# Patient Record
Sex: FEMALE | Race: WHITE | Employment: OTHER | URBAN - METROPOLITAN AREA
[De-identification: names, ages, dates, MRNs, and addresses within clinical notes are randomized per-mention and may not be internally consistent; named-entity substitution may affect disease eponyms.]

---

## 2014-10-29 LAB — COLONOSCOPY, EXTERNAL: NORMAL

## 2017-01-24 LAB
HBA1C MFR BLD HPLC: 8.1 %
LDL-C, EXTERNAL: 76
MICROALBUMIN UR TEST STR-MCNC: 26 MG/DL

## 2017-03-31 ENCOUNTER — TELEPHONE (OUTPATIENT)
Dept: INTERNAL MEDICINE CLINIC | Age: 67
End: 2017-03-31

## 2017-04-03 ENCOUNTER — HOSPITAL ENCOUNTER (OUTPATIENT)
Dept: VASCULAR SURGERY | Age: 67
Discharge: HOME | End: 2017-04-03
Attending: PODIATRIST
Payer: MEDICARE

## 2017-04-03 DIAGNOSIS — R09.89 PULSE PRESSURE DECREASE: ICD-10-CM

## 2017-04-03 PROCEDURE — 93923 UPR/LXTR ART STDY 3+ LVLS: CPT

## 2017-04-03 NOTE — PROCEDURES
[facility]  *** FINAL REPORT ***    Name: Zoltan Osorio  MRN: [id number]    Outpatient  : 15 May 1950  HIS Order #: [id number]  [address] [facility] Visit #: [id number]  Date: 2017    TYPE OF TEST: Peripheral Arterial Testing    REASON FOR TEST  Extremity ulceration, Pulse pressure decrease    Right Leg  Segmentals: Abnormal                     mmHg  Brachial         122  High thigh       192  Low thigh        154  Calf             112  Posterior tibial 110  Dorsalis pedis   101  Peroneal  Metatarsal  Toe pressure  Doppler:    Normal  Ankle/Brachial: 0.90    Left Leg  Segmentals: Normal                     mmHg  Brachial         122  High thigh  Low thigh        163  Calf             135  Posterior tibial 123  Dorsalis pedis   122  Peroneal  Metatarsal  Toe pressure  Doppler:    Normal  Ankle/Brachial: 1.01    INTERPRETATION/FINDINGS  PROCEDURE:  Multi-level lower extremity arterial segmental pressures,  CW Doppler waveforms and digital PPG waveforms were performed. 1. Mild peripheral arterial disease indicated at rest in the right  leg. 2. No evidence of significant peripheral arterial disease at rest in  the left leg. 3. The right ankle/brachial index is 0.90 and the left ankle/brachial  index is 1.01.    ADDITIONAL COMMENTS    I have personally reviewed the data relevant to the interpretation of  this  study.     TECHNOLOGIST: Bowen Wick RVT  Signed: 2017 02:53 PM    PHYSICIAN: Rosy Feliz MD  Signed: 2017 03:51 PM

## 2017-04-06 ENCOUNTER — HOSPITAL ENCOUNTER (OUTPATIENT)
Dept: LAB | Age: 67
Discharge: HOME | End: 2017-04-06
Payer: MEDICARE

## 2017-04-06 ENCOUNTER — OFFICE VISIT (OUTPATIENT)
Dept: INTERNAL MEDICINE CLINIC | Age: 67
End: 2017-04-06

## 2017-04-06 ENCOUNTER — HOSPITAL ENCOUNTER (OUTPATIENT)
Dept: GENERAL RADIOLOGY | Age: 67
Discharge: HOME | End: 2017-04-06
Attending: INTERNAL MEDICINE
Payer: MEDICARE

## 2017-04-06 VITALS
BODY MASS INDEX: 38.18 KG/M2 | DIASTOLIC BLOOD PRESSURE: 60 MMHG | TEMPERATURE: 98.1 F | WEIGHT: 237.6 LBS | HEIGHT: 66 IN | SYSTOLIC BLOOD PRESSURE: 110 MMHG | HEART RATE: 62 BPM | RESPIRATION RATE: 18 BRPM | OXYGEN SATURATION: 95 %

## 2017-04-06 DIAGNOSIS — E06.3 HYPOTHYROIDISM, ACQUIRED, AUTOIMMUNE: ICD-10-CM

## 2017-04-06 DIAGNOSIS — E78.2 MIXED HYPERLIPIDEMIA: ICD-10-CM

## 2017-04-06 DIAGNOSIS — I10 ESSENTIAL HYPERTENSION, BENIGN: ICD-10-CM

## 2017-04-06 DIAGNOSIS — E55.9 VITAMIN D DEFICIENCY: ICD-10-CM

## 2017-04-06 DIAGNOSIS — Z01.818 PREOP EXAMINATION: ICD-10-CM

## 2017-04-06 DIAGNOSIS — I25.10 ATHEROSCLEROSIS OF NATIVE CORONARY ARTERY OF NATIVE HEART WITHOUT ANGINA PECTORIS: ICD-10-CM

## 2017-04-06 DIAGNOSIS — I25.10 ATHEROSCLEROSIS OF NATIVE CORONARY ARTERY OF NATIVE HEART WITHOUT ANGINA PECTORIS: Primary | ICD-10-CM

## 2017-04-06 PROCEDURE — 71020 XR CHEST PA LAT: CPT

## 2017-04-06 PROCEDURE — 87081 CULTURE SCREEN ONLY: CPT

## 2017-04-06 NOTE — MR AVS SNAPSHOT
Visit Information Date & Time Provider Department Dept. Phone Encounter #  
 4/6/2017  1:20 PM Florence Mcnulty, [address] [facility] [phone] [id number] Follow-up Instructions Return if symptoms worsen or fail to improve. Your Appointments 11/2/2017  2:00 PM  
Medicare Physical with Florence Mcnulty MD  
[facility] Appt Note: cpe 1 yr  
 [address] [facility] [address]  
[address] [facility] [place] [address] Upcoming Health Maintenance Date Due DTaP/Tdap/Td series (1 - Tdap) 9/13/2005 EYE EXAM RETINAL OR DILATED Q1 5/14/2015 GLAUCOMA SCREENING Q2Y 5/14/2016 MICROALBUMIN Q1 1/18/2017 HEMOGLOBIN A1C Q6M 7/24/2017 BREAST CANCER SCRN MAMMOGRAM 8/24/2017 FOOT EXAM Q1 10/24/2017 MEDICARE YEARLY EXAM 11/1/2017 Pneumococcal 65+ Low/Medium Risk (2 of 2 - PPSV23) 1/4/2018 LIPID PANEL Q1 1/24/2018 COLONOSCOPY 10/31/2024 Allergies as of 4/6/2017  Review Complete On: 4/6/2017 By: Florence Mcnulty MD  
 No Known Allergies Current Immunizations  Reviewed on 12/12/2016 Name Date Influenza Vaccine Split 11/1/2010 Pneumococcal Vaccine (Unspecified Type) 1/4/2013, 1/1/2009 TD Vaccine 9/12/2005 Zoster Vaccine, Live 1/1/2016 Not reviewed this visit You Were Diagnosed With   
  
 Codes Comments Atherosclerosis of native coronary artery of native heart without angina pectoris    -  Primary ICD-10-CM: I25.10 ICD-9-CM: 414.01 Preop examination     ICD-10-CM: S40.134 ICD-9-CM: V72.84 Type 2 diabetes, uncontrolled, with neuropathy (HCC)     ICD-10-CM: E11.40, E11.65 ICD-9-CM: 250.62, 357.2 Essential hypertension, benign     ICD-10-CM: I10 
ICD-9-CM: 401.1 Mixed hyperlipidemia     ICD-10-CM: E78.2 ICD-9-CM: 272.2 Hypothyroidism, acquired, autoimmune     ICD-10-CM: E03.8 ICD-9-CM: 244.8 Vitamin D deficiency     ICD-10-CM: E55.9 ICD-9-CM: 268.9 Vitals BP Pulse Temp Resp Height(growth percentile) Weight(growth percentile) 110/60 (BP 1 Location: Right arm, BP Patient Position: Sitting) 62 98.1 °F (36.7 °C) (Oral) 18 5' 6\" (1.676 m) 237 lb 9.6 oz (107.8 kg) SpO2 BMI OB Status Smoking Status 95% 38.35 kg/m2 Postmenopausal Former Smoker BMI and BSA Data Body Mass Index Body Surface Area  
 38.35 kg/m 2 2.24 m 2 Preferred Pharmacy Pharmacy Name Phone [address] [place] [address] [facility], [address] [place] [address] [phone] Your Updated Medication List  
  
   
This list is accurate as of: 4/6/17  1:30 PM.  Always use your most recent med list.  
  
  
  
  
 aspirin 81 mg chewable tablet Take 81 mg by mouth daily. atorvastatin 40 mg tablet Commonly known as:  LIPITOR Take 1 Tab by mouth daily. b complex vitamins tablet Take 1 Tab by mouth daily. Bifidobacterium Infantis 4 mg Cap Commonly known as:  ALIGN As directed  
  
 clopidogrel 75 mg Tab Commonly known as:  PLAVIX Take 1 Tab by mouth daily. fenofibrate micronized 134 mg capsule Commonly known as:  LOFIBRA Take 134 mg by mouth daily. FISH OIL 1,000 mg Cap Generic drug:  omega-3 fatty acids-vitamin e Take 1 Cap by mouth daily. insulin CONCENTRATED regular 500 unit/mL Soln Commonly known as:  U-500 CONCENTRATED  
24-34 Units by SubCUTAneous route See Admin Instructions. EVERYDAY BEFORE MEALS PATIENT ADHERES TO FOLLOWING SLIDING SCALE INSULIN REGIMEN: FOR BLOOD GLUCOSE  MG/DL: 24 UNITS ON U-100 SYRINGE = 120 UNITS OF U-500 INSULIN, 151-250 MG/DL: 28 UNITS ON U-100 SYRINGE = 140 UNITS OF U-500 INSULIN, 251-350 MG/DL: 30 UNITS ON U-100 SYRINGE = 150 UNITS OF U-500 INSULIN, >351 MG/DL: 34 UNITS ON U-100 SYRINGE = 170 UNITS OF U-500 INSULIN  
  
 levothyroxine 125 mcg tablet Commonly known as:  SYNTHROID  
 Take 125 mcg by mouth every Monday. Take 2 tablets by mouth Tuesday-Sunday once daily before breakfast, and 1 tablet by mouth on Mondays once daily before breakfast  
  
 metoprolol succinate 50 mg XL tablet Commonly known as:  TOPROL XL Take 1 Tab by mouth daily. MOTRIN  mg tablet Generic drug:  ibuprofen Take 200 mg by mouth daily as needed for Pain. Indications: OSTEOARTHRITIS  
  
 nitroglycerin 0.4 mg SL tablet Commonly known as:  NITROSTAT  
1 Tab by SubLINGual route as needed for Chest Pain. PriLOSEC OTC 20 mg tablet Generic drug:  omeprazole Take 20 mg by mouth daily. Indications: GASTROESOPHAGEAL REFLUX PROzac 40 mg capsule Generic drug:  FLUoxetine Take 40 mg by mouth daily. THERAPEUTIC MULTIVIT/MINERAL 27-0.4 mg Tab Generic drug:  multivitamin,tx-iron-ca-min Take 1 Tab by mouth every evening. TRULICITY SC  
by SubCUTAneous route. We Performed the Following AMB POC EKG ROUTINE W/ 12 LEADS, INTER & REP [20644 CPT(R)] CBC WITH AUTOMATED DIFF [21095 CPT(R)] METABOLIC PANEL, COMPREHENSIVE [37968 CPT(R)] MRSA SCREENING CULTURE [65049 CPT(R)] PTH INTACT [96100 CPT(R)] VITAMIN D, 25 HYDROXY [id number] CPT(R)] Follow-up Instructions Return if symptoms worsen or fail to improve. To-Do List   
 04/06/2017 Imaging:  XR CHEST PA LAT Introducing [facility]! [employer] introduces [employer] patient portal. Now you can access parts of your medical record, email your doctor's office, and request medication refills online. 1. In your internet browser, go to [email or web address] 2. Click on the First Time User? Click Here link in the Sign In box. You will see the New Member Sign Up page. 3. Enter your [employer] Access Code exactly as it appears below. You will not need to use this code after youve completed the sign-up process.  If you do not sign up before the expiration date, you must request a new code. · [employer] Access Code: [email or web address] Expires: 6/25/2017  2:22 PM 
 
4. Enter the last four digits of your Social Security Number (xxxx) and Date of Birth (mm/dd/yyyy) as indicated and click Submit. You will be taken to the next sign-up page. 5. Create a [employer] ID. This will be your [employer] login ID and cannot be changed, so think of one that is secure and easy to remember. 6. Create a [employer] password. You can change your password at any time. 7. Enter your Password Reset Question and Answer. This can be used at a later time if you forget your password. 8. Enter your e-mail address. You will receive e-mail notification when new information is available in [address] 9. Click Sign Up. You can now view and download portions of your medical record. 10. Click the Download Summary menu link to download a portable copy of your medical information. If you have questions, please visit the Frequently Asked Questions section of the [employer] website. Remember, [employer] is NOT to be used for urgent needs. For medical emergencies, dial 911. Now available from your iPhone and Android! Please provide this summary of care documentation to your next provider. Your primary care clinician is listed as ZELDA MUÑOZ. If you have any questions after today's visit, please call [phone].

## 2017-04-06 NOTE — PROGRESS NOTES
HISTORY OF PRESENT ILLNESS  Lexi Holliday is a 78 y.o. female. GRACE Nath is seen today for follow up of diabetes and coronary disease as well as for cardiovascular preop evaluation for upcoming foot surgery. 1. CAD native artery native heart without angina. She has seen Dr. Bryanna Jimenez. She has been given instructions regarding her blood thinners. See below. She has had no active symptoms. 2. Diabetes type 2 uncontrolled with neuropathy with long term insulin use. She is up to date with follow up with her endocrinologist Dr. Shiraz Rudd. She has an occasional low reaction. She has chronic neuropathy as well as nonhealing foot ulcers. 3. Nonhealing foot ulcer. She has surgery scheduled with Dr. Jenna Rodríguez on 4/11/17 for a procedure to assist the healing of the wound. Apparently she requires some repositioning of her metatarsal bones. This will be outpatient surgery at [facility].  I have reviewed the preop requirements and these are ordered. Assessment: She is at moderate risk for medical complications based on history of coronary disease and significant diabetes. She is clinically stable. Based on recommendations of her specialists as well as the below recommendations, I feel the risks are acceptable. Recommendations. 1. She is to call Dr. Shiraz Rudd for directions on her insulin prior to surgery. 2. Jet Nath tells me she has seen Dr. Bryanna Jimenez and was given the OK to hold Plavix for 5 days but continue aspirin. I have asked her to make sure she discusses this with Dr. Jenna Rodríguez at her visit with him tomorrow. 3. Continue all other routine medications. 4. If hospitalization is required, consult the hospitalist service for assistance with her diabetic management. 5. Check fingerstick sugar on a prn basis prior to and post op. 6. We will obtain preop studies. Final approval would require review of these studies. She knows to get her labs and x-ray today. Her EKG is unremarkable.    7. Call if I may help with any post operative medical problems or questions. 8. She will call prn otherwise follow up as previously directed. MedDATA/gwo       Past Medical History:   Diagnosis Date    CAD (coronary artery disease) 2008    angio/mild    Diabetes [facility] [address]     Hypercholesterolemia     Hypothyroid     Ischemic colitis ([facility] [address] 11/1/2014    Menopause 2000    Neuropathy     Obesity     Thyroid disease     Vertigo      Past Surgical History:   Procedure Laterality Date    BREAST SURGERY PROCEDURE UNLISTED      benign breast cyst    HX AMPUTATION      right 2nd , 3rd  toes    HX MOHS PROCEDURES  2005    faith    HX ORTHOPAEDIC      right foot abscess     HX ORTHOPAEDIC  2007    rigtht foot surgery    HX ORTHOPAEDIC  2007    R [place] amputate    HX ORTHOPAEDIC  2009    left 2nd toe surgery    HX TONSILLECTOMY       Current Outpatient Prescriptions   Medication Sig    DULAGLUTIDE (TRULICITY SC) by SubCUTAneous route.  atorvastatin (LIPITOR) 40 mg tablet Take 1 Tab by mouth daily.  clopidogrel (PLAVIX) 75 mg tab Take 1 Tab by mouth daily.  nitroglycerin (NITROSTAT) 0.4 mg SL tablet 1 Tab by SubLINGual route as needed for Chest Pain.  metoprolol succinate (TOPROL XL) 50 mg XL tablet Take 1 Tab by mouth daily.  FLUoxetine (PROZAC) 40 mg capsule Take 40 mg by mouth daily.  omeprazole (PRILOSEC OTC) 20 mg tablet Take 20 mg by mouth daily. Indications: GASTROESOPHAGEAL REFLUX    b complex vitamins tablet Take 1 Tab by mouth daily.  ibuprofen (MOTRIN IB) 200 mg tablet Take 200 mg by mouth daily as needed for Pain. Indications: OSTEOARTHRITIS    levothyroxine (SYNTHROID) 125 mcg tablet Take 125 mcg by mouth every Monday. Take 2 tablets by mouth Tuesday-Sunday once daily before breakfast, and 1 tablet by mouth on Mondays once daily before breakfast    Bifidobacterium Infantis (ALIGN) 4 mg cap As directed (Patient taking differently: Take 1 Cap by mouth daily.  As directed)    fenofibrate micronized (LOFIBRA) 134 mg capsule Take 134 mg by mouth daily.  aspirin 81 mg chewable tablet Take 81 mg by mouth daily.  omega-3 fatty acids-vitamin e (FISH OIL) 1,000 mg cap Take 1 Cap by mouth daily.  insulin CONCENTRATED regular (U-500 CONCENTRATED) 500 unit/mL soln 24-34 Units by SubCUTAneous route See Admin Instructions. EVERYDAY BEFORE MEALS PATIENT ADHERES TO FOLLOWING SLIDING SCALE INSULIN REGIMEN:  FOR BLOOD GLUCOSE  MG/DL: 24 UNITS ON U-100 SYRINGE = 120 UNITS OF U-500 INSULIN, 151-250 MG/DL: 28 UNITS ON U-100 SYRINGE = 140 UNITS OF U-500 INSULIN, 251-350 MG/DL: 30 UNITS ON U-100 SYRINGE = 150 UNITS OF U-500 INSULIN, >351 MG/DL: 34 UNITS ON U-100 SYRINGE = 170 UNITS OF U-500 INSULIN      multivitamin,tx-iron-ca-min (THERAPEUTIC MULTIVIT/MINERAL) 27-0.4 mg Tab Take 1 Tab by mouth every evening. No current facility-administered medications for this visit. No Known Allergies  Family History   Problem Relation Age of Onset    Diabetes Father     Heart Attack Father      congestive heart faliure    Heart Disease Father      CHF    Diabetes Maternal Aunt     Diabetes Maternal Uncle      Social History     Social History    Marital status:      Spouse name: N/A    Number of children: N/A    Years of education: N/A     Occupational History    Not on file. Social History Main Topics    Smoking status: Former Smoker     Years: 5.00     Quit date: 2/7/1985    Smokeless tobacco: Never Used    Alcohol use No    Drug use: No    Sexual activity: Not on file     Other Topics Concern    Not on file     Social History Narrative         Review of Systems   Constitutional: Positive for diaphoresis. Negative for chills, fever and weight loss. Eyes: Positive for blurred vision. Respiratory: Negative. Cardiovascular: Negative for chest pain, palpitations, leg swelling and PND. Musculoskeletal: Negative for myalgias.    Neurological: Positive for tingling and sensory change. Negative for focal weakness. All other systems reviewed and are negative. Physical Exam   Constitutional: She appears well-nourished. HENT:   Mouth/Throat: No oropharyngeal exudate. Eyes: EOM are normal. Right eye exhibits no discharge. Left eye exhibits no discharge. Neck: Neck supple. Carotid bruit is not present. Cardiovascular: Normal rate and regular rhythm. Exam reveals no gallop and no friction rub. No murmur heard. Pulmonary/Chest: Effort normal and breath sounds normal. No respiratory distress. Abdominal: She exhibits no distension. There is no tenderness. Musculoskeletal: She exhibits edema. Mild bilateral lower extremity edema     Full foot exam per Dr Romi Joshi   Lymphadenopathy:     She has no cervical adenopathy. Neurological: She is alert. She exhibits normal muscle tone. Coordination normal.   Skin: Skin is warm and dry. Psychiatric: She has a normal mood and affect. Her behavior is normal.   Nursing note and vitals reviewed. ASSESSMENT and PLAN  Jana Cordero was seen today for pre-op exam.    Diagnoses and all orders for this visit:    Atherosclerosis of native coronary artery of native heart without angina pectoris  -     AMB POC EKG ROUTINE W/ 12 LEADS, INTER & REP  -     XR CHEST PA LAT; Future        - See cardiologist as directed. Preop examination  -     MRSA SCREENING CULTURE    Type 2 diabetes, uncontrolled, with neuropathy [facility])- See endocrinologist as directed. -     CBC WITH AUTOMATED DIFF  -     METABOLIC PANEL, COMPREHENSIVE    Essential hypertension, benign- Continue current regimen of prescription and / or OTC medications     Mixed hyperlipidemia- See endocrinologist as directed. Hypothyroidism, acquired, autoimmune- See endocrinologist as directed. Vitamin D deficiency  -     VITAMIN D, 25 HYDROXY  -     PTH INTACT    This has been fully explained to the patient, who indicates understanding.

## 2017-04-07 LAB
25(OH)D3+25(OH)D2 SERPL-MCNC: 39 NG/ML (ref 30–100)
ALBUMIN SERPL-MCNC: 4.1 G/DL (ref 3.6–4.8)
ALBUMIN/GLOB SERPL: 1.2 {RATIO} (ref 1.2–2.2)
ALP SERPL-CCNC: 173 IU/L (ref 39–117)
ALT SERPL-CCNC: 21 IU/L (ref 0–32)
AST SERPL-CCNC: 24 IU/L (ref 0–40)
BASOPHILS # BLD AUTO: 0 X10E3/UL (ref 0–0.2)
BASOPHILS NFR BLD AUTO: 0 %
BILIRUB SERPL-MCNC: 0.3 MG/DL (ref 0–1.2)
BUN SERPL-MCNC: 34 MG/DL (ref 8–27)
BUN/CREAT SERPL: 33 (ref 12–28)
CALCIUM SERPL-MCNC: 9.6 MG/DL (ref 8.7–10.3)
CHLORIDE SERPL-SCNC: 99 MMOL/L (ref 96–106)
CO2 SERPL-SCNC: 21 MMOL/L (ref 18–29)
CREAT SERPL-MCNC: 1.03 MG/DL (ref 0.57–1)
EOSINOPHIL # BLD AUTO: 0.2 X10E3/UL (ref 0–0.4)
EOSINOPHIL NFR BLD AUTO: 2 %
ERYTHROCYTE [DISTWIDTH] IN BLOOD BY AUTOMATED COUNT: 15.2 % (ref 12.3–15.4)
GLOBULIN SER CALC-MCNC: 3.3 G/DL (ref 1.5–4.5)
GLUCOSE SERPL-MCNC: 66 MG/DL (ref 65–99)
HCT VFR BLD AUTO: 38.2 % (ref 34–46.6)
HGB BLD-MCNC: 12.5 G/DL (ref 11.1–15.9)
IMM GRANULOCYTES # BLD: 0.1 X10E3/UL (ref 0–0.1)
IMM GRANULOCYTES NFR BLD: 1 %
LYMPHOCYTES # BLD AUTO: 2.3 X10E3/UL (ref 0.7–3.1)
LYMPHOCYTES NFR BLD AUTO: 24 %
MCH RBC QN AUTO: 27.5 PG (ref 26.6–33)
MCHC RBC AUTO-ENTMCNC: 32.7 G/DL (ref 31.5–35.7)
MCV RBC AUTO: 84 FL (ref 79–97)
MONOCYTES # BLD AUTO: 1.1 X10E3/UL (ref 0.1–0.9)
MONOCYTES NFR BLD AUTO: 11 %
NEUTROPHILS # BLD AUTO: 6.1 X10E3/UL (ref 1.4–7)
NEUTROPHILS NFR BLD AUTO: 62 %
PLATELET # BLD AUTO: 309 X10E3/UL (ref 150–379)
POTASSIUM SERPL-SCNC: 4.5 MMOL/L (ref 3.5–5.2)
PROT SERPL-MCNC: 7.4 G/DL (ref 6–8.5)
PTH-INTACT SERPL-MCNC: 18 PG/ML (ref 15–65)
RBC # BLD AUTO: 4.54 X10E6/UL (ref 3.77–5.28)
SODIUM SERPL-SCNC: 140 MMOL/L (ref 134–144)
WBC # BLD AUTO: 9.8 X10E3/UL (ref 3.4–10.8)

## 2017-04-07 NOTE — PROGRESS NOTES
Call- cxr is fine and Labs look great overall .  The kidney function shows she may be slightly dehydrated so make sure she drinks adequate fluids

## 2017-04-07 NOTE — PROGRESS NOTES
Advised pt hercxr is fine and labs look great overall. The kidney function shows she may be slightly dehydrated so make sure she drinks adequate fluids.

## 2017-04-08 LAB — MRSA SPEC QL CULT: NEGATIVE

## 2017-04-11 NOTE — PERIOP NOTES
[address]  Preoperative Instructions        Surgery Date 4/18/17          Time of Arrival 10:00am    1. On the day of your surgery, please report to the Surgical Services Registration Desk and sign in at your designated time. The Surgery Center is located to the right of the Emergency Room. 2. You must have someone with you to drive you home. You should not drive a car for 24 hours following surgery. Please make arrangements for a friend or family member to stay with you for the first 24 hours after your surgery. 3. Do not have anything to eat or drink (including water, gum, mints, coffee, juice) after midnight 4/17/17             . This may not apply to medications prescribed by your physician. Please note special instructions, if applicable. If you are currently taking Plavix, Coumadin, or other blood-thinning agents, contact your surgeon for instructions. 4. We recommend you do not drink any alcoholic beverages for 24 hours before and after your surgery. 5. Have a list of all current medications, including vitamins, herbal supplements and any other over the counter medications. Stop all Aspirin and non-steroidal anti-inflammatory drugs (I.e. Advil, Aleve), as directed by your surgeon's office. Stop all vitamins and herbal supplements seven days prior to your surgery. 6. Wear comfortable clothes. Wear glasses instead of contacts. Do not bring any money or jewelry. Please bring picture ID, insurance card, and any prearranged co-payment or hospital payment. Do not wear make-up, particularly mascara the morning of your surgery. Do not wear nail polish, particularly if you are having foot /hand surgery. Wear your hair loose or down, no ponytails, buns, sruthi pins or clips. All body piercings must be removed.   Please shower with antibacterial soap for three consecutive days before and on the morning of surgery, but do not apply any lotions, powders or deodorants after the shower on the day of surgery. Please use a fresh towels after each shower. Please sleep in clean clothes and change bed linens the night before surgery. Please do not shave for 48 hours prior to surgery. Shaving of the face is acceptable. 7. You should understand that if you do not follow these instructions your surgery may be cancelled. If your physical condition changes (I.e. fever, cold or flu) please contact your surgeon as soon as possible. 8. It is important that you be on time. If a situation occurs where you may be late, please call [phone] (OR Holding Area). 9. If you have any questions and or problems, please call [phone] (Pre-admission Testing). 10. Your surgery time may be subject to change. You will receive a phone call the evening prior if your time changes. 11.  If having outpatient surgery, you must have someone to drive you here, stay with you during the duration of your stay, and to drive you home at time of discharge. Special Instructions:Pt stated she was advised by Dr Bridger Torres to cont. Aspirin up to day of surgery and to hold Plavix starting 4/14/17 for surgery    MEDICATIONS TO TAKE THE MORNING OF SURGERY WITH A SIP OF WATER:Metoprolol, Prilosec. I understand a pre-operative phone call will be made to verify my surgery time. In the event that I am not available, I give permission for a message to be left on my answering service and/or with another person?   yes         ___________________      __________   _________    (Signature of Patient)             (Witness)                (Date and Time)

## 2017-04-17 ENCOUNTER — TELEPHONE (OUTPATIENT)
Dept: INTERNAL MEDICINE CLINIC | Age: 67
End: 2017-04-17

## 2017-04-18 ENCOUNTER — HOSPITAL ENCOUNTER (OUTPATIENT)
Age: 67
Setting detail: OUTPATIENT SURGERY
Discharge: HOME | End: 2017-04-18
Attending: PODIATRIST | Admitting: PODIATRIST
Payer: MEDICARE

## 2017-04-18 ENCOUNTER — ANESTHESIA (OUTPATIENT)
Dept: SURGERY | Age: 67
End: 2017-04-18
Payer: MEDICARE

## 2017-04-18 ENCOUNTER — ANESTHESIA EVENT (OUTPATIENT)
Dept: SURGERY | Age: 67
End: 2017-04-18
Payer: MEDICARE

## 2017-04-18 VITALS
DIASTOLIC BLOOD PRESSURE: 57 MMHG | RESPIRATION RATE: 18 BRPM | SYSTOLIC BLOOD PRESSURE: 140 MMHG | TEMPERATURE: 98.1 F | HEART RATE: 69 BPM | WEIGHT: 238.54 LBS | BODY MASS INDEX: 38.34 KG/M2 | HEIGHT: 66 IN | OXYGEN SATURATION: 95 %

## 2017-04-18 LAB
GLUCOSE BLD STRIP.AUTO-MCNC: 147 MG/DL (ref 65–100)
GLUCOSE BLD STRIP.AUTO-MCNC: 179 MG/DL (ref 65–100)
SERVICE CMNT-IMP: ABNORMAL
SERVICE CMNT-IMP: ABNORMAL

## 2017-04-18 PROCEDURE — 77030011640 HC PAD GRND REM COVD -A: Performed by: PODIATRIST

## 2017-04-18 PROCEDURE — 74011000250 HC RX REV CODE- 250

## 2017-04-18 PROCEDURE — 77030006788 HC BLD SAW OSC STRY -B: Performed by: PODIATRIST

## 2017-04-18 PROCEDURE — 74011250636 HC RX REV CODE- 250/636: Performed by: PODIATRIST

## 2017-04-18 PROCEDURE — 77030018836 HC SOL IRR NACL ICUM -A: Performed by: PODIATRIST

## 2017-04-18 PROCEDURE — 77030036687 HC SHOE PSTOP S2SG -A

## 2017-04-18 PROCEDURE — 77030008841 HC WRE K MCRA -A: Performed by: PODIATRIST

## 2017-04-18 PROCEDURE — 77030002916 HC SUT ETHLN J&J -A: Performed by: PODIATRIST

## 2017-04-18 PROCEDURE — C1713 ANCHOR/SCREW BN/BN,TIS/BN: HCPCS | Performed by: PODIATRIST

## 2017-04-18 PROCEDURE — 74011250636 HC RX REV CODE- 250/636: Performed by: ANESTHESIOLOGY

## 2017-04-18 PROCEDURE — 76210000006 HC OR PH I REC 0.5 TO 1 HR: Performed by: PODIATRIST

## 2017-04-18 PROCEDURE — 77030020274 HC MISC IMPL ORTHOPEDIC: Performed by: PODIATRIST

## 2017-04-18 PROCEDURE — 82962 GLUCOSE BLOOD TEST: CPT

## 2017-04-18 PROCEDURE — 74011250636 HC RX REV CODE- 250/636

## 2017-04-18 PROCEDURE — 77030020782 HC GWN BAIR PAWS FLX 3M -B

## 2017-04-18 PROCEDURE — 74011000272 HC RX REV CODE- 272: Performed by: PODIATRIST

## 2017-04-18 PROCEDURE — 76060000034 HC ANESTHESIA 1.5 TO 2 HR: Performed by: PODIATRIST

## 2017-04-18 PROCEDURE — 77030031139 HC SUT VCRL2 J&J -A: Performed by: PODIATRIST

## 2017-04-18 PROCEDURE — 74011000250 HC RX REV CODE- 250: Performed by: PODIATRIST

## 2017-04-18 PROCEDURE — 76010000153 HC OR TIME 1.5 TO 2 HR: Performed by: PODIATRIST

## 2017-04-18 PROCEDURE — 77030004414 HC BUR PEAR STRY -B: Performed by: PODIATRIST

## 2017-04-18 PROCEDURE — 76210000020 HC REC RM PH II FIRST 0.5 HR: Performed by: PODIATRIST

## 2017-04-18 RX ORDER — DIPHENHYDRAMINE HYDROCHLORIDE 50 MG/ML
12.5 INJECTION, SOLUTION INTRAMUSCULAR; INTRAVENOUS AS NEEDED
Status: CANCELLED | OUTPATIENT
Start: 2017-04-18 | End: 2017-04-18

## 2017-04-18 RX ORDER — HYDROMORPHONE HYDROCHLORIDE 1 MG/ML
0.2 INJECTION, SOLUTION INTRAMUSCULAR; INTRAVENOUS; SUBCUTANEOUS
Status: CANCELLED | OUTPATIENT
Start: 2017-04-18

## 2017-04-18 RX ORDER — SODIUM CHLORIDE, SODIUM LACTATE, POTASSIUM CHLORIDE, CALCIUM CHLORIDE 600; 310; 30; 20 MG/100ML; MG/100ML; MG/100ML; MG/100ML
25 INJECTION, SOLUTION INTRAVENOUS CONTINUOUS
Status: DISCONTINUED | OUTPATIENT
Start: 2017-04-18 | End: 2017-04-18 | Stop reason: HOSPADM

## 2017-04-18 RX ORDER — SODIUM CHLORIDE, SODIUM LACTATE, POTASSIUM CHLORIDE, CALCIUM CHLORIDE 600; 310; 30; 20 MG/100ML; MG/100ML; MG/100ML; MG/100ML
25 INJECTION, SOLUTION INTRAVENOUS CONTINUOUS
Status: CANCELLED | OUTPATIENT
Start: 2017-04-18

## 2017-04-18 RX ORDER — PROPOFOL 10 MG/ML
INJECTION, EMULSION INTRAVENOUS AS NEEDED
Status: DISCONTINUED | OUTPATIENT
Start: 2017-04-18 | End: 2017-04-18 | Stop reason: HOSPADM

## 2017-04-18 RX ORDER — LIDOCAINE HYDROCHLORIDE 10 MG/ML
0.1 INJECTION, SOLUTION EPIDURAL; INFILTRATION; INTRACAUDAL; PERINEURAL AS NEEDED
Status: DISCONTINUED | OUTPATIENT
Start: 2017-04-18 | End: 2017-04-18 | Stop reason: HOSPADM

## 2017-04-18 RX ORDER — FENTANYL CITRATE 50 UG/ML
INJECTION, SOLUTION INTRAMUSCULAR; INTRAVENOUS AS NEEDED
Status: DISCONTINUED | OUTPATIENT
Start: 2017-04-18 | End: 2017-04-18 | Stop reason: HOSPADM

## 2017-04-18 RX ORDER — MIDAZOLAM HYDROCHLORIDE 1 MG/ML
INJECTION, SOLUTION INTRAMUSCULAR; INTRAVENOUS AS NEEDED
Status: DISCONTINUED | OUTPATIENT
Start: 2017-04-18 | End: 2017-04-18 | Stop reason: HOSPADM

## 2017-04-18 RX ORDER — SODIUM CHLORIDE 0.9 % (FLUSH) 0.9 %
5-10 SYRINGE (ML) INJECTION AS NEEDED
Status: CANCELLED | OUTPATIENT
Start: 2017-04-18

## 2017-04-18 RX ORDER — BUPIVACAINE HYDROCHLORIDE AND EPINEPHRINE 5; 5 MG/ML; UG/ML
INJECTION, SOLUTION EPIDURAL; INTRACAUDAL; PERINEURAL AS NEEDED
Status: DISCONTINUED | OUTPATIENT
Start: 2017-04-18 | End: 2017-04-18 | Stop reason: HOSPADM

## 2017-04-18 RX ORDER — OXYCODONE AND ACETAMINOPHEN 5; 325 MG/1; MG/1
1 TABLET ORAL
Qty: 25 TAB | Refills: 0 | Status: SHIPPED | OUTPATIENT
Start: 2017-04-18 | End: 2017-11-02 | Stop reason: ALTCHOICE

## 2017-04-18 RX ORDER — FENTANYL CITRATE 50 UG/ML
25 INJECTION, SOLUTION INTRAMUSCULAR; INTRAVENOUS
Status: CANCELLED | OUTPATIENT
Start: 2017-04-18

## 2017-04-18 RX ORDER — PROPOFOL 10 MG/ML
INJECTION, EMULSION INTRAVENOUS
Status: DISCONTINUED | OUTPATIENT
Start: 2017-04-18 | End: 2017-04-18 | Stop reason: HOSPADM

## 2017-04-18 RX ORDER — PROMETHAZINE HYDROCHLORIDE 25 MG/1
25 TABLET ORAL
Qty: 20 TAB | Refills: 0 | Status: SHIPPED | OUTPATIENT
Start: 2017-04-18 | End: 2017-11-02 | Stop reason: ALTCHOICE

## 2017-04-18 RX ORDER — SODIUM CHLORIDE 0.9 % (FLUSH) 0.9 %
5-10 SYRINGE (ML) INJECTION EVERY 8 HOURS
Status: DISCONTINUED | OUTPATIENT
Start: 2017-04-18 | End: 2017-04-18 | Stop reason: HOSPADM

## 2017-04-18 RX ORDER — SODIUM CHLORIDE 0.9 % (FLUSH) 0.9 %
5-10 SYRINGE (ML) INJECTION AS NEEDED
Status: DISCONTINUED | OUTPATIENT
Start: 2017-04-18 | End: 2017-04-18 | Stop reason: HOSPADM

## 2017-04-18 RX ADMIN — VANCOMYCIN HYDROCHLORIDE 1000 MG: 1 INJECTION, POWDER, LYOPHILIZED, FOR SOLUTION INTRAVENOUS at 11:09

## 2017-04-18 RX ADMIN — FENTANYL CITRATE 25 MCG: 50 INJECTION, SOLUTION INTRAMUSCULAR; INTRAVENOUS at 12:11

## 2017-04-18 RX ADMIN — MIDAZOLAM HYDROCHLORIDE 2 MG: 1 INJECTION, SOLUTION INTRAMUSCULAR; INTRAVENOUS at 12:08

## 2017-04-18 RX ADMIN — PROPOFOL 100 MCG/KG/MIN: 10 INJECTION, EMULSION INTRAVENOUS at 12:11

## 2017-04-18 RX ADMIN — SODIUM CHLORIDE, SODIUM LACTATE, POTASSIUM CHLORIDE, AND CALCIUM CHLORIDE: 600; 310; 30; 20 INJECTION, SOLUTION INTRAVENOUS at 13:34

## 2017-04-18 RX ADMIN — PROPOFOL 50 MG: 10 INJECTION, EMULSION INTRAVENOUS at 12:35

## 2017-04-18 RX ADMIN — SODIUM CHLORIDE, SODIUM LACTATE, POTASSIUM CHLORIDE, AND CALCIUM CHLORIDE 25 ML/HR: 600; 310; 30; 20 INJECTION, SOLUTION INTRAVENOUS at 11:09

## 2017-04-18 NOTE — ANESTHESIA PREPROCEDURE EVALUATION
Anesthetic History   No history of anesthetic complications            Review of Systems / Medical History  Patient summary reviewed, nursing notes reviewed and pertinent labs reviewed    Pulmonary        Sleep apnea           Neuro/Psych         Psychiatric history     Cardiovascular    Hypertension          CAD    Exercise tolerance: >4 METS     GI/Hepatic/Renal     GERD           Endo/Other    Diabetes  Hypothyroidism  Obesity and arthritis     Other Findings            Physical Exam    Airway  Mallampati: III  TM Distance: 4 - 6 cm  Neck ROM: normal range of motion   Mouth opening: Normal     Cardiovascular  Regular rate and rhythm,  S1 and S2 normal,  no murmur, click, rub, or gallop             Dental  No notable dental hx       Pulmonary  Breath sounds clear to auscultation               Abdominal  GI exam deferred       Other Findings            Anesthetic Plan    ASA: 3  Anesthesia type: general and total IV anesthesia    Monitoring Plan: BIS      Induction: Intravenous  Anesthetic plan and risks discussed with: Patient

## 2017-04-18 NOTE — OP NOTES
[address], [address]   OP NOTE       Name:  Ambrosio Hoffman   MR#:  [id number]   :  1950   Account #:  [de-identified]    Surgery Date:  2017   Date of Adm:  2017       PREOPERATIVE DIAGNOSIS: Plantar-flexed 3rd metatarsal, right   foot, with chronic ulceration. POSTOPERATIVE DIAGNOSIS: Plantar-flexed 3rd metatarsal, right   foot, with chronic ulceration. PROCEDURES PERFORMED   1. Elevating osteotomy, 3rd metatarsal, right foot. 2. Excisional debridement of ulcer, and application of Clarix amniotic   graft. SURGEON: Zenon Avery DPM    ANESTHESIA: Monitored anesthesia care. ESTIMATED BLOOD LOSS: 10 mL. COMPLICATIONS: None. SPECIMENS REMOVED: None. INDICATIONS: The patient has a chronic ulceration on the plantar   aspect of her right foot. We have tried multiple forms of conservative   therapy, and not able to get rid of the ulceration. I have discussed the   surgery, the risks and the complications with her, she is agreeable. DESCRIPTION OF PROCEDURE: She was taken to the operating   room, placed on the operating table in supine position. After adequate   induction of anesthesia, the patient was prepped and draped in the   usual sterile manner. Attention was directed towards the right foot,   where an incision was made over the dorsal aspect of the right foot,   after the ulcer was covered with an impervious, sticky dressing of   DuoDerm. The incision was carried down to the level of bone. An   McGlamry elevator was placed into the metatarsophalangeal joint, and   freed up the metatarsophalangeal joint. A Weil osteotomy was   performed in the 3rd metatarsal. The osteotomy was slid back. Under   C-arm, we could see a significant change in position. We stabilized   with two 0.045 K-wires. Each 0.045 K-wire was replaced with a 2.0   twist-off screw, a #11 and a #12.  Once both twist-off screws were in place, the overhanging shaft on the 3rd metatarsal was resected with a   rongeur. The area was flushed. The deep structures reapproximated   with 4-0 Vicryl, and the skin was reapproximated with a 4-0 nylon. At   this point, I covered the dorsal aspect of the wound. The skin ulcer was   excisionally debrided with a 10 blade down to healthy bleeding tissue. Clarix graft was placed into the site, and sutured in place with a 4-0   nylon. I left the ends of the 1 strand long to create a bolster dressing   on the plantar aspect of the foot. Once we had done this, the patient   was placed in a dry sterile dressing, and taken from the operating room   to the recovery room in stable condition.         [facility]   D:  04/18/2017   14:09   T:  04/18/2017   14:30   Job #:  [id number]

## 2017-04-18 NOTE — IP AVS SNAPSHOT
[address] [facility] 
[phone] Patient: Francisco Vasquez MRN: [id number] WUT:[id number] You are allergic to the following No active allergies Recent Documentation Height Weight BMI OB Status Smoking Status 1.676 m 108.2 kg 38.5 kg/m2 Postmenopausal Former Smoker Emergency Contacts Name Discharge Info Relation Home Work Mobile Loco Hernandez 11  Other Relative [6] [phone] About your hospitalization You were admitted on:  April 18, 2017 You last received care in the:  [facility] You were discharged on:  April 18, 2017 Unit phone number:  [phone] Why you were hospitalized Your primary diagnosis was:  Not on File Providers Seen During Your Hospitalizations Provider Role Specialty Primary office phone Debora Aguero DPM Attending Provider Podiatry [phone] Your Primary Care Physician (PCP) Primary Care Physician Office Phone Office Fax Kristine Houston [phone] Follow-up Information Follow up With Details Comments Contact Info Gilbert Potter MD   [address] [facility] [address] [place] [address] 
[phone] Current Discharge Medication List  
  
START taking these medications Dose & Instructions Dispensing Information Comments Morning Noon Evening Bedtime  
 oxyCODONE-acetaminophen 5-325 mg per tablet Commonly known as:  PERCOCET Your last dose was: Your next dose is:    
   
   
 Dose:  1 Tab Take 1 Tab by mouth every four (4) hours as needed for Pain. Max Daily Amount: 6 Tabs. Indications: Pain Quantity:  25 Tab Refills:  0  
     
   
   
   
  
 promethazine 25 mg tablet Commonly known as:  PHENERGAN Your last dose was:     
   
Your next dose is:    
   
   
 Dose:  25 mg  
 Take 1 Tab by mouth every six (6) hours as needed for Nausea. Indications: Pain Treatment Adjunct, POST-OPERATIVE NAUSEA AND VOMITING Quantity:  20 Tab Refills:  0 CONTINUE these medications which have CHANGED Dose & Instructions Dispensing Information Comments Morning Noon Evening Bedtime Bifidobacterium Infantis 4 mg Cap Commonly known as:  ALIGN What changed:   
- how much to take 
- how to take this - when to take this 
- additional instructions Your last dose was: Your next dose is: As directed Quantity:  30 capsule Refills:  11 CONTINUE these medications which have NOT CHANGED Dose & Instructions Dispensing Information Comments Morning Noon Evening Bedtime  
 aspirin 81 mg chewable tablet Your last dose was: Your next dose is:    
   
   
 Dose:  81 mg Take 81 mg by mouth daily. Refills:  0  
     
   
   
   
  
 atorvastatin 40 mg tablet Commonly known as:  LIPITOR Your last dose was: Your next dose is:    
   
   
 Dose:  40 mg Take 1 Tab by mouth daily. Quantity:  30 Tab Refills:  6  
     
   
   
   
  
 b complex vitamins tablet Your last dose was: Your next dose is:    
   
   
 Dose:  1 Tab Take 1 Tab by mouth daily. Refills:  0  
     
   
   
   
  
 clopidogrel 75 mg Tab Commonly known as:  PLAVIX Your last dose was: Your next dose is:    
   
   
 Dose:  75 mg Take 1 Tab by mouth daily. Quantity:  30 Tab Refills:  6  
     
   
   
   
  
 fenofibrate micronized 134 mg capsule Commonly known as:  LOFIBRA Your last dose was: Your next dose is:    
   
   
 Dose:  134 mg Take 134 mg by mouth daily. Refills:  0  
     
   
   
   
  
 FISH OIL 1,000 mg Cap Generic drug:  omega-3 fatty acids-vitamin e Your last dose was: Your next dose is:    
   
   
 Dose:  1 Cap Take 1 Cap by mouth daily. Refills:  0  
     
   
   
   
  
 insulin CONCENTRATED regular 500 unit/mL Soln Commonly known as:  U-500 CONCENTRATED Your last dose was: Your next dose is:    
   
   
 Dose:  24-34 Units 24-34 Units by SubCUTAneous route See Admin Instructions. EVERYDAY BEFORE MEALS PATIENT ADHERES TO FOLLOWING SLIDING SCALE INSULIN REGIMEN: FOR BLOOD GLUCOSE  MG/DL: 24 UNITS ON U-100 SYRINGE = 120 UNITS OF U-500 INSULIN, 151-250 MG/DL: 28 UNITS ON U-100 SYRINGE = 140 UNITS OF U-500 INSULIN, 251-350 MG/DL: 30 UNITS ON U-100 SYRINGE = 150 UNITS OF U-500 INSULIN, >351 MG/DL: 34 UNITS ON U-100 SYRINGE = 170 UNITS OF U-500 INSULIN Refills:  0  
     
   
   
   
  
 levothyroxine 125 mcg tablet Commonly known as:  SYNTHROID Your last dose was: Your next dose is:    
   
   
 Dose:  125 mcg Take 125 mcg by mouth every Monday. Take 2 tablets by mouth Tuesday-Sunday once daily before breakfast, and 1 tablet by mouth on Mondays once daily before breakfast  
 Refills:  0  
     
   
   
   
  
 metoprolol succinate 50 mg XL tablet Commonly known as:  TOPROL XL Your last dose was: Your next dose is:    
   
   
 Dose:  50 mg Take 1 Tab by mouth daily. Quantity:  30 Tab Refills:  6 MOTRIN  mg tablet Generic drug:  ibuprofen Your last dose was: Your next dose is:    
   
   
 Dose:  200 mg Take 200 mg by mouth daily as needed for Pain. Indications: OSTEOARTHRITIS Refills:  0  
     
   
   
   
  
 nitroglycerin 0.4 mg SL tablet Commonly known as:  NITROSTAT Your last dose was: Your next dose is:    
   
   
 Dose:  0.4 mg  
1 Tab by SubLINGual route as needed for Chest Pain. Quantity:  1 Bottle Refills:  6 PriLOSEC OTC 20 mg tablet Generic drug:  omeprazole Your last dose was:     
   
Your next dose is:    
   
   
 Dose:  20 mg  
 Take 20 mg by mouth daily. Indications: GASTROESOPHAGEAL REFLUX Refills:  0 PROzac 40 mg capsule Generic drug:  FLUoxetine Your last dose was: Your next dose is:    
   
   
 Dose:  40 mg Take 40 mg by mouth nightly. Refills:  0  
     
   
   
   
  
 THERAPEUTIC MULTIVIT/MINERAL 27-0.4 mg Tab Generic drug:  multivitamin,tx-iron-ca-min Your last dose was: Your next dose is:    
   
   
 Dose:  1 Tab Take 1 Tab by mouth every evening. Refills:  0  
     
   
   
   
  
 TRULICITY SC Your last dose was: Your next dose is:    
   
   
 by SubCUTAneous route. 1.5 units every saturday Refills:  0 Where to Get Your Medications Information on where to get these meds will be given to you by the nurse or doctor. ! Ask your nurse or doctor about these medications  
  oxyCODONE-acetaminophen 5-325 mg per tablet  
 promethazine 25 mg tablet Discharge Instructions INSTRUCTIONS FOLLOWING FOOT SURGERY 
 
ACTIVITY: 
· Elevate feet for 48 hours · Use ice as directed by your doctor · Use crutches / walker as directed by your doctor, and follow your doctors instructions as to your weight bearing status - as little weight as possible on the right foot DIET: 
· Clear liquids until no nausea or vomiting; then light diet for the first day · An advance to regular diet on second day, unless your doctor orders otherwise. PAIN: 
· Take pain medication as directed by your doctor. · Call your doctor if pain is not relieved by medication. · Do not take aspirin or blood thinners until directed by your doctor. DRESSING CARE: keep dressing clean and dry, do not remove FOLLOW-UP PHONE CALLS: 
· Calls will be made by nursing staff. · If you had any problems, call your doctor as needed.  
 
CALL YOUR DOCTOR IF: 
· Excessive bleeding that does not stop after holding mild pressure over the area · Temperature of 101° F or above · Redness, excessive swelling or bruising, and/or green or yellow, smelly discharge from incision · Loss of sensation cold, white, or blue toes AFTER ANESTHESIA :  
· For the first 24 hours: do not drive, drink alcoholic beverages, or make important decisions. · Be aware of dizziness following anesthesia and while taking pain medication. DISCHARGE MEDICATIONS:  
   
 
APPOINTMENT DATE/TIME: please call for an appointment YOUR DOCTORS PHONE NUMBER: [phone] Patients signature: 
Date: 4/18/2017 Discharging Nurse:  
 
 
DISCHARGE SUMMARY from Nurse The following personal items are in your possession at time of discharge: 
 
Dental Appliances: None Visual Aid: Glasses, With patient Hearing Aids/Status: Does not own Home Medications: None Jewelry: None Clothing: Undergarments, With patient (home clothing) Other Valuables: Eyeglasses, Other (comment) (sun glasses) Personal Items Sent to Safe: Denies need to send valuables to security PATIENT INSTRUCTIONS: 
 
 
F-face looks uneven A-arms unable to move or move unevenly S-speech slurred or non-existent T-time-call 911 as soon as signs and symptoms begin-DO NOT go Back to bed or wait to see if you get better-TIME IS BRAIN. Warning Signs of HEART ATTACK Call 911 if you have these symptoms: 
? Chest discomfort. Most heart attacks involve discomfort in the center of the chest that lasts more than a few minutes, or that goes away and comes back. It can feel like uncomfortable pressure, squeezing, fullness, or pain. ? Discomfort in other areas of the upper body. Symptoms can include pain or discomfort in one or both arms, the back, neck, jaw, or stomach. ? Shortness of breath with or without chest discomfort. ? Other signs may include breaking out in a cold sweat, nausea, or lightheadedness. Don't wait more than five minutes to call [address]! Fast action can save your life. Calling 911 is almost always the fastest way to get lifesaving treatment. Emergency Medical Services staff can begin treatment when they arrive  up to an hour sooner than if someone gets to the hospital by car. The discharge information has been reviewed with the patient and caregiver. The patient and caregiver verbalized understanding. Discharge medications reviewed with the patient and caregiver and appropriate educational materials and side effects teaching were provided. Narcotic-Analgesic/Acetaminophen (By mouth) Relieves pain. Brand Name(s):Capital w/Codeine, Endocet, Hycet, Lorcet, Lorcet HD, Lorcet Plus, Lortab 10/325, Lortab 5/325, Lortab 7.5/325, Lortab Elixir, Norco, Percocet, [place], [place], Trezix There may be other brand names for this medicine. When This Medicine Should Not Be Used: You should not use this medicine if you have had an allergic reaction to acetaminophen, codeine, hydrocodone, propoxyphene, or sulfites. You should not use this medicine if you have had an allergic reaction to any other opioid pain medicine. How to Use This Medicine:  
Liquid, Tablet, Capsule · Your doctor will tell you how much medicine to use. Do not use more than directed. · This medicine contains acetaminophen. Read the labels of all other medicines you are using to see if they also contain acetaminophen, or ask your doctor or pharmacist. [facility] not use more than 4 grams (4,000 milligrams) total of acetaminophen in one day. · Drink plenty of liquids to help avoid constipation. If a dose is missed: · Some of these medicines need to be used on a fixed schedule. If you miss a dose or forget to use your medicine, call your doctor pharmacist for instructions. Do not use extra medicine to make up for a missed dose. How to Store and Dispose of This Medicine: · Store the medicine in a closed container at room temperature, away from heat, moisture, and direct light. Do not refrigerate or freeze the medicine. · Ask your pharmacist, doctor, or health caregiver about the best way to dispose of any outdated medicine or medicine no longer needed. · Keep all medicine out of the reach of children. Never share your medicine with anyone. Drugs and Foods to Avoid: Ask your doctor or pharmacist before using any other medicine, including over-the-counter medicines, vitamins, and herbal products. · Make sure your doctor knows if you are using a monoamine oxidase inhibitor (MAOI) medicine, such as Eldepryl®, Marplan®, [place], or Parnate®. Make sure your doctor knows if you are also using a medicine to treat depression, such as amitriptyline, doxepin, nortriptyline, Elavil®, Pamelor®, or Sinequan®. Make sure your doctor knows if you are taking an anticholinergic medicine, such as atropine, methscopolamine, or scopolamine. · Tell your doctor if you use anything else that makes you sleepy. Some examples are allergy medicine, narcotic pain medicine, and alcohol. · Do not drink alcohol while you are using this medicine. Warnings While Using This Medicine: · Make sure your doctor knows if you are pregnant or breast feeding, or if you have a head injury, or other conditions that may cause an increase in intercranial pressure (pressure inside your head). Make sure your doctor knows if you have severe kidney problems or severe liver problems, or if you have hypothyroidism (lack of thyroid function). Make sure your doctor knows if you have Sarath's disease (adrenal gland disease), or if you have enlarged prostate or urethral stricture. Make sure your doctor knows if you have any abdominal problems, or if you have lung disease or asthma. · This medicine may make you dizzy or drowsy. Avoid driving, using machines, or anything else that could be dangerous if you are not alert. · This medicine can be habit-forming. Do not use more than your prescribed dose. Call your doctor if you think your medicine is not working. · Tell any doctor or dentist who treats you that you are using this medicine. This medicine may affect certain medical test results. · This medicine may cause constipation, especially with long-term use. Ask your doctor if you should use a laxative to prevent and treat constipation. · When a mother is breastfeeding and takes codeine, there is a very small chance that this medicine could cause serious side effects in the baby. This is because codeine works differently in a few women, so their breast milk contains too much medicine. If you take codeine, be alert for these signs of overdose in your nursing baby: sleeping more than usual, trouble breastfeeding, trouble breathing, or being limp and weak. Call the baby's doctor right away if you think there is a problem. If you cannot talk to the doctor, take the baby to the emergency room or call 911. Possible Side Effects While Using This Medicine:  
Call your doctor right away if you notice any of these side effects: · Allergic reaction: Itching or hives, swelling in your face or hands, swelling or tingling in your mouth or throat, chest tightness, trouble breathing · Dizziness. · Seeing or hearing things that are not there. · Very slow heartbeat. If you notice these less serious side effects, talk with your doctor: · Change in how much or how often you urinate. · Cold, clammy skin. · Feeling unusually anxious, excited, fearful, or tired. · Nausea, vomiting, constipation, stomach pain or upset, or heartburn. · Skin rash. · Vision changes. If you notice other side effects that you think are caused by this medicine, tell your doctor. Call your doctor for medical advice about side effects. You may report side effects to FDA at 4-779-XCK-7724 © 2016 [address] is for End User's use only and may not be sold, redistributed or otherwise used for commercial purposes. The above information is an  only. It is not intended as medical advice for individual conditions or treatments. Talk to your doctor, nurse or pharmacist before following any medical regimen to see if it is safe and effective for you. Promethazine (By mouth) Promethazine (proe-METH-a-zeen) Treats allergies and motion sickness. Also used before and after surgery and other procedures as a sedative and to control pain or nausea and vomiting. This medicine is a phenothiazine. Brand Name(s):Phenergan, Promacot There may be other brand names for this medicine. When This Medicine Should Not Be Used: This medicine is not right for everyone. Do not use it if you had an allergic reaction to promethazine or another phenothiazine medicine, or while you are having asthma symptoms or similar breathing problems. How to Use This Medicine:  
Tablet, Liquid · Your doctor will tell you how much medicine to use. Do not use more than directed. · Measure the oral liquid medicine with a marked measuring spoon, oral syringe, or medicine cup. · Missed dose: Take a dose as soon as you remember. If it is almost time for your next dose, wait until then and take a regular dose. Do not take extra medicine to make up for a missed dose. · Store the medicine in a closed container at room temperature, away from heat, moisture, and direct light. Do not freeze the oral liquid. Drugs and Foods to Avoid: Ask your doctor or pharmacist before using any other medicine, including over-the-counter medicines, vitamins, and herbal products. · Some medicines can affect how promethazine works. Tell your doctor if you are also using an MAO inhibitor (MAOI). · Tell your doctor if you use anything else that makes you sleepy.  Some examples are allergy medicine, narcotic pain medicine, and alcohol. Warnings While Using This Medicine: · Tell your doctor if you are pregnant or breastfeeding, or if you have liver disease, heart or blood vessel disease, glaucoma, a stomach ulcer, bowel problems, an enlarged prostate, bone marrow problems, trouble urinating, or seizures. Also tell your doctor if you have breathing problems, such as COPD, asthma, or sleep apnea. · This medicine may cause the following problems: ¨ Breathing problems, which could be life-threatening ¨ Neuroleptic malignant syndrome (a nerve disorder that can be life-threatening) ¨ Liver problems · Use in children: Give the medicine exactly as directed by the child's doctor. Too much of this medicine can cause death in a young child. Do not give this medicine to a child younger than 3years old, unless your doctor tells you to. · This medicine may make you dizzy or drowsy. Do not drive or do anything that could be dangerous until you know how this medicine affects you. · Tell any doctor or dentist who treats you that you are using this medicine. This medicine may affect certain medical test results. · This medicine may make your skin more sensitive to sunlight. Wear sunscreen. Do not use sunlamps or tanning beds. · Keep all medicine out of the reach of children. Never share your medicine with anyone. Possible Side Effects While Using This Medicine:  
Call your doctor right away if you notice any of these side effects: · Allergic reaction: Itching or hives, swelling in your face or hands, swelling or tingling in your mouth or throat, chest tightness, trouble breathing · Fever, sweating, confusion, uneven heartbeat, muscle stiffness · Lightheadedness or fainting · Seeing or hearing things that are not there (especially in children) · Seizures · Trouble breathing, slow breathing · Twitching or muscle movements you cannot control · Yellow skin or eyes If you notice these less serious side effects, talk with your doctor: · Blurred vision · Nausea, vomiting, constipation If you notice other side effects that you think are caused by this medicine, tell your doctor. Call your doctor for medical advice about side effects. You may report side effects to FDA at 7-224-JAM-8702 © 2016 [address] is for End User's use only and may not be sold, redistributed or otherwise used for commercial purposes. The above information is an  only. It is not intended as medical advice for individual conditions or treatments. Talk to your doctor, nurse or pharmacist before following any medical regimen to see if it is safe and effective for you. Discharge Orders None Introducing [facility]! Toribio Nyhan introduces [employer] patient portal. Now you can access parts of your medical record, email your doctor's office, and request medication refills online. 1. In your internet browser, go to [email or web address] 2. Click on the First Time User? Click Here link in the Sign In box. You will see the New Member Sign Up page. 3. Enter your Intio Access Code exactly as it appears below. You will not need to use this code after youve completed the sign-up process. If you do not sign up before the expiration date, you must request a new code. · Intio Access Code: [email or web address] Expires: 6/25/2017  2:22 PM 
 
4. Enter the last four digits of your Social Security Number (xxxx) and Date of Birth (mm/dd/yyyy) as indicated and click Submit. You will be taken to the next sign-up page. 5. Create a Intio ID. This will be your Intio login ID and cannot be changed, so think of one that is secure and easy to remember. 6. Create a Intio password. You can change your password at any time. 7. Enter your Password Reset Question and Answer.  This can be used at a later time if you forget your password. 8. Enter your e-mail address. You will receive e-mail notification when new information is available in [address] 9. Click Sign Up. You can now view and download portions of your medical record. 10. Click the Download Summary menu link to download a portable copy of your medical information. If you have questions, please visit the Frequently Asked Questions section of the [employer] website. Remember, [employer] is NOT to be used for urgent needs. For medical emergencies, dial 911. Now available from your iPhone and Android! General Information Please provide this summary of care documentation to your next provider. Patient Signature:  ____________________________________________________________ Date:  ____________________________________________________________  
  
[place] Provider Signature:  ____________________________________________________________ Date:  ____________________________________________________________

## 2017-04-18 NOTE — PERIOP NOTES
Handoff Report from Operating Room to PACU    Report received from CHI Voss CRNA and APURVA Polk RN regarding Gorge Couch. Surgeon(s):  Ruthell Sandhoff, DPM  And Procedure(s) (LRB):  ELEVATING OSTECTOMY METATARSAL 3 RIGHT FOOT DEBRIDE ULCER AND APPLICATION OF GRAFT (Right)  confirmed   with allergies and dressings discussed. Anesthesia type, drugs, patient history, complications, estimated blood loss, vital signs, intake and output, and last pain medication, lines and temperature were reviewed.

## 2017-04-18 NOTE — PERIOP NOTES
1435 TRANSFER - OUT REPORT:    Verbal report given to [facility] RN(name) on Luci Loco  being transferred to Phase II (unit) for routine post - op       Report consisted of patients Situation, Background, Assessment and   Recommendations(SBAR). Information from the following report(s) SBAR, Kardex, OR Summary, Procedure Summary, Intake/Output, MAR, Accordion, Recent Results, Med Rec Status, Cardiac Rhythm NSR and Alarm Parameters  was reviewed with the receiving nurse. Opportunity for questions and clarification was provided.       Patient transported with:   Registered Nurse

## 2017-04-18 NOTE — PERIOP NOTES
TRANSFER - IN REPORT:    Verbal report received from NARCISO Lora RN on Carmelo Mueller  being received from PACU for routine post - op      Report consisted of patients Situation, Background, Assessment and   Recommendations(SBAR). Information from the following report(s) SBAR, Kardex, OR Summary, Intake/Output and MAR was reviewed with the receiving nurse. Opportunity for questions and clarification was provided. Assessment completed upon patients arrival to unit and care assumed.

## 2017-04-18 NOTE — BRIEF OP NOTE
BRIEF OPERATIVE NOTE    Date of Procedure: 4/18/2017   Preoperative Diagnosis: PLANTAR FLEXED METATARSAL 3 RIGHT FOOT, chronic ulceration  Postoperative Diagnosis: PLANTAR FLEXED METATARSAL 3 RIGHT FOOT  Chronic ulcerationProcedure(s):  ELEVATING OSTECTOMY METATARSAL 3 RIGHT FOOT DEBRIDE ULCER AND APPLICATION OF AMNIOX GRAFT  Surgeon(s) and Role:     * Nancy Hook DPM - Primary            Surgical Staff:  Circ-1: Betsey Paez RN  Circ-Relief: Vikram Mercer RN  Scrub Tech-1: Jassi Carvalho  Scrub Tech-2: Kanu Rueda  Scrub RN-Relief: Hal Mays RN  Event Time In   Incision Start 1230   Incision Close 1331     Anesthesia: MAC   Estimated Blood Loss: 10cc  Specimens: * No specimens in log *   Findings: chronic ulcer   Complications: none  Implants:   Implant Name Type Inv.  Item Serial No.  Lot No. LRB No. Used Action   11MM SNAP OFF SCREW    AQ2011 [employer] [id number] Right 1 Implanted   12MM SNAP OFF SCREW   AQ2012 [employer] [id number] Right 1 Implanted   CLARIX CORD 1K 6.0X3.0CM     [id number] [employer] [id number] Implanted

## 2017-04-18 NOTE — ANESTHESIA POSTPROCEDURE EVALUATION
Post-Anesthesia Evaluation and Assessment    Patient: Roderick Caruso MRN: [id number]  SSN: [id number]    YOB: 1950  Age: 78 y.o. Sex: female       Cardiovascular Function/Vital Signs  Visit Vitals    /67    Pulse 78    Temp 36.9 °C (98.5 °F)    Resp 13    Ht 5' 6\" (1.676 m)    Wt 108.2 kg (238 lb 8.6 oz)    SpO2 96%    BMI 38.5 kg/m2       Patient is status post general, total IV anesthesia anesthesia for Procedure(s):  ELEVATING OSTECTOMY METATARSAL 3 RIGHT FOOT DEBRIDE ULCER AND APPLICATION OF GRAFT. Nausea/Vomiting: None    Postoperative hydration reviewed and adequate. Pain:  Pain Scale 1: Numeric (0 - 10) (04/18/17 1355)  Pain Intensity 1: 0 (04/18/17 1355)   Managed    Neurological Status:   Neuro (WDL): Exceptions to WDL (04/18/17 1342)  Neuro  Neurologic State: Drowsy; Eyes open spontaneously (04/18/17 1342)  Orientation Level: Oriented to person (04/18/17 1342)  Cognition: Follows commands (04/18/17 1342)  Speech: Delayed responses;Clear (04/18/17 1342)  LUE Motor Response: Purposeful;Weak;Spontaneous  (04/18/17 1342)  LLE Motor Response: Purposeful;Weak;Spontaneous  (04/18/17 1342)  RUE Motor Response: Purposeful;Weak;Spontaneous  (04/18/17 1342)  RLE Motor Response: Purposeful;Weak;Spontaneous  (04/18/17 1342)   At baseline    Mental Status and Level of Consciousness: Arousable    Pulmonary Status:   O2 Device: Nasal cannula (04/18/17 1355)   Adequate oxygenation and airway patent    Complications related to anesthesia: None    Post-anesthesia assessment completed.  No concerns    Signed By: Boone Calles MD     April 18, 2017

## 2017-04-18 NOTE — ROUTINE PROCESS
1328: Patient: Nate Rabago MRN: [id number]  SSN: [id number]   YOB: 1950  Age: 78 y.o. Sex: female     Patient is status post Procedure(s):  ELEVATING OSTECTOMY METATARSAL 3 RIGHT FOOT DEBRIDE ULCER AND APPLICATION OF GRAFT. Surgeon(s) and Role:     * Hu Chin DPM - Primary    Local/Dose/Irrigation:  MARCAINE 0.5% WITH EPI                  Peripheral IV 04/18/17 Left Antecubital (Active)   Site Assessment Clean, dry, & intact 4/18/2017 11:08 AM   Phlebitis Assessment 0 4/18/2017 11:08 AM   Infiltration Assessment 0 4/18/2017 11:08 AM   Dressing Status New;Occlusive 4/18/2017 11:08 AM   Dressing Type Tape;Transparent 4/18/2017 11:08 AM   Hub Color/Line Status Pink; Infusing 4/18/2017 11:08 AM                           Dressing/Packing:  Wound Foot Right-DRESSING TYPE: 4 x 4;Gauze wrap (lilia); Other (Comment) (Marge Panedy) (04/18/17 1311)  Splint/Cast:  ]    Other:

## 2017-04-18 NOTE — IP AVS SNAPSHOT
Current Discharge Medication List  
  
START taking these medications Dose & Instructions Dispensing Information Comments Morning Noon Evening Bedtime  
 oxyCODONE-acetaminophen 5-325 mg per tablet Commonly known as:  PERCOCET Your last dose was: Your next dose is:    
   
   
 Dose:  1 Tab Take 1 Tab by mouth every four (4) hours as needed for Pain. Max Daily Amount: 6 Tabs. Indications: Pain Quantity:  25 Tab Refills:  0  
     
   
   
   
  
 promethazine 25 mg tablet Commonly known as:  PHENERGAN Your last dose was: Your next dose is:    
   
   
 Dose:  25 mg Take 1 Tab by mouth every six (6) hours as needed for Nausea. Indications: Pain Treatment Adjunct, POST-OPERATIVE NAUSEA AND VOMITING Quantity:  20 Tab Refills:  0 CONTINUE these medications which have CHANGED Dose & Instructions Dispensing Information Comments Morning Noon Evening Bedtime Bifidobacterium Infantis 4 mg Cap Commonly known as:  ALIGN What changed:   
- how much to take 
- how to take this - when to take this 
- additional instructions Your last dose was: Your next dose is: As directed Quantity:  30 capsule Refills:  11 CONTINUE these medications which have NOT CHANGED Dose & Instructions Dispensing Information Comments Morning Noon Evening Bedtime  
 aspirin 81 mg chewable tablet Your last dose was: Your next dose is:    
   
   
 Dose:  81 mg Take 81 mg by mouth daily. Refills:  0  
     
   
   
   
  
 atorvastatin 40 mg tablet Commonly known as:  LIPITOR Your last dose was: Your next dose is:    
   
   
 Dose:  40 mg Take 1 Tab by mouth daily. Quantity:  30 Tab Refills:  6  
     
   
   
   
  
 b complex vitamins tablet Your last dose was: Your next dose is:    
   
   
 Dose:  1 Tab Take 1 Tab by mouth daily. Refills:  0  
     
   
   
   
  
 clopidogrel 75 mg Tab Commonly known as:  PLAVIX Your last dose was: Your next dose is:    
   
   
 Dose:  75 mg Take 1 Tab by mouth daily. Quantity:  30 Tab Refills:  6  
     
   
   
   
  
 fenofibrate micronized 134 mg capsule Commonly known as:  LOFIBRA Your last dose was: Your next dose is:    
   
   
 Dose:  134 mg Take 134 mg by mouth daily. Refills:  0  
     
   
   
   
  
 FISH OIL 1,000 mg Cap Generic drug:  omega-3 fatty acids-vitamin e Your last dose was: Your next dose is:    
   
   
 Dose:  1 Cap Take 1 Cap by mouth daily. Refills:  0  
     
   
   
   
  
 insulin CONCENTRATED regular 500 unit/mL Soln Commonly known as:  U-500 CONCENTRATED Your last dose was: Your next dose is:    
   
   
 Dose:  24-34 Units 24-34 Units by SubCUTAneous route See Admin Instructions. EVERYDAY BEFORE MEALS PATIENT ADHERES TO FOLLOWING SLIDING SCALE INSULIN REGIMEN: FOR BLOOD GLUCOSE  MG/DL: 24 UNITS ON U-100 SYRINGE = 120 UNITS OF U-500 INSULIN, 151-250 MG/DL: 28 UNITS ON U-100 SYRINGE = 140 UNITS OF U-500 INSULIN, 251-350 MG/DL: 30 UNITS ON U-100 SYRINGE = 150 UNITS OF U-500 INSULIN, >351 MG/DL: 34 UNITS ON U-100 SYRINGE = 170 UNITS OF U-500 INSULIN Refills:  0  
     
   
   
   
  
 levothyroxine 125 mcg tablet Commonly known as:  SYNTHROID Your last dose was: Your next dose is:    
   
   
 Dose:  125 mcg Take 125 mcg by mouth every Monday. Take 2 tablets by mouth Tuesday-Sunday once daily before breakfast, and 1 tablet by mouth on Mondays once daily before breakfast  
 Refills:  0  
     
   
   
   
  
 metoprolol succinate 50 mg XL tablet Commonly known as:  TOPROL XL Your last dose was: Your next dose is:    
   
   
 Dose:  50 mg Take 1 Tab by mouth daily. Quantity:  30 Tab Refills:  6 MOTRIN  mg tablet Generic drug:  ibuprofen Your last dose was: Your next dose is:    
   
   
 Dose:  200 mg Take 200 mg by mouth daily as needed for Pain. Indications: OSTEOARTHRITIS Refills:  0  
     
   
   
   
  
 nitroglycerin 0.4 mg SL tablet Commonly known as:  NITROSTAT Your last dose was: Your next dose is:    
   
   
 Dose:  0.4 mg  
1 Tab by SubLINGual route as needed for Chest Pain. Quantity:  1 Bottle Refills:  6 PriLOSEC OTC 20 mg tablet Generic drug:  omeprazole Your last dose was: Your next dose is:    
   
   
 Dose:  20 mg Take 20 mg by mouth daily. Indications: GASTROESOPHAGEAL REFLUX Refills:  0 PROzac 40 mg capsule Generic drug:  FLUoxetine Your last dose was: Your next dose is:    
   
   
 Dose:  40 mg Take 40 mg by mouth nightly. Refills:  0  
     
   
   
   
  
 THERAPEUTIC MULTIVIT/MINERAL 27-0.4 mg Tab Generic drug:  multivitamin,tx-iron-ca-min Your last dose was: Your next dose is:    
   
   
 Dose:  1 Tab Take 1 Tab by mouth every evening. Refills:  0  
     
   
   
   
  
 TRULICITY SC Your last dose was: Your next dose is:    
   
   
 by SubCUTAneous route. 1.5 units every saturday Refills:  0 Where to Get Your Medications Information on where to get these meds will be given to you by the nurse or doctor. ! Ask your nurse or doctor about these medications  
  oxyCODONE-acetaminophen 5-325 mg per tablet  
 promethazine 25 mg tablet

## 2017-04-18 NOTE — DISCHARGE INSTRUCTIONS
INSTRUCTIONS FOLLOWING FOOT SURGERY    ACTIVITY:  · Elevate feet for 48 hours  · Use ice as directed by your doctor  · Use crutches / walker as directed by your doctor, and follow your doctors instructions as to your weight bearing status - as little weight as possible on the right foot    DIET:  · Clear liquids until no nausea or vomiting; then light diet for the first day  · An advance to regular diet on second day, unless your doctor orders otherwise. PAIN:  · Take pain medication as directed by your doctor. · Call your doctor if pain is not relieved by medication. · Do not take aspirin or blood thinners until directed by your doctor. DRESSING CARE: keep dressing clean and dry, do not remove       FOLLOW-UP PHONE CALLS:  · Calls will be made by nursing staff. · If you had any problems, call your doctor as needed. CALL YOUR DOCTOR IF:  · Excessive bleeding that does not stop after holding mild pressure over the area  · Temperature of 101° F or above  · Redness, excessive swelling or bruising, and/or green or yellow, smelly discharge from incision  · Loss of sensation -cold, white, or blue toes    AFTER ANESTHESIA :   · For the first 24 hours: do not drive, drink alcoholic beverages, or make important decisions. · Be aware of dizziness following anesthesia and while taking pain medication.       DISCHARGE MEDICATIONS:         APPOINTMENT DATE/TIME: please call for an appointment    YOUR DOCTORS PHONE NUMBER: [phone]    Patients signature:  Date: 4/18/2017  Discharging Nurse:       Lisseth Waller from Nurse    The following personal items are in your possession at time of discharge:    Dental Appliances: None  Visual Aid: Glasses, With patient  Hearing Aids/Status: Does not own  Home Medications: None  Jewelry: None  Clothing: Undergarments, With patient (home clothing)  Other Valuables: Eyeglasses, Other (comment) (sun glasses)  Personal Items Sent to Safe: Denies need to send valuables to security          PATIENT INSTRUCTIONS:    After general anesthesia or intravenous sedation, for 24 hours or while taking prescription Narcotics:  · Limit your activities  · Do not drive and operate hazardous machinery  · Do not make important personal or business decisions  · Do  not drink alcoholic beverages  · If you have not urinated within 8 hours after discharge, please contact your surgeon on call. Report the following to your surgeon:  · Excessive pain, swelling, redness or odor of or around the surgical area  · Temperature over 100.5  · Nausea and vomiting lasting longer than 4 hours or if unable to take medications  · Any signs of decreased circulation or nerve impairment to extremity: change in color, persistent  numbness, tingling, coldness or increase pain  · Any questions              *  Please give a list of your current medications to your Primary Care Provider. *  Please update this list whenever your medications are discontinued, doses are      changed, or new medications (including over-the-counter products) are added. *  Please carry medication information at all times in case of emergency situations. These are general instructions for a healthy lifestyle:    No smoking/ No tobacco products/ Avoid exposure to second hand smoke    Surgeon General's Warning:  Quitting smoking now greatly reduces serious risk to your health. Obesity, smoking, and sedentary lifestyle greatly increases your risk for illness    A healthy diet, regular physical exercise & weight monitoring are important for maintaining a healthy lifestyle    You may be retaining fluid if you have a history of heart failure or if you experience any of the following symptoms:  Weight gain of 3 pounds or more overnight or 5 pounds in a week, increased swelling in our hands or feet or shortness of breath while lying flat in bed.   Please call your doctor as soon as you notice any of these symptoms; do not wait until your next office visit. Recognize signs and symptoms of STROKE:    F-face looks uneven    A-arms unable to move or move unevenly    S-speech slurred or non-existent    T-time-call 911 as soon as signs and symptoms begin-DO NOT go       Back to bed or wait to see if you get better-TIME IS BRAIN. Warning Signs of HEART ATTACK     Call 911 if you have these symptoms:   Chest discomfort. Most heart attacks involve discomfort in the center of the chest that lasts more than a few minutes, or that goes away and comes back. It can feel like uncomfortable pressure, squeezing, fullness, or pain.  Discomfort in other areas of the upper body. Symptoms can include pain or discomfort in one or both arms, the back, neck, jaw, or stomach.  Shortness of breath with or without chest discomfort.  Other signs may include breaking out in a cold sweat, nausea, or lightheadedness. Don't wait more than five minutes to call 911 - MINUTES MATTER! Fast action can save your life. Calling 911 is almost always the fastest way to get lifesaving treatment. Emergency Medical Services staff can begin treatment when they arrive -- up to an hour sooner than if someone gets to the hospital by car. The discharge information has been reviewed with the patient and caregiver. The patient and caregiver verbalized understanding. Discharge medications reviewed with the patient and caregiver and appropriate educational materials and side effects teaching were provided. Narcotic-Analgesic/Acetaminophen (By mouth)   Relieves pain. Brand Name(s):Capital w/Codeine, Endocet, Hycet, Lorcet, Lorcet HD, Lorcet Plus, Lortab 10/325, Lortab 5/325, Lortab 7.5/325, Lortab Elixir, Norco, Percocet, Moore, Roxicet, Trezix   There may be other brand names for this medicine. When This Medicine Should Not Be Used: You should not use this medicine if you have had an allergic reaction to acetaminophen, codeine, hydrocodone, propoxyphene, or sulfites. You should not use this medicine if you have had an allergic reaction to any other opioid pain medicine. How to Use This Medicine:   Liquid, Tablet, Capsule  · Your doctor will tell you how much medicine to use. Do not use more than directed. · This medicine contains acetaminophen. Read the labels of all other medicines you are using to see if they also contain acetaminophen, or ask your doctor or pharmacist. Niko Atwood not use more than 4 grams (4,000 milligrams) total of acetaminophen in one day. · Drink plenty of liquids to help avoid constipation. If a dose is missed:   · Some of these medicines need to be used on a fixed schedule. If you miss a dose or forget to use your medicine, call your doctor pharmacist for instructions. Do not use extra medicine to make up for a missed dose. How to Store and Dispose of This Medicine:   · Store the medicine in a closed container at room temperature, away from heat, moisture, and direct light. Do not refrigerate or freeze the medicine. · Ask your pharmacist, doctor, or health caregiver about the best way to dispose of any outdated medicine or medicine no longer needed. · Keep all medicine out of the reach of children. Never share your medicine with anyone. Drugs and Foods to Avoid:   Ask your doctor or pharmacist before using any other medicine, including over-the-counter medicines, vitamins, and herbal products. · Make sure your doctor knows if you are using a monoamine oxidase inhibitor (MAOI) medicine, such as Eldepryl®, Marplan®, [place], or Parnate®. Make sure your doctor knows if you are also using a medicine to treat depression, such as amitriptyline, doxepin, nortriptyline, Elavil®, Pamelor®, or Sinequan®. Make sure your doctor knows if you are taking an anticholinergic medicine, such as atropine, methscopolamine, or scopolamine. · Tell your doctor if you use anything else that makes you sleepy.  Some examples are allergy medicine, narcotic pain medicine, and alcohol. · Do not drink alcohol while you are using this medicine. Warnings While Using This Medicine:   · Make sure your doctor knows if you are pregnant or breast feeding, or if you have a head injury, or other conditions that may cause an increase in intercranial pressure (pressure inside your head). Make sure your doctor knows if you have severe kidney problems or severe liver problems, or if you have hypothyroidism (lack of thyroid function). Make sure your doctor knows if you have Clallam's disease (adrenal gland disease), or if you have enlarged prostate or urethral stricture. Make sure your doctor knows if you have any abdominal problems, or if you have lung disease or asthma. · This medicine may make you dizzy or drowsy. Avoid driving, using machines, or anything else that could be dangerous if you are not alert. · This medicine can be habit-forming. Do not use more than your prescribed dose. Call your doctor if you think your medicine is not working. · Tell any doctor or dentist who treats you that you are using this medicine. This medicine may affect certain medical test results. · This medicine may cause constipation, especially with long-term use. Ask your doctor if you should use a laxative to prevent and treat constipation. · When a mother is breastfeeding and takes codeine, there is a very small chance that this medicine could cause serious side effects in the baby. This is because codeine works differently in a few women, so their breast milk contains too much medicine. If you take codeine, be alert for these signs of overdose in your nursing baby: sleeping more than usual, trouble breastfeeding, trouble breathing, or being limp and weak. Call the baby's doctor right away if you think there is a problem. If you cannot talk to the doctor, take the baby to the emergency room or call 911.   Possible Side Effects While Using This Medicine:   Call your doctor right away if you notice any of these side effects:  · Allergic reaction: Itching or hives, swelling in your face or hands, swelling or tingling in your mouth or throat, chest tightness, trouble breathing  · Dizziness. · Seeing or hearing things that are not there. · Very slow heartbeat. If you notice these less serious side effects, talk with your doctor:   · Change in how much or how often you urinate. · Cold, clammy skin. · Feeling unusually anxious, excited, fearful, or tired. · Nausea, vomiting, constipation, stomach pain or upset, or heartburn. · Skin rash. · Vision changes. If you notice other side effects that you think are caused by this medicine, tell your doctor. Call your doctor for medical advice about side effects. You may report side effects to FDA at 6-774-OBC-1576  © 2016 [address] is for End User's use only and may not be sold, redistributed or otherwise used for commercial purposes. The above information is an  only. It is not intended as medical advice for individual conditions or treatments. Talk to your doctor, nurse or pharmacist before following any medical regimen to see if it is safe and effective for you. Promethazine (By mouth)   Promethazine (proe-METH-a-zeen)  Treats allergies and motion sickness. Also used before and after surgery and other procedures as a sedative and to control pain or nausea and vomiting. This medicine is a phenothiazine. Brand Name(s):Phenergan, Promacot   There may be other brand names for this medicine. When This Medicine Should Not Be Used: This medicine is not right for everyone. Do not use it if you had an allergic reaction to promethazine or another phenothiazine medicine, or while you are having asthma symptoms or similar breathing problems. How to Use This Medicine:   Tablet, Liquid  · Your doctor will tell you how much medicine to use. Do not use more than directed.   · Measure the oral liquid medicine with a marked measuring spoon, oral syringe, or medicine cup. · Missed dose: Take a dose as soon as you remember. If it is almost time for your next dose, wait until then and take a regular dose. Do not take extra medicine to make up for a missed dose. · Store the medicine in a closed container at room temperature, away from heat, moisture, and direct light. Do not freeze the oral liquid. Drugs and Foods to Avoid:   Ask your doctor or pharmacist before using any other medicine, including over-the-counter medicines, vitamins, and herbal products. · Some medicines can affect how promethazine works. Tell your doctor if you are also using an MAO inhibitor (MAOI). · Tell your doctor if you use anything else that makes you sleepy. Some examples are allergy medicine, narcotic pain medicine, and alcohol. Warnings While Using This Medicine:   · Tell your doctor if you are pregnant or breastfeeding, or if you have liver disease, heart or blood vessel disease, glaucoma, a stomach ulcer, bowel problems, an enlarged prostate, bone marrow problems, trouble urinating, or seizures. Also tell your doctor if you have breathing problems, such as COPD, asthma, or sleep apnea. · This medicine may cause the following problems:  ¨ Breathing problems, which could be life-threatening  ¨ Neuroleptic malignant syndrome (a nerve disorder that can be life-threatening)  ¨ Liver problems  · Use in children: Give the medicine exactly as directed by the child's doctor. Too much of this medicine can cause death in a young child. Do not give this medicine to a child younger than 3years old, unless your doctor tells you to. · This medicine may make you dizzy or drowsy. Do not drive or do anything that could be dangerous until you know how this medicine affects you. · Tell any doctor or dentist who treats you that you are using this medicine. This medicine may affect certain medical test results. · This medicine may make your skin more sensitive to sunlight.  Wear sunscreen. Do not use sunlamps or tanning beds. · Keep all medicine out of the reach of children. Never share your medicine with anyone. Possible Side Effects While Using This Medicine:   Call your doctor right away if you notice any of these side effects:  · Allergic reaction: Itching or hives, swelling in your face or hands, swelling or tingling in your mouth or throat, chest tightness, trouble breathing  · Fever, sweating, confusion, uneven heartbeat, muscle stiffness  · Lightheadedness or fainting  · Seeing or hearing things that are not there (especially in children)  · Seizures  · Trouble breathing, slow breathing  · Twitching or muscle movements you cannot control  · Yellow skin or eyes  If you notice these less serious side effects, talk with your doctor:   · Blurred vision  · Nausea, vomiting, constipation  If you notice other side effects that you think are caused by this medicine, tell your doctor. Call your doctor for medical advice about side effects. You may report side effects to FDA at 6-560-FDA-4881  © 2016 [address] is for End User's use only and may not be sold, redistributed or otherwise used for commercial purposes. The above information is an  only. It is not intended as medical advice for individual conditions or treatments. Talk to your doctor, nurse or pharmacist before following any medical regimen to see if it is safe and effective for you.

## 2017-05-27 LAB
CREATININE, EXTERNAL: 1.08
HBA1C MFR BLD HPLC: 8.3 %
LDL-C, EXTERNAL: 84

## 2017-08-24 LAB
CREATININE, EXTERNAL: 1.06
HBA1C MFR BLD HPLC: 8.9 %
LDL-C, EXTERNAL: 60

## 2017-09-29 LAB — EF %, EXTERNAL: NORMAL

## 2017-11-02 ENCOUNTER — HOSPITAL ENCOUNTER (OUTPATIENT)
Dept: LAB | Age: 67
Discharge: HOME | End: 2017-11-02
Payer: MEDICARE

## 2017-11-02 ENCOUNTER — OFFICE VISIT (OUTPATIENT)
Dept: INTERNAL MEDICINE CLINIC | Age: 67
End: 2017-11-02

## 2017-11-02 VITALS
RESPIRATION RATE: 20 BRPM | HEART RATE: 72 BPM | BODY MASS INDEX: 38.92 KG/M2 | SYSTOLIC BLOOD PRESSURE: 134 MMHG | OXYGEN SATURATION: 98 % | HEIGHT: 66 IN | TEMPERATURE: 98.2 F | WEIGHT: 242.2 LBS | DIASTOLIC BLOOD PRESSURE: 55 MMHG

## 2017-11-02 DIAGNOSIS — Z71.89 ADVANCED CARE PLANNING/COUNSELING DISCUSSION: ICD-10-CM

## 2017-11-02 DIAGNOSIS — L97.511 DIABETIC ULCER OF TOE OF RIGHT FOOT ASSOCIATED WITH TYPE 2 DIABETES MELLITUS, LIMITED TO BREAKDOWN OF SKIN (HCC): ICD-10-CM

## 2017-11-02 DIAGNOSIS — L29.8 NASAL ITCHING: ICD-10-CM

## 2017-11-02 DIAGNOSIS — Z23 NEED FOR DIPHTHERIA-TETANUS-PERTUSSIS (TDAP) VACCINE, ADULT/ADOLESCENT: ICD-10-CM

## 2017-11-02 DIAGNOSIS — Z23 NEED FOR VACCINATION WITH 13-POLYVALENT PNEUMOCOCCAL CONJUGATE VACCINE: ICD-10-CM

## 2017-11-02 DIAGNOSIS — I10 ESSENTIAL HYPERTENSION, BENIGN: ICD-10-CM

## 2017-11-02 DIAGNOSIS — E78.2 MIXED HYPERLIPIDEMIA: ICD-10-CM

## 2017-11-02 DIAGNOSIS — E11.621 DIABETIC ULCER OF TOE OF RIGHT FOOT ASSOCIATED WITH TYPE 2 DIABETES MELLITUS, LIMITED TO BREAKDOWN OF SKIN (HCC): ICD-10-CM

## 2017-11-02 DIAGNOSIS — Z00.00 MEDICARE ANNUAL WELLNESS VISIT, SUBSEQUENT: Primary | ICD-10-CM

## 2017-11-02 DIAGNOSIS — Z12.39 BREAST CANCER SCREENING: ICD-10-CM

## 2017-11-02 DIAGNOSIS — F33.41 MAJOR DEPRESSIVE DISORDER, RECURRENT EPISODE, IN PARTIAL REMISSION (HCC): ICD-10-CM

## 2017-11-02 DIAGNOSIS — Z13.39 SCREENING FOR ALCOHOLISM: ICD-10-CM

## 2017-11-02 DIAGNOSIS — Z13.31 SCREENING FOR DEPRESSION: ICD-10-CM

## 2017-11-02 PROCEDURE — 85025 COMPLETE CBC W/AUTO DIFF WBC: CPT

## 2017-11-02 PROCEDURE — 36415 COLL VENOUS BLD VENIPUNCTURE: CPT

## 2017-11-02 RX ORDER — FLUOXETINE HYDROCHLORIDE 20 MG/1
20 CAPSULE ORAL DAILY
Qty: 30 CAP | Refills: 11 | Status: SHIPPED | OUTPATIENT
Start: 2017-11-02 | End: 2018-12-13 | Stop reason: ALTCHOICE

## 2017-11-02 RX ORDER — INSULIN LISPRO 100 [IU]/ML
INJECTION, SOLUTION INTRAVENOUS; SUBCUTANEOUS
COMMUNITY
End: 2019-03-12

## 2017-11-02 RX ORDER — FLUTICASONE PROPIONATE 50 MCG
2 SPRAY, SUSPENSION (ML) NASAL DAILY
Qty: 1 BOTTLE | Refills: 11
Start: 2017-11-02 | End: 2018-05-30 | Stop reason: ALTCHOICE

## 2017-11-02 NOTE — PROGRESS NOTES
Lizbeth Wyatt is a 75 y.o. female and presents for Annual Medicare Wellness Visit, she is accompanied with her  today. Assessment of cognitive impairment: Alert and oriented x 3. Abuse Screen:    Abuse Screening Questionnaire 10/28/2015   Do you ever feel afraid of your partner? N   Are you in a relationship with someone who physically or mentally threatens you? N   Is it safe for you to go home? Y       Depression Screen:   PHQ over the last two weeks 11/2/2017   Little interest or pleasure in doing things More than half the days   Feeling down, depressed or hopeless Nearly every day   Total Score PHQ 2 5   Trouble falling or staying asleep, or sleeping too much More than half the days   Feeling tired or having little energy Nearly every day   Poor appetite or overeating More than half the days   Feeling bad about yourself - or that you are a failure or have let yourself or your family down Several days   Trouble concentrating on things such as school, work, reading or watching TV Not at all   Moving or speaking so slowly that other people could have noticed; or the opposite being so fidgety that others notice Nearly every day   Thoughts of being better off dead, or hurting yourself in some way Not at all   PHQ 9 Score 16   Discussed with Dr. Elvie Taylor:    Fall Risk Assessment, last 12 mths 11/2/2017   Able to walk? Yes   Fall in past 12 months? Yes   Fall with injury?  No   Number of falls in past 12 months 1   Fall Risk Score 1       Activities of Daily Living:    ADL Assessment 11/2/2017   Feeding yourself No Help Needed   Getting from bed to chair No Help Needed   Getting dressed No Help Needed   Bathing or showering No Help Needed   Walk across the room (includes cane/walker) No Help Needed   Using the telphone No Help Needed   Taking your medications No Help Needed   Preparing meals No Help Needed   Managing money (expenses/bills) No Help Needed   Moderately strenuous housework (laundry) No Help Needed   Shopping for personal items (toiletries/medicines) No Help Needed   Shopping for groceries No Help Needed   Driving No Help Needed   Climbing a flight of stairs No Help Needed   Getting to places beyond walking distances No Help Needed       Health Maintenance:  Daily Low Dose Aspirin: yes  Bone Density: up to date 11/18/15  Glaucoma Screening & DM eye exam: up to date with Dr. Shira Portillo, records requested  Immunizations:    Tetanus: order placed. Influenza: will obtain at appointment with endocrinologist 11/15/17. Shingles:  up to date 1/1/16. Pneumovax:  up to date 1/14/13. Prevnar: order placed. Cancer screening:    Cervical: NA.  Breast: not up to date - order placed. Colon: up to date 10/31/14 q10 years. Prostate:  NA    Advance Care Planning:   End of Life Planning: has NO advanced directive - not interested in additional information. Provided pt with \"Respecting Choices packet of Information\" no  Offered facilitator session with NN no     Medications/Allergies: Reviewed with patient  Prior to Admission medications    Medication Sig Start Date End Date Taking? Authorizing Provider   insulin lispro (HUMALOG) 100 unit/mL injection by SubCUTAneous route Before breakfast, lunch, and dinner. With sliding scale- per endocrinology   Yes Historical Provider   insulin glargine (TOUJEO SOLOSTAR) 300 unit/mL (1.5 mL) inpn 60 Units by SubCUTAneous route nightly. Yes Historical Provider   DULAGLUTIDE (TRULICITY SC) by SubCUTAneous route. 1.5 units every saturday   Yes Historical Provider   atorvastatin (LIPITOR) 40 mg tablet Take 1 Tab by mouth daily. 11/22/16  Yes Rosaline Drummond MD   clopidogrel (PLAVIX) 75 mg tab Take 1 Tab by mouth daily. 11/22/16  Yes Rosaline Drummond MD   metoprolol succinate (TOPROL XL) 50 mg XL tablet Take 1 Tab by mouth daily. 11/22/16  Yes Rosaline Drummond MD   FLUoxetine (PROZAC) 40 mg capsule Take 40 mg by mouth nightly.    Yes Historical Provider omeprazole (PRILOSEC OTC) 20 mg tablet Take 20 mg by mouth daily. Indications: GASTROESOPHAGEAL REFLUX   Yes Historical Provider   b complex vitamins tablet Take 1 Tab by mouth daily. Yes Historical Provider   levothyroxine (SYNTHROID) 125 mcg tablet Take 125 mcg by mouth every Monday. Take 2 tablets by mouth Tuesday-Sunday once daily before breakfast, and 1 tablet by mouth on Mondays once daily before breakfast   Yes Historical Provider   Bifidobacterium Infantis (ALIGN) 4 mg cap As directed  Patient taking differently: Take 1 Cap by mouth daily. As directed 10/23/14  Yes Judie Martinez MD   fenofibrate micronized (LOFIBRA) 134 mg capsule Take 134 mg by mouth daily. Yes Historical Provider   aspirin 81 mg chewable tablet Take 81 mg by mouth daily. Yes Historical Provider   omega-3 fatty acids-vitamin e (FISH OIL) 1,000 mg cap Take 1 Cap by mouth daily. Yes Historical Provider   multivitamin,tx-iron-ca-min (THERAPEUTIC MULTIVIT/MINERAL) 27-0.4 mg Tab Take 1 Tab by mouth every evening. Yes Historical Provider   nitroglycerin (NITROSTAT) 0.4 mg SL tablet 1 Tab by SubLINGual route as needed for Chest Pain. 11/22/16   Pawan Leon MD   ibuprofen (MOTRIN IB) 200 mg tablet Take 200 mg by mouth daily as needed for Pain. Indications: OSTEOARTHRITIS    Historical Provider     No Known Allergies    PSH: Reviewed with patient  Past Surgical History:   Procedure Laterality Date    BREAST SURGERY PROCEDURE UNLISTED  2000's    benign breast cyst    CARDIAC SURG PROCEDURE UNLIST  11/2016    heart attack    HX AMPUTATION      right 2nd , 3rd  toes    HX GYN      vaginal delivery x1    HX MOHS PROCEDURES  2005    faith    HX ORTHOPAEDIC  2015    right foot abscess     [facility]    HX ORTHOPAEDIC  2007    R MT amputate    HX ORTHOPAEDIC  2009    left 2nd toe surgery    HX TONSILLECTOMY  6 yrs.  old        SH: Reviewed with patient  Social History   Substance Use Topics    Smoking status: Former Smoker     Years: 5.00     Quit date: 2/7/1985    Smokeless tobacco: Never Used    Alcohol use No       FH: Reviewed with patient  Family History   Problem Relation Age of Onset    Diabetes Father     Heart Attack Father      congestive heart faliure    Heart Disease Father      CHF    Diabetes Maternal Aunt     Diabetes Maternal Uncle          Objective:  Visit Vitals    /55 (BP 1 Location: Left arm, BP Patient Position: Sitting)    Pulse 72    Temp 98.2 °F (36.8 °C) (Oral)    Resp 20    Ht 5' 6\" (1.676 m)    Wt 242 lb 3.2 oz (109.9 kg)    SpO2 98%    BMI 39.09 kg/m2    Body mass index is 39.09 kg/(m^2). Alcohol Risk Screen:   On any occasion during past 3 months, have you had more than 3 drinks (female) or 4 drinks (male) containing alcohol? No  Do you average more than 7 drinks (female) or 14 drinks (male) per week? No  Type and Amount: NA    Tobacco Abuse:  No    Nutrition Screen:  Patient does not eat a balanced diabetic diet every day, discussed ways to incorporate small changes in her diet    Hearing Loss:  mild    Vision Loss:   Wears glasses, contact lenses, or have any other visual impairment  Wears glasses    Activities of Daily Living:  Self-care.    Requires assistance with: no ADLs  Patient handle his/her own medications  yes    Current medical providers:    Patient Care Team:  Marcos Benitez MD as PCP - Darien De Leon RN as Ambulatory Care Navigator (Internal Medicine)  Mick Chacko MD as Physician (Endocrinology)  Madi Malin DPM as Physician (Podiatry)  Ofe Ann MD as Physician (Gastroenterology)  Edy Marino MD as Physician (Dermatology)  Dr. Sarah Walker as Physician (Optometry)  Eric Gaines MD as Physician (Cardiology)      Plan:      Orders Placed This Encounter    insulin lispro (HUMALOG) 100 unit/mL injection    insulin glargine (TOUJEO SOLOSTAR) 300 unit/mL (1.5 mL) inCritical access hospital Maintenance   Topic Date Due    DTaP/Tdap/Td series (1 - Tdap) 09/13/2005    EYE EXAM RETINAL OR DILATED Q1  05/14/2015    GLAUCOMA SCREENING Q2Y  05/14/2016    INFLUENZA AGE 9 TO ADULT  08/01/2017    BREAST CANCER SCRN MAMMOGRAM  08/24/2017    FOOT EXAM Q1  10/24/2017    HEMOGLOBIN A1C Q6M  11/27/2017    Pneumococcal 65+ Low/Medium Risk (2 of 2 - PPSV23) 01/04/2018    MICROALBUMIN Q1  01/24/2018    LIPID PANEL Q1  05/27/2018    MEDICARE YEARLY EXAM  11/03/2018    COLONOSCOPY  10/31/2024    Hepatitis C Screening  Completed    OSTEOPOROSIS SCREENING (DEXA)  Completed    ZOSTER VACCINE AGE 60>  Addressed       *Patient verbalized understanding and agreement with the plan. A copy of the After Visit Summary with personalized health plan was given to the patient today. Physical Exam will be performed by PCP and documented under a separate Progress Note.

## 2017-11-02 NOTE — PROGRESS NOTES
HISTORY OF PRESENT ILLNESS  Giovana Proctor is a 75 y.o. female. GRACE Dahl is seen today accompanied by her  Nany Medina for a [employer] Visit and follow up of chronic problems. Preventive medicine. 1. Please see attached RN note. This was reviewed and fully discussed with Meche. 2. She is due for a complete physical examination, select labs. 3. We will have to check on her vaccines. Chronic medical problems are reviewed. 1. Diabetes, cholesterol, thyroid disease. All followed by her endocrinologist.  2. CAD. Clinically stable and up to date with follow up. New problems reviewed. 1. Depression. This has worsened. She is discouraged as she had to drop out of the [employer] [facility] at [address] [place] [address]. We talked about medication adjustments. We will increase Prozac to 60 mg daily and check her in one month. 2. She has a recurrent toe ulcer. She has had these in the past, complicated by osteomyelitis with subsequent amputations. The ulcer has been present for about three weeks. She has not yet called her podiatrist. She did not think it was too bad. We are trying to arrange for ASAP consultation to make sure she was able to get in with the podiatrist. We initially left a message. Review of systems notable for some back pain and sinus itching.     MedDATA/gwo     Past Medical History:   Diagnosis Date    Adverse effect of anesthesia     slow to wake up    Arthritis     hands    CAD (coronary artery disease) 2008, 2016    angio/mild:MI, heart cath    Diabetes ([address]     Type II: insulin    GERD (gastroesophageal reflux disease)     Hypercholesterolemia     Hypothyroid     Ischemic colitis ([address] 11/01/2014    Menopause 2000    Neuropathy     feet/legs: ulcer right foot    Obesity     Psychiatric disorder     Depression    Sleep apnea     no use CPAP: unable to use, ruel me    Thyroid disease     Ulcer ([address] 11/2016    Callus that turned into open wound bottom right foot(ball area), not draining    Vertigo          Review of Systems   Constitutional: Negative for weight loss. Respiratory: Negative. Cardiovascular: Negative for chest pain, palpitations, leg swelling and PND. Musculoskeletal: Negative for myalgias. Skin: Positive for rash. Neurological: Negative for focal weakness. Psychiatric/Behavioral: Positive for depression. Physical Exam   Constitutional: She is oriented to person, place, and time. She appears well-developed and well-nourished. No distress. HENT:   Head: Normocephalic and atraumatic. Right Ear: Tympanic membrane, external ear and ear canal normal.   Left Ear: Tympanic membrane, external ear and ear canal normal.   Eyes: EOM are normal. Pupils are equal, round, and reactive to light. Right eye exhibits no discharge. Left eye exhibits no discharge. Neck: Normal range of motion. Neck supple. Carotid bruit is not present. No thyromegaly present. Cardiovascular: Normal rate, regular rhythm, normal heart sounds and intact distal pulses. Exam reveals no gallop and no friction rub. No murmur heard. Pulmonary/Chest: Effort normal and breath sounds normal. No respiratory distress. She has no wheezes. She has no rales. Abdominal: Soft. She exhibits no distension. Musculoskeletal: Normal range of motion. She exhibits no edema or tenderness. Feet:    Lymphadenopathy:     She has no cervical adenopathy. Neurological: She is alert and oriented to person, place, and time. Skin: Skin is warm and dry. No rash noted. Psychiatric: She has a normal mood and affect. Her behavior is normal.   Nursing note and vitals reviewed. ASSESSMENT and PLAN  Diagnoses and all orders for this visit:    1. Medicare annual wellness visit, subsequent    2. Advanced care planning/counseling discussion    3. Screening for depression    4. Screening for alcoholism    5.  Breast cancer screening  -     [facility] MAMMO BI SCREENING INCL CAD; Future    6. Need for diphtheria-tetanus-pertussis (Tdap) vaccine, adult/adolescent  -     Diphth, Pertus,Acell,, Tetanus (BOOSTRIX TDAP) 2.5-8-5 Lf-mcg-Lf/0.5mL susp susp; 0.5 mL by IntraMUSCular route once for 1 dose. 7. Need for vaccination with 13-polyvalent pneumococcal conjugate vaccine  -     pneumococcal 13 jose roberto conj dip (PREVNAR-13) 0.5 mL syrg injection; 0.5 mL by IntraMUSCular route once for 1 dose. 8. Diabetic ulcer of toe of right foot associated with type 2 diabetes mellitus, limited to breakdown of skin (HCC)  -     REFERRAL TO PODIATRY    9. Mixed hyperlipidemia  -     CBC WITH AUTOMATED DIFF    10. Essential hypertension, benign- Continue current regimen of prescription and / or OTC medications     11. Nasal itching  -     fluticasone (FLONASE) 50 mcg/actuation nasal spray; 2 Sprays by Both Nostrils route daily. 12. Major depressive disorder, recurrent episode, in partial remission (HCC)  -     FLUoxetine (PROZAC) 20 mg capsule; Take 1 Cap by mouth daily.  Take in addition to 40 mg dose    Plan was reviewed with patient and family, understanding expressed

## 2017-11-02 NOTE — MR AVS SNAPSHOT
Visit Information Date & Time Provider Department Dept. Phone Encounter #  
 11/2/2017  2:00 PM Shmuel Casey, [facility] Internal Medicine [phone] [id number] Follow-up Instructions Return in about 1 month (around 12/2/2017). Upcoming Health Maintenance Date Due DTaP/Tdap/Td series (1 - Tdap) 9/13/2005 EYE EXAM RETINAL OR DILATED Q1 5/14/2015 GLAUCOMA SCREENING Q2Y 5/14/2016 BREAST CANCER SCRN MAMMOGRAM 8/24/2017 HEMOGLOBIN A1C Q6M 11/27/2017 Pneumococcal 65+ Low/Medium Risk (2 of 2 - PPSV23) 1/4/2018 MICROALBUMIN Q1 1/24/2018 LIPID PANEL Q1 5/27/2018 FOOT EXAM Q1 11/2/2018 MEDICARE YEARLY EXAM 11/3/2018 COLONOSCOPY 10/31/2024 Allergies as of 11/2/2017  Review Complete On: 11/2/2017 By: Fernando Ryan RN No Known Allergies Current Immunizations  Reviewed on 11/2/2017 Name Date Influenza Vaccine Split 11/1/2010 Pneumococcal Polysaccharide (PPSV-23) 5/15/2017 Pneumococcal Vaccine (Unspecified Type) 1/4/2013 TD Vaccine 9/12/2005 ZZZ-RETIRED (DO NOT USE) Pneumococcal Vaccine (Unspecified Type) 1/1/2009 Zoster Vaccine, Live 1/1/2016 Reviewed by Nicki Bowser on 11/2/2017 at  2:46 PM  
You Were Diagnosed With   
  
 Codes Comments Medicare annual wellness visit, subsequent    -  Primary ICD-10-CM: Z00.00 ICD-9-CM: V70.0 Advanced care planning/counseling discussion     ICD-10-CM: Z71.89 ICD-9-CM: V65.49 Screening for depression     ICD-10-CM: Z13.89 ICD-9-CM: V79.0 Screening for alcoholism     ICD-10-CM: Z13.89 ICD-9-CM: V79.1 Breast cancer screening     ICD-10-CM: Z12.31 
ICD-9-CM: V76.10 Need for diphtheria-tetanus-pertussis (Tdap) vaccine, adult/adolescent     ICD-10-CM: Z23 ICD-9-CM: V06.1 Need for vaccination with 13-polyvalent pneumococcal conjugate vaccine     ICD-10-CM: W95 ICD-9-CM: V03.82  Diabetic ulcer of toe of right foot associated with type 2 diabetes mellitus, limited to breakdown of skin ([facility]     ICD-10-CM: E11.621, F48.083 ICD-9-CM: 250.80, 707.15 Mixed hyperlipidemia     ICD-10-CM: E78.2 ICD-9-CM: 272.2 Essential hypertension, benign     ICD-10-CM: I10 
ICD-9-CM: 401.1 Nasal itching     ICD-10-CM: L29.8 ICD-9-CM: 478.19 Major depressive disorder, recurrent episode, in partial remission ([facility]     ICD-10-CM: F33.41 
ICD-9-CM: 296.35 Vitals BP Pulse Temp Resp Height(growth percentile) Weight(growth percentile) 134/55 (BP 1 Location: Left arm, BP Patient Position: Sitting) 72 98.2 °F (36.8 °C) (Oral) 20 5' 6\" (1.676 m) 242 lb 3.2 oz (109.9 kg) SpO2 BMI OB Status Smoking Status 98% 39.09 kg/m2 Postmenopausal Former Smoker BMI and BSA Data Body Mass Index Body Surface Area 39.09 kg/m 2 2.26 m 2 Preferred Pharmacy Pharmacy Name Phone [facility] [address] [place] [address], [address] [place] [address] [phone] Your Updated Medication List  
  
   
This list is accurate as of: 11/2/17  3:13 PM.  Always use your most recent med list.  
  
  
  
  
 aspirin 81 mg chewable tablet Take 81 mg by mouth daily. atorvastatin 40 mg tablet Commonly known as:  LIPITOR Take 1 Tab by mouth daily. b complex vitamins tablet Take 1 Tab by mouth daily. Bifidobacterium Infantis 4 mg Cap Commonly known as:  ALIGN As directed  
  
 clopidogrel 75 mg Tab Commonly known as:  PLAVIX Take 1 Tab by mouth daily. Diphth, Pertus(Acell), Tetanus 2.5-8-5 Lf-mcg-Lf/0.5mL Susp susp Commonly known as:  BOOSTRIX TDAP  
0.5 mL by IntraMUSCular route once for 1 dose. fenofibrate micronized 134 mg capsule Commonly known as:  LOFIBRA Take 134 mg by mouth daily. FISH OIL 1,000 mg Cap Generic drug:  omega-3 fatty acids-vitamin e Take 1 Cap by mouth daily. fluticasone 50 mcg/actuation nasal spray Commonly known as:  Hardin Cyphers 2 Sprays by Both Nostrils route daily. HumaLOG 100 unit/mL injection Generic drug:  insulin lispro  
by SubCUTAneous route Before breakfast, lunch, and dinner. With sliding scale- per endocrinology  
  
 levothyroxine 125 mcg tablet Commonly known as:  SYNTHROID Take 125 mcg by mouth every Monday. Take 2 tablets by mouth Tuesday-Sunday once daily before breakfast, and 1 tablet by mouth on Mondays once daily before breakfast  
  
 metoprolol succinate 50 mg XL tablet Commonly known as:  TOPROL XL Take 1 Tab by mouth daily. MOTRIN  mg tablet Generic drug:  ibuprofen Take 200 mg by mouth daily as needed for Pain. Indications: OSTEOARTHRITIS  
  
 nitroglycerin 0.4 mg SL tablet Commonly known as:  NITROSTAT  
1 Tab by SubLINGual route as needed for Chest Pain. pneumococcal 13 jose roberto conj dip 0.5 mL Syrg injection Commonly known as:  PREVNAR-13  
0.5 mL by IntraMUSCular route once for 1 dose. PriLOSEC OTC 20 mg tablet Generic drug:  omeprazole Take 20 mg by mouth daily. Indications: GASTROESOPHAGEAL REFLUX  
  
 * PROzac 40 mg capsule Generic drug:  FLUoxetine Take 40 mg by mouth nightly. * FLUoxetine 20 mg capsule Commonly known as:  PROzac Take 1 Cap by mouth daily. Take in addition to 40 mg dose THERAPEUTIC MULTIVIT/MINERAL 27-0.4 mg Tab Generic drug:  multivitamin,tx-iron-ca-min Take 1 Tab by mouth every evening. TOUJEO SOLOSTAR 300 unit/mL (1.5 mL) Inpn Generic drug:  insulin glargine 60 Units by SubCUTAneous route nightly. TRULICITY SC  
by SubCUTAneous route. 1.5 units every saturday * Notice: This list has 2 medication(s) that are the same as other medications prescribed for you. Read the directions carefully, and ask your doctor or other care provider to review them with you. Prescriptions Printed Refills  Mag Gardner,, Tetanus (BOOSTRIX TDAP) 2.5-8-5 Lf-mcg-Lf/0.5mL susp susp 0  
 Si.5 mL by IntraMUSCular route once for 1 dose. Class: Print Route: IntraMUSCular  
 pneumococcal 13 jose roberto conj dip (PREVNAR-13) 0.5 mL syrg injection 0 Si.5 mL by IntraMUSCular route once for 1 dose. Class: Print Route: IntraMUSCular Prescriptions Sent to Pharmacy Refills FLUoxetine (PROZAC) 20 mg capsule 11 Sig: Take 1 Cap by mouth daily. Take in addition to 40 mg dose Class: Normal  
 Pharmacy: [address], [address] [place] [address] #: [phone] Route: Oral  
  
We Performed the Following CBC WITH AUTOMATED DIFF [ CPT(R)] REFERRAL TO PODIATRY [REF90 Custom] Follow-up Instructions Return in about 1 month (around 2017). To-Do List   
 2017 Imaging:  [facility] MAMMO BI SCREENING INCL CAD Referral Information Referral ID Referred By Referred To  
  
 [id number] [facility], [address] [place] [address], [address] [place] [address] Phone: [phone] Fax: [phone] Visits Status Start Date End Date 1 New Request 17 If your referral has a status of pending review or denied, additional information will be sent to support the outcome of this decision. Introducing [facility]! [facility] introduces [facility] patient portal. Now you can access parts of your medical record, email your doctor's office, and request medication refills online. 1. In your internet browser, go to [email or web address] 2. Click on the First Time User? Click Here link in the Sign In box. You will see the New Member Sign Up page. 3. Enter your [facility] Access Code exactly as it appears below. You will not need to use this code after youve completed the sign-up process. If you do not sign up before the expiration date, you must request a new code. · [facility] Access Code: [facility] Expires: 1/31/2018  8:32 AM 
 
4. Enter the last four digits of your Social Security Number (xxxx) and Date of Birth (mm/dd/yyyy) as indicated and click Submit. You will be taken to the next sign-up page. 5. Create a [employer] ID. This will be your [employer] login ID and cannot be changed, so think of one that is secure and easy to remember. 6. Create a [employer] password. You can change your password at any time. 7. Enter your Password Reset Question and Answer. This can be used at a later time if you forget your password. 8. Enter your e-mail address. You will receive e-mail notification when new information is available in [address] 9. Click Sign Up. You can now view and download portions of your medical record. 10. Click the Download Summary menu link to download a portable copy of your medical information. If you have questions, please visit the Frequently Asked Questions section of the [employer] website. Remember, [employer] is NOT to be used for urgent needs. For medical emergencies, dial 911. Now available from your iPhone and Android! Please provide this summary of care documentation to your next provider. Your primary care clinician is listed as ZELDA MUÑOZ. If you have any questions after today's visit, please call [phone].

## 2017-11-03 ENCOUNTER — TELEPHONE (OUTPATIENT)
Dept: INTERNAL MEDICINE CLINIC | Age: 67
End: 2017-11-03

## 2017-11-03 LAB
BASOPHILS # BLD AUTO: 0 X10E3/UL (ref 0–0.2)
BASOPHILS NFR BLD AUTO: 0 %
EOSINOPHIL # BLD AUTO: 0.3 X10E3/UL (ref 0–0.4)
EOSINOPHIL NFR BLD AUTO: 3 %
ERYTHROCYTE [DISTWIDTH] IN BLOOD BY AUTOMATED COUNT: 14.9 % (ref 12.3–15.4)
HCT VFR BLD AUTO: 38.7 % (ref 34–46.6)
HGB BLD-MCNC: 12.9 G/DL (ref 11.1–15.9)
IMM GRANULOCYTES # BLD: 0.1 X10E3/UL (ref 0–0.1)
IMM GRANULOCYTES NFR BLD: 1 %
LYMPHOCYTES # BLD AUTO: 2.4 X10E3/UL (ref 0.7–3.1)
LYMPHOCYTES NFR BLD AUTO: 25 %
MCH RBC QN AUTO: 28.4 PG (ref 26.6–33)
MCHC RBC AUTO-ENTMCNC: 33.3 G/DL (ref 31.5–35.7)
MCV RBC AUTO: 85 FL (ref 79–97)
MONOCYTES # BLD AUTO: 0.7 X10E3/UL (ref 0.1–0.9)
MONOCYTES NFR BLD AUTO: 8 %
NEUTROPHILS # BLD AUTO: 6.1 X10E3/UL (ref 1.4–7)
NEUTROPHILS NFR BLD AUTO: 63 %
PLATELET # BLD AUTO: 349 X10E3/UL (ref 150–379)
RBC # BLD AUTO: 4.55 X10E6/UL (ref 3.77–5.28)
WBC # BLD AUTO: 9.6 X10E3/UL (ref 3.4–10.8)

## 2017-11-03 NOTE — TELEPHONE ENCOUNTER
Called patient's podiatry office, Dr. Torin Allen, to secure appt regarding ulcer on L toe. Patient has appt 11/6/17 at 11:00am with Dr. Torin Allen. LM asking patient to return my call.

## 2017-11-06 ENCOUNTER — TELEPHONE (OUTPATIENT)
Dept: INTERNAL MEDICINE CLINIC | Age: 67
End: 2017-11-06

## 2017-11-06 NOTE — TELEPHONE ENCOUNTER
----- Message from Luis Mena MD sent at 11/4/2017  2:30 PM EDT -----  Regarding: acs  Make sure she got into her , thx      Spoke with pt - MD wanted to make sure she has appt with podiatrist. She states she is there now at Dr Avery Lopez office.  Will forward to MD.

## 2017-11-06 NOTE — PERIOP NOTES
[facility]  Ambulatory Surgery Unit  Pre-operative Instructions    Procedure Date  11/07/2017            Tentative Arrival Time 1030      1. On the day of your procedure, please report to the Ambulatory Surgery Unit Registration Desk and sign in at your designated time. The Ambulatory Surgery Unit is located in [facility] on the [facility] side of the [facility] [place] across from the [facility]. Please have all of your health insurance cards and a photo ID. 2. You must have someone with you to drive you home as directed by your surgeon. 3. You may have a light breakfast and take normal morning medications. 4. We recommend you do not drink any alcoholic beverages for 24 hours before and after your procedure. 5. Contact your surgeons office for instructions on the following medications: non-steroidal anti-inflammatory drugs (i.e. Advil, Aleve), vitamins, and supplements. (Some surgeons will want you to stop these medications prior to surgery and others may allow you to take them)   **If you are currently taking Plavix, Coumadin, Aspirin and/or other blood-thinning agents, contact your surgeon for instructions. ** Your surgeon will partner with the physician prescribing these medications to determine if it is safe to stop or if you need to continue taking. Please do not stop taking these medications without instructions from your surgeon. 6. In an effort to help prevent surgical site infection, we ask that you shower with an anti-bacterial soap (i.e. Dial or Safeguard) on the morning of your procedure. Do not apply any lotions, powders, or deodorants after showering. 7. Wear comfortable clothes. Wear glasses instead of contacts. Do not bring any jewelry or money (other than copays or fees as instructed). Do not wear make-up, particularly mascara, the morning of your procedure. Wear your hair loose or down, no ponytails, buns, sruthi pins or clips.  All body piercings must be removed. 8. You should understand that if you do not follow these instructions your procedure may be cancelled. If your physical condition changes (i.e. fever, cold or flu) please contact your surgeon as soon as possible. 9. It is important that you be on time. If a situation occurs where you may be late, or if you have any questions or problems, please call [phone].    10. Your procedure time may be subject to change. You will receive a phone call the day prior to confirm your arrival time. I understand a pre-operative phone call will be made to verify my procedure time. In the event that I am not available, I give permission for a message to be left on my answering service and/or with another person? yes         ___________________      ___________________      ___________________  Pt verbalized understanding of preop instructions via telephone.   (Signature of Patient)          (Witness)                   (Date and Time)

## 2017-11-07 ENCOUNTER — HOSPITAL ENCOUNTER (OUTPATIENT)
Age: 67
Setting detail: OUTPATIENT SURGERY
Discharge: HOME | End: 2017-11-07
Attending: PODIATRIST | Admitting: PODIATRIST
Payer: MEDICARE

## 2017-11-07 ENCOUNTER — OFFICE VISIT (OUTPATIENT)
Dept: INTERNAL MEDICINE CLINIC | Age: 67
End: 2017-11-07

## 2017-11-07 ENCOUNTER — DOCUMENTATION ONLY (OUTPATIENT)
Dept: INTERNAL MEDICINE CLINIC | Age: 67
End: 2017-11-07

## 2017-11-07 VITALS
SYSTOLIC BLOOD PRESSURE: 129 MMHG | HEART RATE: 60 BPM | HEIGHT: 66 IN | WEIGHT: 242.13 LBS | BODY MASS INDEX: 38.91 KG/M2 | TEMPERATURE: 98.1 F | OXYGEN SATURATION: 96 % | DIASTOLIC BLOOD PRESSURE: 61 MMHG | RESPIRATION RATE: 16 BRPM

## 2017-11-07 VITALS
RESPIRATION RATE: 19 BRPM | HEIGHT: 66 IN | OXYGEN SATURATION: 96 % | DIASTOLIC BLOOD PRESSURE: 60 MMHG | SYSTOLIC BLOOD PRESSURE: 90 MMHG | HEART RATE: 59 BPM | TEMPERATURE: 97.6 F | WEIGHT: 242.2 LBS | BODY MASS INDEX: 38.92 KG/M2

## 2017-11-07 DIAGNOSIS — Z01.818 PRE-OP EXAMINATION: Primary | ICD-10-CM

## 2017-11-07 LAB
GLUCOSE BLD STRIP.AUTO-MCNC: 269 MG/DL (ref 65–100)
GLUCOSE BLD STRIP.AUTO-MCNC: 290 MG/DL (ref 65–100)
GLUCOSE BLD STRIP.AUTO-MCNC: 293 MG/DL (ref 65–100)
GLUCOSE BLD STRIP.AUTO-MCNC: 329 MG/DL (ref 65–100)
SERVICE CMNT-IMP: ABNORMAL

## 2017-11-07 PROCEDURE — 77030010785: Performed by: PODIATRIST

## 2017-11-07 PROCEDURE — 74011250636 HC RX REV CODE- 250/636: Performed by: PODIATRIST

## 2017-11-07 PROCEDURE — 77030018836 HC SOL IRR NACL ICUM -A: Performed by: PODIATRIST

## 2017-11-07 PROCEDURE — 82962 GLUCOSE BLOOD TEST: CPT

## 2017-11-07 PROCEDURE — 87075 CULTR BACTERIA EXCEPT BLOOD: CPT | Performed by: PODIATRIST

## 2017-11-07 PROCEDURE — 74011000272 HC RX REV CODE- 272: Performed by: PODIATRIST

## 2017-11-07 PROCEDURE — 87077 CULTURE AEROBIC IDENTIFY: CPT | Performed by: PODIATRIST

## 2017-11-07 PROCEDURE — 74011636637 HC RX REV CODE- 636/637: Performed by: PODIATRIST

## 2017-11-07 PROCEDURE — C1713 ANCHOR/SCREW BN/BN,TIS/BN: HCPCS | Performed by: PODIATRIST

## 2017-11-07 PROCEDURE — 64450 NJX AA&/STRD OTHER PN/BRANCH: CPT

## 2017-11-07 PROCEDURE — 76030000000 HC AMB SURG OR TIME 0.5 TO 1: Performed by: PODIATRIST

## 2017-11-07 PROCEDURE — 77030011640 HC PAD GRND REM COVD -A: Performed by: PODIATRIST

## 2017-11-07 PROCEDURE — 88311 DECALCIFY TISSUE: CPT | Performed by: PODIATRIST

## 2017-11-07 PROCEDURE — 88307 TISSUE EXAM BY PATHOLOGIST: CPT | Performed by: PODIATRIST

## 2017-11-07 PROCEDURE — 87186 SC STD MICRODIL/AGAR DIL: CPT | Performed by: PODIATRIST

## 2017-11-07 PROCEDURE — 74011000250 HC RX REV CODE- 250: Performed by: PODIATRIST

## 2017-11-07 PROCEDURE — 77030031139 HC SUT VCRL2 J&J -A: Performed by: PODIATRIST

## 2017-11-07 PROCEDURE — 87205 SMEAR GRAM STAIN: CPT | Performed by: PODIATRIST

## 2017-11-07 PROCEDURE — 76210000057 HC AMBSU PH II REC 1 TO 1.5 HR: Performed by: PODIATRIST

## 2017-11-07 PROCEDURE — 74011636637 HC RX REV CODE- 636/637

## 2017-11-07 DEVICE — CEMENT BNE 20ML 41GM FULL DOSE PMMA W/ TOBRA M VISC RADPQ: Type: IMPLANTABLE DEVICE | Site: TOE | Status: FUNCTIONAL

## 2017-11-07 RX ORDER — CEFAZOLIN SODIUM IN 0.9 % NACL 2 G/100 ML
2 PLASTIC BAG, INJECTION (ML) INTRAVENOUS ONCE
Status: COMPLETED | OUTPATIENT
Start: 2017-11-07 | End: 2017-11-07

## 2017-11-07 RX ORDER — BUPIVACAINE HYDROCHLORIDE AND EPINEPHRINE 5; 5 MG/ML; UG/ML
INJECTION, SOLUTION EPIDURAL; INTRACAUDAL; PERINEURAL AS NEEDED
Status: DISCONTINUED | OUTPATIENT
Start: 2017-11-07 | End: 2017-11-07 | Stop reason: HOSPADM

## 2017-11-07 RX ORDER — LIDOCAINE HYDROCHLORIDE 10 MG/ML
0.1 INJECTION, SOLUTION EPIDURAL; INFILTRATION; INTRACAUDAL; PERINEURAL ONCE
Status: COMPLETED | OUTPATIENT
Start: 2017-11-07 | End: 2017-11-07

## 2017-11-07 RX ORDER — AMOXICILLIN AND CLAVULANATE POTASSIUM 875; 125 MG/1; MG/1
TABLET, FILM COATED ORAL EVERY 12 HOURS
COMMUNITY
End: 2018-05-30 | Stop reason: ALTCHOICE

## 2017-11-07 RX ORDER — HYDROCODONE BITARTRATE AND ACETAMINOPHEN 5; 325 MG/1; MG/1
1 TABLET ORAL
Qty: 20 TAB | Refills: 0 | Status: SHIPPED | OUTPATIENT
Start: 2017-11-07 | End: 2018-05-30 | Stop reason: ALTCHOICE

## 2017-11-07 RX ORDER — SODIUM CHLORIDE 9 MG/ML
25 INJECTION, SOLUTION INTRAVENOUS CONTINUOUS
Status: DISCONTINUED | OUTPATIENT
Start: 2017-11-07 | End: 2017-11-07 | Stop reason: HOSPADM

## 2017-11-07 RX ADMIN — LIDOCAINE HYDROCHLORIDE 0.1 ML: 10 INJECTION, SOLUTION EPIDURAL; INFILTRATION; INTRACAUDAL; PERINEURAL at 11:56

## 2017-11-07 RX ADMIN — SODIUM CHLORIDE 25 ML/HR: 900 INJECTION, SOLUTION INTRAVENOUS at 12:02

## 2017-11-07 RX ADMIN — CEFAZOLIN 2 G: 10 INJECTION, POWDER, FOR SOLUTION INTRAVENOUS; PARENTERAL at 14:34

## 2017-11-07 RX ADMIN — INSULIN HUMAN 3 UNITS: 100 INJECTION, SOLUTION PARENTERAL at 13:19

## 2017-11-07 RX ADMIN — INSULIN HUMAN 4 UNITS: 100 INJECTION, SOLUTION PARENTERAL at 12:44

## 2017-11-07 RX ADMIN — INSULIN HUMAN 4 UNITS: 100 INJECTION, SOLUTION PARENTERAL at 13:52

## 2017-11-07 NOTE — DISCHARGE INSTRUCTIONS
INSTRUCTIONS FOLLOWING FOOT SURGERY    ACTIVITY:  · Elevate feet for 48 hours  · Use crutches / walker as directed by your doctor, and follow your doctors instructions as to your weight bearing status -you can bear weight on the left foot    DIET: Please eat  low fat, low sugar foods-high protein. Blood Sugar was 269 at 4:00pm Eat light dinner and take insulin as directed. PAIN:  · Take pain medication as directed by your doctor. · Call your doctor if pain is not relieved by medication. · Take aspirin, Plavix and fish oil in am    DRESSING CARE: keep dressing clean and dry, do not remove       FOLLOW-UP PHONE CALLS:  · Calls will be made by nursing staff. · If you had any problems, call your doctor as needed. CALL YOUR DOCTOR IF:  · Excessive bleeding that does not stop after holding mild pressure over the area  · Temperature of 101° F or above  · Redness, excessive swelling or bruising, and/or green or yellow, smelly discharge from incision              cold, white, or blue toes        [place] THROMBOSIS AND PULMONARY EMBOLUS    SURGICAL PATIENTS  Surgical patients are the #1 risk for DVT and PE. WHAT IS DVT? WHAT IS PE?  DVT is a serious condition where blood clots develop deep in the veins of the legs. PE occurs when a blood clot breaks loose from the wall of a vein and travels to the lungs blocking the pulmonary artery or one of its branches impairing blood flow from the heart, which could result in death.   RISK FACTORS   Surgery lasting longer than 45 minutes   History of inflammatory bowel disease   Oral contraceptive or hormone replacement therapy   Immobilization   Varicose veins / swollen legs   Smoking    CHF / Acute MI / Irregular heart beat   Family history of thrombosis   General anesthesia greater than 2 hours   Obesity   Infection of less than one month   Less than 1 month postpartum   COPD / Pneumonia   Arthroscopic surgery   Malignancy / cancer   Spine surgery   Blood abnormalities   Stroke / Paralysis / Coma    SIGNS AND SYMPTOMS OF DEEP VEIN TROMBOSIS  Usually occurs in one leg, above or below the knee   Swelling - one calf or thigh may be larger than the other   Feeling increased warmth in the area of the leg that is swollen or painful   Leg pain, which may increase when standing or walking   Swelling along the vein of the leg   When swollen areas is pressed with a finger, a depression may remain   Tenderness of the leg that may be confined to one area   Change in leg color (bluish or red)    SIGNS AND SYMPTOMS OF PULMONARY EMBOLUS   Chest pain that gets worse with deep breath, coughing or chest movement   Coughing up blood   Sweating   Shortness of breath or difficulty breathing   Rapid heart beat   Lightheadedness    HOW TO REDUCE THE POSSIBLE RISK OF DVT   Exercise - simple activities as rotating ankles and wrists, wiggling toes and fingers, tightening and relaxing muscles in calves and thighs promotes circulation while recovering from surgery. Please do these exercises every hour during waking hours      Take mediation as prescribed by your physician (Lovenox, Coumadin, Aspirin)   Resume your normal activities as soon as your doctor advises you to do so.  Remember, when traveling, to [place] your legs frequently. PATIENTS WHO BELIEVE THEY MAY BE EXPERIENCING SIGNS AND SYMPTOMS OF DVT OR PE SHOULD SEEK MEDICAL HELP IMMEDIATELY                 DISCHARGE SUMMARY from Nurse    The following personal items collected during your admission are returned to you:   Dental Appliance: Dental Appliances: None  Vision: Visual Aid: Glasses  Hearing Aid:    Jewelry: Jewelry: None  Clothing: Clothing: Shirt, Pants, Undergarments, Socks, Footwear  Other Valuables:  Other Valuables:  (glasses to sister aura)  Valuables sent to safe: Personal Items Sent to Safe: none      If you were given prescriptions, please review the written information on prescribed medications. · You will receive a Post Operative Call from one of the Recovery Room Nurses on the day after your surgery to check on you. It is very important for us to know how you are recovering after your surgery. · You may receive an e-mail or letter in the mail from Mary regarding your experience with us in the Ambulatory Surgery Unit. Your feedback is valuable to us and we appreciate your participation in the survey. If you have not had your influenza or pneumococcal vaccines, please follow up with your primary care physician. The discharge information has been reviewed with the patient. The patient verbalized understanding.

## 2017-11-07 NOTE — PERIOP NOTES
[facility]  1950  [id number]    Situation:  Verbal report given from: RAINA Uriostegui RN  Procedure: Procedure(s):  4TH LEFT TOE RESECT THE PHALLANX  AND INSERT ANTIBIOTIC BEADS LEFT FOOT    Background:    Preoperative diagnosis: OSTEOMYLITIS TOE 4 LEFT FOOT    Postoperative diagnosis: OSTEOMYLITIS TOE 4 LEFT FOOT    :  Dr. Marj Lang    Assistant(s): Alexsander Bates: Nelsy Royal  Local Nurse Monitor: Jacobo Ospina RN; Bunny Zuniga RN  Scrub Tech-1: Feliz Hartman  Scrub Tech-2: Raoul Kendrick    Specimens:   ID Type Source Tests Collected by Time Destination   1 : Middle phalanx 4th toe left foot Preservative Toe  Felecia Angel, [facility] 11/7/2017 1523 Pathology   1 : Middle phalanx 4th toe left foot Wound Toe AEROBIC/ANAEROBIC CULTURE [facility], [facility] 11/7/2017 1514 Microbiology       Assessment:  Intra-procedure medications         Anesthesia gave intra-procedure sedation and medications, see anesthesia flow sheet     Intravenous fluids: LR@ KVO     Vital signs stable. Pt denies pain and warm blanket for shoulders. Left foot elevated and 1st three toes pink with good blood return. 4th toe purple but blanches and refils quick. 5th toe pale and cold-pt. Received local with epi and very normal-will watch and assure pink before discharge.        Recommendation:    Permission to share finding with family or friend yes

## 2017-11-07 NOTE — PERIOP NOTES
Consent clarified to 4th left toe. Glucose 329 treated with 4u regular insulin per Dr. Addis Krause. Will recheck glucose at 1315. Recheck Glucose 290. Per Dr. Addis Krause give 3u Regular Insulin and re-check glucose in 30 min.

## 2017-11-07 NOTE — IP AVS SNAPSHOT
[address] [facility] 
[phone] Patient: Elton Gross MRN: [id number] About your hospitalization You were admitted on:  November 7, 2017 You last received care in the:  [facility] PACU You were discharged on:  November 7, 2017 Why you were hospitalized Your primary diagnosis was:  Not on File Things You Need To Do (next 8 weeks) Follow up with Prem Garza MD  
  
Phone:  [phone] Where:  [address], [address] [place] [phone] Discharge Orders None A check luc indicates which time of day the medication should be taken. My Medications TAKE these medications as instructed Instructions Each Dose to Equal  
 Morning Noon Evening Bedtime  
 amoxicillin-clavulanate 875-125 mg per tablet Commonly known as:  AUGMENTIN Your last dose was: Your next dose is: Take  by mouth every twelve (12) hours. aspirin 81 mg chewable tablet Your last dose was: Your next dose is: Take 81 mg by mouth daily. 81 mg  
    
   
   
   
  
 atorvastatin 40 mg tablet Commonly known as:  LIPITOR Your last dose was: Your next dose is: Take 1 Tab by mouth daily. 40 mg  
    
   
   
   
  
 b complex vitamins tablet Your last dose was: Your next dose is: Take 1 Tab by mouth daily. 1 Tab Bifidobacterium Infantis 4 mg Cap Commonly known as:  ALIGN Your last dose was: Your next dose is: As directed  
     
   
   
   
  
 clopidogrel 75 mg Tab Commonly known as:  PLAVIX Your last dose was: Your next dose is: Take 1 Tab by mouth daily. 75 mg  
    
   
   
   
  
 fenofibrate micronized 134 mg capsule Commonly known as:  LOFIBRA Your last dose was: Your next dose is: Take 134 mg by mouth daily. 134 mg FISH OIL 1,000 mg Cap Generic drug:  omega-3 fatty acids-vitamin e Your last dose was: Your next dose is: Take 1 Cap by mouth daily. 1 Cap  
    
   
   
   
  
 fluticasone 50 mcg/actuation nasal spray Commonly known as:  Wilber Keys Your last dose was: Your next dose is: 2 Sprays by Both Nostrils route daily. 2 Spray HumaLOG 100 unit/mL injection Generic drug:  insulin lispro Your last dose was: Your next dose is:    
   
   
 by SubCUTAneous route Before breakfast, lunch, and dinner. With sliding scale- per endocrinology HYDROcodone-acetaminophen 5-325 mg per tablet Commonly known as:  Fitz Snyder Your last dose was: Your next dose is: Take 1 Tab by mouth every four (4) hours as needed for Pain. Max Daily Amount: 6 Tabs. Indications: Pain 1 Tab  
    
   
   
   
  
 levothyroxine 125 mcg tablet Commonly known as:  SYNTHROID Your last dose was: Your next dose is: Take 125 mcg by mouth every Monday. Take 2 tablets by mouth Tuesday-Sunday once daily before breakfast, and 1 tablet by mouth on Mondays and Wednesday once daily before breakfast  
 125 mcg  
    
   
   
   
  
 metoprolol succinate 50 mg XL tablet Commonly known as:  TOPROL XL Your last dose was: Your next dose is: Take 1 Tab by mouth daily. 50 mg MOTRIN  mg tablet Generic drug:  ibuprofen Your last dose was: Your next dose is: Take 200 mg by mouth daily as needed for Pain. Indications: OSTEOARTHRITIS  
 200 mg  
    
   
   
   
  
 nitroglycerin 0.4 mg SL tablet Commonly known as:  NITROSTAT Your last dose was: Your next dose is: 1 Tab by SubLINGual route as needed for Chest Pain. 0.4 mg  
    
   
   
   
  
 PriLOSEC OTC 20 mg tablet Generic drug:  omeprazole Your last dose was: Your next dose is: Take 20 mg by mouth daily. Indications: GASTROESOPHAGEAL REFLUX  
 20 mg  
    
   
   
   
  
 * PROzac 40 mg capsule Generic drug:  FLUoxetine Your last dose was: Your next dose is: Take 40 mg by mouth nightly. 40 mg  
    
   
   
   
  
 * FLUoxetine 20 mg capsule Commonly known as:  PROzac Your last dose was: Your next dose is: Take 1 Cap by mouth daily. Take in addition to 40 mg dose  
 20 mg  
    
   
   
   
  
 THERAPEUTIC MULTIVIT/MINERAL 27-0.4 mg Tab Generic drug:  multivitamin,tx-iron-ca-min Your last dose was: Your next dose is: Take 1 Tab by mouth every evening. 1 Tab TOUJEO SOLOSTAR 300 unit/mL (1.5 mL) Inpn Generic drug:  insulin glargine Your last dose was: Your next dose is:    
   
   
 60 Units by SubCUTAneous route nightly. 60 Units TRULICITY SC Your last dose was: Your next dose is:    
   
   
 by SubCUTAneous route. 1.5 units every saturday * Notice: This list has 2 medication(s) that are the same as other medications prescribed for you. Read the directions carefully, and ask your doctor or other care provider to review them with you. Where to Get Your Medications Information on where to get these meds will be given to you by the nurse or doctor. ! Ask your nurse or doctor about these medications HYDROcodone-acetaminophen 5-325 mg per tablet Discharge Instructions INSTRUCTIONS FOLLOWING FOOT SURGERY 
 
ACTIVITY: 
· Elevate feet for 48 hours · Use crutches / walker as directed by your doctor, and follow your doctors instructions as to your weight bearing status -you can bear weight on the left foot DIET: Please eat  low fat, low sugar foods-high protein. Blood Sugar was 269 at 4:00pm Eat light dinner and take insulin as directed. PAIN: 
· Take pain medication as directed by your doctor. · Call your doctor if pain is not relieved by medication. · Take aspirin, Plavix and fish oil in am 
 
DRESSING CARE: keep dressing clean and dry, do not remove FOLLOW-UP PHONE CALLS: 
· Calls will be made by nursing staff. · If you had any problems, call your doctor as needed. CALL YOUR DOCTOR IF: 
· Excessive bleeding that does not stop after holding mild pressure over the area · Temperature of 101° F or above · Redness, excessive swelling or bruising, and/or green or yellow, smelly discharge from incision 
            cold, white, or blue toes [address] THROMBOSIS AND PULMONARY EMBOLUS 
 
SURGICAL PATIENTS Surgical patients are the #1 risk for DVT and PE. WHAT IS DVT? WHAT IS PE? 
DVT is a serious condition where blood clots develop deep in the veins of the legs. PE occurs when a blood clot breaks loose from the wall of a vein and travels to the lungs blocking the pulmonary artery or one of its branches impairing blood flow from the heart, which could result in death. RISK FACTORS 
? Surgery lasting longer than 45 minutes ? History of inflammatory bowel disease 
? Oral contraceptive or hormone replacement therapy ? Immobilization ? Varicose veins / swollen legs ? Smoking  
? CHF / Acute MI / Irregular heart beat ? Family history of thrombosis ? General anesthesia greater than 2 hours ? Obesity ? Infection of less than one month ? Less than 1 month postpartum 
? COPD / Pneumonia ? Arthroscopic surgery ? Malignancy / cancer ? Spine surgery ? Blood abnormalities ? Stroke / Paralysis / Coma SIGNS AND SYMPTOMS OF DEEP VEIN TROMBOSIS 
 Usually occurs in one leg, above or below the knee ? Swelling  one calf or thigh may be larger than the other ? Feeling increased warmth in the area of the leg that is swollen or painful ? Leg pain, which may increase when standing or walking ? Swelling along the vein of the leg ? When swollen areas is pressed with a finger, a depression may remain ? Tenderness of the leg that may be confined to one area ? Change in leg color (bluish or red) SIGNS AND SYMPTOMS OF PULMONARY EMBOLUS 
? Chest pain that gets worse with deep breath, coughing or chest movement ? Coughing up blood ? Sweating ? Shortness of breath or difficulty breathing ? Rapid heart beat ? Lightheadedness HOW TO REDUCE THE POSSIBLE RISK OF DVT ? Exercise  simple activities as rotating ankles and wrists, wiggling toes and fingers, tightening and relaxing muscles in calves and thighs promotes circulation while recovering from surgery. Please do these exercises every hour during waking hours ? ? Take mediation as prescribed by your physician (Lovenox, Coumadin, Aspirin) ? Resume your normal activities as soon as your doctor advises you to do so. 
? Remember, when traveling, to [place] your legs frequently. PATIENTS WHO BELIEVE THEY MAY BE EXPERIENCING SIGNS AND SYMPTOMS OF DVT OR PE SHOULD SEEK MEDICAL HELP IMMEDIATELY DISCHARGE SUMMARY from Nurse The following personal items collected during your admission are returned to you:  
Dental Appliance: Dental Appliances: None Vision: Visual Aid: Glasses Hearing Aid:   
Jewelry: Jewelry: None Clothing: Clothing: Shirt, Pants, Undergarments, Socks, Footwear Other Valuables: Other Valuables:  (glasses to sister arua) Valuables sent to safe: Personal Items Sent to Safe: none If you were given prescriptions, please review the written information on prescribed medications.   
 
· You will receive a Post Operative Call from one of the Recovery Room Nurses on the day after your surgery to check on you. It is very important for us to know how you are recovering after your surgery. · You may receive an e-mail or letter in the mail from Mary regarding your experience with us in the Ambulatory Surgery Unit. Your feedback is valuable to us and we appreciate your participation in the survey. If you have not had your influenza or pneumococcal vaccines, please follow up with your primary care physician. The discharge information has been reviewed with the patient. The patient verbalized understanding. Introducing [facility]! [employer] introduces Approva patient portal. Now you can access parts of your medical record, email your doctor's office, and request medication refills online. 1. In your internet browser, go to [email or web address] 2. Click on the First Time User? Click Here link in the Sign In box. You will see the New Member Sign Up page. 3. Enter your Approva Access Code exactly as it appears below. You will not need to use this code after youve completed the sign-up process. If you do not sign up before the expiration date, you must request a new code. · Approva Access Code: [facility] Expires: 1/31/2018  7:32 AM 
 
4. Enter the last four digits of your Social Security Number (xxxx) and Date of Birth (mm/dd/yyyy) as indicated and click Submit. You will be taken to the next sign-up page. 5. Create a Approva ID. This will be your Approva login ID and cannot be changed, so think of one that is secure and easy to remember. 6. Create a Approva password. You can change your password at any time. 7. Enter your Password Reset Question and Answer. This can be used at a later time if you forget your password. 8. Enter your e-mail address. You will receive e-mail notification when new information is available in [address] 9. Click Sign Up. You can now view and download portions of your medical record. 10. Click the Download Summary menu link to download a portable copy of your medical information. If you have questions, please visit the Frequently Asked Questions section of the MyChart website. Remember, MyChart is NOT to be used for urgent needs. For medical emergencies, dial 911. Now available from your iPhone and Android! Unresulted Labs-Please follow up with your PCP about these lab tests Order Current Status CULTURE, ANAEROBIC In process CULTURE, WOUND W GRAM STAIN In process Providers Seen During Your Hospitalization Provider Specialty Primary office phone [facility] [phone] Your Primary Care Physician (PCP) Primary Care Physician Office Phone Office Fax Sonia Nails [phone] You are allergic to the following No active allergies Recent Documentation Height Weight BMI OB Status Smoking Status 1.676 m 109.8 kg 39.08 kg/m2 Postmenopausal Former Smoker Emergency Contacts Name Discharge Info Relation Home Work Mobile Dana Benitez DISCHARGE CAREGIVER [3] Other Relative [6] [phone] Patient Belongings The following personal items are in your possession at time of discharge: 
  Dental Appliances: None  Visual Aid: Glasses   Hearing Aids/Status: Does not own  Home Medications: None   Jewelry: None  Clothing: Shirt, Pants, Undergarments, Socks, Footwear    Other Valuables:  (glasses to sister dana)  Personal Items Sent to Safe: none Discharge Instructions Attachments/References MEFS - HYDROCODONE/ACETAMINOPHEN (VICODIN, Benetta Pock, LORTAB) - (BY MOUTH) (ENGLISH) Patient Handouts Hydrocodone/Acetaminophen (Vicodin, Norco, Lortab) - (By mouth) Why this medicine is used:  
Treats pain. Contact a nurse or doctor right away if you have: · Blistering, peeling, red skin rash · Fast or slow heartbeat, shallow breathing, blue lips, fingernails, or skin · Anxiety, restlessness, muscle spasms, twitching, seeing or hearing things that are not there · Dark urine or pale stools, yellow skin or eyes · Extreme weakness, sweating, seizures, cold or clammy skin · Lightheadedness, dizziness, fainting, fever, sweating Common side effects: 
· Constipation, nausea, vomiting, loss of appetite, stomach pain · Tiredness or sleepiness © 2017 [facility] Information is for End User's use only and may not be sold, redistributed or otherwise used for commercial purposes. Please provide this summary of care documentation to your next provider. Signatures-by signing, you are acknowledging that this After Visit Summary has been reviewed with you and you have received a copy. Patient Signature:  ____________________________________________________________ Date:  ____________________________________________________________  
  
Namita Charter Provider Signature:  ____________________________________________________________ Date:  ____________________________________________________________

## 2017-11-07 NOTE — OP NOTES
[address]   OP NOTE       Name:  Luci Gardiner   MR#:  [id number]   :  1950   Account #:  [de-identified]    Surgery Date:  2017   Date of Adm:  2017       PREOPERATIVE DIAGNOSIS: Osteomyelitis, 4th toe, left foot. POSTOPERATIVE DIAGNOSIS: Osteomyelitis, 4th toe, left foot. PROCEDURES PERFORMED: Resection of phalanx with insertion of   antibiotic spacer. SURGEON: Theresa Blackwood DPM    ASSISTANT: Dr. Misa Arias DPM, Resident    ANESTHESIA: Local.    SPECIMENS REMOVED: Aerobic and anaerobic cultures, and bone   sent for pathology. ESTIMATED BLOOD LOSS: 3cc    INDICATIONS: The patient presented with an open ulceration, draining   ulcer on the dorsal aspect of her left 4th toe yesterday. We discussed   the surgery, risks and complications, and she is aware and agreeable. Rather than amputate the toe, I thought I would try save it with a   spacer and resection. DESCRIPTION OF PROCEDURE: She was taken to the operating   room, placed on the operating table in supine position. After a block of   0.5% Marcaine, she was prepped and draped. A double lenticular   incision was used to resect out the ulceration, and then went down to   bone. We disarticulated the middle phalanx at the proximal and distal   aspects, and debrided of soft tissue sent to Pathology. The head of the   proximal phalanx was soft, and this was resected back to healthy   bone, based on texture and site. At this point, we flushed out the area   after cultures were taken, and then an antibiotic spacer with   gentamicin, vancomycin and bone cement was placed in the wound. The wound was loosely approximated with a 4-0 Prolene. She left the   operating room to the recovery room in stable condition.         Nay Naik DPM      [facility]   D:  2017   15:22   T:  2017   16:46   Job #:  [id number]

## 2017-11-07 NOTE — PROGRESS NOTES
Preoperative Asessment    George Lund is 75 y.o. female (1950) who presents for preoperative evaluation. Procedure/Surgery: Resection of 4th phalanx of left foot and insertion of antibiotic beads. Date of Procedure/Surgery: 11/7/17   Surgeon: Dr. Boni Blanton: [facility]  Primary Physician: Dr. Dayanna Whitt    The patient reports feeling well generally. She has no chest pain. She has had a recent diagnosis of osteomyelitis. No fevers. She has a history of CAD in the past which has been treated medically. She has recently seen cardiology and had an echo with a noted EF of 60%. No complaints. Patient Active Problem List   Diagnosis Code    Mixed hyperlipidemia E78.2    Diabetic foot ulcer ([facility] [address] E11.621, L97.509    Hypothyroidism, acquired, autoimmune E03.8    Mononeuritis of unspecified site G58.9    Obesity, unspecified E66.9    Coronary atherosclerosis of native coronary artery I25.10    Symptomatic menopausal or female climacteric states N95.1    Unspecified sleep apnea G47.30    Essential hypertension, benign I10    Iron deficiency anemia D50.9    Renal insufficiency N28.9    Acute colitis K52.9    Ischemic colitis (HCC) K55.9    Advanced care planning/counseling discussion Z71.89    Type 2 diabetes, uncontrolled, with neuropathy (HCC) E11.40, E11.65       Prior to Admission medications    Medication Sig Start Date End Date Taking? Authorizing Provider   amoxicillin-clavulanate (AUGMENTIN) 875-125 mg per tablet Take  by mouth every twelve (12) hours. Yes Historical Provider   insulin lispro (HUMALOG) 100 unit/mL injection by SubCUTAneous route Before breakfast, lunch, and dinner. With sliding scale- per endocrinology   Yes Historical Provider   insulin glargine (TOUJEO SOLOSTAR) 300 unit/mL (1.5 mL) inpn 60 Units by SubCUTAneous route nightly.    Yes Historical Provider   FLUoxetine (PROZAC) 20 mg capsule Take 1 Cap by mouth daily. Take in addition to 40 mg dose 11/2/17  Yes Khadra Boland MD   fluticasone [facility]) 50 mcg/actuation nasal spray 2 Sprays by Both Nostrils route daily. 11/2/17  Yes Khadra Boland MD   DULAGLUTIDE (TRULICITY SC) by SubCUTAneous route. 1.5 units every saturday   Yes Historical Provider   atorvastatin (LIPITOR) 40 mg tablet Take 1 Tab by mouth daily. 11/22/16  Yes Romana Cosier, MD   clopidogrel (PLAVIX) 75 mg tab Take 1 Tab by mouth daily. 11/22/16  Yes Romana Cosier, MD   nitroglycerin (NITROSTAT) 0.4 mg SL tablet 1 Tab by SubLINGual route as needed for Chest Pain. 11/22/16  Yes Romana Cosier, MD   metoprolol succinate (TOPROL XL) 50 mg XL tablet Take 1 Tab by mouth daily. 11/22/16  Yes Romana Cosier, MD   FLUoxetine (PROZAC) 40 mg capsule Take 40 mg by mouth nightly. Yes Historical Provider   omeprazole (PRILOSEC OTC) 20 mg tablet Take 20 mg by mouth daily. Indications: GASTROESOPHAGEAL REFLUX   Yes Historical Provider   b complex vitamins tablet Take 1 Tab by mouth daily. Yes Historical Provider   ibuprofen (MOTRIN IB) 200 mg tablet Take 200 mg by mouth daily as needed for Pain. Indications: OSTEOARTHRITIS   Yes Historical Provider   levothyroxine (SYNTHROID) 125 mcg tablet Take 125 mcg by mouth every Monday. Take 2 tablets by mouth Tuesday-Sunday once daily before breakfast, and 1 tablet by mouth on Mondays and Wednesday once daily before breakfast   Yes Historical Provider   Bifidobacterium Infantis (ALIGN) 4 mg cap As directed  Patient taking differently: Take 1 Cap by mouth daily. As directed 10/23/14  Yes Khadra Boland MD   fenofibrate micronized (LOFIBRA) 134 mg capsule Take 134 mg by mouth daily. Yes Historical Provider   aspirin 81 mg chewable tablet Take 81 mg by mouth daily. Yes Historical Provider   omega-3 fatty acids-vitamin e (FISH OIL) 1,000 mg cap Take 1 Cap by mouth daily.    Yes Historical Provider   multivitamin,tx-iron-ca-min (THERAPEUTIC MULTIVIT/MINERAL) 27-0.4 mg Tab Take 1 Tab by mouth every evening. Yes Historical Provider       Review of Systems   Constitutional: Negative. Respiratory: Negative. Cardiovascular: Negative. Gastrointestinal: Negative. Latex Allergy: None  Recent use of: ASA and Plavix. Stopped on 11/6/17  Tetanus up to date: last tetanus booster greater than years ago--she has been ordered but not able to get yet. Anesthesia Complications: Yes: Personal Hx (slow to wake up from general anesthesia in the past)  History of abnormal bleeding : None  History of Blood Transfusions: no  Health Care Directive or Living Will: no    Visit Vitals    BP 90/60 (BP 1 Location: Right arm, BP Patient Position: Sitting)    Pulse (!) 59    Temp 97.6 °F (36.4 °C) (Oral)    Resp 19    Ht 5' 6\" (1.676 m)    Wt 242 lb 3.2 oz (109.9 kg)    SpO2 96%    BMI 39.09 kg/m2         Physical Exam   Constitutional: No distress. HENT:   Mouth/Throat: Oropharynx is clear and moist. No oropharyngeal exudate. Cardiovascular: Normal rate and regular rhythm. No murmur heard. Pulmonary/Chest: Effort normal and breath sounds normal.           EKG: EKG FINDINGS - normal EKG, normal sinus rhythm, unchanged from previous tracings    Labs:   Lab Results  Component Value Date/Time   WBC 9.6 11/02/2017 12:00 AM   HGB 12.9 11/02/2017 12:00 AM   Hemoglobin (POC) 11.6 10/29/2014 08:41 PM   HCT 38.7 11/02/2017 12:00 AM   Hematocrit (POC) 34 10/29/2014 08:41 PM   PLATELET 485 16/08/4373 12:00 AM   MCV 85 11/02/2017 12:00 AM           Diagnoses and all orders for this visit:    1. Pre-op examination  -     AMB POC EKG ROUTINE W/ 12 LEADS, INTER & REP        After reviewing the patient's history & exam she is low risk for cardiac complications for a low risk procedure.   No contraindications to planned surgery

## 2017-11-07 NOTE — PERIOP NOTES
Stressed to pt and sister-high risk for blood clots-must move feet and legs and all of body AMAP. Showed sister toes and if 5th toe not pink by discharge then will call Dr. Thu Salvador. Encouraged sister to drive pt home so foot can be elevated and get pt feed and take her evening insulins. Pt and sister state understanding. 65 Dressed pt and escorted to BR via WC. Pt voided. Checked toes. 4th still purple but warm and blanches with good refil. 5th toe pink with good refil. Verified by Javan Walker RN and instructed sister to look when gets home for pt to see in the mirror. Both state understanding and discharged to car without incident.

## 2017-11-07 NOTE — BRIEF OP NOTE
BRIEF OPERATIVE NOTE    Date of Procedure: 11/7/2017   Preoperative Diagnosis: OSTEOMYLITIS TOE 4 LEFT FOOT  Postoperative Diagnosis: OSTEOMYLITIS TOE 4 LEFT FOOT    Procedure(s):  4TH LEFT TOE RESECT THE PHALLANX  AND INSERT ANTIBIOTIC BEADS LEFT FOOT  Surgeon(s) and Role:     * Kj Johnson DPM - Primary     * Marcelo Anne DPM - Resident - Assisting         Assistant Staff:       Surgical Staff:  Circ-1: Bladimir Neff  Local Nurse Monitor: Radha Melendez RN; Edvin Powell RN  Scrub Tech-1: Vlad Diehl  Scrub Tech-2: Jem Jim  Event Time In   Incision Start 1500   Incision Close      Anesthesia: Local   Estimated Blood Loss: 5cc  Specimens:   ID Type Source Tests Collected by Time Destination   1 : Middle phalanx 4th toe left foot Wound Toe AEROBIC/ANAEROBIC CULTURE Kj Johnson DPM 11/7/2017 1514 Microbiology      Findings: soft bone   Complications: none  Implants:   Implant Name Type Inv.  Item Serial No.  Lot No. LRB No. Used Action   CEMENT BNE SIMPLEX TOBRA 4 --  - SNA   CEMENT BNE SIMPLEX TOBRA 4 --  NA [employer] [facility] [id number] Left 1 Implanted

## 2017-11-10 LAB
BACTERIA SPEC CULT: ABNORMAL
SERVICE CMNT-IMP: ABNORMAL

## 2017-11-11 LAB
BACTERIA SPEC CULT: ABNORMAL
GRAM STN SPEC: ABNORMAL
GRAM STN SPEC: ABNORMAL
SERVICE CMNT-IMP: ABNORMAL

## 2017-11-14 LAB
CREATININE, EXTERNAL: 1.01
HBA1C MFR BLD HPLC: 10.4 %
LDL-C, EXTERNAL: 60

## 2018-02-23 LAB
HBA1C MFR BLD HPLC: 12.2 %
MICROALBUMIN UR TEST STR-MCNC: 28.9 MG/DL

## 2018-05-03 LAB
CREATININE, EXTERNAL: 0.96
LDL-C, EXTERNAL: 75

## 2018-05-14 ENCOUNTER — HOSPITAL ENCOUNTER (OUTPATIENT)
Dept: ULTRASOUND IMAGING | Age: 68
Discharge: HOME | End: 2018-05-14
Attending: INTERNAL MEDICINE
Payer: MEDICARE

## 2018-05-14 DIAGNOSIS — R94.5 ABNORMAL RESULTS OF LIVER FUNCTION STUDIES: ICD-10-CM

## 2018-05-14 PROCEDURE — 76705 ECHO EXAM OF ABDOMEN: CPT

## 2018-05-30 ENCOUNTER — OFFICE VISIT (OUTPATIENT)
Dept: INTERNAL MEDICINE CLINIC | Age: 68
End: 2018-05-30

## 2018-05-30 ENCOUNTER — TELEPHONE (OUTPATIENT)
Dept: INTERNAL MEDICINE CLINIC | Age: 68
End: 2018-05-30

## 2018-05-30 VITALS
WEIGHT: 233.6 LBS | RESPIRATION RATE: 18 BRPM | OXYGEN SATURATION: 97 % | DIASTOLIC BLOOD PRESSURE: 60 MMHG | TEMPERATURE: 98.3 F | HEIGHT: 66 IN | BODY MASS INDEX: 37.54 KG/M2 | SYSTOLIC BLOOD PRESSURE: 116 MMHG | HEART RATE: 95 BPM

## 2018-05-30 DIAGNOSIS — I25.10 ATHEROSCLEROSIS OF NATIVE CORONARY ARTERY OF NATIVE HEART WITHOUT ANGINA PECTORIS: ICD-10-CM

## 2018-05-30 DIAGNOSIS — R07.89 ATYPICAL CHEST PAIN: Primary | ICD-10-CM

## 2018-05-30 DIAGNOSIS — M25.562 ACUTE PAIN OF LEFT KNEE: ICD-10-CM

## 2018-05-30 DIAGNOSIS — E06.3 HYPOTHYROIDISM, ACQUIRED, AUTOIMMUNE: ICD-10-CM

## 2018-05-30 DIAGNOSIS — E78.2 MIXED HYPERLIPIDEMIA: ICD-10-CM

## 2018-05-30 DIAGNOSIS — E66.01 SEVERE OBESITY (BMI 35.0-39.9) WITH COMORBIDITY (HCC): ICD-10-CM

## 2018-05-30 NOTE — TELEPHONE ENCOUNTER
Called [facility] - spoke with Lily Payne - requesting appt with Dr Livier Hidalgo or NP this week - pt having chest pain. Pt has appt 6/29/18 but Dr Rebeca Frederick wants her seen sooner. She stated no appt available and she would have to send message back to his nurse Aivo. She will have [facility] call back with appt. Given our back line number.  Will forward to MD.

## 2018-05-30 NOTE — PROGRESS NOTES
HISTORY OF PRESENT ILLNESS  Genie Leyden is a 70 y.o. female. GRACE Michele is seen today for follow up of diabetes and other medical problems. She has also had recurrent chest pain. 1.  Chest pain. For the last 3 weeks she has had episodic chest pain. It has occurred every few days, not daily. It is random, lasts about an hour, resolves spontaneously. It is a pain in the center of her chest without radiation or other associated worrisome symptoms such as nausea, shortness of breath and diaphoresis. She wonders if it may be GI based. She ran out of Prilosec but is back on it now. We checked an EKG and there were no acute changes, but we will arrange for an ASAP consultation with a cardiologist given known coronary disease. 2.  New problem reviewed, fatty liver disease. GI consultation is pending. 3.  Diabetes, hyperlipidemia, hypothyroidism, CKD 3. She is up to date with endocrinology and nephrology for specialist care. Review of  Systems:   Notable for some left knee pain after a fall yesterday. There appears to be no significant injury on exam.     This was an extended visit of high complexity. Past Medical History:   Diagnosis Date    Adverse effect of anesthesia     slow to wake up    Arthritis     hands    CAD (coronary artery disease) 2008, 2016    angio/mild:MI, heart cath    Diabetes ([facility] [address]     Type II: insulin    GERD (gastroesophageal reflux disease)     Hypercholesterolemia     Hypothyroid     Ischemic colitis ([facility] [address] 11/01/2014    Menopause 2000    Neuropathy     feet/legs: ulcer right foot    Obesity     Psychiatric disorder     Depression    Sleep apnea     no use CPAP: unable to use, ruel no    Ulcer 11/2016    Callus that turned into open wound bottom right foot(ball area), not draining    Vertigo      Current Outpatient Prescriptions   Medication Sig    insulin lispro (HUMALOG) 100 unit/mL injection by SubCUTAneous route Before breakfast, lunch, and dinner.  With sliding scale- per endocrinology    insulin glargine (TOUJEO SOLOSTAR) 300 unit/mL (1.5 mL) inpn 60 Units by SubCUTAneous route nightly.  FLUoxetine (PROZAC) 20 mg capsule Take 1 Cap by mouth daily. Take in addition to 40 mg dose    DULAGLUTIDE (TRULICITY SC) by SubCUTAneous route. 1.5 units every saturday    atorvastatin (LIPITOR) 40 mg tablet Take 1 Tab by mouth daily.  clopidogrel (PLAVIX) 75 mg tab Take 1 Tab by mouth daily.  nitroglycerin (NITROSTAT) 0.4 mg SL tablet 1 Tab by SubLINGual route as needed for Chest Pain.  metoprolol succinate (TOPROL XL) 50 mg XL tablet Take 1 Tab by mouth daily.  FLUoxetine (PROZAC) 40 mg capsule Take 40 mg by mouth nightly.  omeprazole (PRILOSEC OTC) 20 mg tablet Take 20 mg by mouth daily. Indications: GASTROESOPHAGEAL REFLUX    b complex vitamins tablet Take 1 Tab by mouth daily.  ibuprofen (MOTRIN IB) 200 mg tablet Take 200 mg by mouth daily as needed for Pain. Indications: OSTEOARTHRITIS    levothyroxine (SYNTHROID) 125 mcg tablet Take 125 mcg by mouth every Monday. Take 2 tablets by mouth Tuesday-Sunday once daily before breakfast, and 1 tablet by mouth on Mondays and Wednesday once daily before breakfast    Bifidobacterium Infantis (ALIGN) 4 mg cap As directed (Patient taking differently: Take 1 Cap by mouth daily. As directed)    fenofibrate micronized (LOFIBRA) 134 mg capsule Take 134 mg by mouth daily.  aspirin 81 mg chewable tablet Take 81 mg by mouth daily.  omega-3 fatty acids-vitamin e (FISH OIL) 1,000 mg cap Take 1 Cap by mouth daily.  multivitamin,tx-iron-ca-min (THERAPEUTIC MULTIVIT/MINERAL) 27-0.4 mg Tab Take 1 Tab by mouth every evening. No current facility-administered medications for this visit. Review of Systems   Constitutional: Negative for weight loss. Respiratory: Negative. Cardiovascular: Positive for chest pain. Negative for palpitations, leg swelling and PND.    Musculoskeletal: Positive for joint pain. Negative for myalgias. Neurological: Negative for focal weakness. All other systems reviewed and are negative. Physical Exam   Constitutional: She appears well-nourished. Neck: Carotid bruit is not present. Cardiovascular: Normal rate and regular rhythm. Exam reveals no gallop and no friction rub. No murmur heard. Pulmonary/Chest: Effort normal and breath sounds normal. No respiratory distress. Musculoskeletal: She exhibits no edema. Left knee: She exhibits normal range of motion, no swelling, no effusion and no deformity. Nursing note and vitals reviewed. ASSESSMENT and PLAN  Diagnoses and all orders for this visit:    1. Atypical chest pain  -     AMB POC EKG ROUTINE W/ 12 LEADS, INTER & REP    2. Severe obesity (BMI 35.0-39. 9) with comorbidity ([address]- Diet and exercise     3. Type 2 diabetes, uncontrolled, with neuropathy [facility])- See endocrinologist as directed. 4. Acute pain of left knee  -     XR KNEE LT MAX 2 VWS; Future    5. Mixed hyperlipidemia- See endocrinologist as directed. 6. Hypothyroidism, acquired, autoimmune- See endocrinologist as directed.      7. Atherosclerosis of native coronary artery of native heart without angina pectoris  -     REFERRAL TO CARDIOLOGY

## 2018-05-30 NOTE — MR AVS SNAPSHOT
[address] [address] 
[phone] Patient: Nano Weaver [place]  [facility] [place][birth year][facility][birth year] Visit Information Date & Time Provider Department Dept. Phone Encounter #  
 5/30/2018  2:40 PM Angelo Ramachandran, [facility] Internal Medicine [phone] [id number] Follow-up Instructions Return in about 6 months (around 11/30/2018). Upcoming Health Maintenance Date Due DTaP/Tdap/Td series (1 - Tdap) 9/13/2005 BREAST CANCER SCRN MAMMOGRAM 8/24/2017 EYE EXAM RETINAL OR DILATED Q1 6/24/2018 Influenza Age 5 to Adult 8/1/2018 HEMOGLOBIN A1C Q6M 8/23/2018 MEDICARE YEARLY EXAM 11/3/2018 FOOT EXAM Q1 11/6/2018 MICROALBUMIN Q1 2/23/2019 LIPID PANEL Q1 5/3/2019 GLAUCOMA SCREENING Q2Y 6/24/2019 COLONOSCOPY 10/31/2024 Allergies as of 5/30/2018  Review Complete On: 5/30/2018 By: Angelo Ramachandran MD  
 No Known Allergies Current Immunizations  Reviewed on 11/2/2017 Name Date Influenza Vaccine Split 11/1/2010 Pneumococcal Polysaccharide (PPSV-23) 5/15/2017 Pneumococcal Vaccine (Unspecified Type) 1/4/2013 TD Vaccine 9/12/2005 ZZZ-RETIRED (DO NOT USE) Pneumococcal Vaccine (Unspecified Type) 1/1/2009 Zoster Vaccine, Live 1/1/2016 Not reviewed this visit You Were Diagnosed With   
  
 Codes Comments Atypical chest pain    -  Primary ICD-10-CM: R07.89 ICD-9-CM: 786.59 Severe obesity (BMI 35.0-39. 9) with comorbidity ([facility] [address]     ICD-10-CM: E66.01 
ICD-9-CM: 278.01 Type 2 diabetes, uncontrolled, with neuropathy (HCC)     ICD-10-CM: E11.40, E11.65 ICD-9-CM: 250.62, 357.2 Acute pain of left knee     ICD-10-CM: M25.562 ICD-9-CM: 719.46 Mixed hyperlipidemia     ICD-10-CM: E78.2 ICD-9-CM: 272.2 Hypothyroidism, acquired, autoimmune     ICD-10-CM: E06.3 ICD-9-CM: 244.8  Atherosclerosis of native coronary artery of native heart without angina pectoris     ICD-10-CM: I25.10 ICD-9-CM: 414.01 Vitals BP Pulse Temp Resp Height(growth percentile) Weight(growth percentile) 116/60 (BP 1 Location: Right arm, BP Patient Position: Sitting) 95 98.3 °F (36.8 °C) (Oral) 18 5' 6\" (1.676 m) 233 lb 9.6 oz (106 kg) SpO2 BMI OB Status Smoking Status 97% 37.7 kg/m2 Postmenopausal Former Smoker BMI and BSA Data Body Mass Index Body Surface Area 37.7 kg/m 2 2.22 m 2 Preferred Pharmacy Pharmacy Name Phone Norma [facility] [id number] [facility], [address] [place] [address] [phone] Your Updated Medication List  
  
   
This list is accurate as of 5/30/18  4:02 PM.  Always use your most recent med list.  
  
  
  
  
 aspirin 81 mg chewable tablet Take 81 mg by mouth daily. atorvastatin 40 mg tablet Commonly known as:  LIPITOR Take 1 Tab by mouth daily. b complex vitamins tablet Take 1 Tab by mouth daily. Bifidobacterium Infantis 4 mg Cap Commonly known as:  ALIGN As directed  
  
 clopidogrel 75 mg Tab Commonly known as:  PLAVIX Take 1 Tab by mouth daily. fenofibrate micronized 134 mg capsule Commonly known as:  LOFIBRA Take 134 mg by mouth daily. FISH OIL 1,000 mg Cap Generic drug:  omega-3 fatty acids-vitamin e Take 1 Cap by mouth daily. HumaLOG U-100 Insulin 100 unit/mL injection Generic drug:  insulin lispro  
by SubCUTAneous route Before breakfast, lunch, and dinner. With sliding scale- per endocrinology  
  
 levothyroxine 125 mcg tablet Commonly known as:  SYNTHROID Take 125 mcg by mouth every Monday. Take 2 tablets by mouth Tuesday-Sunday once daily before breakfast, and 1 tablet by mouth on Mondays and Wednesday once daily before breakfast  
  
 metoprolol succinate 50 mg XL tablet Commonly known as:  TOPROL XL Take 1 Tab by mouth daily. MOTRIN  mg tablet Generic drug:  ibuprofen Take 200 mg by mouth daily as needed for Pain. Indications: OSTEOARTHRITIS  
  
 nitroglycerin 0.4 mg SL tablet Commonly known as:  NITROSTAT  
1 Tab by SubLINGual route as needed for Chest Pain. PriLOSEC OTC 20 mg tablet Generic drug:  omeprazole Take 20 mg by mouth daily. Indications: GASTROESOPHAGEAL REFLUX  
  
 * PROzac 40 mg capsule Generic drug:  FLUoxetine Take 40 mg by mouth nightly. * FLUoxetine 20 mg capsule Commonly known as:  PROzac Take 1 Cap by mouth daily. Take in addition to 40 mg dose THERAPEUTIC MULTIVIT/MINERAL 27-0.4 mg Tab Generic drug:  multivitamin,tx-iron-ca-min Take 1 Tab by mouth every evening. TOUJEO SOLOSTAR U-300 INSULIN 300 unit/mL (1.5 mL) Inpn Generic drug:  insulin glargine 60 Units by SubCUTAneous route nightly. TRULICITY SC  
by SubCUTAneous route. 1.5 units every saturday * Notice: This list has 2 medication(s) that are the same as other medications prescribed for you. Read the directions carefully, and ask your doctor or other care provider to review them with you. We Performed the Following REFERRAL TO CARDIOLOGY [RZC25 Custom] Follow-up Instructions Return in about 6 months (around 11/30/2018). To-Do List   
 05/30/2018 Imaging:  XR KNEE LT MAX 2 VWS Referral Information Referral ID Referred By Referred To  
  
 [id number] Yasmin MUÑOZ MD   
   [address] , [address] Phone: [phone] Fax: [phone] Visits Status Start Date End Date 1 New Request 5/30/18 5/30/19 If your referral has a status of pending review or denied, additional information will be sent to support the outcome of this decision. Introducing [facility]!    
 [facility] introduces [employer] patient portal. Now you can access parts of your medical record, email your doctor's office, and request medication refills online. 1. In your internet browser, go to [email or web address] 2. Click on the First Time User? Click Here link in the Sign In box. You will see the New Member Sign Up page. 3. Enter your [employer] Access Code exactly as it appears below. You will not need to use this code after youve completed the sign-up process. If you do not sign up before the expiration date, you must request a new code. · [employer] Access Code: [email or web address] Expires: 8/9/2018  3:27 PM 
 
4. Enter the last four digits of your Social Security Number (xxxx) and Date of Birth (mm/dd/yyyy) as indicated and click Submit. You will be taken to the next sign-up page. 5. Create a [employer] ID. This will be your [employer] login ID and cannot be changed, so think of one that is secure and easy to remember. 6. Create a [employer] password. You can change your password at any time. 7. Enter your Password Reset Question and Answer. This can be used at a later time if you forget your password. 8. Enter your e-mail address. You will receive e-mail notification when new information is available in [address] 9. Click Sign Up. You can now view and download portions of your medical record. 10. Click the Download Summary menu link to download a portable copy of your medical information. If you have questions, please visit the Frequently Asked Questions section of the [employer] website. Remember, [employer] is NOT to be used for urgent needs. For medical emergencies, dial 911. Now available from your iPhone and Android! Please provide this summary of care documentation to your next provider. Your primary care clinician is listed as ZELDA MUÑOZ. If you have any questions after today's visit, please call [phone].

## 2018-06-01 ENCOUNTER — TELEPHONE (OUTPATIENT)
Dept: INTERNAL MEDICINE CLINIC | Age: 68
End: 2018-06-01

## 2018-06-01 NOTE — TELEPHONE ENCOUNTER
Rodney Cano ([facility]) [phone] (H)     Pt calling re referral to Dr. Abdulaziz Mckeon. Says hasn't heard anything about scheduling. Please advise.

## 2018-06-06 ENCOUNTER — HOSPITAL ENCOUNTER (OUTPATIENT)
Dept: GENERAL RADIOLOGY | Age: 68
Discharge: HOME | End: 2018-06-06
Attending: INTERNAL MEDICINE
Payer: MEDICARE

## 2018-06-06 DIAGNOSIS — M25.562 ACUTE PAIN OF LEFT KNEE: ICD-10-CM

## 2018-06-06 PROCEDURE — 73562 X-RAY EXAM OF KNEE 3: CPT

## 2018-06-11 ENCOUNTER — OFFICE VISIT (OUTPATIENT)
Dept: INTERNAL MEDICINE CLINIC | Age: 68
End: 2018-06-11

## 2018-06-11 ENCOUNTER — TELEPHONE (OUTPATIENT)
Dept: INTERNAL MEDICINE CLINIC | Age: 68
End: 2018-06-11

## 2018-06-11 ENCOUNTER — HOSPITAL ENCOUNTER (OUTPATIENT)
Dept: LAB | Age: 68
Discharge: HOME | End: 2018-06-11
Payer: MEDICARE

## 2018-06-11 VITALS
OXYGEN SATURATION: 98 % | HEIGHT: 66 IN | HEART RATE: 73 BPM | WEIGHT: 231.2 LBS | SYSTOLIC BLOOD PRESSURE: 120 MMHG | BODY MASS INDEX: 37.16 KG/M2 | DIASTOLIC BLOOD PRESSURE: 60 MMHG | TEMPERATURE: 98.3 F | RESPIRATION RATE: 18 BRPM

## 2018-06-11 DIAGNOSIS — R35.0 URINARY FREQUENCY: ICD-10-CM

## 2018-06-11 DIAGNOSIS — N30.00 ACUTE CYSTITIS WITHOUT HEMATURIA: Primary | ICD-10-CM

## 2018-06-11 LAB
BILIRUB UR QL STRIP: NEGATIVE
GLUCOSE UR-MCNC: NEGATIVE MG/DL
KETONES P FAST UR STRIP-MCNC: NEGATIVE MG/DL
PH UR STRIP: 7 [PH] (ref 4.6–8)
PROT UR QL STRIP: NORMAL
SP GR UR STRIP: 1.02 (ref 1–1.03)
UA UROBILINOGEN AMB POC: NORMAL (ref 0.2–1)
URINALYSIS CLARITY POC: CLEAR
URINALYSIS COLOR POC: YELLOW
URINE BLOOD POC: NORMAL
URINE LEUKOCYTES POC: NORMAL
URINE NITRITES POC: POSITIVE

## 2018-06-11 PROCEDURE — 81001 URINALYSIS AUTO W/SCOPE: CPT

## 2018-06-11 PROCEDURE — 87086 URINE CULTURE/COLONY COUNT: CPT

## 2018-06-11 PROCEDURE — 87088 URINE BACTERIA CULTURE: CPT

## 2018-06-11 PROCEDURE — 87186 SC STD MICRODIL/AGAR DIL: CPT

## 2018-06-11 RX ORDER — SULFAMETHOXAZOLE AND TRIMETHOPRIM 800; 160 MG/1; MG/1
1 TABLET ORAL 2 TIMES DAILY
Qty: 14 TAB | Refills: 0 | Status: SHIPPED | OUTPATIENT
Start: 2018-06-11 | End: 2018-06-18

## 2018-06-11 RX ORDER — MELATONIN
2000 DAILY
COMMUNITY

## 2018-06-11 NOTE — PROGRESS NOTES
HISTORY OF PRESENT ILLNESS  Jane Tucker is a 70 y.o. female. Urinary Frequency    The history is provided by the patient. This is a new problem. Episode onset: 4 d. The problem occurs every urination. The problem has not changed since onset. The quality of the pain is described as burning. The pain is moderate. There has been no fever. Associated symptoms include frequency, urgency and back pain. Pertinent negatives include no chills, no sweats, no nausea, no vomiting, no hematuria, no flank pain and no abdominal pain. She has tried nothing for the symptoms. Her past medical history does not include recurrent UTIs. Past medical history comments: poorly controlled DM. Review of Systems   Constitutional: Negative for chills. Gastrointestinal: Negative for abdominal pain, nausea and vomiting. Genitourinary: Positive for frequency and urgency. Negative for flank pain and hematuria. Musculoskeletal: Positive for back pain. Physical Exam   Constitutional: No distress. Cardiovascular: Normal rate and regular rhythm. Exam reveals no gallop and no friction rub. No murmur heard. Pulmonary/Chest: Effort normal and breath sounds normal.   Abdominal: There is no tenderness. There is no CVA tenderness. Nursing note and vitals reviewed. ASSESSMENT and PLAN  Diagnoses and all orders for this visit:    1. Acute cystitis without hematuria  -     URINALYSIS W/MICROSCOPIC  -     CULTURE, URINE  -     trimethoprim-sulfamethoxazole (BACTRIM DS, SEPTRA DS) 160-800 mg per tablet; Take 1 Tab by mouth two (2) times a day for 7 days. Indications: BACTERIAL URINARY TRACT INFECTION    2.  Urinary frequency  -     AMB POC URINALYSIS DIP STICK AUTO W/ MICRO- abnl

## 2018-06-11 NOTE — TELEPHONE ENCOUNTER
While giving pt her results on knee x-ray pt states she has been experiencing symptoms of UTI - burning, frequency, cloudy and odor. She wanted MD to send in antibiotic. Advised pt MD will want to her to schedule and appt for further evaluation prior to prescribing any meds.  Scheduled today 6/11/18 at 4:50 pm. Will forward to MD.

## 2018-06-11 NOTE — MR AVS SNAPSHOT
[address] [facility] [address] [place] [address] 
[phone] Patient: Tre Jenkins MRN:  [id number] Visit Information Date & Time Provider Department Dept. Phone Encounter #  
 6/11/2018  4:50 PM Muriel Johnston [address] [place] [address] Internal Medicine [phone] [id number] Follow-up Instructions Return if symptoms worsen or fail to improve. Your Appointments 11/5/2018  3:50 PM  
Complete Physical with Muriel Johnston MD  
[facility] Internal Medicine [facility]) Appt Note: CPE (11/2/18) [address] [facility] [address] [facility] [address]  
Phyllis Blas[address]brad [phone] [address] Upcoming Health Maintenance Date Due DTaP/Tdap/Td series (1 - Tdap) 9/13/2005 BREAST CANCER SCRN MAMMOGRAM 8/24/2017 EYE EXAM RETINAL OR DILATED Q1 6/24/2018 Influenza Age 5 to Adult 8/1/2018 HEMOGLOBIN A1C Q6M 8/23/2018 MEDICARE YEARLY EXAM 11/3/2018 FOOT EXAM Q1 11/6/2018 MICROALBUMIN Q1 2/23/2019 LIPID PANEL Q1 5/3/2019 GLAUCOMA SCREENING Q2Y 6/24/2019 COLONOSCOPY 10/31/2024 Allergies as of 6/11/2018  Review Complete On: 6/11/2018 By: Muriel Johnston MD  
 No Known Allergies Current Immunizations  Reviewed on 11/2/2017 Name Date Influenza Vaccine Split 11/1/2010 Pneumococcal Polysaccharide (PPSV-23) 5/15/2017 Pneumococcal Vaccine (Unspecified Type) 1/4/2013 TD Vaccine 9/12/2005 ZZZ-RETIRED (DO NOT USE) Pneumococcal Vaccine (Unspecified Type) 1/1/2009 Zoster Vaccine, Live 1/1/2016 Not reviewed this visit You Were Diagnosed With   
  
 Codes Comments Acute cystitis without hematuria    -  Primary ICD-10-CM: N30.00 ICD-9-CM: 595.0 Urinary frequency     ICD-10-CM: R35.0 ICD-9-CM: 788.41 Vitals BP Pulse Temp Resp Height(growth percentile) Weight(growth percentile) 120/60 (BP 1 Location: Right arm, BP Patient Position: Sitting) 73 98.3 °F (36.8 °C) (Oral) 18 5' 6\" (1.676 m) 231 lb 3.2 oz (104.9 kg) SpO2 BMI OB Status Smoking Status 98% 37.32 kg/m2 Postmenopausal Former Smoker BMI and BSA Data Body Mass Index Body Surface Area  
 37.32 kg/m 2 2.21 m 2 Preferred Pharmacy Pharmacy Name Phone JSOIANE BESS [facility] [address], [address] [facility] [phone] Your Updated Medication List  
  
   
This list is accurate as of 6/11/18  5:30 PM.  Always use your most recent med list.  
  
  
  
  
 aspirin 81 mg chewable tablet Take 81 mg by mouth daily. atorvastatin 40 mg tablet Commonly known as:  LIPITOR Take 1 Tab by mouth daily. b complex vitamins tablet Take 1 Tab by mouth daily. Bifidobacterium Infantis 4 mg Cap Commonly known as:  ALIGN As directed  
  
 clopidogrel 75 mg Tab Commonly known as:  PLAVIX Take 1 Tab by mouth daily. fenofibrate micronized 134 mg capsule Commonly known as:  LOFIBRA Take 134 mg by mouth daily. FISH OIL 1,000 mg Cap Generic drug:  omega-3 fatty acids-vitamin e Take 1 Cap by mouth daily. HumaLOG U-100 Insulin 100 unit/mL injection Generic drug:  insulin lispro  
by SubCUTAneous route Before breakfast, lunch, and dinner. With sliding scale- per endocrinology  
  
 levothyroxine 125 mcg tablet Commonly known as:  SYNTHROID Take 125 mcg by mouth every Monday. Take 2 tablets by mouth Tuesday-Sunday once daily before breakfast, and 1 tablet by mouth on Mondays and Wednesday once daily before breakfast  
  
 metoprolol succinate 50 mg XL tablet Commonly known as:  TOPROL XL Take 1 Tab by mouth daily. MOTRIN  mg tablet Generic drug:  ibuprofen Take 200 mg by mouth daily as needed for Pain. Indications: OSTEOARTHRITIS  
  
 nitroglycerin 0.4 mg SL tablet Commonly known as:  NITROSTAT 1 Tab by SubLINGual route as needed for Chest Pain. PriLOSEC OTC 20 mg tablet Generic drug:  omeprazole Take 20 mg by mouth daily. Indications: GASTROESOPHAGEAL REFLUX  
  
 * PROzac 40 mg capsule Generic drug:  FLUoxetine Take 40 mg by mouth nightly. * FLUoxetine 20 mg capsule Commonly known as:  PROzac Take 1 Cap by mouth daily. Take in addition to 40 mg dose THERAPEUTIC MULTIVIT/MINERAL 27-0.4 mg Tab Generic drug:  multivitamin,tx-iron-ca-min Take 1 Tab by mouth every evening. TOUJEO SOLOSTAR U-300 INSULIN 300 unit/mL (1.5 mL) Inpn Generic drug:  insulin glargine 60 Units by SubCUTAneous route nightly. trimethoprim-sulfamethoxazole 160-800 mg per tablet Commonly known as:  BACTRIM DS, SEPTRA DS Take 1 Tab by mouth two (2) times a day for 7 days. Indications: BACTERIAL URINARY TRACT INFECTION  
  
 TRULICITY SC  
by SubCUTAneous route. 1.5 units every saturday VITAMIN D3 1,000 unit tablet Generic drug:  cholecalciferol Take  by mouth daily. * Notice: This list has 2 medication(s) that are the same as other medications prescribed for you. Read the directions carefully, and ask your doctor or other care provider to review them with you. Prescriptions Sent to Pharmacy Refills  
 trimethoprim-sulfamethoxazole (BACTRIM DS, SEPTRA DS) 160-800 mg per tablet 0 Sig: Take 1 Tab by mouth two (2) times a day for 7 days. Indications: BACTERIAL URINARY TRACT INFECTION Class: Normal  
 Pharmacy: Mir Dhruv [address] [place] [address], [address] [place] [address] #: [phone] Route: Oral  
  
We Performed the Following AMB POC URINALYSIS DIP STICK AUTO W/ MICRO [85892 CPT(R)] CULTURE, URINE [id number] CPT(R)] URINALYSIS W/MICROSCOPIC [53721 CPT(R)] Follow-up Instructions Return if symptoms worsen or fail to improve. Patient Instructions Urinary Tract Infection in Women: Care Instructions Your Care Instructions A urinary tract infection, or UTI, is a general term for an infection anywhere between the kidneys and the urethra (where urine comes out). Most UTIs are bladder infections. They often cause pain or burning when you urinate. UTIs are caused by bacteria and can be cured with antibiotics. Be sure to complete your treatment so that the infection goes away. Follow-up care is a key part of your treatment and safety. Be sure to make and go to all appointments, and call your doctor if you are having problems. It's also a good idea to know your test results and keep a list of the medicines you take. How can you care for yourself at home? · Take your antibiotics as directed. Do not stop taking them just because you feel better. You need to take the full course of antibiotics. · Drink extra water and other fluids for the next day or two. This may help wash out the bacteria that are causing the infection. (If you have kidney, heart, or liver disease and have to limit fluids, talk with your doctor before you increase your fluid intake.) · Avoid drinks that are carbonated or have caffeine. They can irritate the bladder. · Urinate often. Try to empty your bladder each time. · To relieve pain, take a hot bath or lay a heating pad set on low over your lower belly or genital area. Never go to sleep with a heating pad in place. To prevent UTIs · Drink plenty of water each day. This helps you urinate often, which clears bacteria from your system. (If you have kidney, heart, or liver disease and have to limit fluids, talk with your doctor before you increase your fluid intake.) · Urinate when you need to. · Urinate right after you have sex. · Change sanitary pads often. · Avoid douches, bubble baths, feminine hygiene sprays, and other feminine hygiene products that have deodorants. · After going to the bathroom, wipe from front to back. When should you call for help? Call your doctor now or seek immediate medical care if: 
? · Symptoms such as fever, chills, nausea, or vomiting get worse or appear for the first time. ? · You have new pain in your back just below your rib cage. This is called flank pain. ? · There is new blood or pus in your urine. ? · You have any problems with your antibiotic medicine. ? Watch closely for changes in your health, and be sure to contact your doctor if: 
? · You are not getting better after taking an antibiotic for 2 days. ? · Your symptoms go away but then come back. Where can you learn more? Go to http://wanda-shahana.info/. Enter O100 in the search box to learn more about \"Urinary Tract Infection in Women: Care Instructions. \" Current as of: May 12, 2017 Content Version: 11.4 © 4731-5309 [employer]. Care instructions adapted under license by [employer] (which disclaims liability or warranty for this information). If you have questions about a medical condition or this instruction, always ask your healthcare professional. [place] [address] any warranty or liability for your use of this information. Introducing [facility]! [employer] introduces [employer] patient portal. Now you can access parts of your medical record, email your doctor's office, and request medication refills online. 1. In your internet browser, go to [email or web address] 2. Click on the First Time User? Click Here link in the Sign In box. You will see the New Member Sign Up page. 3. Enter your [employer] Access Code exactly as it appears below. You will not need to use this code after youve completed the sign-up process. If you do not sign up before the expiration date, you must request a new code. · [employer] Access Code: [email or web address] Expires: 8/9/2018  3:27 PM 
 
4.  Enter the last four digits of your Social Security Number (xxxx) and Date of Birth (mm/dd/yyyy) as indicated and click Submit. You will be taken to the next sign-up page. 5. Create a Hole 19 ID. This will be your Hole 19 login ID and cannot be changed, so think of one that is secure and easy to remember. 6. Create a Hole 19 password. You can change your password at any time. 7. Enter your Password Reset Question and Answer. This can be used at a later time if you forget your password. 8. Enter your e-mail address. You will receive e-mail notification when new information is available in [address] 9. Click Sign Up. You can now view and download portions of your medical record. 10. Click the Download Summary menu link to download a portable copy of your medical information. If you have questions, please visit the Frequently Asked Questions section of the Hole 19 website. Remember, Hole 19 is NOT to be used for urgent needs. For medical emergencies, dial 911. Now available from your iPhone and Android! Please provide this summary of care documentation to your next provider. Your primary care clinician is listed as ZELDA MUÑOZ. If you have any questions after today's visit, please call [phone].

## 2018-06-11 NOTE — PATIENT INSTRUCTIONS

## 2018-06-12 LAB
APPEARANCE UR: ABNORMAL
BACTERIA #/AREA URNS HPF: ABNORMAL /[HPF]
BILIRUB UR QL STRIP: NEGATIVE
CASTS URNS QL MICRO: ABNORMAL /LPF
COLOR UR: YELLOW
EPI CELLS #/AREA URNS HPF: >10 /HPF
GLUCOSE UR QL: ABNORMAL
HGB UR QL STRIP: NEGATIVE
KETONES UR QL STRIP: NEGATIVE
LEUKOCYTE ESTERASE UR QL STRIP: ABNORMAL
MICRO URNS: ABNORMAL
MUCOUS THREADS URNS QL MICRO: PRESENT
NITRITE UR QL STRIP: POSITIVE
PH UR STRIP: =>9 [PH] (ref 5–7.5)
PROT UR QL STRIP: ABNORMAL
RBC #/AREA URNS HPF: ABNORMAL /HPF
SP GR UR: 1.02 (ref 1–1.03)
UROBILINOGEN UR STRIP-MCNC: 0.2 MG/DL (ref 0.2–1)
WBC #/AREA URNS HPF: >30 /HPF

## 2018-06-13 LAB — BACTERIA UR CULT: ABNORMAL

## 2018-08-16 ENCOUNTER — TELEPHONE (OUTPATIENT)
Dept: INTERNAL MEDICINE CLINIC | Age: 68
End: 2018-08-16

## 2018-08-21 NOTE — TELEPHONE ENCOUNTER
Unclear what message is regarding. Attempted to reach patient but she was not available. Advised  if patient still has questions for the nurse she can call back. He said that he would give ehr the emssage.

## 2018-09-26 LAB
CREATININE, EXTERNAL: 0.97
HBA1C MFR BLD HPLC: 909 %
LDL-C, EXTERNAL: 75
MICROALBUMIN UR TEST STR-MCNC: 12.8 MG/DL

## 2018-11-05 ENCOUNTER — OFFICE VISIT (OUTPATIENT)
Dept: INTERNAL MEDICINE CLINIC | Age: 68
End: 2018-11-05

## 2018-11-05 VITALS
TEMPERATURE: 98.9 F | RESPIRATION RATE: 16 BRPM | OXYGEN SATURATION: 97 % | BODY MASS INDEX: 37.77 KG/M2 | WEIGHT: 235 LBS | DIASTOLIC BLOOD PRESSURE: 70 MMHG | HEART RATE: 70 BPM | HEIGHT: 66 IN | SYSTOLIC BLOOD PRESSURE: 114 MMHG

## 2018-11-05 DIAGNOSIS — I10 ESSENTIAL HYPERTENSION, BENIGN: ICD-10-CM

## 2018-11-05 DIAGNOSIS — E78.2 MIXED HYPERLIPIDEMIA: ICD-10-CM

## 2018-11-05 DIAGNOSIS — Z00.00 MEDICARE ANNUAL WELLNESS VISIT, SUBSEQUENT: Primary | ICD-10-CM

## 2018-11-05 DIAGNOSIS — Z12.31 ENCOUNTER FOR SCREENING MAMMOGRAM FOR BREAST CANCER: ICD-10-CM

## 2018-11-05 DIAGNOSIS — Z23 ENCOUNTER FOR IMMUNIZATION: ICD-10-CM

## 2018-11-05 DIAGNOSIS — I25.10 ATHEROSCLEROSIS OF NATIVE CORONARY ARTERY OF NATIVE HEART WITHOUT ANGINA PECTORIS: ICD-10-CM

## 2018-11-05 DIAGNOSIS — E06.3 HYPOTHYROIDISM, ACQUIRED, AUTOIMMUNE: ICD-10-CM

## 2018-11-05 DIAGNOSIS — Z13.31 SCREENING FOR DEPRESSION: ICD-10-CM

## 2018-11-05 DIAGNOSIS — R09.81 NASAL CONGESTION: ICD-10-CM

## 2018-11-05 RX ORDER — FLUTICASONE PROPIONATE 50 MCG
2 SPRAY, SUSPENSION (ML) NASAL DAILY
Qty: 1 BOTTLE | Refills: 11
Start: 2018-11-05

## 2018-11-05 NOTE — PATIENT INSTRUCTIONS
Medicare Wellness Visit, Female The best way to live healthy is to have a lifestyle where you eat a well-balanced diet, exercise regularly, limit alcohol use, and quit all forms of tobacco/nicotine, if applicable. Regular preventive services are another way to keep healthy. Preventive services (vaccines, screening tests, monitoring & exams) can help personalize your care plan, which helps you manage your own care. Screening tests can find health problems at the earliest stages, when they are easiest to treat. [facility] follows the current, evidence-based guidelines published by the [facility] Darci ([facility]) when recommending preventive services for our patients. Because we follow these guidelines, sometimes recommendations change over time as research supports it. (For example, mammograms used to be recommended annually. Even though Medicare will still pay for an annual mammogram, the newer guidelines recommend a mammogram every two years for women of average risk.) Of course, you and your doctor may decide to screen more often for some diseases, based on your risk and your health status. Preventive services for you include: - Medicare offers their members a free annual wellness visit, which is time for you and your primary care provider to discuss and plan for your preventive service needs. Take advantage of this benefit every year! 
-All adults over the age of 72 should receive the recommended pneumonia vaccines. Current USPSTF guidelines recommend a series of two vaccines for the best pneumonia protection.  
-All adults should have a flu vaccine yearly and a tetanus vaccine every 10 years. All adults age 61 and older should receive a shingles vaccine once in their lifetime.   
-A bone mass density test is recommended when a woman turns 65 to screen for osteoporosis. This test is only recommended one time, as a screening. Some providers will use this same test as a disease monitoring tool if you already have osteoporosis. -All adults age 38-68 who are overweight should have a diabetes screening test once every three years.  
-Other screening tests and preventive services for persons with diabetes include: an eye exam to screen for diabetic retinopathy, a kidney function test, a foot exam, and stricter control over your cholesterol.  
-Cardiovascular screening for adults with routine risk involves an electrocardiogram (ECG) at intervals determined by your doctor.  
-Colorectal cancer screenings should be done for adults age 54-65 with no increased risk factors for colorectal cancer. There are a number of acceptable methods of screening for this type of cancer. Each test has its own benefits and drawbacks. Discuss with your doctor what is most appropriate for you during your annual wellness visit. The different tests include: colonoscopy (considered the best screening method), a fecal occult blood test, a fecal DNA test, and sigmoidoscopy. -Breast cancer screenings are recommended every other year for women of normal risk, age 54-69. 
-Cervical cancer screenings for women over age 72 are only recommended with certain risk factors.  
-All adults born between [facility] should be screened once for Hepatitis C. Here is a list of your current Health Maintenance items (your personalized list of preventive services) with a due date: 
Health Maintenance Due Topic Date Due  Shingles Vaccine (1 of 2) 05/15/1999  
 DTaP/Tdap/Td  (1 - Tdap) 09/13/2005  Flu Vaccine  08/01/2018  Hemoglobin A1C    08/23/2018 [facility]  11/06/2018

## 2018-11-05 NOTE — PROGRESS NOTES
This is the Subsequent Medicare Annual Wellness Exam, performed 12 months or more after the Initial AWV or the last Subsequent AWV I have reviewed the patient's medical history in detail and updated the computerized patient record. History Past Medical History:  
Diagnosis Date  Adverse effect of anesthesia   
 slow to wake up  Arthritis   
 hands  CAD (coronary artery disease) 2008, 2016  
 angio/mild:MI, heart cath  Diabetes ([facility] Type II: insulin  GERD (gastroesophageal reflux disease)  Hypercholesterolemia  Hypothyroid  Ischemic colitis ([facility] 11/01/2014  Menopause 2000  Neuropathy   
 feet/legs: ulcer right foot  Obesity  Psychiatric disorder Depression  Sleep apnea   
 no use CPAP: unable to use, ruel me  Ulcer 11/2016 Callus that turned into open wound bottom right foot(ball area), not draining  Vertigo Past Surgical History:  
Procedure Laterality Date  BREAST SURGERY PROCEDURE UNLISTED  2000's  
 benign breast cyst  
 CARDIAC SURG PROCEDURE UNLIST  11/2016  
 heart attack  HX GYN    
 vaginal delivery x1  
 HX MOHS PROCEDURES  2005  
 faith  HX ORTHOPAEDIC  2015  
 right foot abscess  HX ORTHOPAEDIC  2007  
 rigtht foot surgery  HX ORTHOPAEDIC  2007 R MT amputate  HX ORTHOPAEDIC  2009  
 left 2nd toe surgery  HX TONSILLECTOMY  6 yrs. old Current Outpatient Medications Medication Sig Dispense Refill  pneumococcal 13 jose roberto conj dip (PREVNAR 13, PF,) 0.5 mL syrg injection 0.5 mL by IntraMUSCular route once for 1 dose. 0.5 mL 0  
 varicella-zoster recombinant, PF, (SHINGRIX, PF,) 50 mcg/0.5 mL susr injection 0.5mL by IntraMUSCular route once now and then repeat in 2-6 months 0.5 mL 1  cholecalciferol (VITAMIN D3) 1,000 unit tablet Take  by mouth daily.     
 insulin lispro (HUMALOG) 100 unit/mL injection by SubCUTAneous route Before breakfast, lunch, and dinner. With sliding scale- per endocrinology  insulin glargine (TOUJEO SOLOSTAR) 300 unit/mL (1.5 mL) inpn 60 Units by SubCUTAneous route nightly.  FLUoxetine (PROZAC) 20 mg capsule Take 1 Cap by mouth daily. Take in addition to 40 mg dose 30 Cap 11  
 DULAGLUTIDE (TRULICITY SC) by SubCUTAneous route. 1.5 units every saturday  atorvastatin (LIPITOR) 40 mg tablet Take 1 Tab by mouth daily. 30 Tab 6  clopidogrel (PLAVIX) 75 mg tab Take 1 Tab by mouth daily. 30 Tab 6  
 nitroglycerin (NITROSTAT) 0.4 mg SL tablet 1 Tab by SubLINGual route as needed for Chest Pain. 1 Bottle 6  
 metoprolol succinate (TOPROL XL) 50 mg XL tablet Take 1 Tab by mouth daily. 30 Tab 6  FLUoxetine (PROZAC) 40 mg capsule Take 40 mg by mouth nightly.  omeprazole (PRILOSEC OTC) 20 mg tablet Take 20 mg by mouth daily. Indications: GASTROESOPHAGEAL REFLUX    
 b complex vitamins tablet Take 1 Tab by mouth daily.  ibuprofen (MOTRIN IB) 200 mg tablet Take 200 mg by mouth daily as needed for Pain. Indications: OSTEOARTHRITIS  levothyroxine (SYNTHROID) 125 mcg tablet Take 125 mcg by mouth every Monday. Take 2 tablets by mouth Tuesday-Sunday once daily before breakfast, and 1 tablet by mouth on Mondays and Wednesday once daily before breakfast    
 Bifidobacterium Infantis (ALIGN) 4 mg cap As directed (Patient taking differently: Take 1 Cap by mouth daily. As directed) 30 capsule 11  
 fenofibrate micronized (LOFIBRA) 134 mg capsule Take 134 mg by mouth daily.  aspirin 81 mg chewable tablet Take 81 mg by mouth daily.  omega-3 fatty acids-vitamin e (FISH OIL) 1,000 mg cap Take 1 Cap by mouth daily.  multivitamin,tx-iron-ca-min (THERAPEUTIC MULTIVIT/MINERAL) 27-0.4 mg Tab Take 1 Tab by mouth every evening. No Known Allergies Family History Problem Relation Age of Onset  Diabetes Father  Heart Attack Father   
     congestive heart faliure  Heart Disease Father CHF  Diabetes Maternal Aunt  Diabetes Maternal Uncle Social History Tobacco Use  Smoking status: Former Smoker Years: 5.00 Last attempt to quit: 1985 Years since quittin.7  Smokeless tobacco: Never Used Substance Use Topics  Alcohol use: No  
 
Patient Active Problem List  
Diagnosis Code  Mixed hyperlipidemia E78.2  Diabetic foot ulcer ([facility] E11.621, L97.509  Hypothyroidism, acquired, autoimmune E06.3  Mononeuritis of unspecified site G58.9  Coronary atherosclerosis of native coronary artery I25.10  Symptomatic menopausal or female climacteric states N95.1  Unspecified sleep apnea G47.30  Essential hypertension, benign I10  
 Iron deficiency anemia D50.9  Renal insufficiency N28.9  Acute colitis K52.9  
 Ischemic colitis ([facility] K55.9  Advanced care planning/counseling discussion Z71.89  Type 2 diabetes, uncontrolled, with neuropathy (HCC) E11.40, E11.65  Severe obesity (BMI 35.0-39. 9) with comorbidity ([facility] E66.01 Depression Risk Factor Screening: PHQ over the last two weeks 2018 Little interest or pleasure in doing things Several days Feeling down, depressed, irritable, or hopeless Several days Total Score PHQ 2 2 Trouble falling or staying asleep, or sleeping too much - Feeling tired or having little energy - Poor appetite, weight loss, or overeating - Feeling bad about yourself - or that you are a failure or have let yourself or your family down - Trouble concentrating on things such as school, work, reading, or watching TV - Moving or speaking so slowly that other people could have noticed; or the opposite being so fidgety that others notice - Thoughts of being better off dead, or hurting yourself in some way -  
PHQ 9 Score - Alcohol Risk Factor Screening: You do not drink alcohol or very rarely. Functional Ability and Level of Safety: Hearing Loss Hearing is good. Activities of Daily Living The home contains: walker, cane Patient does total self care Fall Risk Fall Risk Assessment, last 12 mths 5/30/2018 Able to walk? Yes Fall in past 12 months? Yes Fall with injury? -  
Number of falls in past 12 months - Fall Risk Score -  
 
 
Abuse Screen Patient is not abused Cognitive Screening Evaluation of Cognitive Function: 
Has your family/caregiver stated any concerns about your memory: no 
Normal 
 
Patient Care Team  
Patient Care Team: 
Margarita Shaffer MD as PCP - General LeeleyFigueroa MD as Physician (Endocrinology) Rhona Cheung DPM as Physician (Podiatry) Marley Morrison MD as Physician (Gastroenterology) Jaden Farah MD as Physician (Dermatology) Dr. Daysi Heard as Physician (Optometry) Yaya Ponce MD as Physician (Cardiology) Assessment/Plan Education and counseling provided: 
Are appropriate based on today's review and evaluation Diagnoses and all orders for this visit: 
 
1. Medicare annual wellness visit, subsequent 2. Type 2 diabetes, uncontrolled, with neuropathy ([facility] 3. Mixed hyperlipidemia 4. Hypothyroidism, acquired, autoimmune 5. Atherosclerosis of native coronary artery of native heart without angina pectoris 6. Essential hypertension, benign 7. Encounter for screening mammogram for breast cancer -     [facility] MAMMO BI SCREENING INCL CAD; Future 8. Encounter for immunization -     pneumococcal 13 jose roberto conj dip (PREVNAR 13, PF,) 0.5 mL syrg injection; 0.5 mL by IntraMUSCular route once for 1 dose. 
-     varicella-zoster recombinant, PF, (SHINGRIX, PF,) 50 mcg/0.5 mL susr injection; 0.5mL by IntraMUSCular route once now and then repeat in 2-6 months 9. Screening for depression 
-     [place] [id number] Health Maintenance Due Topic Date Due  Shingrix Vaccine Age 50> (1 of 2) 05/15/1999  DTaP/Tdap/Td series (1 - Tdap) 09/13/2005  Influenza Age 5 to Adult  08/01/2018  HEMOGLOBIN A1C Q6M  08/23/2018  
 FOOT EXAM Q1  11/06/2018

## 2018-11-05 NOTE — PROGRESS NOTES
HISTORY OF PRESENT ILLNESS Raffy Zuniga is a 70 y.o. female. HPI Guicho Root is seen today for a Medicare Wellness Visit and follow up of chronic problems. 1. Preventive medicine. Fully reviewed today. She is due for a complete physical examination, mammogram, Prevnar vaccine, shingles vaccine. She is up to date with labs from her endocrinologist which I reviewed with her. She is also up to date with the flu vaccine. 2. Medicare Wellness Visit. See attached note. 3. Chronic medical problems are reviewed. 4. Diabetes, hyperlipidemia, hypothyroidism. Up to date with labs. Review of systems notable for some chronic sinus congestion. She treats this with saline spray. Reviewed use of nasal steroid Flonase. Knee pain is 5/10. She had a fall 2-3 weeks ago. She has poor balance. She does have devices at home which I reviewed with her that she should use on a consistent basis (walker, cane). We counseled for 10 minutes regarding her multiple medical problems as well as new problem of sinus congestion. This component of the visit was 15 minutes, > 50% of this component of the visit was spent in counseling. Review of Systems Constitutional: Negative for weight loss. HENT: Positive for congestion. Respiratory: Negative. Cardiovascular: Negative for chest pain, palpitations, leg swelling and PND. Musculoskeletal: Positive for back pain and joint pain. Negative for myalgias. Neurological: Negative for focal weakness. Physical Exam  
Constitutional: She appears well-nourished. HENT:  
Mouth/Throat: Oropharynx is clear and moist. No oropharyngeal exudate. Neck: Neck supple. Carotid bruit is not present. Cardiovascular: Normal rate and regular rhythm. Exam reveals no gallop and no friction rub. No murmur heard. Pulmonary/Chest: Effort normal and breath sounds normal. No respiratory distress. Musculoskeletal: She exhibits no edema. Lymphadenopathy: She has no cervical adenopathy. Neurological: She is alert. Skin: Skin is warm and dry. Psychiatric: She has a normal mood and affect. Her behavior is normal.  
Nursing note and vitals reviewed. ASSESSMENT and PLAN Diagnoses and all orders for this visit: 
 
1. Medicare annual wellness visit, subsequent 2. Type 2 diabetes, uncontrolled, with neuropathy [facility])- See endocrinologist as directed. 3. Mixed hyperlipidemia - See endocrinologist as directed. 4. Hypothyroidism, acquired, autoimmune- See endocrinologist as directed. 5. Atherosclerosis of native coronary artery of native heart without angina pectoris- Continue current regimen of prescription and / or OTC medications , See cardiologist as directed. 6. Essential hypertension, benign- Continue current regimen of prescription and / or OTC medications 7. Encounter for screening mammogram for breast cancer -     [facility] MAMMO BI SCREENING INCL CAD; Future 8. Encounter for immunization -     pneumococcal 13 jose roberto conj dip (PREVNAR 13, PF,) 0.5 mL syrg injection; 0.5 mL by IntraMUSCular route once for 1 dose. 
-     varicella-zoster recombinant, PF, (SHINGRIX, PF,) 50 mcg/0.5 mL susr injection; 0.5mL by IntraMUSCular route once now and then repeat in 2-6 months 9. Screening for depression 
-     [facility]al congestion 
-     fluticasone (FLONASE) 50 mcg/actuation nasal spray; 2 Sprays by Both Nostrils route daily.

## 2018-11-08 ENCOUNTER — HOSPITAL ENCOUNTER (OUTPATIENT)
Dept: MAMMOGRAPHY | Age: 68
Discharge: HOME | End: 2018-11-08
Attending: INTERNAL MEDICINE
Payer: MEDICARE

## 2018-11-08 DIAGNOSIS — Z12.31 ENCOUNTER FOR SCREENING MAMMOGRAM FOR BREAST CANCER: ICD-10-CM

## 2018-11-08 PROCEDURE — 77067 SCR MAMMO BI INCL CAD: CPT

## 2018-11-15 ENCOUNTER — OFFICE VISIT (OUTPATIENT)
Dept: HEMATOLOGY | Age: 68
End: 2018-11-15

## 2018-11-15 VITALS
DIASTOLIC BLOOD PRESSURE: 57 MMHG | SYSTOLIC BLOOD PRESSURE: 126 MMHG | OXYGEN SATURATION: 95 % | HEART RATE: 68 BPM | TEMPERATURE: 98 F | WEIGHT: 234 LBS | BODY MASS INDEX: 37.77 KG/M2

## 2018-11-15 DIAGNOSIS — R74.8 ELEVATED ALKALINE PHOSPHATASE LEVEL: Primary | ICD-10-CM

## 2018-11-15 NOTE — PROGRESS NOTES
Chief Complaint Patient presents with  Follow-up  
  fibroscan Visit Vitals /57 (BP 1 Location: Right arm, BP Patient Position: Sitting) Pulse 68 Temp 98 °F (36.7 °C) (Tympanic) Wt 234 lb (106.1 kg) SpO2 95% BMI 37.77 kg/m² PHQ over the last two weeks 11/15/2018 PHQ Not Done Active Diagnosis of Depression or Bipolar Disorder Little interest or pleasure in doing things Several days Feeling down, depressed, irritable, or hopeless Several days Total Score PHQ 2 2 Trouble falling or staying asleep, or sleeping too much - Feeling tired or having little energy - Poor appetite, weight loss, or overeating - Feeling bad about yourself - or that you are a failure or have let yourself or your family down - Trouble concentrating on things such as school, work, reading, or watching TV - Moving or speaking so slowly that other people could have noticed; or the opposite being so fidgety that others notice - Thoughts of being better off dead, or hurting yourself in some way -  
PHQ 9 Score - Learning Assessment 5/30/2018 PRIMARY LEARNER Patient BARRIERS PRIMARY LEARNER -  
CO-LEARNER CAREGIVER -  
PRIMARY LANGUAGE ENGLISH  
LEARNER PREFERENCE PRIMARY LISTENING  
ANSWERED BY patient RELATIONSHIP SELF Abuse Screening Questionnaire 11/15/2018 Do you ever feel afraid of your partner? Amee Casas Are you in a relationship with someone who physically or mentally threatens you? Amee Casas Is it safe for you to go home? Geoff Ambriz Fall Risk Assessment, last 12 mths 11/15/2018 Able to walk? Yes Fall in past 12 months? Yes Fall with injury? No  
Number of falls in past 12 months 4 Fall Risk Score 4

## 2018-12-08 DIAGNOSIS — F33.41 MAJOR DEPRESSIVE DISORDER, RECURRENT EPISODE, IN PARTIAL REMISSION (HCC): ICD-10-CM

## 2018-12-10 RX ORDER — FLUOXETINE HYDROCHLORIDE 20 MG/1
CAPSULE ORAL
Qty: 30 CAP | Refills: 10 | OUTPATIENT
Start: 2018-12-10

## 2018-12-10 NOTE — TELEPHONE ENCOUNTER
Call- there is a potential drug interaction with fluoxetine and clopidogrel. We should change fluoxetine to lexapro 10 mg every day .  Call this in and schedule a one month follow up to make sure this is working ok

## 2018-12-12 NOTE — TELEPHONE ENCOUNTER
Spoke to patient who states she is just filled prozac and is taking a total of 60mg daily. She would like to start the Lexapro but cannot get out of her driveway right now because of the snow. She may be able to in the next couple of days. She has a 30 day supply of the prozac. Should she taper or stop and immediately start the lexapro. Please advise. lexapro pended.

## 2018-12-13 RX ORDER — ESCITALOPRAM OXALATE 10 MG/1
10 TABLET ORAL DAILY
Qty: 30 TAB | Refills: 11 | Status: SHIPPED | OUTPATIENT
Start: 2018-12-13 | End: 2019-12-19

## 2018-12-15 LAB
CREATININE, EXTERNAL: 0.9
HBA1C MFR BLD HPLC: 10.1 %

## 2019-01-15 ENCOUNTER — OFFICE VISIT (OUTPATIENT)
Dept: INTERNAL MEDICINE CLINIC | Age: 69
End: 2019-01-15

## 2019-01-15 VITALS
SYSTOLIC BLOOD PRESSURE: 104 MMHG | WEIGHT: 237 LBS | DIASTOLIC BLOOD PRESSURE: 54 MMHG | OXYGEN SATURATION: 93 % | RESPIRATION RATE: 20 BRPM | TEMPERATURE: 98.4 F | HEART RATE: 80 BPM | BODY MASS INDEX: 38.09 KG/M2 | HEIGHT: 66 IN

## 2019-01-15 DIAGNOSIS — J45.901 ASTHMATIC BRONCHITIS WITH ACUTE EXACERBATION, UNSPECIFIED ASTHMA SEVERITY, UNSPECIFIED WHETHER PERSISTENT: Primary | ICD-10-CM

## 2019-01-15 DIAGNOSIS — E11.8 TYPE 2 DIABETES MELLITUS WITH COMPLICATION, WITH LONG-TERM CURRENT USE OF INSULIN (HCC): ICD-10-CM

## 2019-01-15 DIAGNOSIS — Z79.4 TYPE 2 DIABETES MELLITUS WITH COMPLICATION, WITH LONG-TERM CURRENT USE OF INSULIN (HCC): ICD-10-CM

## 2019-01-15 DIAGNOSIS — I10 ESSENTIAL HYPERTENSION: ICD-10-CM

## 2019-01-15 RX ORDER — DOXYCYCLINE 100 MG/1
100 TABLET ORAL 2 TIMES DAILY
Qty: 14 TAB | Refills: 0 | Status: SHIPPED | OUTPATIENT
Start: 2019-01-15 | End: 2019-03-12 | Stop reason: ALTCHOICE

## 2019-01-15 RX ORDER — ALBUTEROL SULFATE 90 UG/1
2 AEROSOL, METERED RESPIRATORY (INHALATION)
Qty: 1 INHALER | Refills: 1 | Status: SHIPPED | OUTPATIENT
Start: 2019-01-15

## 2019-01-15 NOTE — PROGRESS NOTES
HISTORY OF PRESENT ILLNESS Lolis Rowe is a 70 y.o. female. HPI Patient presents with cough, congestion, rhinitis x several weeks. She denies fevers or chills. She has noticed wheezing x 1 week. Denies shortness of breath, and she is a non smoker. Taking Mucinex DM, and Flonase. She also complains of  right lateral neck/shoulder soreness, worse with movement and coughing, without radiation. Denies arm pain, numbness or weakness. Diabetes followed by Dr. Don Ring. Has appointment next month. She reports that her Insulin is being changed due to the cost. BS this morning 227 Past Medical History:  
Diagnosis Date  Adverse effect of anesthesia   
 slow to wake up  Arthritis   
 hands  CAD (coronary artery disease) 2008, 2016  
 angio/mild:MI, heart cath  Diabetes ([facility] [address] Type II: insulin  GERD (gastroesophageal reflux disease)  Hypercholesterolemia  Hypothyroid  Ischemic colitis ([facility] [address] 11/01/2014  Menopause 2000  Neuropathy   
 feet/legs: ulcer right foot  Obesity  Psychiatric disorder Depression  Sleep apnea   
 no use CPAP: unable to use, ruel me  Ulcer 11/2016 Callus that turned into open wound bottom right foot(ball area), not draining  Vertigo Current Outpatient Medications on File Prior to Visit Medication Sig Dispense Refill  escitalopram oxalate (LEXAPRO) 10 mg tablet Take 1 Tab by mouth daily. 30 Tab 11  
 varicella-zoster recombinant, PF, (SHINGRIX, PF,) 50 mcg/0.5 mL susr injection 0.5mL by IntraMUSCular route once now and then repeat in 2-6 months 0.5 mL 1  
 fluticasone (FLONASE) 50 mcg/actuation nasal spray 2 Sprays by Both Nostrils route daily. 1 Bottle 11  
 cholecalciferol (VITAMIN D3) 1,000 unit tablet Take  by mouth daily.  insulin lispro (HUMALOG) 100 unit/mL injection by SubCUTAneous route Before breakfast, lunch, and dinner. With sliding scale- per endocrinology  insulin glargine (TOUJEO SOLOSTAR) 300 unit/mL (1.5 mL) inpn 50 Units by SubCUTAneous route two (2) times a day.  DULAGLUTIDE (TRULICITY SC) by SubCUTAneous route. 1.5 units every saturday  atorvastatin (LIPITOR) 40 mg tablet Take 1 Tab by mouth daily. 30 Tab 6  clopidogrel (PLAVIX) 75 mg tab Take 1 Tab by mouth daily. 30 Tab 6  
 nitroglycerin (NITROSTAT) 0.4 mg SL tablet 1 Tab by SubLINGual route as needed for Chest Pain. 1 Bottle 6  
 metoprolol succinate (TOPROL XL) 50 mg XL tablet Take 1 Tab by mouth daily. 30 Tab 6  
 omeprazole (PRILOSEC OTC) 20 mg tablet Take 20 mg by mouth daily. Indications: GASTROESOPHAGEAL REFLUX    
 b complex vitamins tablet Take 1 Tab by mouth daily.  ibuprofen (MOTRIN IB) 200 mg tablet Take 200 mg by mouth daily as needed for Pain. Indications: OSTEOARTHRITIS  levothyroxine (SYNTHROID) 125 mcg tablet Take 125 mcg by mouth every Wednesday. Take 2 tablets by mouth thurs-tues  Bifidobacterium Infantis (ALIGN) 4 mg cap As directed (Patient taking differently: Take 1 Cap by mouth daily. As directed) 30 capsule 11  
 fenofibrate micronized (LOFIBRA) 134 mg capsule Take 134 mg by mouth daily.  aspirin 81 mg chewable tablet Take 81 mg by mouth daily.  omega-3 fatty acids-vitamin e (FISH OIL) 1,000 mg cap Take 1 Cap by mouth daily.  multivitamin,tx-iron-ca-min (THERAPEUTIC MULTIVIT/MINERAL) 27-0.4 mg Tab Take 1 Tab by mouth every evening. No current facility-administered medications on file prior to visit. ROS Per HPI Physical Exam 
Visit Vitals /54 (BP 1 Location: Right arm, BP Patient Position: Sitting) Pulse 80 Temp 98.4 °F (36.9 °C) (Oral) Resp 20 Ht 5' 6\" (1.676 m) Wt 237 lb (107.5 kg) SpO2 93% BMI 38.25 kg/m² HEENT: normocephalic,  conjunctiva clear Oropharynx: clear. No erythema, exudate, or drainage Nose: rhinitis is present Sinuses: nontender Ears: tympanic membrane's and canals normal.  No erythema, fluid, drainage Neck: no cervical tenderness, no lymphadenopathy Tenderness and muscle spasm right trapezius/rhomboid muscles Heart[de-identified] normal rate, regular rhythm, normal S1, S2, no murmurs, rubs, clicks or gallops. Chest: diffuse end expiratory wheezes, no rales or rhonchi, Abdominal exam: soft, nontender, nondistended, no masses or organomegaly. Extremities: no edema Yuriy Opoka ASSESSMENT and PLAN Asthmatic bronchitis Will try to avoid oral steroids due to poorly controlled diabetes mellitus Dulera 100/5 mcg 2 inhalations twice daily (samples x 2 provided), use reviewed by Martínez Montoya LPN Albuterol 2 inhalations every 4 hours as needed for wheezing Mucinex 1 tablet twice daily Doxycycline twice daily x 7 days with food Delsym as directed for cough Muscle Spasm: Recommended moist heat, stretching reviewed DM: poorly controlled. follow up with Endocrine HTN: stable Hyperlipidemia: on Lipitor. Followed by Endocrine

## 2019-01-15 NOTE — PATIENT INSTRUCTIONS
Dulera 100/5 mcg 2 inhalations twice daily Albuterol 2 inhalations every 4 hours as needed for wheezing Mucinex 1 tablet twice daily Doxycycline twice daily x 7 days with food Delsym as directed for cough Call or return to clinic if these symptoms worsen or fail to improve as anticipated.

## 2019-03-12 ENCOUNTER — APPOINTMENT (OUTPATIENT)
Dept: MRI IMAGING | Age: 69
DRG: 617 | End: 2019-03-12
Attending: FAMILY MEDICINE
Payer: MEDICARE

## 2019-03-12 ENCOUNTER — APPOINTMENT (OUTPATIENT)
Dept: GENERAL RADIOLOGY | Age: 69
DRG: 617 | End: 2019-03-12
Attending: EMERGENCY MEDICINE
Payer: MEDICARE

## 2019-03-12 ENCOUNTER — APPOINTMENT (OUTPATIENT)
Dept: MRI IMAGING | Age: 69
DRG: 617 | End: 2019-03-12
Attending: PODIATRIST
Payer: MEDICARE

## 2019-03-12 ENCOUNTER — APPOINTMENT (OUTPATIENT)
Dept: VASCULAR SURGERY | Age: 69
DRG: 617 | End: 2019-03-12
Attending: FAMILY MEDICINE
Payer: MEDICARE

## 2019-03-12 ENCOUNTER — APPOINTMENT (OUTPATIENT)
Dept: CT IMAGING | Age: 69
DRG: 617 | End: 2019-03-12
Attending: FAMILY MEDICINE
Payer: MEDICARE

## 2019-03-12 ENCOUNTER — HOSPITAL ENCOUNTER (INPATIENT)
Age: 69
LOS: 6 days | Discharge: HOME | DRG: 617 | End: 2019-03-18
Attending: EMERGENCY MEDICINE | Admitting: FAMILY MEDICINE
Payer: MEDICARE

## 2019-03-12 DIAGNOSIS — H81.10 BENIGN PAROXYSMAL POSITIONAL VERTIGO, UNSPECIFIED LATERALITY: ICD-10-CM

## 2019-03-12 DIAGNOSIS — E11.628 DIABETIC FOOT INFECTION (HCC): Primary | ICD-10-CM

## 2019-03-12 DIAGNOSIS — L08.9 DIABETIC FOOT INFECTION (HCC): Primary | ICD-10-CM

## 2019-03-12 DIAGNOSIS — M86.9 OSTEOMYELITIS OF RIGHT FOOT, UNSPECIFIED TYPE (HCC): ICD-10-CM

## 2019-03-12 LAB
ALBUMIN SERPL-MCNC: 3.2 G/DL (ref 3.5–5)
ALBUMIN/GLOB SERPL: 0.6 {RATIO} (ref 1.1–2.2)
ALP SERPL-CCNC: 371 U/L (ref 45–117)
ALT SERPL-CCNC: 47 U/L (ref 12–78)
ANION GAP SERPL CALC-SCNC: 11 MMOL/L (ref 5–15)
AST SERPL-CCNC: 48 U/L (ref 15–37)
BASOPHILS # BLD: 0.1 K/UL (ref 0–0.1)
BASOPHILS NFR BLD: 1 % (ref 0–1)
BILIRUB SERPL-MCNC: 0.4 MG/DL (ref 0.2–1)
BUN SERPL-MCNC: 34 MG/DL (ref 6–20)
BUN/CREAT SERPL: 31 (ref 12–20)
CALCIUM SERPL-MCNC: 10 MG/DL (ref 8.5–10.1)
CHLORIDE SERPL-SCNC: 100 MMOL/L (ref 97–108)
CHOLEST SERPL-MCNC: 155 MG/DL
CO2 SERPL-SCNC: 23 MMOL/L (ref 21–32)
COMMENT, HOLDF: NORMAL
CREAT SERPL-MCNC: 1.1 MG/DL (ref 0.55–1.02)
DIFFERENTIAL METHOD BLD: ABNORMAL
EOSINOPHIL # BLD: 0.2 K/UL (ref 0–0.4)
EOSINOPHIL NFR BLD: 3 % (ref 0–7)
ERYTHROCYTE [DISTWIDTH] IN BLOOD BY AUTOMATED COUNT: 13.9 % (ref 11.5–14.5)
EST. AVERAGE GLUCOSE BLD GHB EST-MCNC: 275 MG/DL
GLOBULIN SER CALC-MCNC: 5.4 G/DL (ref 2–4)
GLUCOSE BLD STRIP.AUTO-MCNC: 107 MG/DL (ref 65–100)
GLUCOSE BLD STRIP.AUTO-MCNC: 194 MG/DL (ref 65–100)
GLUCOSE BLD STRIP.AUTO-MCNC: 199 MG/DL (ref 65–100)
GLUCOSE BLD STRIP.AUTO-MCNC: 200 MG/DL (ref 65–100)
GLUCOSE SERPL-MCNC: 221 MG/DL (ref 65–100)
HBA1C MFR BLD: 11.2 % (ref 4.2–6.3)
HCT VFR BLD AUTO: 39.4 % (ref 35–47)
HDLC SERPL-MCNC: 49 MG/DL
HDLC SERPL: 3.2 {RATIO} (ref 0–5)
HGB BLD-MCNC: 12.8 G/DL (ref 11.5–16)
IMM GRANULOCYTES # BLD AUTO: 0 K/UL (ref 0–0.04)
IMM GRANULOCYTES NFR BLD AUTO: 1 % (ref 0–0.5)
LACTATE SERPL-SCNC: 1.2 MMOL/L (ref 0.4–2)
LDLC SERPL CALC-MCNC: 65.4 MG/DL (ref 0–100)
LIPID PROFILE,FLP: ABNORMAL
LYMPHOCYTES # BLD: 1.8 K/UL (ref 0.8–3.5)
LYMPHOCYTES NFR BLD: 23 % (ref 12–49)
MCH RBC QN AUTO: 27.9 PG (ref 26–34)
MCHC RBC AUTO-ENTMCNC: 32.5 G/DL (ref 30–36.5)
MCV RBC AUTO: 86 FL (ref 80–99)
MONOCYTES # BLD: 0.9 K/UL (ref 0–1)
MONOCYTES NFR BLD: 11 % (ref 5–13)
NEUTS SEG # BLD: 4.8 K/UL (ref 1.8–8)
NEUTS SEG NFR BLD: 61 % (ref 32–75)
NRBC # BLD: 0 K/UL (ref 0–0.01)
NRBC BLD-RTO: 0 PER 100 WBC
PLATELET # BLD AUTO: 314 K/UL (ref 150–400)
PMV BLD AUTO: 10.6 FL (ref 8.9–12.9)
POTASSIUM SERPL-SCNC: 4 MMOL/L (ref 3.5–5.1)
PROT SERPL-MCNC: 8.6 G/DL (ref 6.4–8.2)
RBC # BLD AUTO: 4.58 M/UL (ref 3.8–5.2)
SAMPLES BEING HELD,HOLD: NORMAL
SERVICE CMNT-IMP: ABNORMAL
SODIUM SERPL-SCNC: 134 MMOL/L (ref 136–145)
TRIGL SERPL-MCNC: 203 MG/DL (ref ?–150)
TROPONIN I SERPL-MCNC: <0.05 NG/ML
TROPONIN I SERPL-MCNC: <0.05 NG/ML
VLDLC SERPL CALC-MCNC: 40.6 MG/DL
WBC # BLD AUTO: 7.7 K/UL (ref 3.6–11)

## 2019-03-12 PROCEDURE — 80061 LIPID PANEL: CPT

## 2019-03-12 PROCEDURE — 94761 N-INVAS EAR/PLS OXIMETRY MLT: CPT

## 2019-03-12 PROCEDURE — 74011250637 HC RX REV CODE- 250/637: Performed by: FAMILY MEDICINE

## 2019-03-12 PROCEDURE — 74011636637 HC RX REV CODE- 636/637: Performed by: FAMILY MEDICINE

## 2019-03-12 PROCEDURE — 87040 BLOOD CULTURE FOR BACTERIA: CPT

## 2019-03-12 PROCEDURE — 70551 MRI BRAIN STEM W/O DYE: CPT

## 2019-03-12 PROCEDURE — 74011000258 HC RX REV CODE- 258: Performed by: PODIATRIST

## 2019-03-12 PROCEDURE — 36415 COLL VENOUS BLD VENIPUNCTURE: CPT

## 2019-03-12 PROCEDURE — 73630 X-RAY EXAM OF FOOT: CPT

## 2019-03-12 PROCEDURE — 73718 MRI LOWER EXTREMITY W/O DYE: CPT

## 2019-03-12 PROCEDURE — 74011250636 HC RX REV CODE- 250/636: Performed by: FAMILY MEDICINE

## 2019-03-12 PROCEDURE — 84484 ASSAY OF TROPONIN QUANT: CPT

## 2019-03-12 PROCEDURE — 74011250636 HC RX REV CODE- 250/636: Performed by: EMERGENCY MEDICINE

## 2019-03-12 PROCEDURE — 80053 COMPREHEN METABOLIC PANEL: CPT

## 2019-03-12 PROCEDURE — 74011000258 HC RX REV CODE- 258: Performed by: EMERGENCY MEDICINE

## 2019-03-12 PROCEDURE — 70450 CT HEAD/BRAIN W/O DYE: CPT

## 2019-03-12 PROCEDURE — 74011636320 HC RX REV CODE- 636/320: Performed by: RADIOLOGY

## 2019-03-12 PROCEDURE — 83605 ASSAY OF LACTIC ACID: CPT

## 2019-03-12 PROCEDURE — 74011000258 HC RX REV CODE- 258: Performed by: RADIOLOGY

## 2019-03-12 PROCEDURE — 74011250636 HC RX REV CODE- 250/636: Performed by: PODIATRIST

## 2019-03-12 PROCEDURE — 70498 CT ANGIOGRAPHY NECK: CPT

## 2019-03-12 PROCEDURE — 82962 GLUCOSE BLOOD TEST: CPT

## 2019-03-12 PROCEDURE — 85025 COMPLETE CBC W/AUTO DIFF WBC: CPT

## 2019-03-12 PROCEDURE — 99283 EMERGENCY DEPT VISIT LOW MDM: CPT

## 2019-03-12 PROCEDURE — 83036 HEMOGLOBIN GLYCOSYLATED A1C: CPT

## 2019-03-12 PROCEDURE — 65660000000 HC RM CCU STEPDOWN

## 2019-03-12 PROCEDURE — 93971 EXTREMITY STUDY: CPT

## 2019-03-12 PROCEDURE — 70496 CT ANGIOGRAPHY HEAD: CPT

## 2019-03-12 RX ORDER — MAGNESIUM SULFATE 100 %
4 CRYSTALS MISCELLANEOUS AS NEEDED
Status: DISCONTINUED | OUTPATIENT
Start: 2019-03-12 | End: 2019-03-15 | Stop reason: SDUPTHER

## 2019-03-12 RX ORDER — LEVOTHYROXINE SODIUM 125 UG/1
250 TABLET ORAL
COMMUNITY

## 2019-03-12 RX ORDER — ACETAMINOPHEN 325 MG/1
650 TABLET ORAL
Status: DISCONTINUED | OUTPATIENT
Start: 2019-03-12 | End: 2019-03-18 | Stop reason: HOSPADM

## 2019-03-12 RX ORDER — SODIUM CHLORIDE 9 MG/ML
75 INJECTION, SOLUTION INTRAVENOUS CONTINUOUS
Status: DISCONTINUED | OUTPATIENT
Start: 2019-03-12 | End: 2019-03-15

## 2019-03-12 RX ORDER — DEXTROSE 50 % IN WATER (D50W) INTRAVENOUS SYRINGE
25-50 AS NEEDED
Status: DISCONTINUED | OUTPATIENT
Start: 2019-03-12 | End: 2019-03-15 | Stop reason: SDUPTHER

## 2019-03-12 RX ORDER — METOPROLOL SUCCINATE 50 MG/1
50 TABLET, EXTENDED RELEASE ORAL DAILY
Status: DISCONTINUED | OUTPATIENT
Start: 2019-03-13 | End: 2019-03-18 | Stop reason: HOSPADM

## 2019-03-12 RX ORDER — INSULIN LISPRO 100 [IU]/ML
INJECTION, SOLUTION INTRAVENOUS; SUBCUTANEOUS EVERY 6 HOURS
Status: DISCONTINUED | OUTPATIENT
Start: 2019-03-12 | End: 2019-03-12

## 2019-03-12 RX ORDER — HEPARIN SODIUM 5000 [USP'U]/ML
5000 INJECTION, SOLUTION INTRAVENOUS; SUBCUTANEOUS EVERY 8 HOURS
Status: DISCONTINUED | OUTPATIENT
Start: 2019-03-12 | End: 2019-03-14

## 2019-03-12 RX ORDER — SODIUM CHLORIDE 0.9 % (FLUSH) 0.9 %
5-40 SYRINGE (ML) INJECTION EVERY 8 HOURS
Status: DISCONTINUED | OUTPATIENT
Start: 2019-03-12 | End: 2019-03-18 | Stop reason: HOSPADM

## 2019-03-12 RX ORDER — GUAIFENESIN 100 MG/5ML
81 LIQUID (ML) ORAL DAILY
Status: DISCONTINUED | OUTPATIENT
Start: 2019-03-13 | End: 2019-03-12 | Stop reason: SDUPTHER

## 2019-03-12 RX ORDER — SODIUM CHLORIDE 0.9 % (FLUSH) 0.9 %
10 SYRINGE (ML) INJECTION
Status: COMPLETED | OUTPATIENT
Start: 2019-03-12 | End: 2019-03-12

## 2019-03-12 RX ORDER — FENOFIBRATE 48 MG/1
48 TABLET, COATED ORAL EVERY EVENING
Status: DISCONTINUED | OUTPATIENT
Start: 2019-03-12 | End: 2019-03-18 | Stop reason: HOSPADM

## 2019-03-12 RX ORDER — SODIUM CHLORIDE 0.9 % (FLUSH) 0.9 %
5-40 SYRINGE (ML) INJECTION AS NEEDED
Status: DISCONTINUED | OUTPATIENT
Start: 2019-03-12 | End: 2019-03-18 | Stop reason: HOSPADM

## 2019-03-12 RX ORDER — MECLIZINE HCL 12.5 MG 12.5 MG/1
25 TABLET ORAL
Status: DISCONTINUED | OUTPATIENT
Start: 2019-03-12 | End: 2019-03-18 | Stop reason: HOSPADM

## 2019-03-12 RX ORDER — FENOFIBRATE 67 MG/1
67 CAPSULE ORAL
COMMUNITY

## 2019-03-12 RX ORDER — VANCOMYCIN/0.9 % SOD CHLORIDE 1.5G/250ML
1500 PLASTIC BAG, INJECTION (ML) INTRAVENOUS
Status: DISCONTINUED | OUTPATIENT
Start: 2019-03-13 | End: 2019-03-13

## 2019-03-12 RX ORDER — GUAIFENESIN 100 MG/5ML
162 LIQUID (ML) ORAL
Status: COMPLETED | OUTPATIENT
Start: 2019-03-12 | End: 2019-03-12

## 2019-03-12 RX ORDER — CLOPIDOGREL BISULFATE 75 MG/1
75 TABLET ORAL DAILY
Status: DISCONTINUED | OUTPATIENT
Start: 2019-03-13 | End: 2019-03-18 | Stop reason: HOSPADM

## 2019-03-12 RX ORDER — INSULIN LISPRO 100 [IU]/ML
INJECTION, SOLUTION INTRAVENOUS; SUBCUTANEOUS
Status: DISCONTINUED | OUTPATIENT
Start: 2019-03-12 | End: 2019-03-13

## 2019-03-12 RX ORDER — ESCITALOPRAM OXALATE 10 MG/1
10 TABLET ORAL DAILY
Status: DISCONTINUED | OUTPATIENT
Start: 2019-03-13 | End: 2019-03-18 | Stop reason: HOSPADM

## 2019-03-12 RX ORDER — PANTOPRAZOLE SODIUM 40 MG/1
40 TABLET, DELAYED RELEASE ORAL DAILY
Status: DISCONTINUED | OUTPATIENT
Start: 2019-03-13 | End: 2019-03-18 | Stop reason: HOSPADM

## 2019-03-12 RX ORDER — IPRATROPIUM BROMIDE AND ALBUTEROL SULFATE 2.5; .5 MG/3ML; MG/3ML
3 SOLUTION RESPIRATORY (INHALATION)
Status: DISCONTINUED | OUTPATIENT
Start: 2019-03-12 | End: 2019-03-18 | Stop reason: HOSPADM

## 2019-03-12 RX ORDER — GUAIFENESIN 100 MG/5ML
81 LIQUID (ML) ORAL DAILY
Status: DISCONTINUED | OUTPATIENT
Start: 2019-03-13 | End: 2019-03-18 | Stop reason: HOSPADM

## 2019-03-12 RX ORDER — LEVOTHYROXINE SODIUM 125 UG/1
125 TABLET ORAL
Status: DISCONTINUED | OUTPATIENT
Start: 2019-03-13 | End: 2019-03-18 | Stop reason: HOSPADM

## 2019-03-12 RX ORDER — LEVOTHYROXINE SODIUM 125 UG/1
250 TABLET ORAL
Status: DISCONTINUED | OUTPATIENT
Start: 2019-03-14 | End: 2019-03-18 | Stop reason: HOSPADM

## 2019-03-12 RX ORDER — VANCOMYCIN 2 GRAM/500 ML IN 0.9 % SODIUM CHLORIDE INTRAVENOUS
2000 ONCE
Status: COMPLETED | OUTPATIENT
Start: 2019-03-12 | End: 2019-03-12

## 2019-03-12 RX ORDER — ATORVASTATIN CALCIUM 40 MG/1
40 TABLET, FILM COATED ORAL
Status: DISCONTINUED | OUTPATIENT
Start: 2019-03-12 | End: 2019-03-18 | Stop reason: HOSPADM

## 2019-03-12 RX ADMIN — FENOFIBRATE 48 MG: 48 TABLET, FILM COATED ORAL at 21:40

## 2019-03-12 RX ADMIN — ATORVASTATIN CALCIUM 40 MG: 40 TABLET, FILM COATED ORAL at 21:39

## 2019-03-12 RX ADMIN — SODIUM CHLORIDE 75 ML/HR: 900 INJECTION, SOLUTION INTRAVENOUS at 21:50

## 2019-03-12 RX ADMIN — HEPARIN SODIUM 5000 UNITS: 5000 INJECTION INTRAVENOUS; SUBCUTANEOUS at 21:41

## 2019-03-12 RX ADMIN — INSULIN LISPRO 3 UNITS: 100 INJECTION, SOLUTION INTRAVENOUS; SUBCUTANEOUS at 14:09

## 2019-03-12 RX ADMIN — IOPAMIDOL 100 ML: 755 INJECTION, SOLUTION INTRAVENOUS at 17:46

## 2019-03-12 RX ADMIN — SODIUM CHLORIDE 100 ML: 900 INJECTION, SOLUTION INTRAVENOUS at 17:47

## 2019-03-12 RX ADMIN — ASPIRIN 81 MG CHEWABLE TABLET 162 MG: 81 TABLET CHEWABLE at 14:09

## 2019-03-12 RX ADMIN — PIPERACILLIN SODIUM AND TAZOBACTAM SODIUM 3.38 G: 3; .375 INJECTION, POWDER, LYOPHILIZED, FOR SOLUTION INTRAVENOUS at 13:27

## 2019-03-12 RX ADMIN — Medication 10 ML: at 17:47

## 2019-03-12 RX ADMIN — VANCOMYCIN HYDROCHLORIDE 2000 MG: 10 INJECTION, POWDER, LYOPHILIZED, FOR SOLUTION INTRAVENOUS at 15:40

## 2019-03-12 RX ADMIN — SODIUM CHLORIDE 1000 ML: 900 INJECTION, SOLUTION INTRAVENOUS at 15:40

## 2019-03-12 RX ADMIN — PIPERACILLIN AND TAZOBACTAM 3.38 G: 3; .375 INJECTION, POWDER, FOR SOLUTION INTRAVENOUS at 20:10

## 2019-03-12 NOTE — PROGRESS NOTES
Pharmacist Note - Vancomycin Dosing    Consult provided for this 70 y.o. female for indication of cellulitis of the right foot     Antibiotic regimen(s):Vancomycin+Zosyn    Recent Labs     19  1317   WBC 7.7   CREA 1.10*   BUN 34*     Frequency of BMP: daily  Height: 167 cm  Weight: 104 kg  Est CrCl: ~ 60 ml/min; Temp (24hrs), Av.2 °F (36.8 °C), Min:98.2 °F (36.8 °C), Max:98.2 °F (36.8 °C)    Cultures:  None    Goal trough = 10 - 15 mcg/mL    Therapy will be initiated with a loading dose of 2000 mg IV x 1 to be followed by a maintenance dose of 1500 mg IV every 18 hours. Pharmacy to follow patient daily and order levels / make dose adjustments as appropriate.

## 2019-03-12 NOTE — H&P
[address]  HISTORY AND PHYSICAL    Name:  Tim Samano  MR#:  [id number]  :  1950  ACCOUNT #:  [de-identified]  ADMIT DATE:  2019    CHIEF COMPLAINT:  Right foot pain. HISTORY OF PRESENT ILLNESS:  The patient is a 69-year-old female with past medical history of hypertension, diabetes, history of coronary artery disease, bronchitis, GERD, hypercholesterolemia, hypothyroidism, ischemic colitis, menopause, neuropathy, obesity, psychiatric disorder, depression, ulcer at the bottom of the right foot, and vertigo who presents to the hospital with the above-mentioned symptoms. The patient reports that she has two complaints. She reports that for the past 3 days she has been having extensive vertigo. The patient reports that she is not able to get out of her bed. She reports that she has been sleeping a lot, felt weak all over, and quite dizzy. The patient reports that today she woke up and tried to walk but noticed that there was wound on the left lower extremity, got concerned, and went to Dr. Kimmy Walker. Dr. Kimmy Walker saw the patient and told her to come to the hospital for a fourth toe amputation as the toe looked infected. The patient reports that she had not noticed this wound on her foot, fourth toe previously. The patient reports that she has been quite vertiginous. Denies any headache with that but does report mild blurry vision. Denies any trouble swallowing, trouble with speech, any chest pain, shortness of breath, cough, fever, chills, abdominal pain, constipation, diarrhea, urinary symptoms, focal or generalized neurological weakness, recent travels, sick contacts, falls, injuries, hematemesis, melena hemoptysis, hematuria, or any other concerns or problems. PAST MEDICAL HISTORY:  See above. HOME MEDICATIONS:  Currently the patient is on,  1. Levothyroxine 250 mcg for 6 days a week except for Wednesday. 2.  Novolin sliding scale.   3.   units b.i.d.  4. Lofibra 67 mg daily. 5.  Albuterol. 6.  Lexapro 10 mg daily. 7.  Fluticasone. 8.  Cholecalciferol. 9.  Trulicity. 10.  Lipitor 40 mg daily. 11.  Plavix 75 mg daily. 12.  Nitroglycerin. 13.  Metoprolol 50 mg daily. 14.  Omeprazole 20 mg daily. 15.  Levothyroxine 125 mcg on Wednesday. 16.  Aspirin 81 mg daily. SOCIAL HISTORY:  Denies tobacco abuse. Occasional alcohol use. Denies IV drug abuse. Lives at home. ALLERGIES:  NO KNOWN DRUG ALLERGIES. FAMILY HISTORY:  Discussed. History of coronary artery disease in both parents. REVIEW OF SYSTEMS:  All systems were reviewed and found to be essentially negative except for the symptoms mentioned above. PHYSICAL EXAMINATION:  VITAL SIGNS:  Temperature 98.2, pulse 82, respiratory rate 16, blood pressure 126/69, pulse oximetry 97% on room air. GENERAL:  Alert x3, awake, mildly distressed, pleasant female, appears to be stated age. HEENT:  Pupils equal and reactive to light. Dry mucous membranes. Tympanic membranes clear. NECK:  Supple. CHEST:  Clear to auscultation bilaterally. CORONARY:  S1, S2 are heard. ABDOMEN:  Soft, nontender, nondistended. Bowel sounds are physiological.  EXTREMITIES:  No clubbing, no cyanosis, no edema. The right forefoot is under dressing. There is some streaking redness underlying on the distal part of the foot going up to the ankle from the forefoot. The patient has the wound dressed and refuses for me to take off the dressing that Dr. Nano Wilder has put in for her, so the wound could not be checked. NEURO/PSYCH:  Pleasant mood and affect. Cranial nerves II through XII grossly intact. Sensory grossly within normal limits. DTRs 2+/4. Strength 5/5. Romberg could not be tested. SKIN:  Warm. LABORATORY DATA:  White count 7.7, hemoglobin 12.8, hematocrit 39.4, platelets 380. Sodium 134, potassium 4, chloride 100, bicarbonate 23, anion gap 11, glucose 221, BUN 34, creatinine 1.10, calcium 10.0.   Bilirubin total 0.4, ALT 47, AST 48, alkaline phosphatase 371. Glucose 221. X-ray of the foot shows no acute bony abnormality of the fourth toe on examination of right foot; although, the fourth toe is not entirely included; however, further evaluation with an MRI is suggested. ASSESSMENT AND PLAN:  1. Left fourth toe infection with cellulitis of the foot. The patient will be admitted on a medical bed. We will start the patient on broad-spectrum IV antibiotics, gentle IV hydration, n.p.o. for now. We will talk to Dr. Juan Navas for further plan of intervention. We will provide pain control with a venous Duplex, wound care, and further intervention per hospital course. Continue to closely monitor and reassess as needed. 2.  Dizziness, vertigo versus central.  We will get CT of the head and neck to rule out peripheral stroke. Continue aspirin. Neurovascular checks. IV fluids. Fall precautions. May consider getting PT/OT consult once the patient's foot has been improved. Further intervention per hospital course. We will reassess as needed. Continue to monitor. 3.  Hypertension. Continue home medications and continue to monitor. 4.  Diabetes, poorly controlled. The patient will be on sliding scale Novolog insulin, Accu-Cheks, diet control. Further intervention per hospital course. 5.  Gastrointestinal and deep venous thrombosis prophylaxis. The patient will be on heparin.           Karen Jaramillo MD      [id number]  D:  03/12/2019 14:35  T:  03/12/2019 18:09  JOB #:  [id number]

## 2019-03-12 NOTE — ED NOTES
SBAR report received from Raul Valladares. Assumed care of patient at this time. Patient resting in stretcher, talking on cellphone. No sign of distress noted. Family remains at bedside.  RLE dressed appropriately by podiatrist MD.

## 2019-03-12 NOTE — ED NOTES
Bedside shift change report given to ROBERTA grace (oncoming nurse) by Eugenio Sidhu RN (offgoing nurse). Report included the following information SBAR, ED Summary and Recent Results.

## 2019-03-12 NOTE — PROGRESS NOTES
Admission Medication Reconciliation:    Information obtained from: Sherryll Boast query    Significant PMH/Disease States:   Past Medical History:   Diagnosis Date    Adverse effect of anesthesia     slow to wake up    Arthritis     hands    Bronchitis     CAD (coronary artery disease) ,     angio/mild:MI, heart cath    Diabetes ([facility] [address]     Type II: insulin    GERD (gastroesophageal reflux disease)     Hypercholesterolemia     Hypothyroid     Ischemic colitis ([facility] [address] 2014    Menopause 2000    Neuropathy     feet/legs: ulcer right foot    Obesity     Psychiatric disorder     Depression    Sleep apnea     no use CPAP: unable to use, ruel me    Ulcer 2016    Callus that turned into open wound bottom right foot(ball area), not draining    Vertigo        Chief Complaint for this Admission:  Referral    Allergies:  Patient has no known allergies. Prior to Admission Medications:   Prior to Admission Medications   Prescriptions Last Dose Informant Patient Reported? Taking? Bifidobacterium Infantis (ALIGN) 4 mg cap 3/11/2019 Self No Yes   Sig: As directed   Patient taking differently: Take 1 Cap by mouth daily. As directed   DULAGLUTIDE (TRULICITY SC) 8223  Yes Yes   Si.5 Units by SubCUTAneous route Every Saturday. 1.5 units every saturday   albuterol (PROVENTIL HFA, VENTOLIN HFA, PROAIR HFA) 90 mcg/actuation inhaler   No Yes   Sig: Take 2 Puffs by inhalation every four (4) hours as needed for Wheezing. aspirin 81 mg chewable tablet 3/11/2019 Self Yes Yes   Sig: Take 81 mg by mouth daily. atorvastatin (LIPITOR) 40 mg tablet 3/11/2019  No Yes   Sig: Take 1 Tab by mouth daily. b complex vitamins tablet 3/11/2019 Self Yes Yes   Sig: Take 1 Tab by mouth daily. cholecalciferol (VITAMIN D3) 1,000 unit tablet 3/11/2019  Yes Yes   Sig: Take 2,000 Units by mouth daily. clopidogrel (PLAVIX) 75 mg tab 3/11/2019  No Yes   Sig: Take 1 Tab by mouth daily.    escitalopram oxalate (LEXAPRO) 10 mg tablet 3/11/2019  No Yes   Sig: Take 1 Tab by mouth daily. fenofibrate micronized (LOFIBRA) 67 mg capsule 3/11/2019 at Unknown time  Yes Yes   Sig: Take 67 mg by mouth every morning. fluticasone (FLONASE) 50 mcg/actuation nasal spray 3/11/2019  No Yes   Si Sprays by Both Nostrils route daily. ibuprofen (MOTRIN IB) 200 mg tablet  Self Yes Yes   Sig: Take 200 mg by mouth daily as needed for Pain. Indications: OSTEOARTHRITIS   insulin NPH (NOVOLIN N NPH U-100 INSULIN) 100 unit/mL injection 3/11/2019  Yes Yes   Si Units by SubCUTAneous route two (2) times a day. insulin regular (NOVOLIN R REGULAR U-100 INSULN) 100 unit/mL injection 3/11/2019  Yes Yes   Sig: by SubCUTAneous route Before breakfast, lunch, and dinner. Sliding scale   levothyroxine (SYNTHROID) 125 mcg tablet 3/5/2019 at Unknown time  Yes Yes   Sig: Take 125 mcg by mouth every Wednesday. Take 2 tablets by mouth thurs-tues   levothyroxine (SYNTHROID) 125 mcg tablet 3/11/2019  Yes Yes   Sig: Take 250 mcg by mouth six (6) days a week. Every day except Wednesday   metoprolol succinate (TOPROL XL) 50 mg XL tablet 3/11/2019  No Yes   Sig: Take 1 Tab by mouth daily. multivitamin,tx-iron-ca-min (THERAPEUTIC MULTIVIT/MINERAL) 27-0.4 mg Tab 3/11/2019 Self Yes Yes   Sig: Take 1 Tab by mouth every evening. nitroglycerin (NITROSTAT) 0.4 mg SL tablet   No Yes   Si Tab by SubLINGual route as needed for Chest Pain. omega-3 fatty acids-vitamin e (FISH OIL) 1,000 mg cap 3/11/2019 Self Yes Yes   Sig: Take 1 Cap by mouth daily. omeprazole (PRILOSEC OTC) 20 mg tablet 3/11/2019 Self Yes Yes   Sig: Take 20 mg by mouth daily. Indications: GASTROESOPHAGEAL REFLUX      Facility-Administered Medications: None         Comments/Recommendations:   1. Removed doxycycline, Toujeo, Humalog, Dulera  2. Added Novolin R and Novolin N  3. Changed Fenofibrate 134 to 67mg  4.  Changed Vitamin D to 2 tabs daily

## 2019-03-12 NOTE — ED NOTES
Per Leni Kuhn MD, patient cleared to eat and not NPO at this time. Patient to go to MRI around 2000. Podiatry MD paged in regard to NPO status after midnight. Per Podiatry MD, patient not to eat at this time. Clarifying NPO status.    5:21 PM  Per Podiatry, patient able to eat now. Patient NPO at midnight. 5:45 PM  Pt in CT at this time. 6:58 PM  Patient remains off the floor, in MRI at this time. 7:10 PM  Patient returned from MRI via wheelchair by rad tech. Melisa Loving MD at bedside updating patient on plan of care. 7:25 PM  Patient assisted to and from restroom, up with this RN at side. 1 urine occurrence, able to perform incontinence by self.

## 2019-03-12 NOTE — ED PROVIDER NOTES
Foot and Ankle Surgery Progress Note    Patient: Noa Conde MRN: [id number]  SSN: [id number]    YOB: 1950  Age: 70 y.o. Sex: female      Admit Date: 3/12/2019    * No surgery found *    Procedure:      Subjective:     Patient seen in ED     Objective:     Visit Vitals  /60   Pulse 82   Temp 98.2 °F (36.8 °C)   Resp 16   Ht 5' 6\" (1.676 m)   SpO2 95%   BMI 38.25 kg/m²       Temp (24hrs), Av.2 °F (36.8 °C), Min:98.2 °F (36.8 °C), Max:98.2 °F (36.8 °C)      Physical Exam:    There is cellulitis of the right foot  There is an ulcer toe 4 probes deep    Data Review: images and reports reviewed    Lab Review: All lab results for the last 24 hours reviewed.     Assessment:     Hospital Problems  Date Reviewed: 3/12/2019          Codes Class Noted POA    * (Principal) Foot infection ICD-10-CM: L08.9  ICD-9-CM: 686.9  3/12/2019 Unknown              Plan/Recommendations/Medical Decision Making:     MRI of the right foot   Will schedule sx after MRI  I spoke to the Hospitalist and he wants me to hold until the Neuro w/u    Signed By: Abbey Hong DPM     2019

## 2019-03-12 NOTE — ED PROVIDER NOTES
68yo F sent by podiatry for amputation of toe. Pt being treated by Dr. Tim Smith. Has had previous amputations. Now with worsening swelling and drainage fr0m 4th toe on R. No pain or fever. No recent injury or trauma. Pt seen today by Dr. Erin Jacobo associate and sent in for surgery. Podiatry - Dr. Tim Smith. Referral / Consult   Pertinent negatives include no chest pain and no abdominal pain. Past Medical History:   Diagnosis Date    Adverse effect of anesthesia     slow to wake up    Arthritis     hands    Bronchitis     CAD (coronary artery disease) 2008, 2016    angio/mild:MI, heart cath    Diabetes ([facility] [address]     Type II: insulin    GERD (gastroesophageal reflux disease)     Hypercholesterolemia     Hypothyroid     Ischemic colitis ([facility] [address] 11/01/2014    Menopause 2000    Neuropathy     feet/legs: ulcer right foot    Obesity     Psychiatric disorder     Depression    Sleep apnea     no use CPAP: unable to use, ruel me    Ulcer 11/2016    Callus that turned into open wound bottom right foot(ball area), not draining    Vertigo        Past Surgical History:   Procedure Laterality Date    BREAST SURGERY PROCEDURE UNLISTED  2000's    benign breast cyst    CARDIAC SURG PROCEDURE UNLIST  11/2016    heart attack    HX BREAST BIOPSY      many years ago; laterality unknown no scar    HX GYN      vaginal delivery x1    HX MOHS PROCEDURES  2005    faith    HX ORTHOPAEDIC  2015    right foot abscess     HX ORTHOPAEDIC  2007    rigtht foot surgery    HX ORTHOPAEDIC  2007    R MT amputate    HX ORTHOPAEDIC  2009    left 2nd toe surgery    HX TONSILLECTOMY  6 yrs.  old         Family History:   Problem Relation Age of Onset    Diabetes Father     Heart Attack Father         congestive heart faliure    Heart Disease Father         CHF    Diabetes Maternal Aunt     Diabetes Maternal Uncle        Social History     Socioeconomic History    Marital status:      Spouse name: Not on file    Number of children: Not on file    Years of education: Not on file    Highest education level: Not on file   Social Needs    Financial resource strain: Not on file    Food insecurity - worry: Not on file    Food insecurity - inability: Not on file    Transportation needs - medical: Not on file   [employer] needs - non-medical: Not on file   Occupational History    Not on file   Tobacco Use    Smoking status: Former Smoker     Years: 5.00     Last attempt to quit: 1985     Years since quittin.1    Smokeless tobacco: Never Used   Substance and Sexual Activity    Alcohol use: No    Drug use: No    Sexual activity: Not on file   Other Topics Concern    Not on file   Social History Narrative    Not on file         ALLERGIES: Patient has no known allergies. Review of Systems   Constitutional: Negative for fever. HENT: Negative for facial swelling. Eyes: Negative for visual disturbance. Respiratory: Negative for chest tightness. Cardiovascular: Negative for chest pain. Gastrointestinal: Negative for abdominal pain. Genitourinary: Negative for difficulty urinating and dysuria. Musculoskeletal: Negative for arthralgias. Skin: Negative for rash. Neurological: Negative for dizziness. Hematological: Negative for adenopathy. Psychiatric/Behavioral: Negative for suicidal ideas. Vitals:    19 1115   BP: 126/69   Pulse: 82   Resp: 16   Temp: 98.2 °F (36.8 °C)   SpO2: 97%   Height: 5' 6\" (1.676 m)            Physical Exam   Constitutional: She is oriented to person, place, and time. She appears well-developed and well-nourished. No distress. HENT:   Head: Normocephalic and atraumatic. Eyes: Conjunctivae are normal. Pupils are equal, round, and reactive to light. No scleral icterus. Neck: Normal range of motion. Neck supple. Cardiovascular: Normal rate. No murmur heard. Pulmonary/Chest: Effort normal. No respiratory distress.    Abdominal: She exhibits no distension. Musculoskeletal: Normal range of motion. R foot with partial amputation. R 4th toe with increased redness, swelling, and drainage. Neurological: She is alert and oriented to person, place, and time. Skin: Skin is warm and dry. No rash noted. Nursing note and vitals reviewed. MDM  Number of Diagnoses or Management Options  Diabetic foot infection [facility]):   Osteomyelitis of right foot, unspecified type [facility]):   Diagnosis management comments: Diabetic foot wound. Concerning for osteo. IV abx ordered. Admit to hospitalist.  Plan to go to OR later today per notes with family. Procedures    Hospitalist Hardik for Admission  1:05 PM    ED Room Number: L/HL  Patient Name and age:  Ayleen Fitting 70 y.o.  female  Working Diagnosis:   1. Diabetic foot infection ([facility] [address]    2. Osteomyelitis of right foot, unspecified type ([facility] [address]      Readmission: no  Isolation Requirements:  no  Recommended Level of Care:  med/surg  Code Status:  Full  Other:  Sent by Dr. Juan Navas for admission and amputation of R 4th toe. VS stable. ABX ordered for diabetic foot infection. This note will not be viewable in [address]

## 2019-03-13 ENCOUNTER — ANESTHESIA (OUTPATIENT)
Dept: SURGERY | Age: 69
DRG: 617 | End: 2019-03-13
Payer: MEDICARE

## 2019-03-13 ENCOUNTER — ANESTHESIA EVENT (OUTPATIENT)
Dept: SURGERY | Age: 69
DRG: 617 | End: 2019-03-13
Payer: MEDICARE

## 2019-03-13 LAB
ANION GAP SERPL CALC-SCNC: 10 MMOL/L (ref 5–15)
BASOPHILS # BLD: 0 K/UL (ref 0–0.1)
BASOPHILS NFR BLD: 1 % (ref 0–1)
BUN SERPL-MCNC: 26 MG/DL (ref 6–20)
BUN/CREAT SERPL: 22 (ref 12–20)
CALCIUM SERPL-MCNC: 8.4 MG/DL (ref 8.5–10.1)
CHLORIDE SERPL-SCNC: 107 MMOL/L (ref 97–108)
CO2 SERPL-SCNC: 21 MMOL/L (ref 21–32)
CREAT SERPL-MCNC: 1.18 MG/DL (ref 0.55–1.02)
DIFFERENTIAL METHOD BLD: ABNORMAL
EOSINOPHIL # BLD: 0.2 K/UL (ref 0–0.4)
EOSINOPHIL NFR BLD: 3 % (ref 0–7)
ERYTHROCYTE [DISTWIDTH] IN BLOOD BY AUTOMATED COUNT: 13.9 % (ref 11.5–14.5)
GLUCOSE BLD STRIP.AUTO-MCNC: 207 MG/DL (ref 65–100)
GLUCOSE BLD STRIP.AUTO-MCNC: 220 MG/DL (ref 65–100)
GLUCOSE BLD STRIP.AUTO-MCNC: 228 MG/DL (ref 65–100)
GLUCOSE BLD STRIP.AUTO-MCNC: 232 MG/DL (ref 65–100)
GLUCOSE BLD STRIP.AUTO-MCNC: 311 MG/DL (ref 65–100)
GLUCOSE SERPL-MCNC: 277 MG/DL (ref 65–100)
HCT VFR BLD AUTO: 36 % (ref 35–47)
HGB BLD-MCNC: 11.2 G/DL (ref 11.5–16)
IMM GRANULOCYTES # BLD AUTO: 0.1 K/UL (ref 0–0.04)
IMM GRANULOCYTES NFR BLD AUTO: 1 % (ref 0–0.5)
LYMPHOCYTES # BLD: 1.1 K/UL (ref 0.8–3.5)
LYMPHOCYTES NFR BLD: 20 % (ref 12–49)
MCH RBC QN AUTO: 26.9 PG (ref 26–34)
MCHC RBC AUTO-ENTMCNC: 31.1 G/DL (ref 30–36.5)
MCV RBC AUTO: 86.5 FL (ref 80–99)
MONOCYTES # BLD: 0.5 K/UL (ref 0–1)
MONOCYTES NFR BLD: 10 % (ref 5–13)
NEUTS SEG # BLD: 3.6 K/UL (ref 1.8–8)
NEUTS SEG NFR BLD: 65 % (ref 32–75)
NRBC # BLD: 0 K/UL (ref 0–0.01)
NRBC BLD-RTO: 0 PER 100 WBC
PLATELET # BLD AUTO: 280 K/UL (ref 150–400)
PMV BLD AUTO: 10.5 FL (ref 8.9–12.9)
POTASSIUM SERPL-SCNC: 4 MMOL/L (ref 3.5–5.1)
RBC # BLD AUTO: 4.16 M/UL (ref 3.8–5.2)
SERVICE CMNT-IMP: ABNORMAL
SODIUM SERPL-SCNC: 138 MMOL/L (ref 136–145)
WBC # BLD AUTO: 5.5 K/UL (ref 3.6–11)

## 2019-03-13 PROCEDURE — 87077 CULTURE AEROBIC IDENTIFY: CPT

## 2019-03-13 PROCEDURE — 74011250636 HC RX REV CODE- 250/636: Performed by: PODIATRIST

## 2019-03-13 PROCEDURE — 74011636637 HC RX REV CODE- 636/637: Performed by: HOSPITALIST

## 2019-03-13 PROCEDURE — 76010000138 HC OR TIME 0.5 TO 1 HR: Performed by: PODIATRIST

## 2019-03-13 PROCEDURE — 80048 BASIC METABOLIC PNL TOTAL CA: CPT

## 2019-03-13 PROCEDURE — 87075 CULTR BACTERIA EXCEPT BLOOD: CPT

## 2019-03-13 PROCEDURE — 65270000029 HC RM PRIVATE

## 2019-03-13 PROCEDURE — 74011250636 HC RX REV CODE- 250/636: Performed by: NURSE PRACTITIONER

## 2019-03-13 PROCEDURE — 74011250637 HC RX REV CODE- 250/637: Performed by: FAMILY MEDICINE

## 2019-03-13 PROCEDURE — 87186 SC STD MICRODIL/AGAR DIL: CPT

## 2019-03-13 PROCEDURE — 74011636637 HC RX REV CODE- 636/637: Performed by: FAMILY MEDICINE

## 2019-03-13 PROCEDURE — 82962 GLUCOSE BLOOD TEST: CPT

## 2019-03-13 PROCEDURE — 74011250636 HC RX REV CODE- 250/636

## 2019-03-13 PROCEDURE — 87205 SMEAR GRAM STAIN: CPT

## 2019-03-13 PROCEDURE — 76210000016 HC OR PH I REC 1 TO 1.5 HR: Performed by: PODIATRIST

## 2019-03-13 PROCEDURE — 74011250636 HC RX REV CODE- 250/636: Performed by: FAMILY MEDICINE

## 2019-03-13 PROCEDURE — 74011000258 HC RX REV CODE- 258: Performed by: PODIATRIST

## 2019-03-13 PROCEDURE — 76060000033 HC ANESTHESIA 1 TO 1.5 HR: Performed by: PODIATRIST

## 2019-03-13 PROCEDURE — 77030002916 HC SUT ETHLN J&J -A: Performed by: PODIATRIST

## 2019-03-13 PROCEDURE — 77030018836 HC SOL IRR NACL ICUM -A: Performed by: PODIATRIST

## 2019-03-13 PROCEDURE — 0Y6V0Z3 DETACHMENT AT RIGHT 4TH TOE, LOW, OPEN APPROACH: ICD-10-PCS | Performed by: PODIATRIST

## 2019-03-13 PROCEDURE — 77030011640 HC PAD GRND REM COVD -A: Performed by: PODIATRIST

## 2019-03-13 PROCEDURE — 74011000250 HC RX REV CODE- 250

## 2019-03-13 PROCEDURE — 74011000250 HC RX REV CODE- 250: Performed by: PODIATRIST

## 2019-03-13 PROCEDURE — 85025 COMPLETE CBC W/AUTO DIFF WBC: CPT

## 2019-03-13 PROCEDURE — 88305 TISSUE EXAM BY PATHOLOGIST: CPT

## 2019-03-13 PROCEDURE — 74011000272 HC RX REV CODE- 272: Performed by: PODIATRIST

## 2019-03-13 PROCEDURE — 36415 COLL VENOUS BLD VENIPUNCTURE: CPT

## 2019-03-13 PROCEDURE — 77030031139 HC SUT VCRL2 J&J -A: Performed by: PODIATRIST

## 2019-03-13 PROCEDURE — 88311 DECALCIFY TISSUE: CPT

## 2019-03-13 RX ORDER — SODIUM CHLORIDE 0.9 % (FLUSH) 0.9 %
5-40 SYRINGE (ML) INJECTION AS NEEDED
Status: DISCONTINUED | OUTPATIENT
Start: 2019-03-13 | End: 2019-03-13 | Stop reason: HOSPADM

## 2019-03-13 RX ORDER — DEXMEDETOMIDINE HYDROCHLORIDE 4 UG/ML
INJECTION, SOLUTION INTRAVENOUS AS NEEDED
Status: DISCONTINUED | OUTPATIENT
Start: 2019-03-13 | End: 2019-03-13 | Stop reason: HOSPADM

## 2019-03-13 RX ORDER — BUPIVACAINE HYDROCHLORIDE AND EPINEPHRINE 5; 5 MG/ML; UG/ML
INJECTION, SOLUTION EPIDURAL; INTRACAUDAL; PERINEURAL AS NEEDED
Status: DISCONTINUED | OUTPATIENT
Start: 2019-03-13 | End: 2019-03-13 | Stop reason: HOSPADM

## 2019-03-13 RX ORDER — OXYCODONE HYDROCHLORIDE 5 MG/1
5 TABLET ORAL AS NEEDED
Status: DISCONTINUED | OUTPATIENT
Start: 2019-03-13 | End: 2019-03-13 | Stop reason: HOSPADM

## 2019-03-13 RX ORDER — SODIUM CHLORIDE 0.9 % (FLUSH) 0.9 %
5-40 SYRINGE (ML) INJECTION EVERY 8 HOURS
Status: DISCONTINUED | OUTPATIENT
Start: 2019-03-13 | End: 2019-03-13 | Stop reason: HOSPADM

## 2019-03-13 RX ORDER — LIDOCAINE HYDROCHLORIDE 10 MG/ML
0.5 INJECTION, SOLUTION EPIDURAL; INFILTRATION; INTRACAUDAL; PERINEURAL AS NEEDED
Status: DISCONTINUED | OUTPATIENT
Start: 2019-03-13 | End: 2019-03-13 | Stop reason: HOSPADM

## 2019-03-13 RX ORDER — PHENYLEPHRINE HCL IN 0.9% NACL 0.4MG/10ML
SYRINGE (ML) INTRAVENOUS AS NEEDED
Status: DISCONTINUED | OUTPATIENT
Start: 2019-03-13 | End: 2019-03-13 | Stop reason: HOSPADM

## 2019-03-13 RX ORDER — PROPOFOL 10 MG/ML
INJECTION, EMULSION INTRAVENOUS AS NEEDED
Status: DISCONTINUED | OUTPATIENT
Start: 2019-03-13 | End: 2019-03-13 | Stop reason: HOSPADM

## 2019-03-13 RX ORDER — DIPHENHYDRAMINE HYDROCHLORIDE 50 MG/ML
12.5 INJECTION, SOLUTION INTRAMUSCULAR; INTRAVENOUS AS NEEDED
Status: DISCONTINUED | OUTPATIENT
Start: 2019-03-13 | End: 2019-03-13 | Stop reason: HOSPADM

## 2019-03-13 RX ORDER — MECLIZINE HCL 12.5 MG 12.5 MG/1
25 TABLET ORAL 3 TIMES DAILY
Status: DISCONTINUED | OUTPATIENT
Start: 2019-03-13 | End: 2019-03-18 | Stop reason: HOSPADM

## 2019-03-13 RX ORDER — VANCOMYCIN/0.9 % SOD CHLORIDE 1.5G/250ML
1500 PLASTIC BAG, INJECTION (ML) INTRAVENOUS EVERY 24 HOURS
Status: DISCONTINUED | OUTPATIENT
Start: 2019-03-13 | End: 2019-03-14

## 2019-03-13 RX ORDER — SODIUM CHLORIDE, SODIUM LACTATE, POTASSIUM CHLORIDE, CALCIUM CHLORIDE 600; 310; 30; 20 MG/100ML; MG/100ML; MG/100ML; MG/100ML
125 INJECTION, SOLUTION INTRAVENOUS CONTINUOUS
Status: DISCONTINUED | OUTPATIENT
Start: 2019-03-13 | End: 2019-03-13 | Stop reason: HOSPADM

## 2019-03-13 RX ORDER — MIDAZOLAM HYDROCHLORIDE 1 MG/ML
INJECTION, SOLUTION INTRAMUSCULAR; INTRAVENOUS AS NEEDED
Status: DISCONTINUED | OUTPATIENT
Start: 2019-03-13 | End: 2019-03-13 | Stop reason: HOSPADM

## 2019-03-13 RX ORDER — FENTANYL CITRATE 50 UG/ML
50 INJECTION, SOLUTION INTRAMUSCULAR; INTRAVENOUS AS NEEDED
Status: DISCONTINUED | OUTPATIENT
Start: 2019-03-13 | End: 2019-03-13 | Stop reason: HOSPADM

## 2019-03-13 RX ORDER — MORPHINE SULFATE 10 MG/ML
2 INJECTION, SOLUTION INTRAMUSCULAR; INTRAVENOUS
Status: DISCONTINUED | OUTPATIENT
Start: 2019-03-13 | End: 2019-03-13 | Stop reason: HOSPADM

## 2019-03-13 RX ORDER — MIDAZOLAM HYDROCHLORIDE 1 MG/ML
1 INJECTION, SOLUTION INTRAMUSCULAR; INTRAVENOUS
Status: DISCONTINUED | OUTPATIENT
Start: 2019-03-13 | End: 2019-03-13 | Stop reason: HOSPADM

## 2019-03-13 RX ORDER — ONDANSETRON 2 MG/ML
4 INJECTION INTRAMUSCULAR; INTRAVENOUS AS NEEDED
Status: DISCONTINUED | OUTPATIENT
Start: 2019-03-13 | End: 2019-03-13 | Stop reason: HOSPADM

## 2019-03-13 RX ORDER — MIDAZOLAM HYDROCHLORIDE 1 MG/ML
1 INJECTION, SOLUTION INTRAMUSCULAR; INTRAVENOUS AS NEEDED
Status: DISCONTINUED | OUTPATIENT
Start: 2019-03-13 | End: 2019-03-13 | Stop reason: HOSPADM

## 2019-03-13 RX ORDER — INSULIN LISPRO 100 [IU]/ML
INJECTION, SOLUTION INTRAVENOUS; SUBCUTANEOUS
Status: DISCONTINUED | OUTPATIENT
Start: 2019-03-13 | End: 2019-03-14

## 2019-03-13 RX ORDER — DEXTROSE, SODIUM CHLORIDE, SODIUM LACTATE, POTASSIUM CHLORIDE, AND CALCIUM CHLORIDE 5; .6; .31; .03; .02 G/100ML; G/100ML; G/100ML; G/100ML; G/100ML
125 INJECTION, SOLUTION INTRAVENOUS CONTINUOUS
Status: DISCONTINUED | OUTPATIENT
Start: 2019-03-13 | End: 2019-03-13 | Stop reason: HOSPADM

## 2019-03-13 RX ORDER — FENTANYL CITRATE 50 UG/ML
25 INJECTION, SOLUTION INTRAMUSCULAR; INTRAVENOUS
Status: DISCONTINUED | OUTPATIENT
Start: 2019-03-13 | End: 2019-03-13 | Stop reason: HOSPADM

## 2019-03-13 RX ADMIN — MECLIZINE 25 MG: 12.5 TABLET ORAL at 23:33

## 2019-03-13 RX ADMIN — PIPERACILLIN AND TAZOBACTAM 3.38 G: 3; .375 INJECTION, POWDER, FOR SOLUTION INTRAVENOUS at 11:31

## 2019-03-13 RX ADMIN — PROPOFOL 25 MG: 10 INJECTION, EMULSION INTRAVENOUS at 20:21

## 2019-03-13 RX ADMIN — PROPOFOL 25 MG: 10 INJECTION, EMULSION INTRAVENOUS at 20:00

## 2019-03-13 RX ADMIN — INSULIN LISPRO 3 UNITS: 100 INJECTION, SOLUTION INTRAVENOUS; SUBCUTANEOUS at 11:42

## 2019-03-13 RX ADMIN — Medication 120 MCG: at 20:20

## 2019-03-13 RX ADMIN — Medication 10 ML: at 06:00

## 2019-03-13 RX ADMIN — DEXMEDETOMIDINE HYDROCHLORIDE 4 MCG: 4 INJECTION, SOLUTION INTRAVENOUS at 19:52

## 2019-03-13 RX ADMIN — ATORVASTATIN CALCIUM 40 MG: 40 TABLET, FILM COATED ORAL at 23:33

## 2019-03-13 RX ADMIN — DEXMEDETOMIDINE HYDROCHLORIDE 4 MCG: 4 INJECTION, SOLUTION INTRAVENOUS at 19:56

## 2019-03-13 RX ADMIN — Medication 10 ML: at 02:56

## 2019-03-13 RX ADMIN — PROPOFOL 25 MG: 10 INJECTION, EMULSION INTRAVENOUS at 20:03

## 2019-03-13 RX ADMIN — PROPOFOL 25 MG: 10 INJECTION, EMULSION INTRAVENOUS at 20:06

## 2019-03-13 RX ADMIN — DEXMEDETOMIDINE HYDROCHLORIDE 4 MCG: 4 INJECTION, SOLUTION INTRAVENOUS at 19:50

## 2019-03-13 RX ADMIN — INSULIN LISPRO 7 UNITS: 100 INJECTION, SOLUTION INTRAVENOUS; SUBCUTANEOUS at 08:46

## 2019-03-13 RX ADMIN — LEVOTHYROXINE SODIUM 125 MCG: 125 TABLET ORAL at 06:00

## 2019-03-13 RX ADMIN — DEXMEDETOMIDINE HYDROCHLORIDE 4 MCG: 4 INJECTION, SOLUTION INTRAVENOUS at 19:58

## 2019-03-13 RX ADMIN — DEXMEDETOMIDINE HYDROCHLORIDE 4 MCG: 4 INJECTION, SOLUTION INTRAVENOUS at 19:54

## 2019-03-13 RX ADMIN — Medication 120 MCG: at 20:10

## 2019-03-13 RX ADMIN — PROPOFOL 25 MG: 10 INJECTION, EMULSION INTRAVENOUS at 20:09

## 2019-03-13 RX ADMIN — PROPOFOL 25 MG: 10 INJECTION, EMULSION INTRAVENOUS at 20:07

## 2019-03-13 RX ADMIN — PROPOFOL 25 MG: 10 INJECTION, EMULSION INTRAVENOUS at 20:02

## 2019-03-13 RX ADMIN — INSULIN LISPRO 2 UNITS: 100 INJECTION, SOLUTION INTRAVENOUS; SUBCUTANEOUS at 21:42

## 2019-03-13 RX ADMIN — PROPOFOL 50 MG: 10 INJECTION, EMULSION INTRAVENOUS at 19:53

## 2019-03-13 RX ADMIN — PIPERACILLIN AND TAZOBACTAM 3.38 G: 3; .375 INJECTION, POWDER, FOR SOLUTION INTRAVENOUS at 02:56

## 2019-03-13 RX ADMIN — DEXMEDETOMIDINE HYDROCHLORIDE 4 MCG: 4 INJECTION, SOLUTION INTRAVENOUS at 20:02

## 2019-03-13 RX ADMIN — Medication 120 MCG: at 20:49

## 2019-03-13 RX ADMIN — PROPOFOL 25 MG: 10 INJECTION, EMULSION INTRAVENOUS at 20:24

## 2019-03-13 RX ADMIN — DEXMEDETOMIDINE HYDROCHLORIDE 4 MCG: 4 INJECTION, SOLUTION INTRAVENOUS at 20:00

## 2019-03-13 RX ADMIN — VANCOMYCIN HYDROCHLORIDE 1500 MG: 10 INJECTION, POWDER, LYOPHILIZED, FOR SOLUTION INTRAVENOUS at 15:38

## 2019-03-13 RX ADMIN — MIDAZOLAM HYDROCHLORIDE 3 MG: 1 INJECTION, SOLUTION INTRAMUSCULAR; INTRAVENOUS at 19:47

## 2019-03-13 RX ADMIN — PROPOFOL 25 MG: 10 INJECTION, EMULSION INTRAVENOUS at 20:13

## 2019-03-13 RX ADMIN — PANTOPRAZOLE SODIUM 40 MG: 40 TABLET, DELAYED RELEASE ORAL at 08:46

## 2019-03-13 RX ADMIN — PROPOFOL 25 MG: 10 INJECTION, EMULSION INTRAVENOUS at 20:11

## 2019-03-13 RX ADMIN — DEXMEDETOMIDINE HYDROCHLORIDE 4 MCG: 4 INJECTION, SOLUTION INTRAVENOUS at 20:04

## 2019-03-13 RX ADMIN — PROPOFOL 25 MG: 10 INJECTION, EMULSION INTRAVENOUS at 20:05

## 2019-03-13 RX ADMIN — PROPOFOL 25 MG: 10 INJECTION, EMULSION INTRAVENOUS at 20:19

## 2019-03-13 RX ADMIN — PROPOFOL 50 MG: 10 INJECTION, EMULSION INTRAVENOUS at 19:50

## 2019-03-13 RX ADMIN — Medication 10 ML: at 11:43

## 2019-03-13 RX ADMIN — ESCITALOPRAM OXALATE 10 MG: 10 TABLET ORAL at 08:46

## 2019-03-13 RX ADMIN — MIDAZOLAM HYDROCHLORIDE 2 MG: 1 INJECTION, SOLUTION INTRAMUSCULAR; INTRAVENOUS at 19:43

## 2019-03-13 RX ADMIN — PROPOFOL 25 MG: 10 INJECTION, EMULSION INTRAVENOUS at 20:26

## 2019-03-13 RX ADMIN — DEXMEDETOMIDINE HYDROCHLORIDE 4 MCG: 4 INJECTION, SOLUTION INTRAVENOUS at 19:48

## 2019-03-13 RX ADMIN — MECLIZINE 25 MG: 12.5 TABLET ORAL at 15:38

## 2019-03-13 RX ADMIN — DEXMEDETOMIDINE HYDROCHLORIDE 4 MCG: 4 INJECTION, SOLUTION INTRAVENOUS at 19:46

## 2019-03-13 RX ADMIN — PIPERACILLIN AND TAZOBACTAM 3.38 G: 3; .375 INJECTION, POWDER, FOR SOLUTION INTRAVENOUS at 19:09

## 2019-03-13 RX ADMIN — Medication 80 MCG: at 20:39

## 2019-03-13 RX ADMIN — METOPROLOL SUCCINATE 50 MG: 50 TABLET, EXTENDED RELEASE ORAL at 08:46

## 2019-03-13 RX ADMIN — PROPOFOL 25 MG: 10 INJECTION, EMULSION INTRAVENOUS at 20:16

## 2019-03-13 RX ADMIN — PROPOFOL 50 MG: 10 INJECTION, EMULSION INTRAVENOUS at 19:56

## 2019-03-13 NOTE — PROGRESS NOTES
Day #2 of Vancomycin  Indication:  cellulitis of R foot  Current regimen:  1500 mg IV q18h  Abx regimen:  vanc + zosyn  ID Following ?: NO  Concomitant nephrotoxic drugs (requires more frequent monitoring): None  Frequency of BMP?: daily through 3/15    Recent Labs     19  0606 19  0308 19  1317   WBC 5.5  --  7.7   CREA  --  1.18* 1.10*   BUN  --  26* 34*     Est CrCl: 55 ml/min; UO: unmeasured ml/kg/hr  Temp (24hrs), Av.9 °F (36.6 °C), Min:97.6 °F (36.4 °C), Max:98.2 °F (36.8 °C)    Cultures:   3/12 blood, paired - pending  3/13 MRSA - pending      Goal trough = 10 - 15 mcg/mL    Recent trough history (date/time/level/dose/action taken):  none    Plan: Change from 1500 mg IV q18h to 1500 mg IV q24h for an anticipated trough level of ~13.5 mcg/ml.

## 2019-03-13 NOTE — PROGRESS NOTES
Problem: Falls - Risk of  Goal: *Absence of Falls  Document Saige Fall Risk and appropriate interventions in the flowsheet.   Outcome: Progressing Towards Goal  Fall Risk Interventions:            Medication Interventions: Patient to call before getting OOB    Elimination Interventions: Call light in reach, Patient to call for help with toileting needs

## 2019-03-13 NOTE — ANESTHESIA PREPROCEDURE EVALUATION
Anesthetic History   No history of anesthetic complications            Review of Systems / Medical History  Patient summary reviewed, nursing notes reviewed and pertinent labs reviewed    Pulmonary        Sleep apnea           Neuro/Psych         Psychiatric history     Cardiovascular    Hypertension          CAD    Exercise tolerance: >4 METS     GI/Hepatic/Renal     GERD           Endo/Other    Diabetes  Hypothyroidism  Obesity and arthritis     Other Findings              Physical Exam    Airway  Mallampati: III  TM Distance: 4 - 6 cm  Neck ROM: normal range of motion   Mouth opening: Normal     Cardiovascular  Regular rate and rhythm,  S1 and S2 normal,  no murmur, click, rub, or gallop             Dental  No notable dental hx       Pulmonary  Breath sounds clear to auscultation               Abdominal  GI exam deferred       Other Findings            Anesthetic Plan    ASA: 3  Anesthesia type: MAC          Induction: Intravenous  Anesthetic plan and risks discussed with: Patient

## 2019-03-13 NOTE — ROUTINE PROCESS
Bedside shift change report given to [facility] (oncoming nurse) by Angel Damon (offgoing nurse). Report included the following information SBAR, ED Summary, Med Rec Status and Cardiac Rhythm NSR.

## 2019-03-13 NOTE — PROGRESS NOTES
TRANSFER - OUT REPORT:    Verbal report given to [facility] RN(name) on Kapil Nassar  being transferred to (unit) for routine post - op       Report consisted of patients Situation, Background, Assessment and   Recommendations(SBAR). Information from the following report(s) SBAR, Kardex and Recent Results was reviewed with the receiving nurse. Lines:   Peripheral IV 03/12/19 Left Antecubital (Active)   Site Assessment Clean, dry, & intact 3/13/2019  4:00 PM   Phlebitis Assessment 0 3/13/2019  4:00 PM   Infiltration Assessment 0 3/13/2019  4:00 PM   Dressing Status Clean, dry, & intact 3/13/2019  4:00 PM   Dressing Type Transparent 3/13/2019  4:00 PM   Hub Color/Line Status Pink 3/13/2019  4:00 PM   Action Taken Open ports on tubing capped 3/13/2019  4:00 PM   Alcohol Cap Used Yes 3/13/2019  4:00 PM       Peripheral IV 03/12/19 Right;Posterior Forearm (Active)   Site Assessment Clean, dry, & intact 3/13/2019  4:00 PM   Phlebitis Assessment 0 3/13/2019  4:00 PM   Infiltration Assessment 0 3/13/2019  4:00 PM   Dressing Status Clean, dry, & intact 3/13/2019  4:00 PM   Dressing Type Transparent 3/13/2019  4:00 PM   Hub Color/Line Status Pink 3/13/2019  4:00 PM   Action Taken Open ports on tubing capped 3/13/2019  4:00 PM   Alcohol Cap Used Yes 3/13/2019  4:00 PM        Opportunity for questions and clarification was provided.       Patient transported with:   [employer]

## 2019-03-13 NOTE — PROGRESS NOTES
Reason for Admission:   Diabetic foot infection. RRAT Score: 19                 Do you (patient/family) have any concerns for transition/discharge? No               Plan for utilizing home health:    TBD     Likelihood of readmission? medium            Transition of Care Plan:   CM met with pt to introduce her to the role of CM and transition of care. She verbalized understanding. This pt lives at home with her . Per pt, her  has dementia, but she has to supervise him. Her daughter is currently with her . Per pt, she uses a cane for ambulation. She stated that she is fairly independent with ADL's and IADL's, still active and driving. She has had home IV antibiotics in the past but cannot remember the name of the agency. CM will follow. [address] Management Interventions  PCP Verified by CM:  Yes  Palliative Care Criteria Met (RRAT>21 & CHF Dx)?: No  Transition of Care Consult (CM Consult): Discharge Planning  MyChart Signup: No  Discharge Durable Medical Equipment: No  Physical Therapy Consult: No  Occupational Therapy Consult: No  Speech Therapy Consult: No  Current Support Network: Lives with Spouse  Confirm Follow Up Transport: Family  Plan discussed with Pt/Family/Caregiver: Yes  Freedom of Choice Offered: Yes   Resource Information Provided?: No

## 2019-03-13 NOTE — PROGRESS NOTES
Hospitalist Progress Note  Glenn Medina MD  Answering service: [phone] OR [phone] from in house phone      Date of Service:  3/13/2019  NAME:  Clement Rosario  :    MRN:  [id number]      Admission Summary:    70-year-old female with past medical history of hypertension, diabetes, history of coronary artery disease, bronchitis, GERD, hypercholesterolemia, hypothyroidism, ischemic colitis, menopause, neuropathy, obesity, psychiatric disorder, depression, ulcer at the bottom of the right foot, and vertigo who presents to the hospital with right foot pain         Interval history / Subjective:     Pt seen in follow up of right foot pain    Pt seen in room  She does report having  A fall and having a head injury quite some time back, no new injuries. Reports she is ere for getting surgery on her right foot. Denies Fevers or chills. Assessment & Plan:     1. Left fourth Toe OM - Confirmed by MRI- Seen by podiatry for Debridement later today, Currently on broad spectrum abx    2. 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 Dm2- Uncontrolled , Change Scale to resistant, Diabetic Diet    4. HTN - C/W toprol, Controlled        Code status: Full  DVT prophylaxis: Heparin      Risk of deterioration: medium      Total time spent with patient: [address] discussed with: Patient/Family and Nurse  Disposition: TBD     Hospital Problems  Date Reviewed: 3/12/2019          Codes Class Noted POA    * (Principal) Foot infection ICD-10-CM: L08.9  ICD-9-CM: 686.9  3/12/2019 Unknown                Review of Systems:   Pertinent items are noted in HPI. Vital Signs:    Last 24hrs VS reviewed since prior progress note.  Most recent are:  Visit Vitals  /56 (BP 1 Location: Right arm, BP Patient Position: At rest)   Pulse 69   Temp 98.2 °F (36.8 °C)   Resp 16   Ht 5' 6\" (1.676 m)   SpO2 95%   Breastfeeding? No   BMI 38.25 kg/m²       No intake or output data in the 24 hours ending 03/13/19 1607     Physical Examination:             Constitutional:  No acute distress, cooperative, pleasant    ENT:  Oral mucous moist, oropharynx benign. Neck supple,    Resp:  CTA bilaterally. No wheezing/rhonchi/rales. No accessory muscle use   CV:  Regular rhythm, normal rate, no murmurs, gallops, rubs    GI:  Soft, non distended, non tender. normoactive bowel sounds, no hepatosplenomegaly     Musculoskeletal:  No edema, warm, 2+ pulses throughout    Neurologic:  Moves all extremities. AAOx3, CN II-XII reviewed     Hematologic/Lymphatic/Immunlogic:  No jaundice nor lymph node swelling       Data Review:    Review and/or order of clinical lab test  Review and/or order of tests in the radiology section of [facility]      Labs:     Recent Labs     03/13/19  0606 03/12/19  1317   WBC 5.5 7.7   HGB 11.2* 12.8   HCT 36.0 39.4    314     Recent Labs     03/13/19  0308 03/12/19  1317    134*   K 4.0 4.0    100   CO2 21 23   BUN 26* 34*   CREA 1.18* 1.10*   * 221*   CA 8.4* 10.0     Recent Labs     03/12/19  1317   SGOT 48*   ALT 47   *   TBILI 0.4   TP 8.6*   ALB 3.2*   GLOB 5.4*     No results for input(s): INR, PTP, APTT in the last 72 hours. No lab exists for component: INREXT   No results for input(s): FE, TIBC, PSAT, FERR in the last 72 hours. Lab Results   Component Value Date/Time    Folate 19.5 11/01/2014 08:00 AM      No results for input(s): PH, PCO2, PO2 in the last 72 hours.   Recent Labs     03/12/19  1928 03/12/19  1317   TROIQ <0.05 <0.05     Lab Results   Component Value Date/Time    Cholesterol, total 155 03/12/2019 01:17 PM    HDL Cholesterol 49 03/12/2019 01:17 PM    LDL, calculated 65.4 03/12/2019 01:17 PM    Triglyceride 203 (H) 03/12/2019 01:17 PM    CHOL/HDL Ratio 3.2 03/12/2019 01:17 PM     Lab Results   Component Value Date/Time    Glucose (POC) 228 (H) 03/13/2019 11:36 AM    Glucose (POC) 311 (H) 03/13/2019 07:25 AM    Glucose (POC) 199 (H) 03/12/2019 09:42 PM    Glucose (POC) 107 (H) 03/12/2019 07:13 PM    Glucose (POC) 194 (H) 03/12/2019 04:28 PM     Lab Results   Component Value Date/Time    Color Yellow 06/11/2018 05:39 PM    Appearance Cloudy (A) 06/11/2018 05:39 PM    Specific gravity 1.021 10/29/2014 10:02 PM    pH (UA) =>9.0 (A) 06/11/2018 05:39 PM    Protein NEGATIVE  10/29/2014 10:02 PM    Glucose NEGATIVE  10/29/2014 10:02 PM    Ketone Negative 06/11/2018 05:39 PM    Bilirubin Negative 06/11/2018 05:39 PM    Urobilinogen 0.2 10/29/2014 10:02 PM    Nitrites Positive (A) 06/11/2018 05:39 PM    Leukocyte Esterase 3+ (A) 06/11/2018 05:39 PM    Epithelial cells MODERATE (A) 10/29/2014 10:02 PM    Bacteria Few 06/11/2018 05:39 PM    WBC >30 (A) 06/11/2018 05:39 PM    RBC 0-2 06/11/2018 05:39 PM         Medications Reviewed:     Current Facility-Administered Medications   Medication Dose Route Frequency    vancomycin (VANCOCIN) 1500 mg in  ml infusion  1,500 mg IntraVENous Q24H    meclizine (ANTIVERT) tablet 25 mg  25 mg Oral TID    sodium chloride (NS) flush 5-40 mL  5-40 mL IntraVENous Q8H    sodium chloride (NS) flush 5-40 mL  5-40 mL IntraVENous PRN    0.9% sodium chloride infusion  75 mL/hr IntraVENous CONTINUOUS    acetaminophen (TYLENOL) tablet 650 mg  650 mg Oral Q4H PRN    heparin (porcine) injection 5,000 Units  5,000 Units SubCUTAneous Q8H    aspirin chewable tablet 81 mg  81 mg Oral DAILY    glucose chewable tablet 16 g  4 Tab Oral PRN    dextrose (D50W) injection syrg 12.5-25 g  25-50 mL IntraVENous PRN    glucagon (GLUCAGEN) injection 1 mg  1 mg IntraMUSCular PRN    albuterol-ipratropium (DUO-NEB) 2.5 MG-0.5 MG/3 ML  3 mL Nebulization Q4H PRN    atorvastatin (LIPITOR) tablet 40 mg  40 mg Oral QHS    clopidogrel (PLAVIX) tablet 75 mg  75 mg Oral DAILY    escitalopram oxalate (LEXAPRO) tablet 10 mg  10 mg Oral DAILY    fenofibrate nanocrystallized (TRICOR) tablet 48 mg  48 mg Oral QPM    levothyroxine (SYNTHROID) tablet 125 mcg  125 mcg Oral every Wednesday    [START ON 3/14/2019] levothyroxine (SYNTHROID) tablet 250 mcg  250 mcg Oral Once per day on Sun Mon Tue Thu Fri Sat    metoprolol succinate (TOPROL-XL) XL tablet 50 mg  50 mg Oral DAILY    pantoprazole (PROTONIX) tablet 40 mg  40 mg Oral DAILY    meclizine (ANTIVERT) tablet 25 mg  25 mg Oral Q6H PRN    piperacillin-tazobactam (ZOSYN) 3.375 g in 0.9% sodium chloride (MBP/ADV) 100 mL  3.375 g IntraVENous Q8H    Vancomycin Pharmacy Dosing   Other PRN    insulin lispro (HUMALOG) injection   SubCUTAneous AC&HS     ______________________________________________________________________  EXPECTED LENGTH OF STAY: 2d 21h  ACTUAL LENGTH OF STAY:          1                 Luli Meier MD   Patient has given Verbal permission to discuss medical care with   persons present in the room and and also with contact as listed on face sheet.

## 2019-03-13 NOTE — PROGRESS NOTES
1020 left voice message with ROBERTA day for Dr Tirso Vallejo reg.  Sx schedule and meds to hold    1050 per sushila from dr Carol Ann Mcdermott office, sx scheduled around 2000, and hold aspirin and plavix today

## 2019-03-13 NOTE — CONSULTS
[facility]: [id number] [address] Neurology  [place] [address]  [phone]        Name:   Alex Hernandez record #: [id number]  Admission Date: 3/12/2019   Who Consulted: Dr. Cristal Lynn  Reason for Consult:  Vertigo     HISTORY OF PRESENT ILLNESS:   This is a 70 y.o. female with  has a past medical history of Adverse effect of anesthesia, Arthritis, Bronchitis, CAD (coronary artery disease) (2008, 2016), Diabetes ([facility] [address] GERD (gastroesophageal reflux disease), Hypercholesterolemia, Hypothyroid, Ischemic colitis ([facility] [address] (11/01/2014), Menopause (2000), Neuropathy, Obesity, Psychiatric disorder, Sleep apnea, Ulcer (11/2016), and Vertigo. who is admitted for foot infection. The Neurology Service is asked to evaluate for Dizziness/Vertigo. The patient describes a feeling ofspinning sensation. The symptoms lasted approximately couple seconds to mins. Dizziness occured with positional changes. She report this has happen before just not as severe. She denies:  Hearing loss, headache, visual disturbances, decreased sensation in lower extremities, gait disturbances, slurred speech, facial droop. Clinical Data:  Imaging review: CTA/CT- unremarkable   MRI : NO acute findings. 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 The largest foci may be  related to a cavernoma. Current rhythm: unknown    Assessment/Plan:   1. Vertigo:  BPPV   · Heather-Hallpike maneuver- positive for nystagmus  ·  Meclizine 25 mg three times daily, please document in chart if it helps patient  · Follow up with ENT after Discharge if continues   ·  Fall precautions  · Vestibular therapy     2. Mobility:   · Has beenOOB. · PT- Vestibular therapy     5. VTE Prophylaxes:   · Per the primary team    Thank you for allowing the Neurology Service the pleasure of participating in the care of your patient.   This patient will be discussed with my collaborating care team physician Dr. Humera Mcclelland  and he may have further recommendations regarding this patient's care. Attending Attestation:   Patient seen and examined. Chart reviewed. Agree with the assessment and plan as mentioned in the note. Admitted for symptoms of dizziness/vertigo which is mainly positional  She has positive Heather-Hallpike testing on exam  Suspect that this is likely benign paroxysmal positional vertigo  Performed modified Epley exercises at the bedside. Patient may continue to do this on her own. MRI images reviewed and negative for any acute findings  Meclizine 25 mg 3 times daily for 1-2 days  May need vestibular PT  Vicki Leger MD     Review of Systems: 10 point ROS was performed. Pertinent positives listed in HPI. Negative ROS is as follows. Pt denies: angina, palpitations, paresthesias, weakness, vision loss, slurred speech, aphasia, confusion, fever, chills, falls, headache, diplopia, back pain, neck pain,  hallucinations, new medications or dosage changes. PHYSICAL EXAM:     Visit Vitals  /56 (BP 1 Location: Right arm, BP Patient Position: At rest)   Pulse 69   Temp 98.2 °F (36.8 °C)   Resp 16   Ht 5' 6\" (1.676 m)   SpO2 95%   Breastfeeding? No   BMI 38.25 kg/m²      O2 Device: Room air    Temp (24hrs), Av °F (36.7 °C), Min:97.6 °F (36.4 °C), Max:98.5 °F (36.9 °C)    No intake/output data recorded. No intake/output data recorded. General:  Alert, cooperative, no acute distress. Lungs:   Clear to auscultation bilaterally. No crackles/wheezes. Heart:  Abdominal:  Regular rate and rhythm, No murmur, click, rub or gallop. Soft and nondistended   Skin: Skin color, texture, turgor normal.      NEUROLOGICAL EXAM:    Appearance:  Well developed, well nourished,  and is in no acute distress. Mental Status: Oriented to time, place and person. Fully attentive. No aphasia. Full fund of knowledge.   Normal recent and remote memory. Cranial Nerves:   Intact visual fields. Fundi are benign. PERRL, EOM's full, no nystagmus, no ptosis. Facial sensation is normal. Facial movement is symmetric. Palate is midline. Normal sternocleidomastoid strength. Tongue is midline. [place]-Hallpike maneuver- positive for nystagmus   Reflexes:   Deep tendon reflexes 2+/4 and symmetrical.   Sensory:   Normal to temperature and vibration. Gait:  deferred   Tremor:   No tremor noted. Cerebellar:  No cerebellar signs present. Neurovascular:  Normal heart sounds and regular rhythm. No carotid bruits. Motor: No pronator drift of either outstretched arm.          Deltoid Biceps Triceps Wrist Extension Finger Abduction   L 5 5 5 5 5   R 5 5 5 5 5      Hip Flexion Hip Extension Knee Flexion Knee Extension Ankle Dorsiflexion Ankle Plantarflexion   L 5 5 5 5 5 5   R 5 5 5 5 5 5        Reflexes:     Biceps Triceps Plantar Patellar Achilles   L 2 2 2 2 2   R 2 2 2 2 2           Past Medical History:   Diagnosis Date    Adverse effect of anesthesia     slow to wake up    Arthritis     hands    Bronchitis     CAD (coronary artery disease) 2008, 2016    angio/mild:MI, heart cath    Diabetes ([facility] [address]     Type II: insulin    GERD (gastroesophageal reflux disease)     Hypercholesterolemia     Hypothyroid     Ischemic colitis ([facility] [address] 11/01/2014    Menopause 2000    Neuropathy     feet/legs: ulcer right foot    Obesity     Psychiatric disorder     Depression    Sleep apnea     no use CPAP: unable to use, ruel me    Ulcer 11/2016    Callus that turned into open wound bottom right foot(ball area), not draining    Vertigo      Past Surgical History:   Procedure Laterality Date    BREAST SURGERY PROCEDURE UNLISTED  2000's    benign breast cyst    CARDIAC SURG PROCEDURE UNLIST  11/2016    heart attack    HX BREAST BIOPSY      many years ago; laterality unknown no scar    HX GYN      vaginal delivery x1    HX MOHS PROCEDURES  2005    faith    HX ORTHOPAEDIC  2015    right foot abscess     HX ORTHOPAEDIC  2007    rigt foot surgery    HX ORTHOPAEDIC  2007    R MT amputate    HX ORTHOPAEDIC  2009    left 2nd toe surgery    HX TONSILLECTOMY  6 yrs. old     Family History   Problem Relation Age of Onset    Diabetes Father     Heart Attack Father         congestive heart faliure    Heart Disease Father         CHF    Diabetes Maternal Aunt     Diabetes Maternal Uncle      Social History     Socioeconomic History    Marital status:      Spouse name: Not on file    Number of children: Not on file    Years of education: Not on file    Highest education level: Not on file   Social Needs    Financial resource strain: Not on file    Food insecurity - worry: Not on file    Food insecurity - inability: Not on file   [employer] needs - medical: Not on file   [employer] needs - non-medical: Not on file   Occupational History    Not on file   Tobacco Use    Smoking status: Former Smoker     Years: 5.00     Last attempt to quit: 1985     Years since quittin.1    Smokeless tobacco: Never Used   Substance and Sexual Activity    Alcohol use: No    Drug use: No    Sexual activity: Not on file   Other Topics Concern    Not on file   Social History Narrative    Not on file       Allergies:   No Known Allergies    Outpatient Meds  No current facility-administered medications on file prior to encounter. Current Outpatient Medications on File Prior to Encounter   Medication Sig Dispense Refill    levothyroxine (SYNTHROID) 125 mcg tablet Take 250 mcg by mouth six (6) days a week. Every day except Wednesday      insulin regular (NOVOLIN R REGULAR U-100 INSULN) 100 unit/mL injection by SubCUTAneous route Before breakfast, lunch, and dinner. Sliding scale      insulin NPH (NOVOLIN N NPH U-100 INSULIN) 100 unit/mL injection 100 Units by SubCUTAneous route two (2) times a day.       fenofibrate micronized (LOFIBRA) 67 mg capsule Take 67 mg by mouth every morning.  albuterol (PROVENTIL HFA, VENTOLIN HFA, PROAIR HFA) 90 mcg/actuation inhaler Take 2 Puffs by inhalation every four (4) hours as needed for Wheezing. 1 Inhaler 1    escitalopram oxalate (LEXAPRO) 10 mg tablet Take 1 Tab by mouth daily. 30 Tab 11    fluticasone (FLONASE) 50 mcg/actuation nasal spray 2 Sprays by Both Nostrils route daily. 1 Bottle 11    cholecalciferol (VITAMIN D3) 1,000 unit tablet Take 2,000 Units by mouth daily.  DULAGLUTIDE (TRULICITY SC) 1.5 Units by SubCUTAneous route Every Saturday. 1.5 units every saturday      atorvastatin (LIPITOR) 40 mg tablet Take 1 Tab by mouth daily. 30 Tab 6    clopidogrel (PLAVIX) 75 mg tab Take 1 Tab by mouth daily. 30 Tab 6    nitroglycerin (NITROSTAT) 0.4 mg SL tablet 1 Tab by SubLINGual route as needed for Chest Pain. 1 Bottle 6    metoprolol succinate (TOPROL XL) 50 mg XL tablet Take 1 Tab by mouth daily. 30 Tab 6    omeprazole (PRILOSEC OTC) 20 mg tablet Take 20 mg by mouth daily. Indications: GASTROESOPHAGEAL REFLUX      b complex vitamins tablet Take 1 Tab by mouth daily.  ibuprofen (MOTRIN IB) 200 mg tablet Take 200 mg by mouth daily as needed for Pain. Indications: OSTEOARTHRITIS      levothyroxine (SYNTHROID) 125 mcg tablet Take 125 mcg by mouth every Wednesday. Take 2 tablets by mouth thurs-tues      Bifidobacterium Infantis (ALIGN) 4 mg cap As directed (Patient taking differently: Take 1 Cap by mouth daily. As directed) 30 capsule 11    aspirin 81 mg chewable tablet Take 81 mg by mouth daily.  omega-3 fatty acids-vitamin e (FISH OIL) 1,000 mg cap Take 1 Cap by mouth daily.  multivitamin,tx-iron-ca-min (THERAPEUTIC MULTIVIT/MINERAL) 27-0.4 mg Tab Take 1 Tab by mouth every evening.          Inpatient Meds    Current Facility-Administered Medications:     vancomycin (VANCOCIN) 1500 mg in  ml infusion, 1,500 mg, IntraVENous, Q24H, Dasia Reagan MD    meclizine (ANTIVERT) tablet 25 mg, 25 mg, Oral, TID, Tasneem Foreman, NP    sodium chloride (NS) flush 5-40 mL, 5-40 mL, IntraVENous, Q8H, Dasia Reagan MD, 10 mL at 03/13/19 1143    sodium chloride (NS) flush 5-40 mL, 5-40 mL, IntraVENous, PRN, Cesar Barajas MD    0.9% sodium chloride infusion, 75 mL/hr, IntraVENous, CONTINUOUS, Cesar Barajas MD, Last Rate: 75 mL/hr at 03/12/19 2150, 75 mL/hr at 03/12/19 2150    acetaminophen (TYLENOL) tablet 650 mg, 650 mg, Oral, Q4H PRN, Cesar Barajas MD    heparin (porcine) injection 5,000 Units, 5,000 Units, SubCUTAneous, Q8H, Cesar Barajas MD, Stopped at 03/13/19 0406    aspirin chewable tablet 81 mg, 81 mg, Oral, DAILY, Romaine Reagan MD, Stopped at 03/13/19 0900    glucose chewable tablet 16 g, 4 Tab, Oral, PRN, Cesar Barajas MD    dextrose (D50W) injection syrg 12.5-25 g, 25-50 mL, IntraVENous, PRN, Cesar Barajas MD    glucagon (GLUCAGEN) injection 1 mg, 1 mg, IntraMUSCular, PRN, Cesar Barajas MD    albuterol-ipratropium (DUO-NEB) 2.5 MG-0.5 MG/3 ML, 3 mL, Nebulization, Q4H PRN, Cesar Barajas MD    atorvastatin (LIPITOR) tablet 40 mg, 40 mg, Oral, QHS, Dasia Reagan MD, 40 mg at 03/12/19 2139    clopidogrel (PLAVIX) tablet 75 mg, 75 mg, Oral, DAILY, Cesar Barajas MD, Stopped at 03/13/19 0900    escitalopram oxalate (LEXAPRO) tablet 10 mg, 10 mg, Oral, DAILY, Dasia Reagan MD, 10 mg at 03/13/19 0846    fenofibrate nanocrystallized (TRICOR) tablet 48 mg, 48 mg, Oral, QPM, Dasia Reagan MD, 48 mg at 03/12/19 2140    levothyroxine (SYNTHROID) tablet 125 mcg, 125 mcg, Oral, every Wednesday, Cesar Barajas MD, 125 mcg at 03/13/19 0600    [START ON 3/14/2019] levothyroxine (SYNTHROID) tablet 250 mcg, 250 mcg, Oral, Once per day on Sun Mon Tue Thu Fri Sat, Cesar Barajas MD    metoprolol succinate (TOPROL-XL) XL tablet 50 mg, 50 mg, Oral, DAILY, Cesar Barajas MD, 50 mg at 03/13/19 0846    pantoprazole (PROTONIX) tablet 40 mg, 40 mg, Oral, DAILY, Cesar Barajas MD, 40 mg at 03/13/19 0846    meclizine (ANTIVERT) tablet 25 mg, 25 mg, Oral, Q6H PRN, Ivory Wayne MD    piperacillin-tazobactam (ZOSYN) 3.375 g in 0.9% sodium chloride (MBP/ADV) 100 mL, 3.375 g, IntraVENous, Q8H, Dipesh Mann, DPM, Last Rate: 25 mL/hr at 03/13/19 1131, 3.375 g at 03/13/19 1131    Vancomycin Pharmacy Dosing, , Other, PRN, Vedia Johnathan, DPM    insulin lispro (HUMALOG) injection, , SubCUTAneous, AC&HS, Ivory Wayne MD, 3 Units at 03/13/19 1142    Recent Results (from the past 24 hour(s))   GLUCOSE, POC    Collection Time: 03/12/19  4:28 PM   Result Value Ref Range    Glucose (POC) 194 (H) 65 - 100 mg/dL    Performed by [address], POC    Collection Time: 03/12/19  7:13 PM   Result Value Ref Range    Glucose (POC) 107 (H) 65 - 100 mg/dL    Performed by Jules Ayers    TROPONIN I    Collection Time: 03/12/19  7:28 PM   Result Value Ref Range    Troponin-I, Qt. <0.05 <0.05 ng/mL   GLUCOSE, POC    Collection Time: 03/12/19  9:42 PM   Result Value Ref Range    Glucose (POC) 199 (H) 65 - 100 mg/dL    Performed by Chioma Mcgee    CULTURE, BLOOD, PAIRED    Collection Time: 03/12/19 11:00 PM   Result Value Ref Range    Special Requests: NO SPECIAL REQUESTS      Culture result: NO GROWTH AFTER 11 HOURS     LACTIC ACID    Collection Time: 03/12/19 11:09 PM   Result Value Ref Range    Lactic acid 1.2 0.4 - 2.0 MMOL/L   METABOLIC PANEL, BASIC    Collection Time: 03/13/19  3:08 AM   Result Value Ref Range    Sodium 138 136 - 145 mmol/L    Potassium 4.0 3.5 - 5.1 mmol/L    Chloride 107 97 - 108 mmol/L    CO2 21 21 - 32 mmol/L    Anion gap 10 5 - 15 mmol/L    Glucose 277 (H) 65 - 100 mg/dL    BUN 26 (H) 6 - 20 MG/DL    Creatinine 1.18 (H) 0.55 - 1.02 MG/DL    BUN/Creatinine ratio 22 (H) 12 - 20      GFR est AA 55 (L) >60 ml/min/1.73m2    GFR est non-AA 45 (L) >60 ml/min/1.73m2    Calcium 8.4 (L) 8.5 - 10.1 MG/DL   CBC WITH AUTOMATED DIFF    Collection Time: 03/13/19  6:06 AM   Result Value Ref Range    WBC 5.5 3.6 - 11.0 K/uL    RBC 4.16 3.80 - 5.20 M/uL    HGB 11.2 (L) 11.5 - 16.0 g/dL    HCT 36.0 35.0 - 47.0 %    MCV 86.5 80.0 - 99.0 FL    MCH 26.9 26.0 - 34.0 PG    MCHC 31.1 30.0 - 36.5 g/dL    RDW 13.9 11.5 - 14.5 %    PLATELET 932 970 - 583 K/uL    MPV 10.5 8.9 - 12.9 FL    NRBC 0.0 0  WBC    ABSOLUTE NRBC 0.00 0.00 - 0.01 K/uL    NEUTROPHILS 65 32 - 75 %    LYMPHOCYTES 20 12 - 49 %    MONOCYTES 10 5 - 13 %    EOSINOPHILS 3 0 - 7 %    BASOPHILS 1 0 - 1 %    IMMATURE GRANULOCYTES 1 (H) 0.0 - 0.5 %    ABS. NEUTROPHILS 3.6 1.8 - 8.0 K/UL    ABS. LYMPHOCYTES 1.1 0.8 - 3.5 K/UL    ABS. MONOCYTES 0.5 0.0 - 1.0 K/UL    ABS. EOSINOPHILS 0.2 0.0 - 0.4 K/UL    ABS. BASOPHILS 0.0 0.0 - 0.1 K/UL    ABS. IMM.  GRANS. 0.1 (H) 0.00 - 0.04 K/UL    DF AUTOMATED     GLUCOSE, POC    Collection Time: 03/13/19  7:25 AM   Result Value Ref Range    Glucose (POC) 311 (H) 65 - 100 mg/dL    Performed by Efrain Lamb, POC    Collection Time: 03/13/19 11:36 AM   Result Value Ref Range    Glucose (POC) 228 (H) 65 - 100 mg/dL    Performed by Josiah 1822 discussed with:  Patient x   Family X   RN    Care Manager    Consultant/Specialist:         Choco Flowers NP

## 2019-03-13 NOTE — ED NOTES
TRANSFER - OUT REPORT:    Verbal report given to dionisio olivarez (name) on Hardik Sue  being transferred to [facility] (unit) for routine progression of care       Report consisted of patients Situation, Background, Assessment and   Recommendations(SBAR). Information from the following report(s) SBAR, ED Summary and Recent Results was reviewed with the receiving nurse. Lines:   Peripheral IV 03/12/19 Left Antecubital (Active)   Site Assessment Clean, dry, & intact 3/12/2019  1:20 PM   Phlebitis Assessment 0 3/12/2019  1:20 PM   Infiltration Assessment 0 3/12/2019  1:20 PM   Dressing Status Clean, dry, & intact 3/12/2019  1:20 PM   Dressing Type Transparent 3/12/2019  1:20 PM   Hub Color/Line Status Pink;Capped;Flushed;Patent 3/12/2019  1:20 PM   Action Taken Blood drawn 3/12/2019  1:20 PM       Peripheral IV 03/12/19 Right;Posterior Forearm (Active)   Site Assessment Clean, dry, & intact 3/12/2019  4:04 PM   Phlebitis Assessment 0 3/12/2019  4:04 PM   Infiltration Assessment 0 3/12/2019  4:04 PM   Dressing Status Clean, dry, & intact 3/12/2019  4:04 PM   Dressing Type Transparent 3/12/2019  4:04 PM   Hub Color/Line Status Pink 3/12/2019  4:04 PM        Opportunity for questions and clarification was provided.       Patient transported with:   Monitor  Registered Nurse

## 2019-03-14 LAB
ANION GAP SERPL CALC-SCNC: 8 MMOL/L (ref 5–15)
BACTERIA SPEC CULT: NORMAL
BACTERIA SPEC CULT: NORMAL
BASOPHILS # BLD: 0 K/UL (ref 0–0.1)
BASOPHILS NFR BLD: 1 % (ref 0–1)
BUN SERPL-MCNC: 13 MG/DL (ref 6–20)
BUN/CREAT SERPL: 18 (ref 12–20)
CALCIUM SERPL-MCNC: 9.3 MG/DL (ref 8.5–10.1)
CHLORIDE SERPL-SCNC: 107 MMOL/L (ref 97–108)
CO2 SERPL-SCNC: 26 MMOL/L (ref 21–32)
CREAT SERPL-MCNC: 0.74 MG/DL (ref 0.55–1.02)
DIFFERENTIAL METHOD BLD: ABNORMAL
EOSINOPHIL # BLD: 0.2 K/UL (ref 0–0.4)
EOSINOPHIL NFR BLD: 4 % (ref 0–7)
ERYTHROCYTE [DISTWIDTH] IN BLOOD BY AUTOMATED COUNT: 13.8 % (ref 11.5–14.5)
GLUCOSE BLD STRIP.AUTO-MCNC: 182 MG/DL (ref 65–100)
GLUCOSE BLD STRIP.AUTO-MCNC: 282 MG/DL (ref 65–100)
GLUCOSE BLD STRIP.AUTO-MCNC: 353 MG/DL (ref 65–100)
GLUCOSE BLD STRIP.AUTO-MCNC: 354 MG/DL (ref 65–100)
GLUCOSE BLD STRIP.AUTO-MCNC: 370 MG/DL (ref 65–100)
GLUCOSE SERPL-MCNC: 179 MG/DL (ref 65–100)
HCT VFR BLD AUTO: 35.6 % (ref 35–47)
HGB BLD-MCNC: 11 G/DL (ref 11.5–16)
IMM GRANULOCYTES # BLD AUTO: 0.1 K/UL (ref 0–0.04)
IMM GRANULOCYTES NFR BLD AUTO: 1 % (ref 0–0.5)
LYMPHOCYTES # BLD: 1.6 K/UL (ref 0.8–3.5)
LYMPHOCYTES NFR BLD: 27 % (ref 12–49)
MCH RBC QN AUTO: 27.3 PG (ref 26–34)
MCHC RBC AUTO-ENTMCNC: 30.9 G/DL (ref 30–36.5)
MCV RBC AUTO: 88.3 FL (ref 80–99)
MONOCYTES # BLD: 0.6 K/UL (ref 0–1)
MONOCYTES NFR BLD: 10 % (ref 5–13)
NEUTS SEG # BLD: 3.5 K/UL (ref 1.8–8)
NEUTS SEG NFR BLD: 57 % (ref 32–75)
NRBC # BLD: 0 K/UL (ref 0–0.01)
NRBC BLD-RTO: 0 PER 100 WBC
PLATELET # BLD AUTO: 299 K/UL (ref 150–400)
PMV BLD AUTO: 10.5 FL (ref 8.9–12.9)
POTASSIUM SERPL-SCNC: 3.9 MMOL/L (ref 3.5–5.1)
RBC # BLD AUTO: 4.03 M/UL (ref 3.8–5.2)
SERVICE CMNT-IMP: ABNORMAL
SERVICE CMNT-IMP: NORMAL
SODIUM SERPL-SCNC: 141 MMOL/L (ref 136–145)
WBC # BLD AUTO: 6.1 K/UL (ref 3.6–11)

## 2019-03-14 PROCEDURE — 74011250637 HC RX REV CODE- 250/637: Performed by: FAMILY MEDICINE

## 2019-03-14 PROCEDURE — 74011000258 HC RX REV CODE- 258: Performed by: PODIATRIST

## 2019-03-14 PROCEDURE — 65270000029 HC RM PRIVATE

## 2019-03-14 PROCEDURE — 85025 COMPLETE CBC W/AUTO DIFF WBC: CPT

## 2019-03-14 PROCEDURE — 80048 BASIC METABOLIC PNL TOTAL CA: CPT

## 2019-03-14 PROCEDURE — 82962 GLUCOSE BLOOD TEST: CPT

## 2019-03-14 PROCEDURE — 74011250636 HC RX REV CODE- 250/636: Performed by: PODIATRIST

## 2019-03-14 PROCEDURE — 74011250636 HC RX REV CODE- 250/636: Performed by: FAMILY MEDICINE

## 2019-03-14 PROCEDURE — 74011636637 HC RX REV CODE- 636/637: Performed by: HOSPITALIST

## 2019-03-14 PROCEDURE — 36415 COLL VENOUS BLD VENIPUNCTURE: CPT

## 2019-03-14 PROCEDURE — 74011636637 HC RX REV CODE- 636/637: Performed by: FAMILY MEDICINE

## 2019-03-14 PROCEDURE — 77030018836 HC SOL IRR NACL ICUM -A

## 2019-03-14 PROCEDURE — 74011250636 HC RX REV CODE- 250/636: Performed by: NURSE PRACTITIONER

## 2019-03-14 PROCEDURE — 97530 THERAPEUTIC ACTIVITIES: CPT

## 2019-03-14 PROCEDURE — 97161 PT EVAL LOW COMPLEX 20 MIN: CPT

## 2019-03-14 PROCEDURE — 94760 N-INVAS EAR/PLS OXIMETRY 1: CPT

## 2019-03-14 RX ORDER — VANCOMYCIN/0.9 % SOD CHLORIDE 1.5G/250ML
1500 PLASTIC BAG, INJECTION (ML) INTRAVENOUS
Status: DISCONTINUED | OUTPATIENT
Start: 2019-03-14 | End: 2019-03-15

## 2019-03-14 RX ORDER — INSULIN LISPRO 100 [IU]/ML
4 INJECTION, SOLUTION INTRAVENOUS; SUBCUTANEOUS ONCE
Status: COMPLETED | OUTPATIENT
Start: 2019-03-15 | End: 2019-03-14

## 2019-03-14 RX ORDER — DEXTROSE 50 % IN WATER (D50W) INTRAVENOUS SYRINGE
12.5-25 AS NEEDED
Status: DISCONTINUED | OUTPATIENT
Start: 2019-03-14 | End: 2019-03-15 | Stop reason: SDUPTHER

## 2019-03-14 RX ORDER — INSULIN LISPRO 100 [IU]/ML
INJECTION, SOLUTION INTRAVENOUS; SUBCUTANEOUS
Status: DISCONTINUED | OUTPATIENT
Start: 2019-03-14 | End: 2019-03-15

## 2019-03-14 RX ORDER — MAGNESIUM SULFATE 100 %
4 CRYSTALS MISCELLANEOUS AS NEEDED
Status: DISCONTINUED | OUTPATIENT
Start: 2019-03-14 | End: 2019-03-18 | Stop reason: HOSPADM

## 2019-03-14 RX ADMIN — PANTOPRAZOLE SODIUM 40 MG: 40 TABLET, DELAYED RELEASE ORAL at 09:56

## 2019-03-14 RX ADMIN — MECLIZINE 25 MG: 12.5 TABLET ORAL at 16:17

## 2019-03-14 RX ADMIN — INSULIN LISPRO 5 UNITS: 100 INJECTION, SOLUTION INTRAVENOUS; SUBCUTANEOUS at 16:30

## 2019-03-14 RX ADMIN — INSULIN LISPRO 10 UNITS: 100 INJECTION, SOLUTION INTRAVENOUS; SUBCUTANEOUS at 13:18

## 2019-03-14 RX ADMIN — HUMAN INSULIN 25 UNITS: 100 INJECTION, SUSPENSION SUBCUTANEOUS at 11:00

## 2019-03-14 RX ADMIN — ATORVASTATIN CALCIUM 40 MG: 40 TABLET, FILM COATED ORAL at 21:55

## 2019-03-14 RX ADMIN — METOPROLOL SUCCINATE 50 MG: 50 TABLET, EXTENDED RELEASE ORAL at 09:56

## 2019-03-14 RX ADMIN — MECLIZINE 25 MG: 12.5 TABLET ORAL at 09:56

## 2019-03-14 RX ADMIN — INSULIN LISPRO 3 UNITS: 100 INJECTION, SOLUTION INTRAVENOUS; SUBCUTANEOUS at 08:15

## 2019-03-14 RX ADMIN — PIPERACILLIN AND TAZOBACTAM 3.38 G: 3; .375 INJECTION, POWDER, FOR SOLUTION INTRAVENOUS at 04:04

## 2019-03-14 RX ADMIN — ESCITALOPRAM OXALATE 10 MG: 10 TABLET ORAL at 09:56

## 2019-03-14 RX ADMIN — MECLIZINE 25 MG: 12.5 TABLET ORAL at 21:55

## 2019-03-14 RX ADMIN — PIPERACILLIN AND TAZOBACTAM 3.38 G: 3; .375 INJECTION, POWDER, FOR SOLUTION INTRAVENOUS at 19:27

## 2019-03-14 RX ADMIN — INSULIN LISPRO 4 UNITS: 100 INJECTION, SOLUTION INTRAVENOUS; SUBCUTANEOUS at 23:33

## 2019-03-14 RX ADMIN — VANCOMYCIN HYDROCHLORIDE 1500 MG: 10 INJECTION, POWDER, LYOPHILIZED, FOR SOLUTION INTRAVENOUS at 12:03

## 2019-03-14 RX ADMIN — Medication 10 ML: at 21:56

## 2019-03-14 RX ADMIN — ASPIRIN 81 MG CHEWABLE TABLET 81 MG: 81 TABLET CHEWABLE at 09:56

## 2019-03-14 RX ADMIN — HUMAN INSULIN 25 UNITS: 100 INJECTION, SUSPENSION SUBCUTANEOUS at 18:11

## 2019-03-14 RX ADMIN — HEPARIN SODIUM 5000 UNITS: 5000 INJECTION INTRAVENOUS; SUBCUTANEOUS at 04:05

## 2019-03-14 RX ADMIN — LEVOTHYROXINE SODIUM 250 MCG: 125 TABLET ORAL at 08:15

## 2019-03-14 RX ADMIN — INSULIN LISPRO 10 UNITS: 100 INJECTION, SOLUTION INTRAVENOUS; SUBCUTANEOUS at 21:56

## 2019-03-14 RX ADMIN — CLOPIDOGREL BISULFATE 75 MG: 75 TABLET, FILM COATED ORAL at 09:56

## 2019-03-14 RX ADMIN — PIPERACILLIN AND TAZOBACTAM 3.38 G: 3; .375 INJECTION, POWDER, FOR SOLUTION INTRAVENOUS at 12:03

## 2019-03-14 NOTE — PROGRESS NOTES
Foot and Ankle Surgery Progress Note    Patient: Ayleen Espinoza MRN: [id number]  SSN: [id number]    YOB: 1950  Age: 70 y.o. Sex: female      Admit Date: 3/12/2019    1 Day Post-Op    Procedure:  Procedure(s):  AMPUTATION TOE 4 RIGHT FOOT    Subjective:     Patient has no new complaints. Objective:     Visit Vitals  /68 (BP 1 Location: Right arm, BP Patient Position: At rest;Sitting)   Pulse 70   Temp 98.2 °F (36.8 °C)   Resp 16   Ht 5' 6\" (1.676 m)   SpO2 94%   Breastfeeding? No   BMI 38.25 kg/m²       Temp (24hrs), Av.6 °F (36.4 °C), Min:97.2 °F (36.2 °C), Max:98.2 °F (36.8 °C)      Physical Exam:    There is less erythema and edema  No drainage   No odor    Incision is approximated and looksn OK     Data Review: images and reports reviewed    Lab Review: All lab results for the last 24 hours reviewed.     Assessment:     Hospital Problems  Date Reviewed: 3/13/2019          Codes Class Noted POA    * (Principal) Foot infection ICD-10-CM: L08.9  ICD-9-CM: 686.9  3/12/2019 Unknown              Plan/Recommendations/Medical Decision Making:     Betadine wet to dry dressing   Will hopefully d/c in 24-48 hours    Signed By: Chula Walsh DPM     2019

## 2019-03-14 NOTE — BRIEF OP NOTE
BRIEF OPERATIVE NOTE    Date of Procedure: 3/13/2019   Preoperative Diagnosis: osteomylitis right foot  Postoperative Diagnosis: osteomylitis right foot    Procedure(s):  AMPUTATION TOE 4 RIGHT FOOT  Surgeon(s) and Role:     Marii Sanches DPM - Primary         Surgical Assistant: none    Surgical Staff:  Circ-1: Puma Fernandezub RN-1: Sherice Ortega RN  Event Time In Time Out   Incision Start 03/13/2019 2002    Incision Close 03/13/2019 2033      Anesthesia: General   Estimated Blood Loss: 3cc  Specimens:   ID Type Source Tests Collected by Time Destination   1 : Right 4th Toe Fresh Toe  [place] Sprain, DONNA 3/13/2019 2015 Pathology   1 : Right 4th Toe Wound Toe AEROBIC/ANAEROBIC CULTURE, GRAM STAIN, [facility]ULT[facility]13[facility] 2011 Microbiology      Findings: ulcer to bone   Complications: none  Implants: * No implants in log *

## 2019-03-14 NOTE — PROGRESS NOTES
TRANSFER - OUT REPORT:    Verbal report given to [facility] rn(name) on Sharyon Dance  being transferred to [facility](unit) for routine progression of care       Report consisted of patients Situation, Background, Assessment and   Recommendations(SBAR). Time Pre op antibiotic given:1900  Anesthesia Stop time: 2045  Romero Present on Transfer to floor:n  Order for Romero on Chart:n  Discharge Prescriptions with Chart:n    Information from the following report(s) OR Summary, Procedure Summary and MAR was reviewed with the receiving nurse. Opportunity for questions and clarification was provided. Is the patient on 02? NO       L/Min        Other     Is the patient on a monitor? NO    Is the nurse transporting with the patient? YES    Surgical Waiting Area notified of patient's transfer from PACU? YES      The following personal items collected during your admission accompanied patient upon transfer:   Dental Appliance: Dental Appliances: None  Vision: Visual Aid: Glasses   Hearing Aid:    Jewelry: Jewelry: None  Clothing: Clothing: (belongings to PACU) to 546  Other Valuables:  Other Valuables: Eyeglasses, Cell Phone(sent to pacu)  Valuables sent to safe:

## 2019-03-14 NOTE — PROGRESS NOTES
Hospitalist Progress Note  Sarmad Remy MD  Answering service: [phone] from in house phone      Date of Service:  3/14/2019  NAME:  Dior Daugherty  :  [id number]  MRN:  [id number]      Admission Summary:    65-year-old female with past medical history of hypertension, diabetes, history of coronary artery disease, bronchitis, GERD, hypercholesterolemia, hypothyroidism, ischemic colitis, menopause, neuropathy, obesity, psychiatric disorder, depression, ulcer at the bottom of the right foot, and vertigo who presents to the hospital with right foot pain         Interval history / Subjective:     Pt seen in follow up of right foot pain    She denied any dizziness/lightheadedness today. Post OP foot tolerable. Assessment & Plan:     1. Right fourth Toe OM - Confirmed by MRI- Seen by podiatry. S/p amputation of 4th toe of rt foot. Currently on broad spectrum abx. Repeat CBC today -post OP. 2. Vertigo: related to BPPV -on meclizine. Needs vestibular therapy as OP.  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 3. Dm2 with hyperglycemia: start 25 U NPH today and continue SSI. Diabetic Diet    4. HTN - C/W toprol, Controlled    5. History of CAD:   -s/p cardiac cath in 2016 showed diffuse coronary   artery disease with an occluded distal marginal vessel that filled by   both left-to-left, and right-to-left collaterals  - medical management was recommended. On aspirin,plavix,metoprolol and statin      Code status: Full  DVT prophylaxis: SCD      Risk of deterioration: medium      Total time spent with patient: [address] [place] discussed with: Patient/Family and Nurse  Disposition: TBD     Hospital Problems  Date Reviewed: 3/13/2019          Codes Class Noted POA    * (Principal) Foot infection ICD-10-CM: L08.9  ICD-9-CM: 686.9  3/12/2019 Unknown                Review of Systems: Pertinent items are noted in HPI. Vital Signs:    Last 24hrs VS reviewed since prior progress note. Most recent are:  Visit Vitals  /70 (BP 1 Location: Left arm, BP Patient Position: At rest;Sitting)   Pulse 76   Temp 97.7 °F (36.5 °C)   Resp 16   Ht 5' 6\" (1.676 m)   SpO2 95%   Breastfeeding? No   BMI 38.25 kg/m²         Intake/Output Summary (Last 24 hours) at 3/14/2019 1046  Last data filed at 3/14/2019 6560  Gross per 24 hour   Intake 750 ml   Output 800 ml   Net -50 ml        Physical Examination:             Constitutional:  No acute distress, cooperative, pleasant    ENT:  Oral mucous moist, oropharynx benign. Neck supple,    Resp:  CTA bilaterally. No wheezing/rhonchi/rales. No accessory muscle use   CV:  Regular rhythm, normal rate, no murmurs, gallops, rubs    GI:  Soft, non distended, non tender. normoactive bowel sounds, no hepatosplenomegaly     Musculoskeletal:  rt foot has a dry dressing    Neurologic:  Moves all extremities. AAOx3, CN II-XII reviewed     Hematologic/Lymphatic/Immunlogic:  No jaundice nor lymph node swelling       Data Review:    labs,imaging results      Labs:     Recent Labs     03/13/19  0606 03/12/19  1317   WBC 5.5 7.7   HGB 11.2* 12.8   HCT 36.0 39.4    314     Recent Labs     03/14/19  0405 03/13/19  0308 03/12/19  1317    138 134*   K 3.9 4.0 4.0    107 100   CO2 26 21 23   BUN 13 26* 34*   CREA 0.74 1.18* 1.10*   * 277* 221*   CA 9.3 8.4* 10.0     Recent Labs     03/12/19  1317   SGOT 48*   ALT 47   *   TBILI 0.4   TP 8.6*   ALB 3.2*   GLOB 5.4*     No results for input(s): INR, PTP, APTT in the last 72 hours. No lab exists for component: INREXT, INREXT   No results for input(s): FE, TIBC, PSAT, FERR in the last 72 hours. Lab Results   Component Value Date/Time    Folate 19.5 11/01/2014 08:00 AM      No results for input(s): PH, PCO2, PO2 in the last 72 hours.   Recent Labs     03/12/19  1928 03/12/19  1317   TROIQ <0.05 <0.05     Lab Results   Component Value Date/Time    Cholesterol, total 155 03/12/2019 01:17 PM    HDL Cholesterol 49 03/12/2019 01:17 PM    LDL, calculated 65.4 03/12/2019 01:17 PM    Triglyceride 203 (H) 03/12/2019 01:17 PM    CHOL/HDL Ratio 3.2 03/12/2019 01:17 PM     Lab Results   Component Value Date/Time    Glucose (POC) 182 (H) 03/14/2019 07:16 AM    Glucose (POC) 220 (H) 03/13/2019 08:53 PM    Glucose (POC) 232 (H) 03/13/2019 07:36 PM    Glucose (POC) 207 (H) 03/13/2019 04:44 PM    Glucose (POC) 228 (H) 03/13/2019 11:36 AM     Lab Results   Component Value Date/Time    Color Yellow 06/11/2018 05:39 PM    Appearance Cloudy (A) 06/11/2018 05:39 PM    Specific gravity 1.021 10/29/2014 10:02 PM    pH (UA) =>9.0 (A) 06/11/2018 05:39 PM    Protein NEGATIVE  10/29/2014 10:02 PM    Glucose NEGATIVE  10/29/2014 10:02 PM    Ketone Negative 06/11/2018 05:39 PM    Bilirubin Negative 06/11/2018 05:39 PM    Urobilinogen 0.2 10/29/2014 10:02 PM    Nitrites Positive (A) 06/11/2018 05:39 PM    Leukocyte Esterase 3+ (A) 06/11/2018 05:39 PM    Epithelial cells MODERATE (A) 10/29/2014 10:02 PM    Bacteria Few 06/11/2018 05:39 PM    WBC >30 (A) 06/11/2018 05:39 PM    RBC 0-2 06/11/2018 05:39 PM         Medications Reviewed:     Current Facility-Administered Medications   Medication Dose Route Frequency    vancomycin (VANCOCIN) 1500 mg in  ml infusion  1,500 mg IntraVENous Q16H    insulin NPH (NOVOLIN N, HUMULIN N) injection 25 Units  25 Units SubCUTAneous BID    insulin lispro (HUMALOG) injection   SubCUTAneous AC&HS    glucose chewable tablet 16 g  4 Tab Oral PRN    dextrose (D50W) injection syrg 12.5-25 g  12.5-25 g IntraVENous PRN    glucagon (GLUCAGEN) injection 1 mg  1 mg IntraMUSCular PRN    meclizine (ANTIVERT) tablet 25 mg  25 mg Oral TID    sodium chloride (NS) flush 5-40 mL  5-40 mL IntraVENous Q8H    sodium chloride (NS) flush 5-40 mL  5-40 mL IntraVENous PRN    0.9% sodium chloride infusion  75 mL/hr IntraVENous CONTINUOUS    acetaminophen (TYLENOL) tablet 650 mg  650 mg Oral Q4H PRN    heparin (porcine) injection 5,000 Units  5,000 Units SubCUTAneous Q8H    aspirin chewable tablet 81 mg  81 mg Oral DAILY    glucose chewable tablet 16 g  4 Tab Oral PRN    dextrose (D50W) injection syrg 12.5-25 g  25-50 mL IntraVENous PRN    glucagon (GLUCAGEN) injection 1 mg  1 mg IntraMUSCular PRN    albuterol-ipratropium (DUO-NEB) 2.5 MG-0.5 MG/3 ML  3 mL Nebulization Q4H PRN    atorvastatin (LIPITOR) tablet 40 mg  40 mg Oral QHS    clopidogrel (PLAVIX) tablet 75 mg  75 mg Oral DAILY    escitalopram oxalate (LEXAPRO) tablet 10 mg  10 mg Oral DAILY    fenofibrate nanocrystallized (TRICOR) tablet 48 mg  48 mg Oral QPM    levothyroxine (SYNTHROID) tablet 125 mcg  125 mcg Oral every Wednesday    levothyroxine (SYNTHROID) tablet 250 mcg  250 mcg Oral Once per day on Sun Mon Tue Thu Fri Sat    metoprolol succinate (TOPROL-XL) XL tablet 50 mg  50 mg Oral DAILY    pantoprazole (PROTONIX) tablet 40 mg  40 mg Oral DAILY    meclizine (ANTIVERT) tablet 25 mg  25 mg Oral Q6H PRN    piperacillin-tazobactam (ZOSYN) 3.375 g in 0.9% sodium chloride (MBP/ADV) 100 mL  3.375 g IntraVENous Q8H    Vancomycin Pharmacy Dosing   Other PRN     ______________________________________________________________________  EXPECTED LENGTH OF STAY: 2d 21h  ACTUAL LENGTH OF STAY:          2                 Sarmad Remy MD

## 2019-03-14 NOTE — CDMP QUERY
Patient admitted with osteomyelitis , noted to have an elevated A1c   If possible, please document in progress notes and d/c summary if you are evaluating and/or treating any of the following:    => Type 2 diabetes mellitus with Hyperglycemia  => Other explanation of clinical findings  => Clinically Undetermined (no explanation for clinical findings)    The medical record reflects the following:     Risk Factors: DM     Clinical Indicators: H/P-Diabetes, poorly controlled. The patient will be on sliding scale Novolog insulin, Accu-Cheks, diet control. Further intervention per hospital course.   Glucose: 221 (H)  Hemoglobin A1c, (calculated): 11.2 (H)  Est. average glucose: 275       Treatment: sliding scale insulin      Thank you,         Ernestina Sánchez [address] [facility], [address] [facility]

## 2019-03-14 NOTE — OP NOTES
[address] [place]   OPERATIVE REPORT    Name:  Jamal Melgar  MR#:  [id number]  :  1950  ACCOUNT #:  [de-identified]  DATE OF SERVICE:  2019      PREOPERATIVE DIAGNOSIS:  Osteomyelitis, right foot fourth toe. POSTOPERATIVE DIAGNOSIS:  Osteomyelitis, right foot fourth toe. PROCEDURE PERFORMED:  Partial amputation of fourth toe, right foot. SURGEON:  Razia Jones DPM    ASSISTANT:  none    ANESTHESIA:  Monitored anesthesia care. COMPLICATIONS:  None. PATHOLOGY:  Aerobic and anaerobic cultures. SPECIMENS REMOVED: bone     IMPLANTS:  none    ESTIMATED BLOOD LOSS:  3 mL. INDICATIONS:  I spoke to Dr. Yvonne Juarez from Radiology who feels there is osteomyelitis at the middle phalanx that in fact appears open wound on the lateral aspect of the toe that probes to bone. We have discussed the surgery, the risks, the complications. She is aware and agreeable. PROCEDURE:  She was taken to the operating room and placed on the operating table in supine position. After adequate induction of intravenous sedation, the patient was blocked with 0.5% Marcaine with epinephrine. I made a transverse incision at the proximal interphalangeal joint and then carried down to plantar flap. We took out the middle and distal phalanges, did a culture, flushed the area with bacitracin and saline and then sutured 4-0 Vicryl in the superficial fascia, 2 sutures total in place, and then used 2-0 nylon to loosely approximate the  edges. Xeroform followed by dry sterile dressing was applied. The patient left the operating room to the recovery room in stable condition.       DONNA Martínez/[email or web address]  D:  2019 20:56  T:  2019 2:23  JOB #:  [id number]

## 2019-03-14 NOTE — DIABETES MGMT
DTC Progress Note    Recommendations/ Comments:Chart reviewed on Nano Weaver for hyperglycemia. If appropriate, please consider:   - addition of NPH 30 units BID  Message sent to Dr. Yfn Guzman regarding above      Current hospital DM medication: Lispro correction, resistant scale    Patient is a 70 y.o. female with known diabetes on insulin injections: regular: scale with meals, NPH: 100 units BID at home. A1c:   Lab Results   Component Value Date/Time    Hemoglobin A1c 11.2 (H) 03/12/2019 01:17 PM    Hemoglobin A1c 8.8 (H) 10/30/2014 01:52 AM    Hemoglobin A1c, External 10.1 12/15/2018    Hemoglobin A1c, External 909 09/26/2018       Recent Glucose Results:   Lab Results   Component Value Date/Time     (H) 03/14/2019 04:05 AM    GLUCPOC 182 (H) 03/14/2019 07:16 AM    GLUCPOC 220 (H) 03/13/2019 08:53 PM    GLUCPOC 232 (H) 03/13/2019 07:36 PM        Lab Results   Component Value Date/Time    Creatinine 0.74 03/14/2019 04:05 AM     Estimated Creatinine Clearance: 87.6 mL/min (based on SCr of 0.74 mg/dL). Active Orders   Diet    DIET DIABETIC CONSISTENT CARB Regular        PO intake: No data found. Will continue to follow as needed.     Thank you  Blanche Dickens MS, RN, CDE    Time spent: 6 minutes

## 2019-03-14 NOTE — PROGRESS NOTES
Problem: Mobility Impaired (Adult and Pediatric)  Goal: *Acute Goals and Plan of Care (Insert Text)  Physical Therapy Goals  Initiated 3/14/2019  1. Patient will demonstrate independence with modified Epley maneuver for home within 7 days. physical Therapy EVALUATION  Patient: Hardik Sue (38 y.o. female)  Date: 3/14/2019  Primary Diagnosis: Foot infection [L08.9]  Procedure(s) (LRB):  AMPUTATION TOE 4 RIGHT FOOT (Right) 1 Day Post-Op   Precautions:   (unknown WB of LLE)    ASSESSMENT :  Based on the objective data described below, the patient presents with admission for an amputation of the 4th toe on the right foot and c/o dizziness. PT is consulted for BPPV/vertigo. PT for mobility is not consulted so far and patient would need a WB status to mobilize. Patient presents with a recent fall and c/o dizziness with position changes. Positive Heather Hallpike in left sidelying with upbeating torsional nystagmus observed. Facilitated a modified Epley in bed and left patient in bed with HOB elevated. Education provided regarding BPPV and a handout was given to show the patient and her sister left sided Epley treatment. Anticipate resolution of sx given time and meclizine has offered comfort so far. If sx remain persistent, she may benefit from outpatient PT services vs home treatment. Patient will benefit from skilled intervention to address the above impairments.   Patients rehabilitation potential is considered to be Good  Factors which may influence rehabilitation potential include:   [x]         None noted  []         Mental ability/status  []         Medical condition  []         Home/family situation and support systems  []         Safety awareness  []         Pain tolerance/management  []         Other:      PLAN :  Recommendations and Planned Interventions:  [x]           Bed Mobility Training             [x]    Neuromuscular Re-Education  [x]           Transfer Training                   [] Orthotic/Prosthetic Training  [x]           Gait Training                         []    Modalities  [x]           Therapeutic Exercises           []    Edema Management/Control  [x]           Therapeutic Activities            []    Patient and Family Training/Education  []           Other (comment):    Frequency/Duration: Patient will be followed by physical therapy  3 times a week to address goals. Discharge Recommendations: Outpatient for vertigo vs none  Further Equipment Recommendations for Discharge: None identified     SUBJECTIVE:   Patient stated It is definitely worse on that side.  (left)    OBJECTIVE DATA SUMMARY:   HISTORY:    Past Medical History:   Diagnosis Date    Adverse effect of anesthesia     slow to wake up    Arthritis     hands    Bronchitis     CAD (coronary artery disease) 2008, 2016    angio/mild:MI, heart cath    Diabetes ([facility] [address]     Type II: insulin    GERD (gastroesophageal reflux disease)     Hypercholesterolemia     Hypothyroid     Ischemic colitis ([facility] [address] 11/01/2014    Menopause 2000    Neuropathy     feet/legs: ulcer right foot    Obesity     Psychiatric disorder     Depression    Sleep apnea     no use CPAP: unable to use, ruel me    Ulcer 11/2016    Callus that turned into open wound bottom right foot(ball area), not draining    Vertigo      Past Surgical History:   Procedure Laterality Date    BREAST SURGERY PROCEDURE UNLISTED  2000's    benign breast cyst    CARDIAC SURG PROCEDURE UNLIST  11/2016    heart attack    HX BREAST BIOPSY      many years ago; laterality unknown no scar    HX GYN      vaginal delivery x1    HX MOHS PROCEDURES  2005    faith    HX ORTHOPAEDIC  2015    right foot abscess     HX ORTHOPAEDIC  2007    rigtht foot surgery    HX ORTHOPAEDIC  2007    R MT amputate    HX ORTHOPAEDIC  2009    left 2nd toe surgery    HX TONSILLECTOMY  6 yrs.  old     Prior Level of Function/Home Situation:   Personal factors and/or comorbidities impacting plan of care: patient with no sensation to BLE and treated for a foot infection    Home Situation  Home Environment: Private residence  One/Two Story Residence: One story  Living Alone: No  Support Systems: Family member(s), Child(jean pierre)  Patient Expects to be Discharged to[de-identified] Private residence  Current DME Used/Available at Home: None    EXAMINATION/PRESENTATION/DECISION MAKING:   Critical Behavior:  Neurologic State: Appropriate for age  Orientation Level: Disoriented to time  Cognition: Appropriate for age attention/concentration     Hearing: Auditory  Auditory Impairment: Hard of hearing, bilateral    Range Of Motion:  AROM: Generally decreased, functional                       Strength:    Strength: Generally decreased, functional                    Tone & Sensation:   Tone: Normal              Sensation: Impaired(patient indicates numbness BLE)               Coordination:  Coordination: Within functional limits    Functional Mobility:  Bed Mobility:  Rolling: Modified independent  Supine to Sit: Modified independent  Sit to Supine: Supervision     Transfers:  Sit to Stand: Contact guard assistance                          Balance:   Sitting: Intact  Standing: Intact     Physical Therapy Evaluation Charge Determination   History Examination Presentation Decision-Making   MEDIUM  Complexity : 1-2 comorbidities / personal factors will impact the outcome/ POC  LOW Complexity : 1-2 Standardized tests and measures addressing body structure, function, activity limitation and / or participation in recreation  LOW Complexity : Stable, uncomplicated  LOW Complexity : FOTO score of       Based on the above components, the patient evaluation is determined to be of the following complexity level: LOW     Pain:  Pain Scale 1: Numeric (0 - 10)  Pain Intensity 1: 0              Activity Tolerance:     Please refer to the flowsheet for vital signs taken during this treatment.   After treatment:   [] Patient left in no apparent distress sitting up in chair  [x]         Patient left in no apparent distress in bed  [x]         Call bell left within reach  [x]         Nursing notified  []         Caregiver present  []         Bed alarm activated    COMMUNICATION/EDUCATION:   The patients plan of care was discussed with: Registered Nurse. [x]         Fall prevention education was provided and the patient/caregiver indicated understanding. [x]         Patient/family have participated as able in goal setting and plan of care. [x]         Patient/family agree to work toward stated goals and plan of care. []         Patient understands intent and goals of therapy, but is neutral about his/her participation. []         Patient is unable to participate in goal setting and plan of care.     Thank you for this referral.  Yobani Conklin, PT, DPT   Time Calculation: 30 mins

## 2019-03-14 NOTE — ANESTHESIA POSTPROCEDURE EVALUATION
Procedure(s):  AMPUTATION TOE 4 RIGHT FOOT. Anesthesia Post Evaluation        Patient location during evaluation: PACU  Patient participation: complete - patient participated  Level of consciousness: awake and alert  Pain management: adequate  Airway patency: patent  Anesthetic complications: no  Cardiovascular status: acceptable  Respiratory status: acceptable  Hydration status: acceptable  Comments: I have seen and evaluated the patient and is ready for discharge. Juan Atkins MD    Post anesthesia nausea and vomiting:  none      Visit Vitals  /87   Pulse 63   Temp 36.4 °C (97.5 °F)   Resp 13   Ht 5' 6\" (1.676 m)   SpO2 96%   Breastfeeding?  No   BMI 38.25 kg/m²

## 2019-03-15 LAB
ANION GAP SERPL CALC-SCNC: 7 MMOL/L (ref 5–15)
BACTERIA SPEC CULT: ABNORMAL
BACTERIA SPEC CULT: NORMAL
BUN SERPL-MCNC: 14 MG/DL (ref 6–20)
BUN/CREAT SERPL: 13 (ref 12–20)
CALCIUM SERPL-MCNC: 9.3 MG/DL (ref 8.5–10.1)
CHLORIDE SERPL-SCNC: 106 MMOL/L (ref 97–108)
CO2 SERPL-SCNC: 24 MMOL/L (ref 21–32)
CREAT SERPL-MCNC: 1.11 MG/DL (ref 0.55–1.02)
GLUCOSE BLD STRIP.AUTO-MCNC: 255 MG/DL (ref 65–100)
GLUCOSE BLD STRIP.AUTO-MCNC: 273 MG/DL (ref 65–100)
GLUCOSE BLD STRIP.AUTO-MCNC: 300 MG/DL (ref 65–100)
GLUCOSE BLD STRIP.AUTO-MCNC: 364 MG/DL (ref 65–100)
GLUCOSE SERPL-MCNC: 292 MG/DL (ref 65–100)
GRAM STN SPEC: ABNORMAL
GRAM STN SPEC: ABNORMAL
POTASSIUM SERPL-SCNC: 4.4 MMOL/L (ref 3.5–5.1)
SERVICE CMNT-IMP: ABNORMAL
SERVICE CMNT-IMP: NORMAL
SODIUM SERPL-SCNC: 137 MMOL/L (ref 136–145)

## 2019-03-15 PROCEDURE — 80048 BASIC METABOLIC PNL TOTAL CA: CPT

## 2019-03-15 PROCEDURE — 74011250636 HC RX REV CODE- 250/636: Performed by: PODIATRIST

## 2019-03-15 PROCEDURE — 65270000029 HC RM PRIVATE

## 2019-03-15 PROCEDURE — 82962 GLUCOSE BLOOD TEST: CPT

## 2019-03-15 PROCEDURE — 74011250637 HC RX REV CODE- 250/637: Performed by: FAMILY MEDICINE

## 2019-03-15 PROCEDURE — 74011250636 HC RX REV CODE- 250/636: Performed by: HOSPITALIST

## 2019-03-15 PROCEDURE — 94760 N-INVAS EAR/PLS OXIMETRY 1: CPT

## 2019-03-15 PROCEDURE — 74011250636 HC RX REV CODE- 250/636: Performed by: NURSE PRACTITIONER

## 2019-03-15 PROCEDURE — 97530 THERAPEUTIC ACTIVITIES: CPT

## 2019-03-15 PROCEDURE — 74011000258 HC RX REV CODE- 258: Performed by: PODIATRIST

## 2019-03-15 PROCEDURE — 74011636637 HC RX REV CODE- 636/637: Performed by: HOSPITALIST

## 2019-03-15 PROCEDURE — 36415 COLL VENOUS BLD VENIPUNCTURE: CPT

## 2019-03-15 PROCEDURE — 77030027138 HC INCENT SPIROMETER -A

## 2019-03-15 PROCEDURE — 74011250636 HC RX REV CODE- 250/636: Performed by: FAMILY MEDICINE

## 2019-03-15 RX ORDER — INSULIN LISPRO 100 [IU]/ML
12 INJECTION, SOLUTION INTRAVENOUS; SUBCUTANEOUS ONCE
Status: COMPLETED | OUTPATIENT
Start: 2019-03-15 | End: 2019-03-15

## 2019-03-15 RX ORDER — HEPARIN SODIUM 5000 [USP'U]/ML
5000 INJECTION, SOLUTION INTRAVENOUS; SUBCUTANEOUS EVERY 12 HOURS
Status: DISCONTINUED | OUTPATIENT
Start: 2019-03-15 | End: 2019-03-18 | Stop reason: HOSPADM

## 2019-03-15 RX ORDER — INSULIN LISPRO 100 [IU]/ML
6 INJECTION, SOLUTION INTRAVENOUS; SUBCUTANEOUS
Status: DISCONTINUED | OUTPATIENT
Start: 2019-03-15 | End: 2019-03-15

## 2019-03-15 RX ORDER — INSULIN LISPRO 100 [IU]/ML
INJECTION, SOLUTION INTRAVENOUS; SUBCUTANEOUS
Status: DISCONTINUED | OUTPATIENT
Start: 2019-03-15 | End: 2019-03-18 | Stop reason: HOSPADM

## 2019-03-15 RX ORDER — VANCOMYCIN/0.9 % SOD CHLORIDE 1.5G/250ML
1500 PLASTIC BAG, INJECTION (ML) INTRAVENOUS EVERY 24 HOURS
Status: DISCONTINUED | OUTPATIENT
Start: 2019-03-16 | End: 2019-03-16

## 2019-03-15 RX ORDER — INSULIN LISPRO 100 [IU]/ML
INJECTION, SOLUTION INTRAVENOUS; SUBCUTANEOUS
Status: DISCONTINUED | OUTPATIENT
Start: 2019-03-15 | End: 2019-03-15

## 2019-03-15 RX ORDER — DEXTROSE 50 % IN WATER (D50W) INTRAVENOUS SYRINGE
12.5-25 AS NEEDED
Status: DISCONTINUED | OUTPATIENT
Start: 2019-03-15 | End: 2019-03-18 | Stop reason: HOSPADM

## 2019-03-15 RX ORDER — INSULIN LISPRO 100 [IU]/ML
6 INJECTION, SOLUTION INTRAVENOUS; SUBCUTANEOUS
Status: DISCONTINUED | OUTPATIENT
Start: 2019-03-16 | End: 2019-03-16

## 2019-03-15 RX ADMIN — HUMAN INSULIN 40 UNITS: 100 INJECTION, SUSPENSION SUBCUTANEOUS at 09:12

## 2019-03-15 RX ADMIN — MECLIZINE 25 MG: 12.5 TABLET ORAL at 17:20

## 2019-03-15 RX ADMIN — Medication 10 ML: at 04:53

## 2019-03-15 RX ADMIN — MECLIZINE 25 MG: 12.5 TABLET ORAL at 22:36

## 2019-03-15 RX ADMIN — PIPERACILLIN AND TAZOBACTAM 3.38 G: 3; .375 INJECTION, POWDER, FOR SOLUTION INTRAVENOUS at 04:52

## 2019-03-15 RX ADMIN — METOPROLOL SUCCINATE 50 MG: 50 TABLET, EXTENDED RELEASE ORAL at 09:11

## 2019-03-15 RX ADMIN — VANCOMYCIN HYDROCHLORIDE 1500 MG: 10 INJECTION, POWDER, LYOPHILIZED, FOR SOLUTION INTRAVENOUS at 04:52

## 2019-03-15 RX ADMIN — INSULIN LISPRO 4 UNITS: 100 INJECTION, SOLUTION INTRAVENOUS; SUBCUTANEOUS at 22:34

## 2019-03-15 RX ADMIN — LEVOTHYROXINE SODIUM 250 MCG: 125 TABLET ORAL at 07:05

## 2019-03-15 RX ADMIN — PANTOPRAZOLE SODIUM 40 MG: 40 TABLET, DELAYED RELEASE ORAL at 09:11

## 2019-03-15 RX ADMIN — HUMAN INSULIN 60 UNITS: 100 INJECTION, SUSPENSION SUBCUTANEOUS at 18:25

## 2019-03-15 RX ADMIN — Medication 10 ML: at 18:29

## 2019-03-15 RX ADMIN — MECLIZINE 25 MG: 12.5 TABLET ORAL at 09:10

## 2019-03-15 RX ADMIN — ESCITALOPRAM OXALATE 10 MG: 10 TABLET ORAL at 09:11

## 2019-03-15 RX ADMIN — CLOPIDOGREL BISULFATE 75 MG: 75 TABLET, FILM COATED ORAL at 09:11

## 2019-03-15 RX ADMIN — INSULIN LISPRO 7 UNITS: 100 INJECTION, SOLUTION INTRAVENOUS; SUBCUTANEOUS at 07:06

## 2019-03-15 RX ADMIN — INSULIN LISPRO 12 UNITS: 100 INJECTION, SOLUTION INTRAVENOUS; SUBCUTANEOUS at 17:21

## 2019-03-15 RX ADMIN — SODIUM CHLORIDE 75 ML/HR: 900 INJECTION, SOLUTION INTRAVENOUS at 00:15

## 2019-03-15 RX ADMIN — Medication 20 ML: at 22:38

## 2019-03-15 RX ADMIN — ASPIRIN 81 MG CHEWABLE TABLET 81 MG: 81 TABLET CHEWABLE at 09:12

## 2019-03-15 RX ADMIN — FENOFIBRATE 48 MG: 48 TABLET, FILM COATED ORAL at 17:20

## 2019-03-15 RX ADMIN — HEPARIN SODIUM 5000 UNITS: 5000 INJECTION INTRAVENOUS; SUBCUTANEOUS at 18:27

## 2019-03-15 RX ADMIN — ATORVASTATIN CALCIUM 40 MG: 40 TABLET, FILM COATED ORAL at 22:36

## 2019-03-15 RX ADMIN — INSULIN LISPRO 10 UNITS: 100 INJECTION, SOLUTION INTRAVENOUS; SUBCUTANEOUS at 12:15

## 2019-03-15 RX ADMIN — PIPERACILLIN AND TAZOBACTAM 3.38 G: 3; .375 INJECTION, POWDER, FOR SOLUTION INTRAVENOUS at 10:34

## 2019-03-15 NOTE — PROGRESS NOTES
Bedside and Verbal shift change report given to [facility] (oncoming nurse) by Quirino Sellers (offgoing nurse). Report included the following information SBAR, Kardex and MAR.

## 2019-03-15 NOTE — PROGRESS NOTES
Day #4 of Vancomycin  Indication:  Osteomyelitis, R 4th toe  - s/p amputation R 4th toe 3/13, h/o osteomyelitis    Current regimen:  1500 mg IV q16h  Abx regimen:  Vancomycin and Zosyn   ID Following ?: Yes  Concomitant nephrotoxic drugs (requires more frequent monitoring): None  Frequency of BMP?:  Daily, through 3/18  Recent Labs     03/15/19  0249 19  1208 19  0405 19  0606 19  0308 19  1317   WBC  --  6.1  --  5.5  --  7.7   CREA 1.11*  --  0.74  --  1.18* 1.10*   BUN 14  --  13  --  26* 34*   Est CrCl: 34.9 ml/min; UO: 0.4 ml/kg/hr (one occurrence 3/14, not assessed on 3/15)  Temp (24hrs), Av °F (36.7 °C), Min:97.7 °F (36.5 °C), Max:98.4 °F (36.9 °C)    Cultures:   3/12 Blood, paired - pending  3/13 MRSA - Not colonized - Final  3/13 Wound - R 4th Toe - Light Staph aureus - pending    Goal trough = 15 - 20 mcg/mL (Osteomyelitis)    Recent trough history (date/time/level/dose/action taken):  none    Plan:  SCr back up again Today. Change to 1500 mg Q 24hrs  which predicts a trough of 16.92 mcg/mL. Trough Tomorrow prior to 0500 dose. Pharmacy will continue to monitor this patient daily for changes in clinical status and renal function.

## 2019-03-15 NOTE — CONSULTS
[address]    Name:  Nicki Schneider  MR#:  [id number]  :  1950  ACCOUNT #:  [de-identified]  DATE OF SERVICE:  2019          REASON FOR CONSULTATION:  Osteomyelitis of the right fourth toe. HISTORY OF PRESENT ILLNESS:  The patient is a 69-year-old woman whose medical history is significant for diabetes, dyslipidemia and osteomyelitis of her right foot. I had seen her about five years ago for osteomyelitis of the right foot. At that time, I treated her with six weeks of Zosyn. She was in her usual state of health until the day of admission on  when she woke up and she was having vertigo. At the same time, she noticed that her right fourth toe had a purplish discoloration of it. She does not have any pain there because she has diabetic neuropathy. There was also purulent drainage. She went to Dr. Clive Matta who advised her to go to get admitted at [facility] for surgery on the right toe. For the vertigo, Neurology was consulted. Dr. Sundar Johnson performed a Marion-Hallpike maneuver and diagnosed her with benign positional paroxysmal vertigo. She tells me that there is erythema on the dorsum of her right foot. It was not painful at all, but this is probably because of her neuropathy. She does not really know whether it was warm to the touch or not. There were no alleviating or aggravating factors. She does not remember any particular injury, but she does say that the right fourth toe has an awkward position and receives a lot more wear and tear. She does have some lower back pain which is chronic. Dr. Clive Matta brought her to the operating room where he performed a right fourth toe amputation by doing the amputation at the level of the fourth interphalangeal joint. She is currently on vancomycin and Zosyn and her culture will grow at least a Staph aureus. We are now being asked to see her in consult. ALLERGIES:  NO KNOWN DRUG ALLERGIES.     CURRENT MEDICATIONS:  1. Aspirin. 2.  Lipitor. 3.  Plavix,  4. Lexapro. 5.  Tricor. 6.  Humalog. 7.  Humulin. 8.  Synthroid. 9. Antivert. 10.  Toprol XL. 11.  Zosyn. 12.  Vancomycin. REVIEW OF SYSTEMS:  She does not have any anorexia. Vision and hearing are fine. No shortness of breath. No chest pain. No abdominal pain. No dysuria. Aside from the things mentioned previously, the rest of the review of systems is negative. PAST MEDICAL HISTORY:  1.  Diabetes. 2.  Dyslipidemia. 3.  Hypothyroidism. 4.  Obesity. 5.  Diabetic neuropathy. 6.  History of osteomyelitis of the right foot. PAST SURGICAL HISTORY:  1. Right second toe amputation. 2.  Tonsillectomy. 3.  Benign breast cyst removal.  4.  Right rotator cuff repair. 5.  Left second toe surgery. 6.  Right foot debridement and removal of first metatarsal.  7.  Secondary closure of the right foot. 8.  Elevating ostectomy of the third metatarsal of the right foot. 9.  Fourth left toe resection. 10.  Amputation of the right fourth toe with removal of the middle and distal phalanges. FAMILY HISTORY:  Her father had diabetes and had a heart attack. SOCIAL HISTORY:  She is a former smoker. She does not drink alcohol. She does not engage in recreational drug use. PHYSICAL EXAMINATION:  GENERAL:  She is not in respiratory distress. VITAL SIGNS:  Temperature is 98.2, pulse 70, blood pressure 114/68, satting at 94% room air. HEENT:  She has pink conjunctivae. Anicteric sclerae. External ears are normal.  NECK:  No JVD. No cervical lymphadenopathy. No carotid bruits. LUNGS:  Clear to auscultation. No rales, wheezes or rhonchi. HEART:  Regular rate and rhythm. No murmur, rubs or clicks. ABDOMEN:  Positive bowel sounds. Abdomen is soft, nontender, no guarding, no rebound. GENITOURINARY:  No CVA tenderness. She does not have a Romero catheter. MUSCULOSKELETAL:  The right foot is bandaged. There is no ascending cellulitis. Knees, wrists and elbows are not warm and are not tender. PSYCHIATRIC:  No disturbance in thought. Mood and affect are appropriate. She answers questions appropriately. INTEGUMENT:  Did not see any rash. NEUROLOGIC:  She has full use of her extraocular muscles. Tongue midline. No facial symmetry. Muscle strength is 5/5. Neck is supple. LABORATORY DATA:  CBC:  White blood cell count 6.1, hemoglobin 11, platelet count 470. Chemistry:  Creatinine 0.74, AST 48, ALT 47, alk phos 371, total bilirubin 0.4. Cultures growing Staph aureus. CTA of the head shows no acute process. MRI of the toe shows fourth middle phalanx osteomyelitis. IMPRESSION:  1. Osteomyelitis of the right fourth toe. 2.  Vertigo. 3.  Diabetes with neuropathy. 4.  History of osteomyelitis of the right foot. PLAN:  The patient had an amputation and hopefully this will result in a surgical cure. If indeed there is a surgical cure, then she will not need IV antibiotics when she leaves the hospital.  For now, I would continue vancomycin and Zosyn. We should follow up cultures. Thank you for the consult.       Cesar Shaver MD      [id number]  D:  03/14/2019 19:11  T:  03/15/2019 3:42  JOB #:  [id number]

## 2019-03-15 NOTE — PROGRESS NOTES
. Called and spoke with MD Montero. Per Md give 10 units Lispro subcutaneously once and repeat BG in 1 hour. Orders read back. Will implement. BG still elevated 1 hour after medication administration (370). Per MD Montero give 4 additional units Lispro subcutaneously. Shift change report given to [facility] (oncoming nurse) by Shreya Soria (offgoing nurse). Report included the following information SBAR, Kardex, Procedure Summary, Intake/Output and MAR.

## 2019-03-15 NOTE — PROGRESS NOTES
Problem: Mobility Impaired (Adult and Pediatric)  Goal: *Acute Goals and Plan of Care (Insert Text)  Physical Therapy Goals  Initiated 3/14/2019  1. Patient will demonstrate independence with modified Epley maneuver for home within 7 days. physical Therapy TREATMENT  Patient: Papo Dial (38 y.o. female)  Date: 3/15/2019  Diagnosis: Foot infection [L08.9] Foot infection  Procedure(s) (LRB):  AMPUTATION TOE 4 RIGHT FOOT (Right) 2 Days Post-Op  Precautions: (unknown WB of right LE)  Chart, physical therapy assessment, plan of care and goals were reviewed. ASSESSMENT:  Pt seen for ongoing BPPV treatment. Reports continued \"room spinning\" sensation with L head movements. Vestibulo-ocular exam as follows: smooth pursuit intact with slight end gaze nystagmus L>R; +L head thrust; + extra shifts with saccades; Boston-Hallpike test + for L upbeating torsional nystagmus. Epley maneuver completed x2 reps - pt tolerated well however reported continued mild dizziness with movements following. Anticipate may continue to persist and would greatly benefit from follow up with OP vestibular based PT. Will follow up Monday if remains admitted. Progression toward goals:  []    Improving appropriately and progressing toward goals  [x]    Improving slowly and progressing toward goals  []    Not making progress toward goals and plan of care will be adjusted     PLAN:  Patient continues to benefit from skilled intervention to address the above impairments. Continue treatment per established plan of care. Discharge Recommendations:  OP Vestibular based PT  Further Equipment Recommendations for Discharge:  None - not currently seeing for any mobility assessments      SUBJECTIVE:   Patient stated overall it feels better right now.     OBJECTIVE DATA SUMMARY:   Critical Behavior:  Neurologic State: Alert  Orientation Level: Oriented X4  Cognition: Appropriate decision making, Appropriate for age attention/concentration, Appropriate safety awareness, Follows commands     Activity Tolerance:   NAD    Please refer to the flowsheet for vital signs taken during this treatment.   After treatment:   []    Patient left in no apparent distress sitting up in chair  [x]    Patient left in no apparent distress in bed  [x]    Call bell left within reach  [x]    Nursing notified  []    Caregiver present  []    Bed alarm activated    COMMUNICATION/COLLABORATION:   The patients plan of care was discussed with: Registered Nurse    Rashmi Allison, PT, DPT   Time Calculation: 53 mins

## 2019-03-15 NOTE — DIABETES MGMT
[facility]    Elevated A1C Visit Note    Recommendations/ Comments:     If appropriate, please consider:  Increasing NPH to 50 units every 12 hours  Adding Humalog 10 units ACTID     Current hospital DM medications: NPH 40 units BID, Humalog for correction, insulin resistant scale     Patient is a 70 y.o. female with hx Type 2 Diabetes on Novolin N 100 units every 12 hrs and Novolin R SS and Trulicity every Saturday at home. Pt reports to check BG 3-4 times a day at home. Reports FBG usually 200s mg/dl and high 200s mg/dl before meals at home. She also reports to graze on chips during the day. She would drink a Premier shake for breakfast and eat a cheeseburger with fries or a sandwich for lunch and dinner. Denies drinking sweet beverages at home. Does like to eat cakes. Pt states that she has been taking care of her , who has dementia, and has not been taking care of herself as much. Endorses that she has not been \"watching\" her diet and does skips her insulin at times, due to forgetfulness. She follows up with an endocrinologist, Dr. Don Ring and her las visit was 3 weeks ago. Pt was advised to split the Novolin N dose (taken at the same time, different body sites) for improved absorption. She was unaware that this would help to improve her blood sugars.       A1c:   Lab Results   Component Value Date/Time    Hemoglobin A1c 11.2 (H) 03/12/2019 01:17 PM    Hemoglobin A1c 8.8 (H) 10/30/2014 01:52 AM    Hemoglobin A1c, External 10.1 12/15/2018    Hemoglobin A1c, External 909 09/26/2018       Recent Glucose Results:   Lab Results   Component Value Date/Time     (H) 03/15/2019 02:49 AM    GLUCPOC 300 (H) 03/15/2019 11:45 AM    GLUCPOC 273 (H) 03/15/2019 06:13 AM    GLUCPOC 370 (H) 03/14/2019 11:03 PM        Lab Results   Component Value Date/Time    Creatinine 1.11 (H) 03/15/2019 02:49 AM       Active Orders   Diet    DIET DIABETIC CONSISTENT CARB Regular          Assessed and instructed patient on the following:   ·  interpretation of lab results, blood sugar goals, a1c target, importance of BG control for healing and to prevent skin infections, complications of diabetes mellitus, hypoglycemia prevention and treatment, exercise, SMBG skills, nutrition, referred to Diabetes Educator, site rotation, self-injection of insulin    Provided patient with the following: [x]          Diabetes Self-Care Guide               [x]          Insulin education materials               []          Diabetes survival skills handout               []          BG guidelines for post-op patients               []          \"Decreasing  the Cost of Diabetes Care\"                                                                       [x]          Outpatient DTC contact number          Patient to follow up with endocrinologist  after discharge. Will continue to follow as needed. Thank you.    Kirt Varela RD, CDE  [facility]  Pager: [phone]     Time spent: 30 min

## 2019-03-15 NOTE — PROGRESS NOTES
ID Progress Note  3/15/2019    Subjective:     Afebrile. No dyspnea, abdominal pain, sore throat, dysuria, cough. Objective:     Vitals:   Visit Vitals  /64 (BP Patient Position: At rest)   Pulse 71   Temp 98 °F (36.7 °C)   Resp 16   Ht 5' 6\" (1.676 m)   SpO2 96%   Breastfeeding? No   BMI 38.25 kg/m²        Tmax:  Temp (24hrs), Av.1 °F (36.7 °C), Min:97.7 °F (36.5 °C), Max:98.4 °F (36.9 °C)      Exam:    Not in distress  Eyes: pink conjunctivae, anicteric sclerae  Lungs: clear to auscultation, no rales, wheezes or rhonchi   Heart: s1, s2, no murmurs, rubs or clicks   Abdomen: soft, nontender, no guarding or rebound   Extremities: dorsum of right foot has decreased erythema. Incision is clean. Speech fluent     Labs:   Lab Results   Component Value Date/Time    WBC 6.1 2019 12:08 PM    Hemoglobin (POC) 11.6 10/29/2014 08:41 PM    HGB 11.0 (L) 2019 12:08 PM    Hematocrit (POC) 34 (L) 10/29/2014 08:41 PM    HCT 35.6 2019 12:08 PM    PLATELET 779  12:08 PM    MCV 88.3 2019 12:08 PM     Lab Results   Component Value Date/Time    Sodium 137 03/15/2019 02:49 AM    Potassium 4.4 03/15/2019 02:49 AM    Chloride 106 03/15/2019 02:49 AM    CO2 24 03/15/2019 02:49 AM    Anion gap 7 03/15/2019 02:49 AM    Glucose 292 (H) 03/15/2019 02:49 AM    BUN 14 03/15/2019 02:49 AM    Creatinine 1.11 (H) 03/15/2019 02:49 AM    BUN/Creatinine ratio 13 03/15/2019 02:49 AM    GFR est AA 59 (L) 03/15/2019 02:49 AM    GFR est non-AA 49 (L) 03/15/2019 02:49 AM    Calcium 9.3 03/15/2019 02:49 AM    Bilirubin, total 0.4 2019 01:17 PM    AST (SGOT) 48 (H) 2019 01:17 PM    Alk. phosphatase 371 (H) 2019 01:17 PM    Protein, total 8.6 (H) 2019 01:17 PM    Albumin 3.2 (L) 2019 01:17 PM    Globulin 5.4 (H) 2019 01:17 PM    A-G Ratio 0.6 (L) 2019 01:17 PM    ALT (SGPT) 47 2019 01:17 PM             Assessment:       1.   Osteomyelitis of the right fourth toe s/p 4th toe amputation  2. Vertigo. 3.  Diabetes with neuropathy. 4.  History of osteomyelitis of the right foot.           Recommendations: The patient had an amputation and hopefully this will result in a surgical cure. If indeed there is a surgical cure, then she will not need IV antibiotics when she leaves the hospital. I will discontinue zosyn as the culture is growing staph aureus.      Team available over the weekend if needed.            Abdifatah Snyder MD

## 2019-03-15 NOTE — PROGRESS NOTES
Dr. Feli Wagner notified and aware that patient is eating and drinking well. Patient has been requesting to be discontinued from continuous fluids. Dr. Feli Wagner ordered, \"May discontinue IV fluids. \" RN will implement Dr. Sergio Monge orders.

## 2019-03-15 NOTE — PROGRESS NOTES
Foot and Ankle Surgery Progress Note    Patient: Ana Luisa Claros MRN: [id number]  SSN: [id number]    YOB: 1950  Age: 70 y.o. Sex: female      Admit Date: 3/12/2019    2 Days Post-Op    Procedure:  Procedure(s):  AMPUTATION TOE 4 RIGHT FOOT    Subjective:     Patient has no new complaints. Objective:     Visit Vitals  /64 (BP Patient Position: At rest)   Pulse 71   Temp 98 °F (36.7 °C)   Resp 16   Ht 5' 6\" (1.676 m)   SpO2 96%   Breastfeeding? No   BMI 38.25 kg/m²       Temp (24hrs), Av °F (36.7 °C), Min:97.7 °F (36.5 °C), Max:98.4 °F (36.9 °C)      Physical Exam:    Toe is improving  Incision is stable with a dry eschar  m inimal edema  No drainage      Data Review: images and reports reviewed    Lab Review: All lab results for the last 24 hours reviewed.     Assessment:     Hospital Problems  Date Reviewed: 3/13/2019          Codes Class Noted POA    * (Principal) Foot infection ICD-10-CM: L08.9  ICD-9-CM: 686.9  3/12/2019 Unknown              Plan/Recommendations/Medical Decision Making:     Betadine wet to dry dressing change   Patient is clear for d/c from a surgical point of view and we will f/u as an outpatient    Signed By: Tan Bolden DPM     March 15, 2019

## 2019-03-15 NOTE — PROGRESS NOTES
Dr. Jaylan Garcia notified and aware that patient's blood sugar is 364 this afternoon. RN calling because protocol is to call if blood sugar is greater than 350. Patient is also due NPH 40 units SQ at 18:00. Dr. Jaylan Garcia ordered,\"Humalog 12 units SQ for sliding scale. \" RN will implement Dr. Deetta Gottron orders.

## 2019-03-15 NOTE — DISCHARGE INSTRUCTIONS
INSTRUCTIONS FOLLOWING FOOT SURGERY    ACTIVITY:  · Elevate feet for 48 hours  · Use ice as directed by your doctor  · Use crutches / walker as directed by your doctor, and follow your doctors instructions as to your weight bearing status - you can bear weight    DIET:  · Clear liquids until no nausea or vomiting; then light diet for the first day  · An advance to regular diet on second day, unless your doctor orders otherwise. PAIN:  · Take pain medication as directed by your doctor. · Call your doctor if pain is not relieved by medication. · Do not take aspirin or blood thinners until directed by your doctor. DRESSING CARE: keep dressing clean and dry, do not remove       FOLLOW-UP PHONE CALLS:  · Calls will be made by nursing staff. · If you had any problems, call your doctor as needed. CALL YOUR DOCTOR IF:  · Excessive bleeding that does not stop after holding mild pressure over the area  · Temperature of 101° F or above  · Redness, excessive swelling or bruising, and/or green or yellow, smelly discharge from incision  · Loss of sensation -cold, white, or blue toes    AFTER ANESTHESIA :   · For the first 24 hours: do not drive, drink alcoholic beverages, or make important decisions. · Be aware of dizziness following anesthesia and while taking pain medication.       DISCHARGE MEDICATIONS:         APPOINTMENT DATE/TIME: please call for an appointment    YOUR DOCTORS PHONE NUMBER: [phone]    Patients signature:  Date: 3/15/2019  Discharging Nurse:

## 2019-03-16 LAB
ANION GAP SERPL CALC-SCNC: 7 MMOL/L (ref 5–15)
BUN SERPL-MCNC: 13 MG/DL (ref 6–20)
BUN/CREAT SERPL: 15 (ref 12–20)
CALCIUM SERPL-MCNC: 9.2 MG/DL (ref 8.5–10.1)
CHLORIDE SERPL-SCNC: 107 MMOL/L (ref 97–108)
CO2 SERPL-SCNC: 25 MMOL/L (ref 21–32)
CREAT SERPL-MCNC: 0.89 MG/DL (ref 0.55–1.02)
DATE LAST DOSE: NORMAL
GLUCOSE BLD STRIP.AUTO-MCNC: 183 MG/DL (ref 65–100)
GLUCOSE BLD STRIP.AUTO-MCNC: 211 MG/DL (ref 65–100)
GLUCOSE BLD STRIP.AUTO-MCNC: 241 MG/DL (ref 65–100)
GLUCOSE BLD STRIP.AUTO-MCNC: 244 MG/DL (ref 65–100)
GLUCOSE SERPL-MCNC: 196 MG/DL (ref 65–100)
POTASSIUM SERPL-SCNC: 4.1 MMOL/L (ref 3.5–5.1)
REPORTED DOSE,DOSE: NORMAL UNITS
REPORTED DOSE/TIME,TMG: NORMAL
SERVICE CMNT-IMP: ABNORMAL
SODIUM SERPL-SCNC: 139 MMOL/L (ref 136–145)
VANCOMYCIN TROUGH SERPL-MCNC: 8.3 UG/ML (ref 5–10)

## 2019-03-16 PROCEDURE — 74011250637 HC RX REV CODE- 250/637: Performed by: FAMILY MEDICINE

## 2019-03-16 PROCEDURE — 82962 GLUCOSE BLOOD TEST: CPT

## 2019-03-16 PROCEDURE — 74011250636 HC RX REV CODE- 250/636: Performed by: HOSPITALIST

## 2019-03-16 PROCEDURE — 65270000029 HC RM PRIVATE

## 2019-03-16 PROCEDURE — 74011250636 HC RX REV CODE- 250/636: Performed by: PODIATRIST

## 2019-03-16 PROCEDURE — 80202 ASSAY OF VANCOMYCIN: CPT

## 2019-03-16 PROCEDURE — 36415 COLL VENOUS BLD VENIPUNCTURE: CPT

## 2019-03-16 PROCEDURE — 74011250636 HC RX REV CODE- 250/636: Performed by: NURSE PRACTITIONER

## 2019-03-16 PROCEDURE — 74011636637 HC RX REV CODE- 636/637: Performed by: HOSPITALIST

## 2019-03-16 PROCEDURE — 80048 BASIC METABOLIC PNL TOTAL CA: CPT

## 2019-03-16 RX ORDER — VANCOMYCIN/0.9 % SOD CHLORIDE 1.5G/250ML
1500 PLASTIC BAG, INJECTION (ML) INTRAVENOUS
Status: DISCONTINUED | OUTPATIENT
Start: 2019-03-16 | End: 2019-03-18 | Stop reason: HOSPADM

## 2019-03-16 RX ORDER — INSULIN LISPRO 100 [IU]/ML
10 INJECTION, SOLUTION INTRAVENOUS; SUBCUTANEOUS
Status: DISCONTINUED | OUTPATIENT
Start: 2019-03-17 | End: 2019-03-18 | Stop reason: HOSPADM

## 2019-03-16 RX ADMIN — HEPARIN SODIUM 5000 UNITS: 5000 INJECTION INTRAVENOUS; SUBCUTANEOUS at 19:05

## 2019-03-16 RX ADMIN — FENOFIBRATE 48 MG: 48 TABLET, FILM COATED ORAL at 19:05

## 2019-03-16 RX ADMIN — HUMAN INSULIN 60 UNITS: 100 INJECTION, SUSPENSION SUBCUTANEOUS at 19:05

## 2019-03-16 RX ADMIN — VANCOMYCIN HYDROCHLORIDE 1500 MG: 10 INJECTION, POWDER, LYOPHILIZED, FOR SOLUTION INTRAVENOUS at 22:13

## 2019-03-16 RX ADMIN — INSULIN LISPRO 4 UNITS: 100 INJECTION, SOLUTION INTRAVENOUS; SUBCUTANEOUS at 12:12

## 2019-03-16 RX ADMIN — MECLIZINE 25 MG: 12.5 TABLET ORAL at 09:25

## 2019-03-16 RX ADMIN — INSULIN LISPRO 6 UNITS: 100 INJECTION, SOLUTION INTRAVENOUS; SUBCUTANEOUS at 12:13

## 2019-03-16 RX ADMIN — MECLIZINE 25 MG: 12.5 TABLET ORAL at 22:12

## 2019-03-16 RX ADMIN — PANTOPRAZOLE SODIUM 40 MG: 40 TABLET, DELAYED RELEASE ORAL at 09:25

## 2019-03-16 RX ADMIN — INSULIN LISPRO 6 UNITS: 100 INJECTION, SOLUTION INTRAVENOUS; SUBCUTANEOUS at 07:45

## 2019-03-16 RX ADMIN — INSULIN LISPRO 4 UNITS: 100 INJECTION, SOLUTION INTRAVENOUS; SUBCUTANEOUS at 16:57

## 2019-03-16 RX ADMIN — MECLIZINE 25 MG: 12.5 TABLET ORAL at 16:04

## 2019-03-16 RX ADMIN — INSULIN LISPRO 6 UNITS: 100 INJECTION, SOLUTION INTRAVENOUS; SUBCUTANEOUS at 16:57

## 2019-03-16 RX ADMIN — ATORVASTATIN CALCIUM 40 MG: 40 TABLET, FILM COATED ORAL at 22:12

## 2019-03-16 RX ADMIN — ESCITALOPRAM OXALATE 10 MG: 10 TABLET ORAL at 09:25

## 2019-03-16 RX ADMIN — INSULIN LISPRO 3 UNITS: 100 INJECTION, SOLUTION INTRAVENOUS; SUBCUTANEOUS at 07:45

## 2019-03-16 RX ADMIN — Medication 10 ML: at 16:05

## 2019-03-16 RX ADMIN — Medication 10 ML: at 05:33

## 2019-03-16 RX ADMIN — METOPROLOL SUCCINATE 50 MG: 50 TABLET, EXTENDED RELEASE ORAL at 09:25

## 2019-03-16 RX ADMIN — VANCOMYCIN HYDROCHLORIDE 1500 MG: 10 INJECTION, POWDER, LYOPHILIZED, FOR SOLUTION INTRAVENOUS at 05:35

## 2019-03-16 RX ADMIN — CLOPIDOGREL BISULFATE 75 MG: 75 TABLET, FILM COATED ORAL at 09:25

## 2019-03-16 RX ADMIN — HUMAN INSULIN 60 UNITS: 100 INJECTION, SUSPENSION SUBCUTANEOUS at 09:24

## 2019-03-16 RX ADMIN — Medication 10 ML: at 22:13

## 2019-03-16 RX ADMIN — LEVOTHYROXINE SODIUM 250 MCG: 125 TABLET ORAL at 07:44

## 2019-03-16 RX ADMIN — INSULIN LISPRO 2 UNITS: 100 INJECTION, SOLUTION INTRAVENOUS; SUBCUTANEOUS at 22:12

## 2019-03-16 RX ADMIN — HEPARIN SODIUM 5000 UNITS: 5000 INJECTION INTRAVENOUS; SUBCUTANEOUS at 05:43

## 2019-03-16 RX ADMIN — ASPIRIN 81 MG CHEWABLE TABLET 81 MG: 81 TABLET CHEWABLE at 09:25

## 2019-03-16 NOTE — PROGRESS NOTES
Attempted to place a bed alarm on pt's bed after she got up by herself to go to the bathroom and voided all over the floor. Pt refused to have a bed alarm and said that she has not needed one so far and she did not want one now because it makes too much noise. Turned alarm off and made pt promise to call for help when she needs to get up. Pt also said she has no feeling in either foot  And she has not for quite some time now. I told her that puts her at higher risk for falls and she agreed. She agreed to call for help.

## 2019-03-16 NOTE — PROGRESS NOTES
Pharmacist Note - Vancomycin Dosing  Therapy day 5  Indication: Osteomyelitis, R 4th toe  - s/p amputation R 4th toe 3/13, h/o osteomyelitis  Current regimen: 1500 mg Q 24hrs  (x1 dose, previously 1500mg q 16 h)    A Trough Level resulted at 8.3 mcg/mL which was obtained 24 hrs post-dose. Goal trough: 15 - 20 mcg/mL     Plan: Resume regimen 1500mg q 16 h. Pharmacy will continue to monitor this patient daily for changes in clinical status and renal function.

## 2019-03-17 LAB
ANION GAP SERPL CALC-SCNC: 8 MMOL/L (ref 5–15)
BACTERIA SPEC CULT: NORMAL
BUN SERPL-MCNC: 15 MG/DL (ref 6–20)
BUN/CREAT SERPL: 16 (ref 12–20)
CALCIUM SERPL-MCNC: 9.1 MG/DL (ref 8.5–10.1)
CHLORIDE SERPL-SCNC: 106 MMOL/L (ref 97–108)
CO2 SERPL-SCNC: 25 MMOL/L (ref 21–32)
CREAT SERPL-MCNC: 0.91 MG/DL (ref 0.55–1.02)
GLUCOSE BLD STRIP.AUTO-MCNC: 200 MG/DL (ref 65–100)
GLUCOSE BLD STRIP.AUTO-MCNC: 207 MG/DL (ref 65–100)
GLUCOSE BLD STRIP.AUTO-MCNC: 225 MG/DL (ref 65–100)
GLUCOSE BLD STRIP.AUTO-MCNC: 280 MG/DL (ref 65–100)
GLUCOSE SERPL-MCNC: 206 MG/DL (ref 65–100)
POTASSIUM SERPL-SCNC: 3.9 MMOL/L (ref 3.5–5.1)
SERVICE CMNT-IMP: ABNORMAL
SERVICE CMNT-IMP: NORMAL
SODIUM SERPL-SCNC: 139 MMOL/L (ref 136–145)

## 2019-03-17 PROCEDURE — 74011636637 HC RX REV CODE- 636/637: Performed by: INTERNAL MEDICINE

## 2019-03-17 PROCEDURE — 36415 COLL VENOUS BLD VENIPUNCTURE: CPT

## 2019-03-17 PROCEDURE — 65270000029 HC RM PRIVATE

## 2019-03-17 PROCEDURE — 74011250636 HC RX REV CODE- 250/636: Performed by: PODIATRIST

## 2019-03-17 PROCEDURE — 74011636637 HC RX REV CODE- 636/637: Performed by: HOSPITALIST

## 2019-03-17 PROCEDURE — 74011250637 HC RX REV CODE- 250/637: Performed by: FAMILY MEDICINE

## 2019-03-17 PROCEDURE — 74011250636 HC RX REV CODE- 250/636: Performed by: NURSE PRACTITIONER

## 2019-03-17 PROCEDURE — 74011250636 HC RX REV CODE- 250/636: Performed by: HOSPITALIST

## 2019-03-17 PROCEDURE — 82962 GLUCOSE BLOOD TEST: CPT

## 2019-03-17 PROCEDURE — 80048 BASIC METABOLIC PNL TOTAL CA: CPT

## 2019-03-17 RX ADMIN — ESCITALOPRAM OXALATE 10 MG: 10 TABLET ORAL at 09:47

## 2019-03-17 RX ADMIN — CLOPIDOGREL BISULFATE 75 MG: 75 TABLET, FILM COATED ORAL at 09:47

## 2019-03-17 RX ADMIN — INSULIN LISPRO 4 UNITS: 100 INJECTION, SOLUTION INTRAVENOUS; SUBCUTANEOUS at 17:43

## 2019-03-17 RX ADMIN — VANCOMYCIN HYDROCHLORIDE 1500 MG: 10 INJECTION, POWDER, LYOPHILIZED, FOR SOLUTION INTRAVENOUS at 14:46

## 2019-03-17 RX ADMIN — FENOFIBRATE 48 MG: 48 TABLET, FILM COATED ORAL at 17:42

## 2019-03-17 RX ADMIN — MECLIZINE 25 MG: 12.5 TABLET ORAL at 09:47

## 2019-03-17 RX ADMIN — ASPIRIN 81 MG CHEWABLE TABLET 81 MG: 81 TABLET CHEWABLE at 09:47

## 2019-03-17 RX ADMIN — MECLIZINE 25 MG: 12.5 TABLET ORAL at 22:41

## 2019-03-17 RX ADMIN — INSULIN LISPRO 10 UNITS: 100 INJECTION, SOLUTION INTRAVENOUS; SUBCUTANEOUS at 12:33

## 2019-03-17 RX ADMIN — INSULIN LISPRO 10 UNITS: 100 INJECTION, SOLUTION INTRAVENOUS; SUBCUTANEOUS at 17:43

## 2019-03-17 RX ADMIN — HEPARIN SODIUM 5000 UNITS: 5000 INJECTION INTRAVENOUS; SUBCUTANEOUS at 07:33

## 2019-03-17 RX ADMIN — INSULIN LISPRO 4 UNITS: 100 INJECTION, SOLUTION INTRAVENOUS; SUBCUTANEOUS at 07:32

## 2019-03-17 RX ADMIN — Medication 10 ML: at 14:46

## 2019-03-17 RX ADMIN — INSULIN LISPRO 10 UNITS: 100 INJECTION, SOLUTION INTRAVENOUS; SUBCUTANEOUS at 07:33

## 2019-03-17 RX ADMIN — PANTOPRAZOLE SODIUM 40 MG: 40 TABLET, DELAYED RELEASE ORAL at 09:48

## 2019-03-17 RX ADMIN — HEPARIN SODIUM 5000 UNITS: 5000 INJECTION INTRAVENOUS; SUBCUTANEOUS at 19:24

## 2019-03-17 RX ADMIN — INSULIN LISPRO 4 UNITS: 100 INJECTION, SOLUTION INTRAVENOUS; SUBCUTANEOUS at 22:42

## 2019-03-17 RX ADMIN — ATORVASTATIN CALCIUM 40 MG: 40 TABLET, FILM COATED ORAL at 22:42

## 2019-03-17 RX ADMIN — MECLIZINE 25 MG: 12.5 TABLET ORAL at 17:42

## 2019-03-17 RX ADMIN — HUMAN INSULIN 60 UNITS: 100 INJECTION, SUSPENSION SUBCUTANEOUS at 17:43

## 2019-03-17 RX ADMIN — INSULIN LISPRO 4 UNITS: 100 INJECTION, SOLUTION INTRAVENOUS; SUBCUTANEOUS at 12:34

## 2019-03-17 RX ADMIN — HUMAN INSULIN 60 UNITS: 100 INJECTION, SUSPENSION SUBCUTANEOUS at 09:48

## 2019-03-17 RX ADMIN — Medication 10 ML: at 22:42

## 2019-03-17 RX ADMIN — Medication 10 ML: at 07:32

## 2019-03-17 NOTE — PROGRESS NOTES
Hospitalist Progress Note  Reva Simon MD  Answering service: [phone] from in house phone      Date of Service:  3/16/2019  NAME:  Marilyn Dsouza  :    MRN:  [id number]      Admission Summary:    65-year-old female with past medical history of hypertension, diabetes, history of coronary artery disease, bronchitis, GERD, hypercholesterolemia, hypothyroidism, ischemic colitis, menopause, neuropathy, obesity, psychiatric disorder, depression, ulcer at the bottom of the right foot, and vertigo who presents to the hospital with right foot pain         Interval history / Subjective:     Pt seen in follow up of right foot pain    Patient states that she has intermittent dizziness but over all improved. Denied any rt foot pain. Assessment & Plan:     1. Right fourth Toe OM - Confirmed by MRI- Seen by podiatry. S/p amputation of 4th toe of rt foot. Currently on broad spectrum abx - vancomycin. Zosyn d/c on 3/15.  - wound culture gre staph aureus MRSA. Path report pending. ID following.  -monitor vanc levels  - If path comes back neg for Osteo will discharged on oral Abx    2. Vertigo: related to BPPV -on meclizine. Needs vestibular therapy as OP.  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 3. Dm2 with hyperglycemia:  On 60 U NPH BID and add 6---> 10 3/16 U TID with meal and SSI    4. HTN - C/W toprol, Controlled    5. History of CAD:   -s/p cardiac cath in 2016 showed diffuse coronary   artery disease with an occluded distal marginal vessel that filled by   both left-to-left, and right-to-left collaterals  - medical management was recommended. On aspirin,plavix,metoprolol and statin      Code status: Full  DVT prophylaxis: heaprin       Care Plan discussed with: Patient/Family and Nurse  Disposition: TBD     Hospital Problems  Date Reviewed: 3/13/2019          Codes Class Noted POA * (Principal) Foot infection ICD-10-CM: L08.9  ICD-9-CM: 686.9  3/12/2019 Unknown                Review of Systems:   Pertinent items are noted in HPI. Vital Signs:    Last 24hrs VS reviewed since prior progress note. Most recent are:  Visit Vitals  /67 (BP 1 Location: Right arm, BP Patient Position: Sitting)   Pulse (!) 58   Temp 97.4 °F (36.3 °C)   Resp 18   Ht 5' 6\" (1.676 m)   Wt 106.1 kg (233 lb 14.4 oz)   SpO2 97%   Breastfeeding? No   BMI 37.75 kg/m²         Intake/Output Summary (Last 24 hours) at 3/16/2019 2140  Last data filed at 3/16/2019 1212  Gross per 24 hour   Intake 480 ml   Output    Net 480 ml        Physical Examination:             Constitutional:  No acute distress, cooperative, pleasant    ENT:  Oral mucous moist, oropharynx benign. Neck supple,    Resp:  CTA bilaterally. No wheezing/rhonchi/rales. No accessory muscle use   CV:  Regular rhythm, normal rate, no murmurs, gallops, rubs    GI:  Soft, non distended, non tender. normoactive bowel sounds, no hepatosplenomegaly     Musculoskeletal:  rt foot has a dry dressing    Neurologic:  Moves all extremities. AAOx3, CN II-XII reviewed     Hematologic/Lymphatic/Immunlogic:  No jaundice nor lymph node swelling       Data Review:    labs,meds        Labs:     Recent Labs     03/14/19  1208   WBC 6.1   HGB 11.0*   HCT 35.6        Recent Labs     03/16/19  0513 03/15/19  0249 03/14/19  0405    137 141   K 4.1 4.4 3.9    106 107   CO2 25 24 26   BUN 13 14 13   CREA 0.89 1.11* 0.74   * 292* 179*   CA 9.2 9.3 9.3     No results for input(s): SGOT, GPT, ALT, AP, TBIL, TBILI, TP, ALB, GLOB, GGT, AML, LPSE in the last 72 hours. No lab exists for component: AMYP, HLPSE  No results for input(s): INR, PTP, APTT in the last 72 hours. No lab exists for component: INREXT, INREXT   No results for input(s): FE, TIBC, PSAT, FERR in the last 72 hours.    Lab Results   Component Value Date/Time    Folate 19.5 11/01/2014 08:00 AM      No results for input(s): PH, PCO2, PO2 in the last 72 hours. No results for input(s): CPK, CKNDX, TROIQ in the last 72 hours.     No lab exists for component: CPKMB  Lab Results   Component Value Date/Time    Cholesterol, total 155 03/12/2019 01:17 PM    HDL Cholesterol 49 03/12/2019 01:17 PM    LDL, calculated 65.4 03/12/2019 01:17 PM    Triglyceride 203 (H) 03/12/2019 01:17 PM    CHOL/HDL Ratio 3.2 03/12/2019 01:17 PM     Lab Results   Component Value Date/Time    Glucose (POC) 241 (H) 03/16/2019 09:17 PM    Glucose (POC) 244 (H) 03/16/2019 03:40 PM    Glucose (POC) 211 (H) 03/16/2019 11:49 AM    Glucose (POC) 183 (H) 03/16/2019 06:15 AM    Glucose (POC) 255 (H) 03/15/2019 09:20 PM     Lab Results   Component Value Date/Time    Color Yellow 06/11/2018 05:39 PM    Appearance Cloudy (A) 06/11/2018 05:39 PM    Specific gravity 1.021 10/29/2014 10:02 PM    pH (UA) =>9.0 (A) 06/11/2018 05:39 PM    Protein NEGATIVE  10/29/2014 10:02 PM    Glucose NEGATIVE  10/29/2014 10:02 PM    Ketone Negative 06/11/2018 05:39 PM    Bilirubin Negative 06/11/2018 05:39 PM    Urobilinogen 0.2 10/29/2014 10:02 PM    Nitrites Positive (A) 06/11/2018 05:39 PM    Leukocyte Esterase 3+ (A) 06/11/2018 05:39 PM    Epithelial cells MODERATE (A) 10/29/2014 10:02 PM    Bacteria Few 06/11/2018 05:39 PM    WBC >30 (A) 06/11/2018 05:39 PM    RBC 0-2 06/11/2018 05:39 PM         Medications Reviewed:     Current Facility-Administered Medications   Medication Dose Route Frequency    vancomycin (VANCOCIN) 1500 mg in  ml infusion  1,500 mg IntraVENous Q16H    [START ON 3/17/2019] insulin lispro (HUMALOG) injection 10 Units  10 Units SubCUTAneous TIDAC    dextrose (D50W) injection syrg 12.5-25 g  12.5-25 g IntraVENous PRN    insulin lispro (HUMALOG) injection   SubCUTAneous AC&HS    insulin NPH (NOVOLIN N, HUMULIN N) injection 60 Units  60 Units SubCUTAneous BID    heparin (porcine) injection 5,000 Units  5,000 Units SubCUTAneous Q12H    glucose chewable tablet 16 g  4 Tab Oral PRN    glucagon (GLUCAGEN) injection 1 mg  1 mg IntraMUSCular PRN    meclizine (ANTIVERT) tablet 25 mg  25 mg Oral TID    sodium chloride (NS) flush 5-40 mL  5-40 mL IntraVENous Q8H    sodium chloride (NS) flush 5-40 mL  5-40 mL IntraVENous PRN    acetaminophen (TYLENOL) tablet 650 mg  650 mg Oral Q4H PRN    aspirin chewable tablet 81 mg  81 mg Oral DAILY    albuterol-ipratropium (DUO-NEB) 2.5 MG-0.5 MG/3 ML  3 mL Nebulization Q4H PRN    atorvastatin (LIPITOR) tablet 40 mg  40 mg Oral QHS    clopidogrel (PLAVIX) tablet 75 mg  75 mg Oral DAILY    escitalopram oxalate (LEXAPRO) tablet 10 mg  10 mg Oral DAILY    fenofibrate nanocrystallized (TRICOR) tablet 48 mg  48 mg Oral QPM    levothyroxine (SYNTHROID) tablet 125 mcg  125 mcg Oral every Wednesday    levothyroxine (SYNTHROID) tablet 250 mcg  250 mcg Oral Once per day on Sun Mon Tue Thu Fri Sat    metoprolol succinate (TOPROL-XL) XL tablet 50 mg  50 mg Oral DAILY    pantoprazole (PROTONIX) tablet 40 mg  40 mg Oral DAILY    meclizine (ANTIVERT) tablet 25 mg  25 mg Oral Q6H PRN    Vancomycin Pharmacy Dosing   Other PRN     ______________________________________________________________________  EXPECTED LENGTH OF STAY: 2d 21h  ACTUAL LENGTH OF STAY:          4                 Junior Luis MD

## 2019-03-17 NOTE — PROGRESS NOTES
Bedside shift change report given to Carol (oncoming nurse) by Gregoria Mendez (offgoing nurse). Report included the following information SBAR, Kardex, Intake/Output, MAR and Recent Results.

## 2019-03-17 NOTE — PROGRESS NOTES
Hospitalist Progress Note  Cherylene Booty, MD  Answering service: [phone] from in house phone      Date of Service:  3/17/2019  NAME:  Ernesto Lora  :    MRN:  [id number]      Admission Summary:    79-year-old female with past medical history of hypertension, diabetes, history of coronary artery disease, bronchitis, GERD, hypercholesterolemia, hypothyroidism, ischemic colitis, menopause, neuropathy, obesity, psychiatric disorder, depression, ulcer at the bottom of the right foot, and vertigo who presents to the hospital with right foot pain         Interval history / Subjective:     Pt seen in follow up of right foot pain    Patient states that she has intermittent dizziness but over all improved. Denied any rt foot pain. Assessment & Plan:     1. Right fourth Toe OM - Confirmed by MRI- Seen by podiatry. S/p amputation of 4th toe of rt foot. Currently on broad spectrum abx - vancomycin. Zosyn d/c on 3/15.  - wound culture gre staph aureus MRSA. Path report pending. ID following.  -monitor vanc levels  - If path comes back neg for Osteo will discharged on oral Abx    2. Vertigo: related to BPPV -on meclizine. Needs vestibular therapy as OP.  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 3. Dm2 with hyperglycemia:  On 60 U NPH BID -----> 70   Units BID  3/17  and add 6---> 10 3/16 U TID with meal and SSI  Stated that she was taking 100  UNITS bBID at home     4. HTN - C/W toprol, Controlled    5. History of CAD:   -s/p cardiac cath in 2016 showed diffuse coronary   artery disease with an occluded distal marginal vessel that filled by   both left-to-left, and right-to-left collaterals  - medical management was recommended. On aspirin,plavix,metoprolol and statin      Code status: Full  DVT prophylaxis: heaprin       Care Plan discussed with: Patient/Family and Nurse  Disposition: TBD     Hospital Problems  Date Reviewed: 3/13/2019          Codes Class Noted POA    * (Principal) Foot infection ICD-10-CM: L08.9  ICD-9-CM: 686.9  3/12/2019 Unknown                Review of Systems:   Pertinent items are noted in HPI. Vital Signs:    Last 24hrs VS reviewed since prior progress note. Most recent are:  Visit Vitals  /64 (BP 1 Location: Right arm, BP Patient Position: Supine)   Pulse 60   Temp 98.2 °F (36.8 °C)   Resp 16   Ht 5' 6\" (1.676 m)   Wt 106.1 kg (233 lb 14.4 oz)   SpO2 95%   Breastfeeding? No   BMI 37.75 kg/m²       No intake or output data in the 24 hours ending 03/17/19 1851     Physical Examination:             Constitutional:  No acute distress, cooperative, pleasant    ENT:  Oral mucous moist, oropharynx benign. Neck supple,    Resp:  CTA bilaterally. No wheezing/rhonchi/rales. No accessory muscle use   CV:  Regular rhythm, normal rate, no murmurs, gallops, rubs    GI:  Soft, non distended, non tender. normoactive bowel sounds, no hepatosplenomegaly     Musculoskeletal:  rt foot has a dry dressing    Neurologic:  Moves all extremities. AAOx3, CN II-XII reviewed     Hematologic/Lymphatic/Immunlogic:  No jaundice nor lymph node swelling       Data Review:    labs,meds        Labs:     No results for input(s): WBC, HGB, HCT, PLT, HGBEXT, HCTEXT, PLTEXT, HGBEXT, HCTEXT, PLTEXT in the last 72 hours. Recent Labs     03/17/19  0517 03/16/19  0513 03/15/19  0249    139 137   K 3.9 4.1 4.4    107 106   CO2 25 25 24   BUN 15 13 14   CREA 0.91 0.89 1.11*   * 196* 292*   CA 9.1 9.2 9.3     No results for input(s): SGOT, GPT, ALT, AP, TBIL, TBILI, TP, ALB, GLOB, GGT, AML, LPSE in the last 72 hours. No lab exists for component: AMYP, HLPSE  No results for input(s): INR, PTP, APTT in the last 72 hours. No lab exists for component: INREXT, INREXT   No results for input(s): FE, TIBC, PSAT, FERR in the last 72 hours.    Lab Results   Component Value Date/Time Folate 19.5 11/01/2014 08:00 AM      No results for input(s): PH, PCO2, PO2 in the last 72 hours. No results for input(s): CPK, CKNDX, TROIQ in the last 72 hours. No lab exists for component: CPKMB  Lab Results   Component Value Date/Time    Cholesterol, total 155 03/12/2019 01:17 PM    HDL Cholesterol 49 03/12/2019 01:17 PM    LDL, calculated 65.4 03/12/2019 01:17 PM    Triglyceride 203 (H) 03/12/2019 01:17 PM    CHOL/HDL Ratio 3.2 03/12/2019 01:17 PM     Lab Results   Component Value Date/Time    Glucose (POC) 207 (H) 03/17/2019 04:52 PM    Glucose (POC) 225 (H) 03/17/2019 11:49 AM    Glucose (POC) 200 (H) 03/17/2019 06:42 AM    Glucose (POC) 241 (H) 03/16/2019 09:17 PM    Glucose (POC) 244 (H) 03/16/2019 03:40 PM     Lab Results   Component Value Date/Time    Color Yellow 06/11/2018 05:39 PM    Appearance Cloudy (A) 06/11/2018 05:39 PM    Specific gravity 1.021 10/29/2014 10:02 PM    pH (UA) =>9.0 (A) 06/11/2018 05:39 PM    Protein NEGATIVE  10/29/2014 10:02 PM    Glucose NEGATIVE  10/29/2014 10:02 PM    Ketone Negative 06/11/2018 05:39 PM    Bilirubin Negative 06/11/2018 05:39 PM    Urobilinogen 0.2 10/29/2014 10:02 PM    Nitrites Positive (A) 06/11/2018 05:39 PM    Leukocyte Esterase 3+ (A) 06/11/2018 05:39 PM    Epithelial cells MODERATE (A) 10/29/2014 10:02 PM    Bacteria Few 06/11/2018 05:39 PM    WBC >30 (A) 06/11/2018 05:39 PM    RBC 0-2 06/11/2018 05:39 PM         Medications Reviewed:     Current Facility-Administered Medications   Medication Dose Route Frequency    [START ON 3/18/2019] vancomycin trough 3/18 @ 6:00 - thanks!    Other ONCE    [START ON 3/18/2019] insulin NPH (NOVOLIN N, HUMULIN N) injection 70 Units  70 Units SubCUTAneous BID    vancomycin (VANCOCIN) 1500 mg in  ml infusion  1,500 mg IntraVENous Q16H    insulin lispro (HUMALOG) injection 10 Units  10 Units SubCUTAneous TIDAC    dextrose (D50W) injection syrg 12.5-25 g  12.5-25 g IntraVENous PRN    insulin lispro (HUMALOG) injection   SubCUTAneous AC&HS    heparin (porcine) injection 5,000 Units  5,000 Units SubCUTAneous Q12H    glucose chewable tablet 16 g  4 Tab Oral PRN    glucagon (GLUCAGEN) injection 1 mg  1 mg IntraMUSCular PRN    meclizine (ANTIVERT) tablet 25 mg  25 mg Oral TID    sodium chloride (NS) flush 5-40 mL  5-40 mL IntraVENous Q8H    sodium chloride (NS) flush 5-40 mL  5-40 mL IntraVENous PRN    acetaminophen (TYLENOL) tablet 650 mg  650 mg Oral Q4H PRN    aspirin chewable tablet 81 mg  81 mg Oral DAILY    albuterol-ipratropium (DUO-NEB) 2.5 MG-0.5 MG/3 ML  3 mL Nebulization Q4H PRN    atorvastatin (LIPITOR) tablet 40 mg  40 mg Oral QHS    clopidogrel (PLAVIX) tablet 75 mg  75 mg Oral DAILY    escitalopram oxalate (LEXAPRO) tablet 10 mg  10 mg Oral DAILY    fenofibrate nanocrystallized (TRICOR) tablet 48 mg  48 mg Oral QPM    levothyroxine (SYNTHROID) tablet 125 mcg  125 mcg Oral every Wednesday    levothyroxine (SYNTHROID) tablet 250 mcg  250 mcg Oral Once per day on Sun Mon Tue Thu Fri Sat    metoprolol succinate (TOPROL-XL) XL tablet 50 mg  50 mg Oral DAILY    pantoprazole (PROTONIX) tablet 40 mg  40 mg Oral DAILY    meclizine (ANTIVERT) tablet 25 mg  25 mg Oral Q6H PRN    Vancomycin Pharmacy Dosing   Other PRN     ______________________________________________________________________  EXPECTED LENGTH OF STAY: 5d 21h  ACTUAL LENGTH OF STAY:          Sydney Quijano MD

## 2019-03-18 VITALS
DIASTOLIC BLOOD PRESSURE: 68 MMHG | WEIGHT: 233.9 LBS | TEMPERATURE: 98.3 F | BODY MASS INDEX: 37.59 KG/M2 | HEIGHT: 66 IN | SYSTOLIC BLOOD PRESSURE: 113 MMHG | RESPIRATION RATE: 17 BRPM | HEART RATE: 70 BPM | OXYGEN SATURATION: 97 %

## 2019-03-18 LAB
ANION GAP SERPL CALC-SCNC: 3 MMOL/L (ref 5–15)
BUN SERPL-MCNC: 17 MG/DL (ref 6–20)
BUN/CREAT SERPL: 20 (ref 12–20)
CALCIUM SERPL-MCNC: 9.5 MG/DL (ref 8.5–10.1)
CHLORIDE SERPL-SCNC: 109 MMOL/L (ref 97–108)
CO2 SERPL-SCNC: 26 MMOL/L (ref 21–32)
CREAT SERPL-MCNC: 0.86 MG/DL (ref 0.55–1.02)
DATE LAST DOSE: ABNORMAL
GLUCOSE BLD STRIP.AUTO-MCNC: 174 MG/DL (ref 65–100)
GLUCOSE BLD STRIP.AUTO-MCNC: 267 MG/DL (ref 65–100)
GLUCOSE SERPL-MCNC: 166 MG/DL (ref 65–100)
POTASSIUM SERPL-SCNC: 4.1 MMOL/L (ref 3.5–5.1)
REPORTED DOSE,DOSE: ABNORMAL UNITS
REPORTED DOSE/TIME,TMG: ABNORMAL
SERVICE CMNT-IMP: ABNORMAL
SERVICE CMNT-IMP: ABNORMAL
SODIUM SERPL-SCNC: 138 MMOL/L (ref 136–145)
VANCOMYCIN TROUGH SERPL-MCNC: 14.7 UG/ML (ref 5–10)

## 2019-03-18 PROCEDURE — 80048 BASIC METABOLIC PNL TOTAL CA: CPT

## 2019-03-18 PROCEDURE — 74011250637 HC RX REV CODE- 250/637: Performed by: FAMILY MEDICINE

## 2019-03-18 PROCEDURE — 74011250636 HC RX REV CODE- 250/636: Performed by: HOSPITALIST

## 2019-03-18 PROCEDURE — 74011250636 HC RX REV CODE- 250/636: Performed by: PODIATRIST

## 2019-03-18 PROCEDURE — 94760 N-INVAS EAR/PLS OXIMETRY 1: CPT

## 2019-03-18 PROCEDURE — 74011250636 HC RX REV CODE- 250/636: Performed by: NURSE PRACTITIONER

## 2019-03-18 PROCEDURE — 36415 COLL VENOUS BLD VENIPUNCTURE: CPT

## 2019-03-18 PROCEDURE — 74011636637 HC RX REV CODE- 636/637: Performed by: INTERNAL MEDICINE

## 2019-03-18 PROCEDURE — 82962 GLUCOSE BLOOD TEST: CPT

## 2019-03-18 PROCEDURE — 80202 ASSAY OF VANCOMYCIN: CPT

## 2019-03-18 PROCEDURE — 74011636637 HC RX REV CODE- 636/637: Performed by: HOSPITALIST

## 2019-03-18 RX ORDER — DOXYCYCLINE 100 MG/1
100 TABLET ORAL 2 TIMES DAILY
Qty: 16 TAB | Refills: 0 | Status: SHIPPED | OUTPATIENT
Start: 2019-03-18 | End: 2020-09-24 | Stop reason: ALTCHOICE

## 2019-03-18 RX ADMIN — HUMAN INSULIN 70 UNITS: 100 INJECTION, SUSPENSION SUBCUTANEOUS at 08:25

## 2019-03-18 RX ADMIN — INSULIN LISPRO 7 UNITS: 100 INJECTION, SOLUTION INTRAVENOUS; SUBCUTANEOUS at 12:07

## 2019-03-18 RX ADMIN — INSULIN LISPRO 10 UNITS: 100 INJECTION, SOLUTION INTRAVENOUS; SUBCUTANEOUS at 07:34

## 2019-03-18 RX ADMIN — LEVOTHYROXINE SODIUM 250 MCG: 125 TABLET ORAL at 07:35

## 2019-03-18 RX ADMIN — CLOPIDOGREL BISULFATE 75 MG: 75 TABLET, FILM COATED ORAL at 08:26

## 2019-03-18 RX ADMIN — ASPIRIN 81 MG CHEWABLE TABLET 81 MG: 81 TABLET CHEWABLE at 08:25

## 2019-03-18 RX ADMIN — INSULIN LISPRO 3 UNITS: 100 INJECTION, SOLUTION INTRAVENOUS; SUBCUTANEOUS at 07:34

## 2019-03-18 RX ADMIN — Medication 10 ML: at 07:35

## 2019-03-18 RX ADMIN — Medication 10 ML: at 14:37

## 2019-03-18 RX ADMIN — INSULIN LISPRO 10 UNITS: 100 INJECTION, SOLUTION INTRAVENOUS; SUBCUTANEOUS at 12:07

## 2019-03-18 RX ADMIN — MECLIZINE 25 MG: 12.5 TABLET ORAL at 08:26

## 2019-03-18 RX ADMIN — PANTOPRAZOLE SODIUM 40 MG: 40 TABLET, DELAYED RELEASE ORAL at 08:26

## 2019-03-18 RX ADMIN — VANCOMYCIN HYDROCHLORIDE 1500 MG: 10 INJECTION, POWDER, LYOPHILIZED, FOR SOLUTION INTRAVENOUS at 06:39

## 2019-03-18 RX ADMIN — HEPARIN SODIUM 5000 UNITS: 5000 INJECTION INTRAVENOUS; SUBCUTANEOUS at 07:34

## 2019-03-18 RX ADMIN — ESCITALOPRAM OXALATE 10 MG: 10 TABLET ORAL at 08:26

## 2019-03-18 RX ADMIN — METOPROLOL SUCCINATE 50 MG: 50 TABLET, EXTENDED RELEASE ORAL at 08:26

## 2019-03-18 NOTE — PROGRESS NOTES
Pharmacist Note - Vancomycin Dosing  Therapy day 7  Indication:  diabetic foot infection with osteomyelitis of 4th toe; s/p amputation   Current regimen:  1500 mg IV Q16H    A Trough Level resulted at 14.7 mcg/mL which was obtained 16 hrs post-dose. The extrapolated \"true\" trough is approximately 14.5 mcg/mL based on the patient's known kinetics. Goal trough: 15 - 20 mcg/mL     Plan: Continue current regimen. Pharmacy will continue to monitor this patient daily for changes in clinical status and renal function.

## 2019-03-18 NOTE — PROGRESS NOTES
Hospital follow-up PCP transitional care appointment has been scheduled with Dr. Vania Moon for Friday, 3/22/19 at 12:40 p.m. Pending patient discharge.   Carina Garcia, Care Management Specialist.

## 2019-03-18 NOTE — PROGRESS NOTES
ID Progress Note  3/18/2019    Subjective:     Afebrile. No dyspnea. Objective:     Vitals:   Visit Vitals  /62 (BP 1 Location: Right arm, BP Patient Position: At rest)   Pulse 75   Temp 97.1 °F (36.2 °C)   Resp 18   Ht 5' 6\" (1.676 m)   Wt 106.1 kg (233 lb 14.4 oz)   SpO2 95%   Breastfeeding? No   BMI 37.75 kg/m²        Tmax:  Temp (24hrs), Av.6 °F (36.4 °C), Min:97.1 °F (36.2 °C), Max:98.2 °F (36.8 °C)      Exam:    Not in distress  Lungs: clear to auscultation, no rales, wheezes or rhonchi   Heart: s1, s2, no murmurs, rubs or clicks   Abdomen: soft, nontender, no guarding or rebound   Extremities: dorsum of right foot has no erythema. Incision has a scab    Labs:   Lab Results   Component Value Date/Time    WBC 6.1 2019 12:08 PM    Hemoglobin (POC) 11.6 10/29/2014 08:41 PM    HGB 11.0 (L) 2019 12:08 PM    Hematocrit (POC) 34 (L) 10/29/2014 08:41 PM    HCT 35.6 2019 12:08 PM    PLATELET 224  12:08 PM    MCV 88.3 2019 12:08 PM     Lab Results   Component Value Date/Time    Sodium 138 2019 06:32 AM    Potassium 4.1 2019 06:32 AM    Chloride 109 (H) 2019 06:32 AM    CO2 26 2019 06:32 AM    Anion gap 3 (L) 2019 06:32 AM    Glucose 166 (H) 2019 06:32 AM    BUN 17 2019 06:32 AM    Creatinine 0.86 2019 06:32 AM    BUN/Creatinine ratio 20 2019 06:32 AM    GFR est AA >60 2019 06:32 AM    GFR est non-AA >60 2019 06:32 AM    Calcium 9.5 2019 06:32 AM    Bilirubin, total 0.4 2019 01:17 PM    AST (SGOT) 48 (H) 2019 01:17 PM    Alk. phosphatase 371 (H) 2019 01:17 PM    Protein, total 8.6 (H) 2019 01:17 PM    Albumin 3.2 (L) 2019 01:17 PM    Globulin 5.4 (H) 2019 01:17 PM    A-G Ratio 0.6 (L) 2019 01:17 PM    ALT (SGPT) 47 2019 01:17 PM             Assessment:       1. Osteomyelitis of the right fourth toe s/p 4th toe amputation  2. Vertigo.   3.  Diabetes with neuropathy. 4.  History of osteomyelitis of the right foot.           Recommendations:        Path proves surgical cure. The dressing was very damp and it had the smell of urine on it. She tells me she must have stepped on it last night. There dressing is off and will be rebandaged.  From my end, if discharged today, she can get doxycycline 100 mg bid  Until march 27.                Laura Abrams MD

## 2019-03-18 NOTE — PROGRESS NOTES
Discharge instructions gone over with patient and paper copy of discharge instructions signed and placed on chart. Copy of discharge instructions given to patient. IV's removed. Time for questions and clarifications provided. Volunteer request sent.

## 2019-03-19 ENCOUNTER — PATIENT OUTREACH (OUTPATIENT)
Dept: INTERNAL MEDICINE CLINIC | Age: 69
End: 2019-03-19

## 2019-03-19 NOTE — ACP (ADVANCE CARE PLANNING)
Non-Provider Advance Care Planning (ACP) Note    Date of ACP Conversation: 3/19/2019  Persons included in Conversation: patient  Length of ACP Conversation in minutes: <16 minutes (Non-Billable)    Conversation requested by:   Nurse Navigator    Authorized Decision Maker (if patient is incapable of making informed decisions): This person is:   Other Legally Authorized Decision Maker (e.g. Next of Kin)        General ACP for ALL Patients with Decision Making Capacity:    Advance Directive Conversation with Patients who have not yet planned:  Importance of advance care planning, including choosing a healthcare agent to communicate patient's healthcare decisions if patient lost the ability to make decisions, such as after a sudden illness or accident  Recommended additional conversation with prospective agent    Review of Existing Advance Directive: (Select questions covered)  N/A    Interventions Provided:  Recommended completion of Advance Directive after review of materials, conversation with prospective healthcare agent about (and others as needed), and readiness to complete a written plan

## 2019-03-19 NOTE — PROGRESS NOTES
Per Dr. Lucy Dawson request, Rad Shrestha called patient to offer scheduling appointment with endocrinology, Dr Tami Sinha, for follow up ASAP. Patient declined, wants to schedule for herself. Stressed importance of scheduling ASAP.  Patient verbalized understanding

## 2019-03-19 NOTE — PROGRESS NOTES
Hospital Discharge Follow-Up Date/Time:  3/19/2019 12:00 PM 
 
Patient was admitted to [facility] on 3/12/19 and discharged on 3/18/19 for Diabetic foot infection resulting in amputation of 4th toe right foot. The physician discharge summary was not available at the time of outreach. Patient was contacted within 1 business days of discharge. Top Challenges reviewed with the provider Diabetes uncontrolled. A1c inpatient 11.2 on 3/12/19. Consider new A1c and FU with pharD for medication and diet education. Offered at phone call FU, but patient said \"not ready\" FU appointments with podiatry. Offered to schedule for patient. Patient would prefer scheduling herself. Stressed importance of FU ASAP. No ACP. Patient would like information provided to take with her  at PCP FU 3/22/19. Method of communication with provider :staff message Inpatient RRAT score: 22 Was this a readmission? no  
Patient stated reason for the readmission: N/A Nurse Navigator (NN) contacted the patient by telephone to perform post hospital discharge assessment. Verified name and  with patient as identifiers. Provided introduction to self, and explanation of the Nurse Navigator role. Reviewed discharge instructions and red flags with patient who verbalized understanding. Patient given an opportunity to ask questions and does not have any further questions or concerns at this time. The patient agrees to contact the PCP office for questions related to their healthcare. NN provided contact information for future reference. Disease Specific:   N/A Summary of patient's top problems: 1. Diabetic foot infection - Patient sent for admission to [facility] following podiatry appointment for foot infection. Amputation of 4th toe, right foot performed. Started on IV antibiotics and transitioned to oral antibiotics for discharge. Xray and Duplex lower ext venous right were performed. Patient reports medication compliance, denies pain and verbalized knowledge of s/s of infection. 2. Vertigo - patient c/o vertigo and dizziness on admission. CT of head/neck showed no acute abnormality and MRI of brain showed  4 small foci of chronic hemosiderin deposition/calcific density, inferior left cerebellum most likely related to remote infarctions. The largest foci may be related to a cavernoma. Patient reports no further problems outpatient. Received PT inpatient which helped. Per patient, no FU was suggested. Home Health orders at discharge: none Home Health company: N/A Date of initial visit: N/A Durable Medical Equipment ordered/company: None Durable Medical Equipment received: N/A Barriers to care? lack of knowledge about disease Advance Care Planning:  
Does patient have an Advance Directive:  not on file; education provided Medication(s):  
New Medications at Discharge: none Changed Medications at Discharge: none Discontinued Medications at Discharge: B12, and multivitamin Medication reconciliation was performed with patient, who verbalizes understanding of administration of home medications. There were no barriers to obtaining medications identified at this time. Referral to Pharm D needed: yes -writer offered to schedule for patient, but patient reported \"not ready\" at this time Current Outpatient Medications Medication Sig  
 doxycycline (ADOXA) 100 mg tablet Take 1 Tab by mouth two (2) times a day.  levothyroxine (SYNTHROID) 125 mcg tablet Take 250 mcg by mouth six (6) days a week. Every day except Wednesday  insulin regular (NOVOLIN R REGULAR U-100 INSULN) 100 unit/mL injection by SubCUTAneous route Before breakfast, lunch, and dinner. Sliding scale  insulin NPH (NOVOLIN N NPH U-100 INSULIN) 100 unit/mL injection 100 Units by SubCUTAneous route two (2) times a day.   
 fenofibrate micronized (LOFIBRA) 67 mg capsule Take 67 mg by mouth every morning.  escitalopram oxalate (LEXAPRO) 10 mg tablet Take 1 Tab by mouth daily.  fluticasone (FLONASE) 50 mcg/actuation nasal spray 2 Sprays by Both Nostrils route daily.  cholecalciferol (VITAMIN D3) 1,000 unit tablet Take 2,000 Units by mouth daily.  DULAGLUTIDE (TRULICITY SC) 1.5 Units by SubCUTAneous route Every Saturday. 1.5 units every saturday  atorvastatin (LIPITOR) 40 mg tablet Take 1 Tab by mouth daily.  clopidogrel (PLAVIX) 75 mg tab Take 1 Tab by mouth daily.  metoprolol succinate (TOPROL XL) 50 mg XL tablet Take 1 Tab by mouth daily.  omeprazole (PRILOSEC OTC) 20 mg tablet Take 20 mg by mouth daily. Indications: GASTROESOPHAGEAL REFLUX  ibuprofen (MOTRIN IB) 200 mg tablet Take 200 mg by mouth daily as needed for Pain. Indications: OSTEOARTHRITIS  Bifidobacterium Infantis (ALIGN) 4 mg cap As directed (Patient taking differently: Take 1 Cap by mouth daily. As directed)  aspirin 81 mg chewable tablet Take 81 mg by mouth daily.  omega-3 fatty acids-vitamin e (FISH OIL) 1,000 mg cap Take 1 Cap by mouth daily.  albuterol (PROVENTIL HFA, VENTOLIN HFA, PROAIR HFA) 90 mcg/actuation inhaler Take 2 Puffs by inhalation every four (4) hours as needed for Wheezing.  nitroglycerin (NITROSTAT) 0.4 mg SL tablet 1 Tab by SubLINGual route as needed for Chest Pain.  levothyroxine (SYNTHROID) 125 mcg tablet Take 125 mcg by mouth every Wednesday. Take 2 tablets by mouth thurs-tues No current facility-administered medications for this visit. There are no discontinued medications. [facility] follow up appointment(s):  
Future Appointments Date Time Provider Braeden Clifford 3/22/2019 12:40 [place] [id number] [facility] follow up appointment(s): Needs podiatry Fu. Writer offered to schedule for patient, but patient declined choosing to schedule for self. Writer stressed importance of FU ASAP. Patient verbalized understanding [facility]:  out of service area Goals  Prevent complications post hospitalization. 3/19/19 Patient discharged 3/18/19 following DM foot infection resulting in amputation of 4th toe right foot. Patient has uncontrolled diabetes. Last A1c was 11.2 on 3/12/19. Encouraged patient to set time to see Pharmacist for review of medication and diet. Patient declined at this time. Patient reports checking BS 3 x daily prior to eating. Had been on clear liquid diet in hospital and was instructed to continue until no N/V. Patient reports no N/V and feels able to eat a regular diet. Has not eaten today and had not checked BS. Stressed importance of getting BS under control. Patient verbalized understanding. Follow up in 1 week. ST 
  
  Understands red flags post discharge. 3/19/19 Patient admitted 3/12 - 318/19 for Diabetic foot infection resulting in amputation of 4th toe, right foot. IV antibiotics inpatient now oral doxycycline outpatient. Patient reports dressing dry and intact with no s/s of infection or pain at site. Did have burning sensation in ankle of right foot last night but is not present today. Encouraged patient to keep log of any such symptoms and report to PCP at FU apt 3/22/19. Reviewed symptoms to be aware of and to call if any changes. Patient verbalized understanding. Reports ambulating slowly and without assistance. Has cane if she needs to use it. Follow up in 1 week to monitor improvement. ST 
  
  
  
3:08pm  Per Dr Ange Eduardo staff message, Matthew Martins called patient to offer to schedule appointment with Dr. Freda Kidd (endocrinology). Patient reports seeing Dr Freda Kidd about 1 month ago and would prefer to schedule next appointment herself. Patient informed appointment should be ASAP. Patient verbalized understanding.

## 2019-03-20 NOTE — DISCHARGE SUMMARY
.      Discharge Summary     PATIENT ID: Marilyn Dsouza  MRN: [id number]   YOB: 1950    DATE OF ADMISSION: 3/12/2019 12:49 PM    DATE OF DISCHARGE: 3/20/2019  PRIMARY CARE PROVIDER: Kelly Chang MD       DISCHARGING PHYSICIAN: Reva Simon MD    To contact this individual call [phone]. CONSULTATIONS: IP CONSULT TO NEUROLOGY    PROCEDURES/SURGERIES: Procedure(s):  AMPUTATION TOE 4 RIGHT FOOT    ADMITTING DIAGNOSES & HOSPITAL COURSE:   1. Left fourth toe Ostewith cellulitis of the foot      The patient will be admitted on a medical bed. The  started the patient on broad-spectrum IV antibiotics. Candance Dux took her to the OR . She is status post forth Toe amputation for further plan of intervention. We will provide pain control with a venous Duplex, wound care, and further intervention per hospital course. Continue to closely monitor and reassess as needed. 2.  Dizziness, vertigo versus central.  We will get CT of the head and neck to rule out peripheral stroke. Continue aspirin. Neurovascular checks. IV fluids. Fall precautions. May consider getting PT/OT consult once the patient's foot has been improved. Further intervention per hospital course. We will reassess as needed. Continue to monitor. 3.  Hypertension. Continue home medications and continue to monitor. 4.  Diabetes, poorly controlled. The patient will be on sliding scale Novolog insulin, Accu-Cheks, diet control. Further intervention per hospital course. 5.  Gastrointestinal and deep venous thrombosis prophylaxis. The patient will be on heparin.                  DISCHARGE DIAGNOSES / PLAN:      1. Left foot cellulitis           No results found for this or any previous visit (from the past 24 hour(s)).     PENDING TEST RESULTS:   At the time of discharge the following test results are still pending: None    FOLLOW UP APPOINTMENTS:    Follow-up Information     Follow up With Specialties Details Why Contact Info    Fady Dos Santos DPM Foot and Ankle , Podiatry Schedule an appointment as soon as possible for a visit in 1 week  [address] [facility]  [address] Delores Gupta MD Internal Medicine On 3/22/2019 Hospital f/u PCP appointment Friday, 3/22/19 @ 12:40 p.m.  [address] [facility] [address]      Fady Dos Santos DPM Foot and Ankle , Podiatry In 2 weeks  [address] [place] [address]  [phone]             ADDITIONAL CARE RECOMMENDATIONS: None    DIET: Cardiac    ACTIVITY: As tolerated            DISCHARGE MEDICATIONS:  Discharge Medication List as of 3/18/2019  3:49 PM      CONTINUE these medications which have CHANGED    Details   doxycycline (ADOXA) 100 mg tablet Take 1 Tab by mouth two (2) times a day., Normal, Disp-16 Tab, R-0         CONTINUE these medications which have NOT CHANGED    Details   !! levothyroxine (SYNTHROID) 125 mcg tablet Take 250 mcg by mouth six (6) days a week. Every day except Wednesday, Historical Med      insulin regular (NOVOLIN R REGULAR U-100 INSULN) 100 unit/mL injection by SubCUTAneous route Before breakfast, lunch, and dinner. Sliding scale, Historical Med      insulin NPH (NOVOLIN N NPH U-100 INSULIN) 100 unit/mL injection 100 Units by SubCUTAneous route two (2) times a day., Historical Med      fenofibrate micronized (LOFIBRA) 67 mg capsule Take 67 mg by mouth every morning., Historical Med      albuterol (PROVENTIL HFA, VENTOLIN HFA, PROAIR HFA) 90 mcg/actuation inhaler Take 2 Puffs by inhalation every four (4) hours as needed for Wheezing., Normal, Disp-1 Inhaler, R-1      escitalopram oxalate (LEXAPRO) 10 mg tablet Take 1 Tab by mouth daily. , Normal, Disp-30 Tab, R-11      fluticasone (FLONASE) 50 mcg/actuation nasal spray 2 Sprays by Both Nostrils route daily. , No Print, Disp-1 Bottle, R-11      cholecalciferol (VITAMIN D3) 1,000 unit tablet Take 2,000 Units by mouth daily. , Historical Med      DULAGLUTIDE (TRULICITY SC) 1.5 Units by SubCUTAneous route Every Saturday. 1.5 units every saturday, Historical Med      atorvastatin (LIPITOR) 40 mg tablet Take 1 Tab by mouth daily. , Print, Disp-30 Tab, R-6      clopidogrel (PLAVIX) 75 mg tab Take 1 Tab by mouth daily. , Print, Disp-30 Tab, R-6      nitroglycerin (NITROSTAT) 0.4 mg SL tablet 1 Tab by SubLINGual route as needed for Chest Pain., Print, Disp-1 Bottle, R-6      metoprolol succinate (TOPROL XL) 50 mg XL tablet Take 1 Tab by mouth daily. , Print, Disp-30 Tab, R-6      omeprazole (PRILOSEC OTC) 20 mg tablet Take 20 mg by mouth daily. Indications: GASTROESOPHAGEAL REFLUX, Historical Med      ibuprofen (MOTRIN IB) 200 mg tablet Take 200 mg by mouth daily as needed for Pain. Indications: OSTEOARTHRITIS, Historical Med      !! levothyroxine (SYNTHROID) 125 mcg tablet Take 125 mcg by mouth every Wednesday. Take 2 tablets by mouth thurs-tues, Historical Med      Bifidobacterium Infantis (ALIGN) 4 mg cap As directed, No Print, Disp-30 capsule, R-11      aspirin 81 mg chewable tablet Take 81 mg by mouth daily. , Historical Med      omega-3 fatty acids-vitamin e (FISH OIL) 1,000 mg cap Take 1 Cap by mouth daily. , Historical Med       !! - Potential duplicate medications found. Please discuss with provider.       STOP taking these medications       varicella-zoster recombinant, PF, (SHINGRIX, PF,) 50 mcg/0.5 mL susr injection Comments:   Reason for Stopping:         b complex vitamins tablet Comments:   Reason for Stopping:         multivitamin,tx-iron-ca-min (THERAPEUTIC MULTIVIT/MINERAL) 27-0.4 mg Tab Comments:   Reason for Stopping:                 DISPOSITION:    Home With:   OT  PT  HH  RN       Long term SNF/Inpatient Rehab    Independent/assisted living    Hospice    Other:       PATIENT CONDITION AT DISCHARGE:     Functional status    Poor     Deconditioned     Independent      Cognition     Lucid     Forgetful     Dementia Code status     Full code     DNR      PHYSICAL EXAMINATION AT DISCHARGE  Visit Vitals  /68 (BP 1 Location: Right arm, BP Patient Position: At rest)   Pulse 70   Temp 98.3 °F (36.8 °C)   Resp 17   Ht 5' 6\" (1.676 m)   Wt 106.1 kg (233 lb 14.4 oz)   SpO2 97%   Breastfeeding? No   BMI 37.75 kg/m²      No data recorded. O2 Device: Room air  No data found. No intake or output data in the 24 hours ending 03/20/19 2009  Last shift:    No intake/output data recorded. Last 3 shifts:    No intake/output data recorded. General:   Alert, cooperative, no acute distress   Head:   Atraumatic   Eyes:   Conjunctivae clear   ENT:  Oral mucosa normal   Neck:  Supple, trachea midline, no adenopathy   No JVD   Back:    No CVA tenderness    Chest wall:    No tenderness or deformities    Lungs:   bilateral wheezing     Heart:   Regular rhythm, no murmur   Abdomen:    Soft, non-tender   No masses or organomegaly    Extremities:  No edema or DVT signs   Pulses:  Symmetric all extremities   Skin:  Warm and dry    No rashes or lesions   Neurologic:  Oriented   No focal deficits           CHRONIC MEDICAL DIAGNOSES:  Problem List as of 3/18/2019 Date Reviewed: 3/13/2019          Codes Class Noted - Resolved    * (Principal) Foot infection ICD-10-CM: L08.9  ICD-9-CM: 686.9  3/12/2019 - Present        Elevated alkaline phosphatase level ICD-10-CM: R74.8  ICD-9-CM: 790.5  11/15/2018 - Present        Severe obesity (BMI 35.0-39. 9) with comorbidity [facility]) ICD-10-CM: E66.01  ICD-9-CM: 278.01  5/30/2018 - Present        Advanced care planning/counseling discussion ICD-10-CM: Z71.89  ICD-9-CM: V65.49  10/28/2015 - Present    Overview Signed 10/28/2015  2:14 PM by Ramya Light RN     Feels that GOD will make the decision for her. Believes her  and daughter will agree on what is best for her. There will be no fighting over decisions.               Type 2 diabetes, uncontrolled, with neuropathy ([facility] [address] ICD-10-CM: E11.40, E11.65  ICD-9-CM: 250.62, 357.2  10/28/2015 - Present        Ischemic colitis ([facility] ICD-10-CM: K55.9  ICD-9-CM: 557.9  11/12/2014 - Present        Acute colitis ICD-10-CM: K52.9  ICD-9-CM: 558.9  10/29/2014 - Present        Renal insufficiency ICD-10-CM: N28.9  ICD-9-CM: 593.9  10/23/2014 - Present        Iron deficiency anemia ICD-10-CM: D50.9  ICD-9-CM: 280.9  5/31/2011 - Present        Essential hypertension, benign ICD-10-CM: I10  ICD-9-CM: 401.1  7/21/2010 - Present        Unspecified sleep apnea ICD-10-CM: G47.30  ICD-9-CM: 780.57  3/24/2010 - Present        Mixed hyperlipidemia ICD-10-CM: E78.2  ICD-9-CM: 272.2  11/20/2009 - Present        Diabetic foot ulcer ([facility] ICD-10-CM: E11.621, L97.509  ICD-9-CM: 250.80, 707.15  11/20/2009 - Present        Hypothyroidism, acquired, autoimmune ICD-10-CM: E06.3  ICD-9-CM: 244.8  11/20/2009 - Present        Mononeuritis of unspecified site ICD-10-CM: G58.9  ICD-9-CM: 355.9  11/20/2009 - Present        Coronary atherosclerosis of native coronary artery ICD-10-CM: I25.10  ICD-9-CM: 414.01  11/20/2009 - Present        Symptomatic menopausal or female climacteric states ICD-10-CM: N95.1  ICD-9-CM: 627.2  11/20/2009 - Present        RESOLVED: Unstable angina ([facility] ICD-10-CM: I20.0  ICD-9-CM: 411.1  11/18/2016 - 11/2/2017        RESOLVED: Ischemic colitis ([facility] ICD-10-CM: K55.9  ICD-9-CM: 557.9  11/1/2014 - 11/1/2014        RESOLVED: Type II or unspecified type diabetes mellitus with unspecified complication, not stated as uncontrolled ICD-10-CM: E11.8  ICD-9-CM: 250.90  10/23/2013 - 10/28/2015        RESOLVED: Type I (juvenile type) diabetes mellitus with unspecified complication, not stated as uncontrolled ICD-10-CM: E10.8  ICD-9-CM: 250.91  11/20/2009 - 10/23/2013        RESOLVED: Obesity, unspecified ICD-10-CM: E66.9  ICD-9-CM: 278.00  11/20/2009 - 5/30/2018              Greater than  45 minutes were spent with the patient on counseling and coordination of care    Signed:   Francoise Romero MD  3/20/2019  8:09 PM

## 2019-03-22 ENCOUNTER — OFFICE VISIT (OUTPATIENT)
Dept: INTERNAL MEDICINE CLINIC | Age: 69
End: 2019-03-22

## 2019-03-22 VITALS
RESPIRATION RATE: 19 BRPM | HEART RATE: 64 BPM | HEIGHT: 66 IN | DIASTOLIC BLOOD PRESSURE: 70 MMHG | TEMPERATURE: 98.2 F | OXYGEN SATURATION: 97 % | WEIGHT: 238.6 LBS | BODY MASS INDEX: 38.35 KG/M2 | SYSTOLIC BLOOD PRESSURE: 118 MMHG

## 2019-03-22 DIAGNOSIS — I25.10 ATHEROSCLEROSIS OF NATIVE CORONARY ARTERY OF NATIVE HEART WITHOUT ANGINA PECTORIS: ICD-10-CM

## 2019-03-22 DIAGNOSIS — E10.621 DIABETIC ULCER OF TOE OF RIGHT FOOT ASSOCIATED WITH TYPE 1 DIABETES MELLITUS, WITH NECROSIS OF BONE (HCC): ICD-10-CM

## 2019-03-22 DIAGNOSIS — Z79.4 TYPE 2 DIABETES MELLITUS WITH COMPLICATION, WITH LONG-TERM CURRENT USE OF INSULIN (HCC): ICD-10-CM

## 2019-03-22 DIAGNOSIS — I10 ESSENTIAL HYPERTENSION: Primary | ICD-10-CM

## 2019-03-22 DIAGNOSIS — L97.514 DIABETIC ULCER OF TOE OF RIGHT FOOT ASSOCIATED WITH TYPE 1 DIABETES MELLITUS, WITH NECROSIS OF BONE (HCC): ICD-10-CM

## 2019-03-22 DIAGNOSIS — E11.8 TYPE 2 DIABETES MELLITUS WITH COMPLICATION, WITH LONG-TERM CURRENT USE OF INSULIN (HCC): ICD-10-CM

## 2019-03-22 NOTE — PROGRESS NOTES
HISTORY OF PRESENT ILLNESS  Genie Leyden is a 70 y.o. female. HPI Subjective:  Lorraine Michele is seen today for a transitional care visit for recent admission of a diabetic foot ulcer. She was admitted to [facility] on 03/12/19 with a diabetic foot infection. She was discharged on 03/20. Nurse navigator contact was within two business days of discharge. Her visit is today, the 22nd. This represents a transitional care visit. 1. Diabetic foot ulcer. She underwent amputation. This is one of many amputations she has had. Symptoms of infection had begun about seven days prior to the presentation. The toe amputated was the right fourth toe. She follows up closely with podiatry, (Dr. Edvin Reeves). 2. Diabetes. A1c is poor. She saw her endocrinologist yesterday. Strongly encouraged follow up to get better control of her diabetes. 3. Vertigo. Negative workup during hospitalization and her symptoms have improved. 4. CAD. No flare up of symptoms during her hospitalization. Social History:  Notable for her not requiring home health. Wound care is to be undertaken by her surgeon. Medications:  Fully reviewed and reconciled. See below. This is an extended visit of high complexity. Current Outpatient Medications   Medication Sig    doxycycline (ADOXA) 100 mg tablet Take 1 Tab by mouth two (2) times a day.  levothyroxine (SYNTHROID) 125 mcg tablet Take 250 mcg by mouth six (6) days a week. Every day except Wednesday    insulin regular (NOVOLIN R REGULAR U-100 INSULN) 100 unit/mL injection by SubCUTAneous route Before breakfast, lunch, and dinner. Sliding scale    insulin NPH (NOVOLIN N NPH U-100 INSULIN) 100 unit/mL injection 100 Units by SubCUTAneous route two (2) times a day.  fenofibrate micronized (LOFIBRA) 67 mg capsule Take 67 mg by mouth every morning.     albuterol (PROVENTIL HFA, VENTOLIN HFA, PROAIR HFA) 90 mcg/actuation inhaler Take 2 Puffs by inhalation every four (4) hours as needed for Wheezing.  escitalopram oxalate (LEXAPRO) 10 mg tablet Take 1 Tab by mouth daily.  fluticasone (FLONASE) 50 mcg/actuation nasal spray 2 Sprays by Both Nostrils route daily.  cholecalciferol (VITAMIN D3) 1,000 unit tablet Take 2,000 Units by mouth daily.  DULAGLUTIDE (TRULICITY SC) 1.5 Units by SubCUTAneous route Every Saturday. 1.5 units every saturday    atorvastatin (LIPITOR) 40 mg tablet Take 1 Tab by mouth daily.  clopidogrel (PLAVIX) 75 mg tab Take 1 Tab by mouth daily.  nitroglycerin (NITROSTAT) 0.4 mg SL tablet 1 Tab by SubLINGual route as needed for Chest Pain.  metoprolol succinate (TOPROL XL) 50 mg XL tablet Take 1 Tab by mouth daily.  omeprazole (PRILOSEC OTC) 20 mg tablet Take 20 mg by mouth daily. Indications: GASTROESOPHAGEAL REFLUX    ibuprofen (MOTRIN IB) 200 mg tablet Take 200 mg by mouth daily as needed for Pain. Indications: OSTEOARTHRITIS    levothyroxine (SYNTHROID) 125 mcg tablet Take 125 mcg by mouth every Wednesday. Take 2 tablets by mouth thurs-tues    Bifidobacterium Infantis (ALIGN) 4 mg cap As directed (Patient taking differently: Take 1 Cap by mouth daily. As directed)    aspirin 81 mg chewable tablet Take 81 mg by mouth daily.  omega-3 fatty acids-vitamin e (FISH OIL) 1,000 mg cap Take 1 Cap by mouth daily. No current facility-administered medications for this visit. Review of Systems   Constitutional: Negative for chills, fever and weight loss. Respiratory: Negative. Cardiovascular: Negative for chest pain, palpitations, leg swelling and PND. Musculoskeletal: Negative for joint pain and myalgias. Neurological: Positive for dizziness. Negative for focal weakness. All other systems reviewed and are negative. Physical Exam   Constitutional: No distress. Cardiovascular: Normal rate and regular rhythm. Exam reveals no gallop and no friction rub. No murmur heard.   Pulmonary/Chest: Effort normal and breath sounds normal.   Musculoskeletal: She exhibits no edema. Feet:    Neurological: She is alert. Skin: Skin is warm and dry. Psychiatric: She has a normal mood and affect. Her behavior is normal.   Nursing note and vitals reviewed. ASSESSMENT and PLAN  Diagnoses and all orders for this visit:    1. Essential hypertension- Continue current regimen of prescription and / or OTC medications     2. Type 2 diabetes mellitus with complication, with long-term current use of insulin [facility])- See endocrinologist as directed. 3. Atherosclerosis of native coronary artery of native heart without angina pectoris- Continue current regimen of prescription and / or OTC medications , See cardiologist as directed. 4. 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  BPPV- Call with recurrence

## 2019-03-26 NOTE — DISCHARGE SUMMARY
Discharge Summary     PATIENT ID: Miranda Houston  MRN: [id number]   YOB: 1950    DATE OF ADMISSION: 3/12/2019 12:49 PM    DATE OF DISCHARGE: 3/26/2019  PRIMARY CARE PROVIDER: Philip De Guzman MD       DISCHARGING PHYSICIAN: Francoise Romero MD    To contact this individual call [phone]. CONSULTATIONS: IP CONSULT TO NEUROLOGY    PROCEDURES/SURGERIES: Procedure(s):  AMPUTATION TOE 4 RIGHT FOOT    ADMITTING DIAGNOSES & HOSPITAL COURSE:   1. Right fourth Toe OM - Confirmed by MRI- Seen by podiatry. S/p amputation of 4th toe of rt foot. Started on broad spectrum abx - vancomycin. Zosyn d/c on 3/15.  - wound culture gre staph aureus MRSA. Path report Osteomyelitis  ID recommend  Oral Antibiotics . D/W Sofía Edwards agreed with about management despite the path came  Back positve for Osteomyelitis  -monitor vanc levels  - If path comes back neg for Osteo will discharged on oral Abx     2. Vertigo: related to BPPV -on meclizine. Needs vestibular therapy as OP.  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           3. Dm2 with hyperglycemia:  On 60 U NPH BID -----> 70   Units BID  3/17  and add 6---> 10 3/16 U TID with meal and SSI  Stated that she was taking 100  UNITS bBID at home      4. HTN - C/W toprol, Controlled     5. History of CAD:   -s/p cardiac cath in 2016 showed diffuse coronary   artery disease with an occluded distal marginal vessel that filled by   both left-to-left, and right-to-left collaterals  - medical management was recommended. On aspirin,plavix,metoprolol and statin        Code status: Full  DVT prophylaxis: heaprin                  Care Plan discussed with: Patient/Family and Nurse  Disposition: TBD        DISCHARGE DIAGNOSES / PLAN:       1- Right fourth Toe OM  Status post Amputation     2- Poorly controlled Diabetes          No results found for this or any previous visit (from the past 24 hour(s)).     PENDING TEST RESULTS: At the time of discharge the following test results are still pending: none    FOLLOW UP APPOINTMENTS:    Follow-up Information     Follow up With Specialties Details Why Contact Info    Nicolás Espinosa DPM Foot and Ankle , Podiatry Schedule an appointment as soon as possible for a visit in 1 week  [address] [facility]  [address]  [address] [place] [address]      Fernando Galeana MD Internal Medicine On 3/22/2019 Hospital f/u PCP appointment Friday, 3/22/19 @ 12:40 p.m.  [address] [place] [address]  [address] [facility] [address]      Nicolás Espinosa DPM Foot and Ankle , Podiatry In 2 weeks  [address] [facility]  [address]  [address] [place] [address]  [phone]             ADDITIONAL CARE RECOMMENDATIONS: better glycemic control , the patient was referred to Endocrinologist  Daily dersing change as she instructed    DIET: Diabetic Diet    ACTIVITY: Activity as tolerated            DISCHARGE MEDICATIONS:  Discharge Medication List as of 3/18/2019  3:49 PM      CONTINUE these medications which have CHANGED    Details   doxycycline (ADOXA) 100 mg tablet Take 1 Tab by mouth two (2) times a day., Normal, Disp-16 Tab, R-0         CONTINUE these medications which have NOT CHANGED    Details   !! levothyroxine (SYNTHROID) 125 mcg tablet Take 250 mcg by mouth six (6) days a week. Every day except Wednesday, Historical Med      insulin regular (NOVOLIN R REGULAR U-100 INSULN) 100 unit/mL injection by SubCUTAneous route Before breakfast, lunch, and dinner.  Sliding scale, Historical Med      insulin NPH (NOVOLIN N NPH U-100 INSULIN) 100 unit/mL injection 100 Units by SubCUTAneous route two (2) times a day., Historical Med      fenofibrate micronized (LOFIBRA) 67 mg capsule Take 67 mg by mouth every morning., Historical Med      albuterol (PROVENTIL HFA, VENTOLIN HFA, PROAIR HFA) 90 mcg/actuation inhaler Take 2 Puffs by inhalation every four (4) hours as needed for Wheezing., Normal, Disp-1 Inhaler, R-1 escitalopram oxalate (LEXAPRO) 10 mg tablet Take 1 Tab by mouth daily. , Normal, Disp-30 Tab, R-11      fluticasone (FLONASE) 50 mcg/actuation nasal spray 2 Sprays by Both Nostrils route daily. , No Print, Disp-1 Bottle, R-11      cholecalciferol (VITAMIN D3) 1,000 unit tablet Take 2,000 Units by mouth daily. , Historical Med      DULAGLUTIDE (TRULICITY SC) 1.5 Units by SubCUTAneous route Every Saturday. 1.5 units every saturday, Historical Med      atorvastatin (LIPITOR) 40 mg tablet Take 1 Tab by mouth daily. , Print, Disp-30 Tab, R-6      clopidogrel (PLAVIX) 75 mg tab Take 1 Tab by mouth daily. , Print, Disp-30 Tab, R-6      nitroglycerin (NITROSTAT) 0.4 mg SL tablet 1 Tab by SubLINGual route as needed for Chest Pain., Print, Disp-1 Bottle, R-6      metoprolol succinate (TOPROL XL) 50 mg XL tablet Take 1 Tab by mouth daily. , Print, Disp-30 Tab, R-6      omeprazole (PRILOSEC OTC) 20 mg tablet Take 20 mg by mouth daily. Indications: GASTROESOPHAGEAL REFLUX, Historical Med      ibuprofen (MOTRIN IB) 200 mg tablet Take 200 mg by mouth daily as needed for Pain. Indications: OSTEOARTHRITIS, Historical Med      !! levothyroxine (SYNTHROID) 125 mcg tablet Take 125 mcg by mouth every Wednesday. Take 2 tablets by mouth thurs-tues, Historical Med      Bifidobacterium Infantis (ALIGN) 4 mg cap As directed, No Print, Disp-30 capsule, R-11      aspirin 81 mg chewable tablet Take 81 mg by mouth daily. , Historical Med      omega-3 fatty acids-vitamin e (FISH OIL) 1,000 mg cap Take 1 Cap by mouth daily. , Historical Med       !! - Potential duplicate medications found. Please discuss with provider.       STOP taking these medications       varicella-zoster recombinant, PF, (SHINGRIX, PF,) 50 mcg/0.5 mL susr injection Comments:   Reason for Stopping:         b complex vitamins tablet Comments:   Reason for Stopping:         multivitamin,tx-iron-ca-min (THERAPEUTIC MULTIVIT/MINERAL) 27-0.4 mg Tab Comments:   Reason for Stopping: DISPOSITION:    Home With:   OT  PT  HH  RN       Long term SNF/Inpatient Rehab    Independent/assisted living    Hospice    Other:       PATIENT CONDITION AT DISCHARGE:     Functional status    Poor     Deconditioned     Independent      Cognition     Lucid     Forgetful     Dementia          Code status     Full code     DNR      PHYSICAL EXAMINATION AT DISCHARGE  Visit Vitals  /68 (BP 1 Location: Right arm, BP Patient Position: At rest)   Pulse 70   Temp 98.3 °F (36.8 °C)   Resp 17   Ht 5' 6\" (1.676 m)   Wt 106.1 kg (233 lb 14.4 oz)   SpO2 97%   Breastfeeding? No   BMI 37.75 kg/m²      No data recorded. O2 Device: Room air  No data found. No intake or output data in the 24 hours ending 03/26/19 0926  Last shift:    No intake/output data recorded. Last 3 shifts:    No intake/output data recorded. General:   Alert, cooperative, no acute distress   Head:   Atraumatic   Eyes:   Conjunctivae clear   ENT:  Oral mucosa normal   Neck:  Supple, trachea midline, no adenopathy   No JVD   Back:    No CVA tenderness    Chest wall:    No tenderness or deformities    Lungs:   bilateral wheezing     Heart:   Regular rhythm, no murmur   Abdomen:    Soft, non-tender   No masses or organomegaly    Extremities:  No edema or DVT signs   Pulses:  Symmetric all extremities   Skin:  Warm and dry    No rashes or lesions   Neurologic:  Oriented   No focal deficits           CHRONIC MEDICAL DIAGNOSES:  Problem List as of 3/18/2019 Date Reviewed: 3/13/2019          Codes Class Noted - Resolved    * (Principal) Foot infection ICD-10-CM: L08.9  ICD-9-CM: 686.9  3/12/2019 - Present        Elevated alkaline phosphatase level ICD-10-CM: R74.8  ICD-9-CM: 790.5  11/15/2018 - Present        Severe obesity (BMI 35.0-39. 9) with comorbidity [facility]) ICD-10-CM: E66.01  ICD-9-CM: 278.01  5/30/2018 - Present        Advanced care planning/counseling discussion ICD-10-CM: Z71.89  ICD-9-CM: V65.49  10/28/2015 - Present    Overview Signed 10/28/2015  2:14 PM by Sia Abreu, RN     Feels that GOD will make the decision for her. Believes her  and daughter will agree on what is best for her. There will be no fighting over decisions.               Type 2 diabetes, uncontrolled, with neuropathy ([facility] ICD-10-CM: E11.40, E11.65  ICD-9-CM: 250.62, 357.2  10/28/2015 - Present        Ischemic colitis ([facility] ICD-10-CM: K55.9  ICD-9-CM: 557.9  11/12/2014 - Present        Acute colitis ICD-10-CM: K52.9  ICD-9-CM: 558.9  10/29/2014 - Present        Renal insufficiency ICD-10-CM: N28.9  ICD-9-CM: 593.9  10/23/2014 - Present        Iron deficiency anemia ICD-10-CM: D50.9  ICD-9-CM: 280.9  5/31/2011 - Present        Essential hypertension, benign ICD-10-CM: I10  ICD-9-CM: 401.1  7/21/2010 - Present        Unspecified sleep apnea ICD-10-CM: G47.30  ICD-9-CM: 780.57  3/24/2010 - Present        Mixed hyperlipidemia ICD-10-CM: E78.2  ICD-9-CM: 272.2  11/20/2009 - Present        Diabetic foot ulcer ([facility] ICD-10-CM: E11.621, L97.509  ICD-9-CM: 250.80, 707.15  11/20/2009 - Present        Hypothyroidism, acquired, autoimmune ICD-10-CM: E06.3  ICD-9-CM: 244.8  11/20/2009 - Present        Mononeuritis of unspecified site ICD-10-CM: G58.9  ICD-9-CM: 355.9  11/20/2009 - Present        Coronary atherosclerosis of native coronary artery ICD-10-CM: I25.10  ICD-9-CM: 414.01  11/20/2009 - Present        Symptomatic menopausal or female climacteric states ICD-10-CM: N95.1  ICD-9-CM: 627.2  11/20/2009 - Present        RESOLVED: Unstable angina ([facility] ICD-10-CM: I20.0  ICD-9-CM: 411.1  11/18/2016 - 11/2/2017        RESOLVED: Ischemic colitis ([facility] ICD-10-CM: K55.9  ICD-9-CM: 557.9  11/1/2014 - 11/1/2014        RESOLVED: Type II or unspecified type diabetes mellitus with unspecified complication, not stated as uncontrolled ICD-10-CM: E11.8  ICD-9-CM: 250.90  10/23/2013 - 10/28/2015        RESOLVED: Type I (juvenile type) diabetes mellitus with unspecified complication, not stated as uncontrolled ICD-10-CM: E10.8  ICD-9-CM: 250.91  11/20/2009 - 10/23/2013        RESOLVED: Obesity, unspecified ICD-10-CM: E66.9  ICD-9-CM: 278.00  11/20/2009 - 5/30/2018              Greater than  45 minutes were spent with the patient on counseling and coordination of care    Signed:   Deuce Reynaga MD  3/26/2019  9:26 AM   .

## 2019-03-27 ENCOUNTER — PATIENT OUTREACH (OUTPATIENT)
Dept: INTERNAL MEDICINE CLINIC | Age: 69
End: 2019-03-27

## 2019-03-27 NOTE — PROGRESS NOTES
Goals  Prevent complications post hospitalization. 3/19/19 Patient admitted 3/12/19 and discharged 3/18/19 following DM foot infection resulting in amputation of 4th toe right foot. Patient has uncontrolled diabetes. Last A1c was 11.2 on 3/12/19. Encouraged patient to set time to see Pharmacist for review of medication and diet. Patient declined at this time. Patient reports checking BS 3 x daily prior to eating. Had been on clear liquid diet in hospital and was instructed to continue until no N/V. Patient reports no N/V and feels able to eat a regular diet. Has not eaten today and had not checked BS. Stressed importance of getting BS under control. Patient verbalized understanding. Follow up in 1 week. Lucille South RN 
 
3/27/19 Called patient for weekly follow up. Patient not home. Left message NN will call again later. ST 
 
3/28/19 Spoke with patient using 2 IDs name and . Patient reports compliance with checking BS. Last BS yesterday evening \"about 184\". Has not checked this morning. Reports no problems with diet. Follow up scheduled with Dr Yi Yarbrough 19. NN will follow up with patient in 1 week. ST 
  
  Understands red flags post discharge. 3/19/19 Patient admitted 3/12 -  for Diabetic foot infection resulting in amputation of 4th toe, right foot. IV antibiotics inpatient now oral doxycycline outpatient. Patient reports dressing dry and intact with no s/s of infection or pain at site. Did have burning sensation in ankle of right foot last night but is not present today. Encouraged patient to keep log of any such symptoms and report to PCP at FU apt 3/22/19. Reviewed symptoms to be aware of and to call if any changes. Patient verbalized understanding. Reports ambulating slowly and without assistance. Has cane if she needs to use it. Follow up in 1 week to monitor improvement. Lucille South RN,  
 
3/27/19 Called patient for weekly follow up. Patient not home. Left message NN will call again later. ST 
 
3/2/8/19 Spoke with patient, ID verified name and . Patient reports no s/s of infection, dressing dry and intact, using cane for ambulation, no concerns at this time. Denies any further burning sensation in ankle of right foot. Patient reports continued checking BS with yesterday evening BS \"about 184\", has not checked this morning, also reports has scheduled a follow up appointment with Dr. Vi Barton (endocrinologist)  for 19. Per patient follow up appointment scheduled with surgeon for 4/3/19. Nurse Navigator will follow up in 1 week.   ST

## 2019-04-04 ENCOUNTER — PATIENT OUTREACH (OUTPATIENT)
Dept: INTERNAL MEDICINE CLINIC | Age: 69
End: 2019-04-04

## 2019-04-08 NOTE — PROGRESS NOTES
Goals  Prevent complications post hospitalization. 3/19/19 Patient admitted 3/12/19 and discharged 3/18/19 following DM foot infection resulting in amputation of 4th toe right foot. Patient has uncontrolled diabetes. Last A1c was 11.2 on 3/12/19. Encouraged patient to set time to see Pharmacist for review of medication and diet. Patient declined at this time. Patient reports checking BS 3 x daily prior to eating. Had been on clear liquid diet in hospital and was instructed to continue until no N/V. Patient reports no N/V and feels able to eat a regular diet. Has not eaten today and had not checked BS. Stressed importance of getting BS under control. Patient verbalized understanding. Follow up in 1 week. Yanique Mejias RN 
 
3/27/19 Called patient for weekly follow up. Patient not home. Left message NN will call again later. ST 
 
3/28/19 Spoke with patient using 2 IDs name and . Patient reports compliance with checking BS. Last BS yesterday evening \"about 184\". Has not checked this morning. Reports no problems with diet. Follow up scheduled with Dr Behzad Catherine 19. NN will follow up with patient in 1 week. ST 
 
19 Spoke with patient, she was at mother's 80 BD party and stated unable to talk at that time. She did report attending apt with Anat Pena 4/3/19  Understands red flags post discharge. 3/19/19 Patient admitted 3/12 -  for Diabetic foot infection resulting in amputation of 4th toe, right foot. IV antibiotics inpatient now oral doxycycline outpatient. Patient reports dressing dry and intact with no s/s of infection or pain at site. Did have burning sensation in ankle of right foot last night but is not present today. Encouraged patient to keep log of any such symptoms and report to PCP at FU apt 3/22/19. Reviewed symptoms to be aware of and to call if any changes. Patient verbalized understanding.  Reports ambulating slowly and without assistance. Has cane if she needs to use it. Follow up in 1 week to monitor improvement. Oriana Pham RN,  
 
3/27/19 Called patient for weekly follow up. Patient not home. Left message NN will call again later. ST 
 
3/28/19 Spoke with patient, ID verified name and . Patient reports no s/s of infection, dressing dry and intact, using cane for ambulation, no concerns at this time. Denies any further burning sensation in ankle of right foot. Patient reports continued checking BS with yesterday evening BS \"about 184\", has not checked this morning, also reports has scheduled a follow up appointment with Dr. Elie Espinal (endocrinologist)  for 19. Per patient follow up appointment scheduled with surgeon for 4/3/19. Nurse Navigator will follow up in 1 week. ST 
 
19 Called patient. No answer. Left VM. Will attempt call back later today. ST 
 
19 Spoke with patient verified ID by name and . Patient reports she is at mother's 80 BD party and cant talk at this time. Patient stated she did attend FU with Dr Mariel Melgar on 4/3/19. Dressing removed, some signs of infection observed per Dr Mariel Melgar, he redressed and started patient back on doxycycline. Patient to return to Dr Mariel Melgar for FU  19. NN will FU with patient next week. ST 
 
19 ID verified name and . Patient reports dressing clean, dry and intact. Denies pain, but reports having neuropathy. Attended follow up with Dr. Mariel Melgar today. Wound redressed. Per patient Dr. Mariel Melgar reports less redness and swelling today. Patient will return to Dr Mariel Melgar on Friday. Continues with doxycycline for now. Most recent BS reading was 109 taken later in the morning prior to first meal. NN will follow up with patient in 1 week

## 2019-04-15 ENCOUNTER — PATIENT OUTREACH (OUTPATIENT)
Dept: INTERNAL MEDICINE CLINIC | Age: 69
End: 2019-04-15

## 2019-04-15 NOTE — PROGRESS NOTES
Goals  Prevent complications post hospitalization. 3/19/19 Patient admitted 3/12/19 and discharged 3/18/19 following DM foot infection resulting in amputation of 4th toe right foot. Patient has uncontrolled diabetes. Last A1c was 11.2 on 3/12/19. Encouraged patient to set time to see Pharmacist for review of medication and diet. Patient declined at this time. Patient reports checking BS 3 x daily prior to eating. Had been on clear liquid diet in hospital and was instructed to continue until no N/V. Patient reports no N/V and feels able to eat a regular diet. Has not eaten today and had not checked BS. Stressed importance of getting BS under control. Patient verbalized understanding. Follow up in 1 week. De Zimmerman RN 
 
3/27/19 Called patient for weekly follow up. Patient not home. Left message NN will call again later. ST 
 
3/28/19 Spoke with patient using 2 IDs name and . Patient reports compliance with checking BS. Last BS yesterday evening \"about 184\". Has not checked this morning. Reports no problems with diet. Follow up scheduled with Dr Tamara De Guzman 19. NN will follow up with patient in 1 week. ST 
 
19 Spoke with patient, she was at mother's 80 BD party and stated unable to talk at that time. She did report attending apt with Arvis Cranker 4/3/19  Understands red flags post discharge. 3/19/19 Patient admitted 3/12 -  for Diabetic foot infection resulting in amputation of 4th toe, right foot. IV antibiotics inpatient now oral doxycycline outpatient. Patient reports dressing dry and intact with no s/s of infection or pain at site. Did have burning sensation in ankle of right foot last night but is not present today. Encouraged patient to keep log of any such symptoms and report to PCP at FU apt 3/22/19. Reviewed symptoms to be aware of and to call if any changes. Patient verbalized understanding.  Reports ambulating slowly and without assistance. Has cane if she needs to use it. Follow up in 1 week to monitor improvement. Nestor Clifton RN,  
 
3/27/19 Called patient for weekly follow up. Patient not home. Left message NN will call again later. ST 
 
3/28/19 Spoke with patient, ID verified name and . Patient reports no s/s of infection, dressing dry and intact, using cane for ambulation, no concerns at this time. Denies any further burning sensation in ankle of right foot. Patient reports continued checking BS with yesterday evening BS \"about 184\", has not checked this morning, also reports has scheduled a follow up appointment with Dr. Kylah Chand (endocrinologist)  for 19. Per patient follow up appointment scheduled with surgeon for 4/3/19. Nurse Navigator will follow up in 1 week. ST 
 
19 Called patient. No answer. Left VM. Will attempt call back later today. ST 
 
19 Spoke with patient verified ID by name and . Patient reports she is at [employer] 79 [employer] party and cant talk at this time. Patient stated she did attend FU with Dr Wilma Wilson on 4/3/19. Dressing removed, some signs of infection observed per Dr Wilma Wilson, he redressed and started patient back on doxycycline. Patient to return to Dr Wilma Wilson for FU  19. NN will FU with patient next week. ST 
 
19 ID verified name and . Patient reports dressing clean, dry and intact. Denies pain, but reports having neuropathy. Attended follow up with Dr. Wilma Wilson today. Wound redressed. Per patient Dr. Wilma Wilson reports less redness and swelling today. Patient will return to Dr Wilma Wilson on Friday. Continues with doxycycline for now. Most recent BS reading was 109 taken later in the morning prior to first meal. NN will follow up with patient in 1 week 4/15/19 ID verified name and . Patient reports attending follow up with Dr. Wilma Wilson 19. Per patient, doxycyline is finished now, Dr Wilma Wilson said less redness and no swelling.  Patient is to follow up with Dr Wilma Wilson Monday, 4/22 and Monday 4/29. Patient reports  this morning, tries to eat well and follow diabetic diet, but admits difficulty. Encouraged patient to try to follow diet and if any additional information needed, Jeanna Pickering is here for assistance. Patient verbalized understanding. NN contact information provided, will follow up in 1 week.  ST

## 2019-04-23 ENCOUNTER — PATIENT OUTREACH (OUTPATIENT)
Dept: INTERNAL MEDICINE CLINIC | Age: 69
End: 2019-04-23

## 2019-04-25 ENCOUNTER — PATIENT OUTREACH (OUTPATIENT)
Dept: INTERNAL MEDICINE CLINIC | Age: 69
End: 2019-04-25

## 2019-04-26 LAB
CREATININE, EXTERNAL: 0.81
HBA1C MFR BLD HPLC: 10.1 %
LDL-C, EXTERNAL: 75

## 2019-04-26 NOTE — PROGRESS NOTES
Patient has graduated from the Transitions of Care Coordination  program on 19. Patient's symptoms are stable at this time. Patient/family has the ability to self-manage. Care management goals have been completed at this time. No further nurse navigator follow up scheduled. Goals Addressed This Visit's Progress  COMPLETED: Prevent complications post hospitalization. On track 3/19/19 Patient admitted 3/12/19 and discharged 3/18/19 following DM foot infection resulting in amputation of 4th toe right foot. Patient has uncontrolled diabetes. Last A1c was 11.2 on 3/12/19. Encouraged patient to set time to see Pharmacist for review of medication and diet. Patient declined at this time. Patient reports checking BS 3 x daily prior to eating. Had been on clear liquid diet in hospital and was instructed to continue until no N/V. Patient reports no N/V and feels able to eat a regular diet. Has not eaten today and had not checked BS. Stressed importance of getting BS under control. Patient verbalized understanding. Follow up in 1 week. Anabelle Bourgeois RN 
 
3/27/19 Called patient for weekly follow up. Patient not home. Left message NN will call again later. ST 
 
3/28/19 Spoke with patient using 2 IDs name and . Patient reports compliance with checking BS. Last BS yesterday evening \"about 184\". Has not checked this morning. Reports no problems with diet. Follow up scheduled with Dr Camie Jeans 19. NN will follow up with patient in 1 week. ST 
 
19 Spoke with patient, she was at mother's 80 BD party and stated unable to talk at that time. She did report attending apt with Jonny Mays 4/3/19  
 
4/24/19 Patient reports doing well has follow up appointment with surgeon on 19. Only concern is when to restart B12 and multivitamin. Writer will call back with PCP ok once reviewed.  Call placed to patient. LVM.  Per Dr. Kym Mena, if patient has completed antibiotics she can start back with vitamin B12 and multivitamin. Will try again later  ST 
 
19 Patient ID verified name and . Patient reports doing well. No s/s of infection. FU with podiatry for last visit on 19  ST 
  
  COMPLETED: Understands red flags post discharge. On track 3/19/19 Patient admitted 3/12 -  for Diabetic foot infection resulting in amputation of 4th toe, right foot. IV antibiotics inpatient now oral doxycycline outpatient. Patient reports dressing dry and intact with no s/s of infection or pain at site. Did have burning sensation in ankle of right foot last night but is not present today. Encouraged patient to keep log of any such symptoms and report to PCP at FU apt 3/22/19. Reviewed symptoms to be aware of and to call if any changes. Patient verbalized understanding. Reports ambulating slowly and without assistance. Has cane if she needs to use it. Follow up in 1 week to monitor improvement. Nestor Clifton RN,  
 
3/27/19 Called patient for weekly follow up. Patient not home. Left message NN will call again later. ST 
 
3/28/19 Spoke with patient, ID verified name and . Patient reports no s/s of infection, dressing dry and intact, using cane for ambulation, no concerns at this time. Denies any further burning sensation in ankle of right foot. Patient reports continued checking BS with yesterday evening BS \"about 184\", has not checked this morning, also reports has scheduled a follow up appointment with Dr. Kylah Chand (endocrinologist)  for 19. Per patient follow up appointment scheduled with surgeon for 4/3/19. Nurse Navigator will follow up in 1 week. ST 
 
19 Called patient. No answer. Left VM. Will attempt call back later today. ST 
 
19 Spoke with patient verified ID by name and . Patient reports she is at mother's 80 BD party and cant talk at this time.   Patient stated she did attend FU with Dr Haile Barajas on 4/3/19. Dressing removed, some signs of infection observed per Dr Haile Barajas, he redressed and started patient back on doxycycline. Patient to return to Dr Haile Barajas for FU  19. NN will FU with patient next week. ST 
 
19 ID verified name and . Patient reports dressing clean, dry and intact. Denies pain, but reports having neuropathy. Attended follow up with Dr. Haile Barajas today. Wound redressed. Per patient Dr. Haile Barajas reports less redness and swelling today. Patient will return to Dr Haile Barajas on Friday. Continues with doxycycline for now. Most recent BS reading was 109 taken later in the morning prior to first meal. NN will follow up with patient in 1 week 4/15/19 ID verified name and . Patient reports attending follow up with Dr. Haile Barajas 19. Per patient, doxycyline is finished now, Dr Haile Barajas said less redness and no swelling. Patient is to follow up with Dr Haile Barajas Monday,  and . Patient reports  this morning, tries to eat well and follow diabetic diet, but admits difficulty. Encouraged patient to try to follow diet and if any additional information needed, Max Bain is here for assistance. Patient verbalized understanding. NN contact information provided, will follow up in 1 week. ST 
 
19 LV home and mobile number for patient. Will attempt to call again later. ST 
 
19 Patient reports feeling fine with no s/s infection, denies pain. FU appointment with surgeon on 19, but was told everything looks good at last visit. Patient would like to know if ok to restart B12 and multivitamin. Kate Millicent will return call to patient with ok from PCP once reviewed. 19 Patient ID verified name and . Patient instructed per Dr. Hansel Millard staff message, ok to restart vitamins if no longer on antibiotics. Patient completed antibiotics and verbalized understanding.  ST 
  
  
 
 
 Pt has nurse navigator's contact information for any further questions, concerns, or needs. Patients upcoming visits:  No future appointments.

## 2019-07-26 LAB
CREATININE, EXTERNAL: 0.95
HBA1C MFR BLD HPLC: 10 %
LDL-C, EXTERNAL: 0
MICROALBUMIN UR TEST STR-MCNC: 1 MG/DL

## 2019-08-16 NOTE — PROGRESS NOTES
RECIPE FOR SALINE NASAL FLUSHING  FOR NASAL CONGESTION AND SINUS INFECTION    A saline nasal spray or flush is not addictive and is used to open the sinus ducts and loosen the mucus to allow the sinuses to drain.  It is also helpful during dry weather to keep the mucous membrane moist.  It is safe for everyone no matter how long it is used or how often.      There are many saline nasal sprays that can be purchased over-the-counter or you can make your own solutions.      To make the solution, combine:    1/4 tsp baking soda  1/4 tsp salt  8 oz warm water  Mix all ingredients.    Put this solution in a neti pot, ear syringe, regular syringe or any container with a small spout.    To irrigate your nasal passages, do one nostril at a time.  Tilt your head back.  Flush a small amount of saline solution into one nostril; as much as you can tolerate.  Take a deep breath and hold it to minimize the saline solution draining into your throat.  Bend your head forward and let the saline drip from your nose into a sink.  Blow your nose.  Repeat in other nostril.    Do this 4 times a day.    Also increase your flluid intake to help loosen the mucus.    Patient Education     Conjunctivitis Caused by Infection     Wash hands often to help prevent spreading infection.     Infections are caused by viruses or germs (bacteria). Treatment includes keeping your eyes and hands clean. Your healthcare provider may prescribe eye drops, and tell you to stay home from work or school if you’re contagious. Untreated infections can be serious. It's important to see your provider for a diagnosis.  Viral infections  A cold, flu, or other virus can spread to your eyes. This causes a watery discharge. Your eyes may burn or itch and get red. Your eyelids may also be puffy and sore.  Treatment  Most viral infections go away on their own. Artificial tears and warm compresses can relieve symptoms. Your healthcare provider may also prescribe eye drops.  A Spiritual Care Partner Volunteer visited patient in Rm 546 on 3/14/2019.   Documented by:  Chaplain Nur MDiv, MS, [employer]  [address] [place] [address] ([id number]) A viral infection can be very contagious and spread quickly. To prevent this, wash your hands often. Use a separate tissue to wipe each eye. Don’t touch your eyes or share bedding or towels. Use a new, clean washcloth every day. Throw away eye cosmetics, especially mascara. Never use someone else's eye cosmetics. If you use contact lenses, follow your healthcare provider's instructions on proper lens care.   Bacterial infections  Bacterial infections often happen in one eye. There may be a watery or a thick discharge from the eye. These infections can cause serious damage to your eye if not treated promptly.  Treatment  Your healthcare provider may prescribe eye drops or ointment to kill the bacteria. Use the medicine for the number of days it is prescribed. Don't stop using it when the symptoms improve. Warm compresses can help keep the eyelids clean. To keep the bacteria from spreading, wash your hands often. Use a separate tissue to wipe each eye. Don't touch your eyes or share bedding or towels. Use a new, clean washcloth every day. Throw away eye cosmetics, especially mascara. Never use someone else's eye cosmetics. If you use contact lenses, follow your healthcare provider's instructions on proper lens care.   Date Last Reviewed: 10/1/2017  © 1521-9089 [employer]. [address] [place] [address], [place] [address]. All rights reserved. This information is not intended as a substitute for professional medical care. Always follow your healthcare professional's instructions.         Patient Education     Trimethoprim Sulfate, Polymyxin B Sulfate Ophthalmic drops, solution  What is this medicine?  POLYMYXIN B and TRIMETHOPRIM (belle i MIX in B and trye METH oh prim) eye drops treat certain eye infections caused by bacteria.  This medicine may be used for other purposes; ask your health care provider or pharmacist if you have questions.  What should I tell my health care provider before I take this  medicine?  They need to know if you have any of these conditions:  · wear contact lenses  · an unusual or allergic reaction to polymyxin B, trimethoprim, other medicines, foods, dyes, or preservatives  · pregnant or trying to get pregnant  · breast-feeding  How should I use this medicine?  This medicine is used in the eye. Follow the directions on the prescription label. Wash your hands before and after use. Tilt your head back slightly. Pull your lower eyelid down gently to form a pouch. Do not touch the tip of the dropper to your eye, fingertips, or other surface. Squeeze the prescribed number of drops into the pouch. Close the eye gently to spread the drops. Use your medicine at regular intervals. Do not take your medicine more often than directed. Use all of your medicine as directed even if you think your are better. Do not skip doses or stop your medicine early.  Talk to your pediatrician regarding the use of this medicine in children. While this drug may be prescribed for children and infants for selected conditions, precautions do apply.  Overdosage: If you think you have taken too much of this medicine contact a poison control center or emergency room at once.  NOTE: This medicine is only for you. Do not share this medicine with others.  What if I miss a dose?  If you miss a dose, use it as soon as you can. If it is almost time for your next dose, use only that dose. Do not use double or extra doses.  What may interact with this medicine?  Interactions are not expected. Do not use any other eye products without advice of your doctor or health care professional.  This list may not describe all possible interactions. Give your health care provider a list of all the medicines, herbs, non-prescription drugs, or dietary supplements you use. Also tell them if you smoke, drink alcohol, or use illegal drugs. Some items may interact with your medicine.  What should I watch for while using this medicine?  Check with  your doctor or health care professional if your condition does not get better after 5 days, or if it gets worse.  If you wear contact lenses, ask when you can use your lenses again.  A burning or stinging reaction that does not go away may mean you are allergic to this product. Stop use and call your doctor or health care professional.  To prevent the spread of infection, do not share eye products or other personal items with anyone else.  What side effects may I notice from receiving this medicine?  Side effects that you should report to your doctor or health care professional as soon as possible:  · burning, stinging, or swelling  · change in vision or blurred vision that will not go away  · eye pain  · itching and redness  · rash  Side effects that usually do not require medical attention (report to your doctor or health care professional if they continue or are bothersome):  · temporary blurred vision after applying  · temporary watering or stinging  This list may not describe all possible side effects. Call your doctor for medical advice about side effects. You may report side effects to FDA at 9-967-SGA-9647.  Where should I keep my medicine?  Keep out of the reach of children.  Store at room temperature 15 to 25 degrees C (59 to 77 degrees F). Protect from light. To prevent the spread of infection, it is best to throw away any unused eye drops after you finish the course of treatment. Throw away any unused medicine after the expiration date.  NOTE:This sheet is a summary. It may not cover all possible information. If you have questions about this medicine, talk to your doctor, pharmacist, or health care provider. Copyright© 2016 [employer]         Patient Education     MEDICATION: FLONASE Nasal Spray  Flonase (generic name: fluticasone) is an anti-inflammatory nasal spray. It is used to treat the nasal symptoms of allergies (\"hay fever\"). Improvement usually begins within five days, although it may require  up to three weeks. Notify your doctor if you are not getting better after three weeks of use. This medicine is not effective for an acute flare up of nasal allergy because it does not work immediately. Decongestants or antihistamines may be needed as short term treatment.  DIRECTIONS FOR USE:  Shake the container well, blow the nose to clear out any mucus, tilt the head forward and insert the nozzle. Hold the other nostril closed. Breathe in through the open nostril and spray. Shake the container and repeat in the other nostril. Do not use more sprays than directed, or more often than prescribed.  WHAT TO WATCH FOR:  POSSIBLE SIDE EFFECTS: Mild temporary nasal burning or stinging --> (This is normal). Bleeding from the nose --> (Pinch the nose for five minutes. If this does not stop the bleeding, contact your doctor or this facility). Headache --> (Contact your doctor if this persists).     ---------- IMPORTANT ----------  MEDICAL CONDITIONS: Before starting this medicine, be sure your doctor knows if you have any of the following conditions:  -- Pregnancy or breast feeding  -- Active tuberculosis, fungal, bacterial or severe viral infection  -- Herpes infection in the eye   -- Recent surgery or injury to the nose  DRUG INTERACTIONS: Before starting this medicine, be sure your doctor knows if you are taking any of the following drugs:  -- Prednisone, other oral steroids  -- Other steroid inhalers or sprays  [NOTE: This information topic may not include all directions, precautions, medical conditions, drug/food interactions and warnings for this drug. Check with your doctor, nurse or pharmacist for any questions that you may have.]  © 6494-0322 [employer], [address] [place] [address], [place] [address]. All rights reserved. This information is not intended as a substitute for professional medical care. Always follow your healthcare professional's instructions.          --------------------------------------------------------------------------------------------------------------  [facility]    Monday - Friday 8:00 a.m. - 8:00 p.m.  Saturday  8:00 a.m. - 4:00 p.m.         Sunday                 Closed  -*-*-*-*-*-*-*-  The [facility]gent [facility] at [facility] is open on SUNDAYS: 8:00 a.m. to 4:00 p.m.  [address], [place]  -*-*-*-*-*-*-*-  [email or web address]  See current wait times for [facility] in real-time!  Reserve your waiting-room spot in line!   Receive text/email messages if our wait times are long  so that you can do your waiting at home or work, instead of in our waiting room.     Note:  This system does not guarantee an \"appointment time\" to see a provider.  Also, patients may be called out of the waiting room \"ahead of turn\" in emergency situations, at discretion of Urgent Care staff.  ----------------  Thank you for choosing [facility] today.  We hope you had a pleasant experience and we look forward to serving your future needs.  If you receive a survey in the mail about today's services, we hope that you will take a few minutes to let us know about your experience.    · If you have any questions about your VISIT, please call [phone]    · If you have any questions about your BILL, please call [phone]    · If you need a copy of your MEDICAL RECORD, please call [phone]    --------------------------------------------------------------------------------------------------------------  UNLESS OTHERWISE INSTRUCTED BY YOUR URGENT CARE PROVIDER TODAY, all follow-up for your medical issues should be managed by your primary care provider.  The Urgent Care does not manage chronic medical issues or refill medications.  You are responsible for scheduling and keeping any necessary follow-up visits with your primary care provider after this visit today.    --------------------------------------------------------------------------------------------------------------  IF YOU WERE PRESCRIBED AN ANTIBIOTIC TODAY:  We recommend taking an over-the-counter probiotic (Such as Florajen 3 for adults, or [id number] for children -- AVAILABLE IN THE [facility] and other local pharmacies too) once a day for the entire duration of your antibiotics, and continuing it for 2 weeks after the antibiotics are finished.  This will help reduce your chance of developing antibiotic-related diarrhea and/or yeast infections.  --------------------------------------------------------------------------------------------------------------  IF YOU HAVE LAB RESULTS or X-RAY REPORTS STILL PENDING:  We typically call patients back only if (1) the results are \"significantly abnormal\", (2) we need to change your treatment plan based on the results, or (3) the report is different than what we told you during your visit.  If we have not called you about your results 48 hours after your visit, you can call us at [phone] to check on the status.  --------------------------------------------------------------------------------------------------------------

## 2020-01-14 LAB
CREATININE, EXTERNAL: 0.98
HBA1C MFR BLD HPLC: 13.1 %
LDL-C, EXTERNAL: 68

## 2020-01-15 NOTE — PROGRESS NOTES
Hospitalist Progress Note  Margot Doss MD  Answering service: [phone] from in house phone      Date of Service:  3/15/2019  NAME:  Miranda Houston  :  1950  MRN:  [id number]      Admission Summary:    65-year-old female with past medical history of hypertension, diabetes, history of coronary artery disease, bronchitis, GERD, hypercholesterolemia, hypothyroidism, ischemic colitis, menopause, neuropathy, obesity, psychiatric disorder, depression, ulcer at the bottom of the right foot, and vertigo who presents to the hospital with right foot pain         Interval history / Subjective:     Pt seen in follow up of right foot pain    Patient states that she has intermittent dizziness but over all improved. Denied any rt foot pain. Assessment & Plan:     1. Right fourth Toe OM - Confirmed by MRI- Seen by podiatry. S/p amputation of 4th toe of rt foot. Currently on broad spectrum abx - vancomycin. Zosyn d/c on 3/15.  - wound culture gre staph aureus MRSA. Path report pending. ID following.  -monitor vanc levels    2. Vertigo: related to BPPV -on meclizine. Needs vestibular therapy as OP.  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 3. Dm2 with hyperglycemia:increase to 60 U NPH BID and add 6 U TID with meal and SSI    4. HTN - C/W toprol, Controlled    5. History of CAD:   -s/p cardiac cath in 2016 showed diffuse coronary   artery disease with an occluded distal marginal vessel that filled by   both left-to-left, and right-to-left collaterals  - medical management was recommended. On aspirin,plavix,metoprolol and statin      Code status: Full  DVT prophylaxis: heaprin       Care Plan discussed with: Patient/Family and Nurse  Disposition: TBD     Hospital Problems  Date Reviewed: 3/13/2019          Codes Class Noted POA    * (Principal) Foot infection ICD-10-CM: L08.9  ICD-9-CM: 686.9 I do not see any notes or orders placed by Dr. Lima for labs that would need to be done prior to surgery. She will have routine labs such as metered blood glucose and urine pregnancy test the day of surgery.    3/12/2019 Unknown                Review of Systems:   Pertinent items are noted in HPI. Vital Signs:    Last 24hrs VS reviewed since prior progress note. Most recent are:  Visit Vitals  /64 (BP Patient Position: At rest)   Pulse 71   Temp 98 °F (36.7 °C)   Resp 16   Ht 5' 6\" (1.676 m)   SpO2 96%   Breastfeeding? No   BMI 38.25 kg/m²         Intake/Output Summary (Last 24 hours) at 3/15/2019 1756  Last data filed at 3/15/2019 0920  Gross per 24 hour   Intake    Output 550 ml   Net -550 ml        Physical Examination:             Constitutional:  No acute distress, cooperative, pleasant    ENT:  Oral mucous moist, oropharynx benign. Neck supple,    Resp:  CTA bilaterally. No wheezing/rhonchi/rales. No accessory muscle use   CV:  Regular rhythm, normal rate, no murmurs, gallops, rubs    GI:  Soft, non distended, non tender. normoactive bowel sounds, no hepatosplenomegaly     Musculoskeletal:  rt foot has a dry dressing    Neurologic:  Moves all extremities. AAOx3, CN II-XII reviewed     Hematologic/Lymphatic/Immunlogic:  No jaundice nor lymph node swelling       Data Review:    labs,meds        Labs:     Recent Labs     03/14/19  1208 03/13/19  0606   WBC 6.1 5.5   HGB 11.0* 11.2*   HCT 35.6 36.0    280     Recent Labs     03/15/19  0249 03/14/19  0405 03/13/19  0308    141 138   K 4.4 3.9 4.0    107 107   CO2 24 26 21   BUN 14 13 26*   CREA 1.11* 0.74 1.18*   * 179* 277*   CA 9.3 9.3 8.4*     No results for input(s): SGOT, GPT, ALT, AP, TBIL, TBILI, TP, ALB, GLOB, GGT, AML, LPSE in the last 72 hours. No lab exists for component: AMYP, HLPSE  No results for input(s): INR, PTP, APTT in the last 72 hours. No lab exists for component: INREXT, INREXT   No results for input(s): FE, TIBC, PSAT, FERR in the last 72 hours. Lab Results   Component Value Date/Time    Folate 19.5 11/01/2014 08:00 AM      No results for input(s): PH, PCO2, PO2 in the last 72 hours.   Recent Labs 03/12/19 1928   TROIQ <0.05     Lab Results   Component Value Date/Time    Cholesterol, total 155 03/12/2019 01:17 PM    HDL Cholesterol 49 03/12/2019 01:17 PM    LDL, calculated 65.4 03/12/2019 01:17 PM    Triglyceride 203 (H) 03/12/2019 01:17 PM    CHOL/HDL Ratio 3.2 03/12/2019 01:17 PM     Lab Results   Component Value Date/Time    Glucose (POC) 364 (H) 03/15/2019 04:17 PM    Glucose (POC) 300 (H) 03/15/2019 11:45 AM    Glucose (POC) 273 (H) 03/15/2019 06:13 AM    Glucose (POC) 370 (H) 03/14/2019 11:03 PM    Glucose (POC) 354 (H) 03/14/2019 09:18 PM     Lab Results   Component Value Date/Time    Color Yellow 06/11/2018 05:39 PM    Appearance Cloudy (A) 06/11/2018 05:39 PM    Specific gravity 1.021 10/29/2014 10:02 PM    pH (UA) =>9.0 (A) 06/11/2018 05:39 PM    Protein NEGATIVE  10/29/2014 10:02 PM    Glucose NEGATIVE  10/29/2014 10:02 PM    Ketone Negative 06/11/2018 05:39 PM    Bilirubin Negative 06/11/2018 05:39 PM    Urobilinogen 0.2 10/29/2014 10:02 PM    Nitrites Positive (A) 06/11/2018 05:39 PM    Leukocyte Esterase 3+ (A) 06/11/2018 05:39 PM    Epithelial cells MODERATE (A) 10/29/2014 10:02 PM    Bacteria Few 06/11/2018 05:39 PM    WBC >30 (A) 06/11/2018 05:39 PM    RBC 0-2 06/11/2018 05:39 PM         Medications Reviewed:     Current Facility-Administered Medications   Medication Dose Route Frequency    dextrose (D50W) injection syrg 12.5-25 g  12.5-25 g IntraVENous PRN    insulin lispro (HUMALOG) injection   SubCUTAneous AC&HS    [START ON 3/16/2019] Vancomycin, Trough - Please draw IMMEDIATELY PRIOR to starting 0500 dose on Saturday, 3/15/2019   Other ONCE    [START ON 3/16/2019] vancomycin (VANCOCIN) 1500 mg in  ml infusion  1,500 mg IntraVENous Q24H    insulin NPH (NOVOLIN N, HUMULIN N) injection 60 Units  60 Units SubCUTAneous BID    [START ON 3/16/2019] insulin lispro (HUMALOG) injection 6 Units  6 Units SubCUTAneous TIDAC    glucose chewable tablet 16 g  4 Tab Oral PRN    glucagon (GLUCAGEN) injection 1 mg  1 mg IntraMUSCular PRN    meclizine (ANTIVERT) tablet 25 mg  25 mg Oral TID    sodium chloride (NS) flush 5-40 mL  5-40 mL IntraVENous Q8H    sodium chloride (NS) flush 5-40 mL  5-40 mL IntraVENous PRN    0.9% sodium chloride infusion  75 mL/hr IntraVENous CONTINUOUS    acetaminophen (TYLENOL) tablet 650 mg  650 mg Oral Q4H PRN    aspirin chewable tablet 81 mg  81 mg Oral DAILY    albuterol-ipratropium (DUO-NEB) 2.5 MG-0.5 MG/3 ML  3 mL Nebulization Q4H PRN    atorvastatin (LIPITOR) tablet 40 mg  40 mg Oral QHS    clopidogrel (PLAVIX) tablet 75 mg  75 mg Oral DAILY    escitalopram oxalate (LEXAPRO) tablet 10 mg  10 mg Oral DAILY    fenofibrate nanocrystallized (TRICOR) tablet 48 mg  48 mg Oral QPM    levothyroxine (SYNTHROID) tablet 125 mcg  125 mcg Oral every Wednesday    levothyroxine (SYNTHROID) tablet 250 mcg  250 mcg Oral Once per day on Sun Mon Tue Thu Fri Sat    metoprolol succinate (TOPROL-XL) XL tablet 50 mg  50 mg Oral DAILY    pantoprazole (PROTONIX) tablet 40 mg  40 mg Oral DAILY    meclizine (ANTIVERT) tablet 25 mg  25 mg Oral Q6H PRN    Vancomycin Pharmacy Dosing   Other PRN     ______________________________________________________________________  EXPECTED LENGTH OF STAY: 2d 21h  ACTUAL LENGTH OF STAY:          3                 Jasmina Aguilera MD

## 2020-03-01 ENCOUNTER — TELEPHONE (OUTPATIENT)
Dept: INTERNAL MEDICINE CLINIC | Age: 70
End: 2020-03-01

## 2020-03-01 NOTE — TELEPHONE ENCOUNTER
Condolences offered at [address] [facility]. She has an excellent support network.  Will call if we can help

## 2020-03-26 RX ORDER — ESCITALOPRAM OXALATE 10 MG/1
TABLET ORAL
Qty: 30 TAB | Refills: 1 | Status: SHIPPED | OUTPATIENT
Start: 2020-03-26 | End: 2020-09-14

## 2020-06-08 NOTE — PROGRESS NOTES
Day #3 of Vancomycin  Indication:  cellulitis of R foot  - s/p amputation R 4th toe 3/13    Current regimen:  1500 mg IV q24h  Abx regimen:  vanc + zosyn  ID Following ?: NO  Concomitant nephrotoxic drugs (requires more frequent monitoring): None  Frequency of BMP?: daily through 3/15  Recent Labs     19  0405 19  0606 19  0308 19  1317   WBC  --  5.5  --  7.7   CREA 0.74  --  1.18* 1.10*   BUN 13  --  26* 34*   Est CrCl: 87.6 ml/min; UO: 0.4 ml/kg/hr (one occurrence)  Temp (24hrs), Av.6 °F (36.4 °C), Min:97.2 °F (36.2 °C), Max:98.2 °F (36.8 °C)    Cultures:   3/12 Blood, paired - pending  3/13 MRSA - Not colonized - Final  3/13 Wound - R 4th Toe - NGTD - pending    Goal trough = 10 - 15 mcg/mL    Recent trough history (date/time/level/dose/action taken):  none    Plan: Patient has received 2 total doses thus far. SCr significantly improved Today. Change to 1500 mg Q 16hrs for predicted therapeutic trough . Pharmacy will continue to monitor this patient daily for changes in clinical status and renal function. Never smoker Former smoker

## 2020-08-18 LAB — EF %, EXTERNAL: NORMAL

## 2020-08-20 LAB
CREATININE, EXTERNAL: 0.91
HBA1C MFR BLD HPLC: 126 %
LDL-C, EXTERNAL: 61
MICROALBUMIN UR TEST STR-MCNC: 55 MG/DL

## 2020-09-10 RX ORDER — ATORVASTATIN CALCIUM 40 MG/1
40 TABLET, FILM COATED ORAL DAILY
Qty: 30 TAB | Refills: 0 | Status: SHIPPED | OUTPATIENT
Start: 2020-09-10 | End: 2020-10-11

## 2020-09-14 RX ORDER — ESCITALOPRAM OXALATE 10 MG/1
TABLET ORAL
Qty: 30 TAB | Refills: 0 | Status: SHIPPED | OUTPATIENT
Start: 2020-09-14 | End: 2020-10-19

## 2020-09-24 ENCOUNTER — HOSPITAL ENCOUNTER (OUTPATIENT)
Dept: LAB | Age: 70
Discharge: HOME | End: 2020-09-24
Payer: MEDICARE

## 2020-09-24 ENCOUNTER — OFFICE VISIT (OUTPATIENT)
Dept: INTERNAL MEDICINE CLINIC | Age: 70
End: 2020-09-24
Payer: MEDICARE

## 2020-09-24 VITALS
HEART RATE: 73 BPM | TEMPERATURE: 97.3 F | SYSTOLIC BLOOD PRESSURE: 95 MMHG | HEIGHT: 66 IN | OXYGEN SATURATION: 98 % | RESPIRATION RATE: 20 BRPM | WEIGHT: 247.4 LBS | BODY MASS INDEX: 39.76 KG/M2 | DIASTOLIC BLOOD PRESSURE: 65 MMHG

## 2020-09-24 DIAGNOSIS — I10 ESSENTIAL HYPERTENSION, BENIGN: Primary | ICD-10-CM

## 2020-09-24 DIAGNOSIS — E78.2 MIXED HYPERLIPIDEMIA: ICD-10-CM

## 2020-09-24 DIAGNOSIS — R35.0 URINARY FREQUENCY: ICD-10-CM

## 2020-09-24 DIAGNOSIS — E06.3 HYPOTHYROIDISM, ACQUIRED, AUTOIMMUNE: ICD-10-CM

## 2020-09-24 DIAGNOSIS — Z86.2 HISTORY OF ANEMIA: ICD-10-CM

## 2020-09-24 PROCEDURE — G0463 HOSPITAL OUTPT CLINIC VISIT: HCPCS | Performed by: INTERNAL MEDICINE

## 2020-09-24 PROCEDURE — 1100F PTFALLS ASSESS-DOCD GE2>/YR: CPT | Performed by: INTERNAL MEDICINE

## 2020-09-24 PROCEDURE — 3046F HEMOGLOBIN A1C LEVEL >9.0%: CPT | Performed by: INTERNAL MEDICINE

## 2020-09-24 PROCEDURE — 85025 COMPLETE CBC W/AUTO DIFF WBC: CPT

## 2020-09-24 PROCEDURE — G8510 SCR DEP NEG, NO PLAN REQD: HCPCS | Performed by: INTERNAL MEDICINE

## 2020-09-24 PROCEDURE — 1090F PRES/ABSN URINE INCON ASSESS: CPT | Performed by: INTERNAL MEDICINE

## 2020-09-24 PROCEDURE — G8754 DIAS BP LESS 90: HCPCS | Performed by: INTERNAL MEDICINE

## 2020-09-24 PROCEDURE — G8536 NO DOC ELDER MAL SCRN: HCPCS | Performed by: INTERNAL MEDICINE

## 2020-09-24 PROCEDURE — G9899 SCRN MAM PERF RSLTS DOC: HCPCS | Performed by: INTERNAL MEDICINE

## 2020-09-24 PROCEDURE — G8427 DOCREV CUR MEDS BY ELIG CLIN: HCPCS | Performed by: INTERNAL MEDICINE

## 2020-09-24 PROCEDURE — 99214 OFFICE O/P EST MOD 30 MIN: CPT | Performed by: INTERNAL MEDICINE

## 2020-09-24 PROCEDURE — G8419 CALC BMI OUT NRM PARAM NOF/U: HCPCS | Performed by: INTERNAL MEDICINE

## 2020-09-24 PROCEDURE — 36415 COLL VENOUS BLD VENIPUNCTURE: CPT

## 2020-09-24 PROCEDURE — 3288F FALL RISK ASSESSMENT DOCD: CPT | Performed by: INTERNAL MEDICINE

## 2020-09-24 PROCEDURE — G8752 SYS BP LESS 140: HCPCS | Performed by: INTERNAL MEDICINE

## 2020-09-24 PROCEDURE — G8399 PT W/DXA RESULTS DOCUMENT: HCPCS | Performed by: INTERNAL MEDICINE

## 2020-09-24 PROCEDURE — 81001 URINALYSIS AUTO W/SCOPE: CPT

## 2020-09-24 PROCEDURE — 87088 URINE BACTERIA CULTURE: CPT

## 2020-09-24 PROCEDURE — 3017F COLORECTAL CA SCREEN DOC REV: CPT | Performed by: INTERNAL MEDICINE

## 2020-09-24 PROCEDURE — 2022F DILAT RTA XM EVC RTNOPTHY: CPT | Performed by: INTERNAL MEDICINE

## 2020-09-24 PROCEDURE — 87086 URINE CULTURE/COLONY COUNT: CPT

## 2020-09-24 PROCEDURE — 87186 SC STD MICRODIL/AGAR DIL: CPT

## 2020-09-24 NOTE — PROGRESS NOTES
HISTORY OF PRESENT ILLNESS  Bowen Torres is a 69 y.o. female. HPI Subjective:  Shira Stark is seen today for follow up of CAD and other medical problems. 1. CAD, diabetes. She is up to date with specialist follow up. The vast majority of her labs are followed by her endocrinologist.  This includes labs for thyroid treatment. 2. Low blood pressure. This is borderline. She is completely asymptomatic. We will monitor for now. She has a history of anemia, so will check a CBC. 3. Depression, stable but mild. She has been consistent with her Lexapro despite the refill history. We will continue the current regimen and I think she is fine for annual follow up. Review of Systems:  Notable for urinary frequency. She denies any burning or blood in the urine. This is chronic. We will check a UA and culture to assure she does not have an infection. She has osteoarthritis of the hands, as well as low back pain, for which she takes Tylenol. This seems to help out adequately. Social History:  Notable for being . Her , Dale Cobos, passed away last fall. We had been in touch via telephone after this event. Past Medical History:   Diagnosis Date    Adverse effect of anesthesia     slow to wake up    Arthritis     hands    Bronchitis     CAD (coronary artery disease) 2008, 2016    angio/mild:MI, heart cath    Diabetes ([address]     Type II: insulin    GERD (gastroesophageal reflux disease)     Hypercholesterolemia     Hypothyroid     Ischemic colitis ([facility] [address] 11/01/2014    Menopause 2000    Neuropathy     feet/legs: ulcer right foot    Obesity     Psychiatric disorder     Depression    Sleep apnea     no use CPAP: unable to use, ruel me    Ulcer 11/2016    Callus that turned into open wound bottom right foot(ball area), not draining    Vertigo          Review of Systems   Constitutional: Negative for chills, fever and weight loss. Respiratory: Negative.     Cardiovascular: Negative for chest pain, palpitations, leg swelling and PND. Musculoskeletal: Positive for back pain and joint pain. Negative for myalgias. Neurological: Negative for focal weakness. Physical Exam  Vitals signs and nursing note reviewed. Neck:      Vascular: No carotid bruit. Cardiovascular:      Rate and Rhythm: Normal rate and regular rhythm. Heart sounds: No murmur. No friction rub. No gallop. Pulmonary:      Effort: Pulmonary effort is normal. No respiratory distress. Breath sounds: Normal breath sounds. ASSESSMENT and PLAN  Diagnoses and all orders for this visit:    1. Essential hypertension, benign - Continue current regimen of prescription and / or OTC medications     2. Mixed hyperlipidemia- See endocrinologist as directed. 3. Hypothyroidism, acquired, autoimmune- See endocrinologist as directed. 4. Type 2 diabetes, uncontrolled, with neuropathy [facility])- See endocrinologist as directed. 5. History of anemia  -     CBC WITH AUTOMATED DIFF    6.  Urinary frequency  -     URINALYSIS W/ RFLX MICROSCOPIC  -     CULTURE, URINE    Other orders  -     MICROSCOPIC EXAMINATION

## 2020-09-25 LAB
APPEARANCE UR: ABNORMAL
BACTERIA #/AREA URNS HPF: ABNORMAL /[HPF]
BASOPHILS # BLD AUTO: 0.1 X10E3/UL (ref 0–0.2)
BASOPHILS NFR BLD AUTO: 1 %
BILIRUB UR QL STRIP: NEGATIVE
CASTS URNS MICRO: ABNORMAL
CASTS URNS QL MICRO: PRESENT /LPF
COLOR UR: YELLOW
EOSINOPHIL # BLD AUTO: 0.2 X10E3/UL (ref 0–0.4)
EOSINOPHIL NFR BLD AUTO: 2 %
EPI CELLS #/AREA URNS HPF: >10 /HPF (ref 0–10)
ERYTHROCYTE [DISTWIDTH] IN BLOOD BY AUTOMATED COUNT: 13.7 % (ref 11.7–15.4)
GLUCOSE UR QL: NEGATIVE
HCT VFR BLD AUTO: 41.3 % (ref 34–46.6)
HGB BLD-MCNC: 14 G/DL (ref 11.1–15.9)
HGB UR QL STRIP: NEGATIVE
IMM GRANULOCYTES # BLD AUTO: 0.1 X10E3/UL (ref 0–0.1)
IMM GRANULOCYTES NFR BLD AUTO: 1 %
KETONES UR QL STRIP: NEGATIVE
LEUKOCYTE ESTERASE UR QL STRIP: ABNORMAL
LYMPHOCYTES # BLD AUTO: 1.9 X10E3/UL (ref 0.7–3.1)
LYMPHOCYTES NFR BLD AUTO: 22 %
MCH RBC QN AUTO: 27.7 PG (ref 26.6–33)
MCHC RBC AUTO-ENTMCNC: 33.9 G/DL (ref 31.5–35.7)
MCV RBC AUTO: 82 FL (ref 79–97)
MICRO URNS: ABNORMAL
MONOCYTES # BLD AUTO: 0.9 X10E3/UL (ref 0.1–0.9)
MONOCYTES NFR BLD AUTO: 10 %
MUCOUS THREADS URNS QL MICRO: PRESENT
NEUTROPHILS # BLD AUTO: 5.6 X10E3/UL (ref 1.4–7)
NEUTROPHILS NFR BLD AUTO: 64 %
NITRITE UR QL STRIP: POSITIVE
PH UR STRIP: 5 [PH] (ref 5–7.5)
PLATELET # BLD AUTO: 339 X10E3/UL (ref 150–450)
PROT UR QL STRIP: ABNORMAL
RBC # BLD AUTO: 5.06 X10E6/UL (ref 3.77–5.28)
RBC #/AREA URNS HPF: ABNORMAL /HPF (ref 0–2)
SP GR UR: 1.02 (ref 1–1.03)
UROBILINOGEN UR STRIP-MCNC: 0.2 MG/DL (ref 0.2–1)
WBC # BLD AUTO: 8.6 X10E3/UL (ref 3.4–10.8)
WBC #/AREA URNS HPF: >30 /HPF (ref 0–5)

## 2020-09-26 LAB — BACTERIA UR CULT: ABNORMAL

## 2020-09-26 RX ORDER — CEFUROXIME AXETIL 250 MG/1
250 TABLET ORAL 2 TIMES DAILY
Qty: 10 TAB | Refills: 0 | Status: SHIPPED | OUTPATIENT
Start: 2020-09-26 | End: 2020-10-01

## 2020-12-17 LAB — HBA1C MFR BLD HPLC: 13.4 %

## 2021-05-03 RX ORDER — ATORVASTATIN CALCIUM 40 MG/1
TABLET, FILM COATED ORAL
Qty: 30 TAB | Refills: 4 | Status: SHIPPED | OUTPATIENT
Start: 2021-05-03 | End: 2021-12-30

## 2021-05-18 ENCOUNTER — TELEPHONE (OUTPATIENT)
Dept: HEMATOLOGY | Age: 71
End: 2021-05-18

## 2021-05-25 ENCOUNTER — TELEPHONE (OUTPATIENT)
Dept: HEMATOLOGY | Age: 71
End: 2021-05-25

## 2021-05-25 NOTE — TELEPHONE ENCOUNTER
----- Message from Wilma Damon sent at 5/25/2021 12:44 PM EDT -----  Regarding: Dr. Cadence Valdez: [phone]  General Message/Vendor Calls    Caller's first and last name:Pt      Reason for call:Pt would like a call back to schedule a fibroscan. Callback required yes/no and why:Yes, schedule fibroscan.        Best contact number(s):[phone]      Details to clarify the request:n/a      Wilma Damon

## 2021-06-06 RX ORDER — ESCITALOPRAM OXALATE 10 MG/1
TABLET ORAL
Qty: 30 TABLET | Refills: 4 | Status: SHIPPED | OUTPATIENT
Start: 2021-06-06 | End: 2021-12-30

## 2021-07-01 ENCOUNTER — OFFICE VISIT (OUTPATIENT)
Dept: HEMATOLOGY | Age: 71
End: 2021-07-01
Payer: MEDICARE

## 2021-07-01 VITALS
DIASTOLIC BLOOD PRESSURE: 60 MMHG | WEIGHT: 238.2 LBS | HEIGHT: 66 IN | TEMPERATURE: 96.9 F | HEART RATE: 60 BPM | RESPIRATION RATE: 18 BRPM | OXYGEN SATURATION: 94 % | SYSTOLIC BLOOD PRESSURE: 88 MMHG | BODY MASS INDEX: 38.28 KG/M2

## 2021-07-01 DIAGNOSIS — R74.8 ELEVATED ALKALINE PHOSPHATASE LEVEL: Primary | ICD-10-CM

## 2021-07-01 PROCEDURE — G8427 DOCREV CUR MEDS BY ELIG CLIN: HCPCS | Performed by: NURSE PRACTITIONER

## 2021-07-01 PROCEDURE — G8399 PT W/DXA RESULTS DOCUMENT: HCPCS | Performed by: NURSE PRACTITIONER

## 2021-07-01 PROCEDURE — 1090F PRES/ABSN URINE INCON ASSESS: CPT | Performed by: NURSE PRACTITIONER

## 2021-07-01 PROCEDURE — 3288F FALL RISK ASSESSMENT DOCD: CPT | Performed by: NURSE PRACTITIONER

## 2021-07-01 PROCEDURE — 99213 OFFICE O/P EST LOW 20 MIN: CPT | Performed by: NURSE PRACTITIONER

## 2021-07-01 PROCEDURE — 1100F PTFALLS ASSESS-DOCD GE2>/YR: CPT | Performed by: NURSE PRACTITIONER

## 2021-07-01 PROCEDURE — G8754 DIAS BP LESS 90: HCPCS | Performed by: NURSE PRACTITIONER

## 2021-07-01 PROCEDURE — G8752 SYS BP LESS 140: HCPCS | Performed by: NURSE PRACTITIONER

## 2021-07-01 PROCEDURE — G8432 DEP SCR NOT DOC, RNG: HCPCS | Performed by: NURSE PRACTITIONER

## 2021-07-01 PROCEDURE — 3017F COLORECTAL CA SCREEN DOC REV: CPT | Performed by: NURSE PRACTITIONER

## 2021-07-01 PROCEDURE — G8536 NO DOC ELDER MAL SCRN: HCPCS | Performed by: NURSE PRACTITIONER

## 2021-07-01 PROCEDURE — 91200 LIVER ELASTOGRAPHY: CPT | Performed by: NURSE PRACTITIONER

## 2021-07-01 PROCEDURE — G0463 HOSPITAL OUTPT CLINIC VISIT: HCPCS | Performed by: NURSE PRACTITIONER

## 2021-07-01 PROCEDURE — G8417 CALC BMI ABV UP PARAM F/U: HCPCS | Performed by: NURSE PRACTITIONER

## 2021-07-01 NOTE — PROGRESS NOTES
Nhan Robledo MD, MD Christophe Candelaria PA-C Irvin Euler, [facility]     Eloise HODGE oCdy, [facility]   Karolyn Pablo, P-C    Myrla Hashimoto, [facility]       Rosario Ludwig [facility], [facility] [address]    at [place] [address] [address] [facility], [address] [facility], [facility] 22.    [phone]    FAX: [address] [place] [address], [address], [address]    [phone]    FAX: [phone]     Patient Care Team:  Santiago Mccoy MD as PCP - [facility], Anthony Olmedo MD as PCP - [facility]  Fabiola Orlando MD as Physician (Endocrinology)  Eugenie King DPM as Physician (Darcus Monday)  Meryle Pries, MD as Physician (Gastroenterology)  Austin Stone MD as Physician (Dermatology)  Dr. Rahel Torres as Physician (Optometry)  Amber Carranza MD as Physician (Cardiology)  Connor Noguera MD (Gastroenterology)    Patient Active Problem List   Diagnosis Code    Mixed hyperlipidemia E78.2    Diabetic foot ulcer ([address] E11.621, L97.509    Hypothyroidism, acquired, autoimmune E06.3    Mononeuritis of unspecified site G58.9    Coronary atherosclerosis of native coronary artery I25.10    Symptomatic menopausal or female climacteric states N95.1    Unspecified sleep apnea G47.30    Essential hypertension, benign I10    Iron deficiency anemia D50.9    Renal insufficiency N28.9    Acute colitis K52.9    Ischemic colitis ([address] K55.9    Advanced care planning/counseling discussion Z71.89    Type 2 diabetes, uncontrolled, with neuropathy ([address] E11.40, E11.65    Severe obesity (BMI 35.0-39. 9) with comorbidity ([address] E66.01    Elevated alkaline phosphatase level R74.8    Foot infection L08.9     The clinicians listed above have asked me to see Kwaku Delacruz fatty liver disease and to perform assessment of fibrosis by means of in-office FibroScan analysis. The patient is a 66 y.o.  female who was found to have liver disease remotely. The patient has no symptoms which can be attributed to the liver disorder. The patient has not experienced the following symptoms: fatigue, pain in the right side over the liver, yellowing of the eyes or skin or swelling of the lower extremities. The patient has mild limitations in functional activities which can be attributed to other medical problems not related to the liver disease. ASSESSMENT AND PLAN:  BILLINGS with stage 3 bridging fibrosis. Assessment of fibrosis was made through performance of FibroScan in the office today. The results of the analysis were shared with the patient in the office at the time of the procedure. A copy of the report was provided to the patient and will be forwarded to the referring medical practice. All questions were answered. The patient expressed a clear understanding of the above. Fatty liver disease  If she has fatty liver disease, the best course of action is to lose weight, especially while there is time to potentially reverse any damage. Elevated alkaline phosphatase  Her ALP is markedly elevated. I know this has been the case historically but even if AMA was negative in the past, she benefit from empiric FREDDY to see if she responds. As always, a liver biopsy could also be performed or advanced imaging. She is a long standing patient of Dr. Rukhsana Heart so although I don't have access to those records, I imagine a work up has been completed.      No Known Allergies    Current Outpatient Medications on File Prior to Visit   Medication Sig Dispense Refill    escitalopram oxalate (LEXAPRO) 10 mg tablet TAKE ONE TABLET BY MOUTH DAILY 30 Tablet 4    atorvastatin (LIPITOR) 40 mg tablet TAKE ONE TABLET BY MOUTH DAILY 30 Tab 4    levothyroxine (SYNTHROID) 125 mcg tablet Take 250 mcg by mouth Daily (before breakfast).  insulin regular (NOVOLIN R REGULAR U-100 INSULN) 100 unit/mL injection by SubCUTAneous route Before breakfast, lunch, and dinner. Sliding scale      insulin NPH (NOVOLIN N NPH U-100 INSULIN) 100 unit/mL injection 160 Units by SubCUTAneous route two (2) times a day.  fenofibrate micronized (LOFIBRA) 67 mg capsule Take 67 mg by mouth every morning.  albuterol (PROVENTIL HFA, VENTOLIN HFA, PROAIR HFA) 90 mcg/actuation inhaler Take 2 Puffs by inhalation every four (4) hours as needed for Wheezing. 1 Inhaler 1    fluticasone (FLONASE) 50 mcg/actuation nasal spray 2 Sprays by Both Nostrils route daily. 1 Bottle 11    cholecalciferol (VITAMIN D3) 1,000 unit tablet Take 2,000 Units by mouth daily.  DULAGLUTIDE (TRULICITY SC) 1.5 Units by SubCUTAneous route Every Saturday. 1.5 units every saturday      clopidogrel (PLAVIX) 75 mg tab Take 1 Tab by mouth daily. 30 Tab 6    nitroglycerin (NITROSTAT) 0.4 mg SL tablet 1 Tab by SubLINGual route as needed for Chest Pain. 1 Bottle 6    metoprolol succinate (TOPROL XL) 50 mg XL tablet Take 1 Tab by mouth daily. 30 Tab 6    omeprazole (PRILOSEC OTC) 20 mg tablet Take 20 mg by mouth daily. Indications: GASTROESOPHAGEAL REFLUX      Bifidobacterium Infantis (ALIGN) 4 mg cap As directed (Patient taking differently: Take 1 Cap by mouth daily. As directed) 30 capsule 11    aspirin 81 mg chewable tablet Take 81 mg by mouth daily.  omega-3 fatty acids-vitamin e (FISH OIL) 1,000 mg cap Take 1 Cap by mouth daily. No current facility-administered medications on file prior to visit.      PHYSICAL EXAMINATION:  Visit Vitals  BP (!) 88/60 (BP 1 Location: Right upper arm, BP Patient Position: Sitting, BP Cuff Size: Large adult)   Pulse 60   Temp 96.9 °F (36.1 °C) (Temporal)   Resp 18   Ht 5' 6\" (1.676 m)   Wt 238 lb 3.2 oz (108 kg)   SpO2 94%   BMI 38.45 kg/m² General: No acute distress. Obese. Walks with a cane. Skin: No spider angiomata. No jaundice. No palmar erythema. Abdomen: Soft non-tender. Liver size normal to percussion/palpation. Spleen not palpable. No obvious ascites. Extremities: No edema. No muscle wasting. No gross arthritic changes. Neurologic: Alert and oriented. Cranial nerves grossly intact. No asterixis. LABORATORY STUDIES:  Liver [place] of [address] Units 3/12/2019   WBC 3.4 - 10.8 x10E3/uL 7.7   ANC 1.4 - 7.0 x10E3/uL 4.8   HGB 11.1 - 15.9 g/dL 12.8    - 450 x10E3/uL 314   AST 15 - 37 U/L 48 (H)   ALT 12 - 78 U/L 47   Alk Phos 45 - 117 U/L 371 (H)   Bili, Total 0.2 - 1.0 MG/DL 0.4   Albumin 3.5 - 5.0 g/dL 3.2 (L)   BUN 6 - 20 MG/DL 34 (H)   Creat 0.55 - 1.02 MG/DL 1.10 (H)   Na 136 - 145 mmol/L 134 (L)   K 3.5 - 5.1 mmol/L 4.0   Cl 97 - 108 mmol/L 100   CO2 21 - 32 mmol/L 23   Glucose 65 - 100 mg/dL 221 (H)     SEROLOGIES:  Not available or performed. Testing will be performed as indicated. LIVER HISTOLOGY:  7/2021. [facility]. EkPa was 13.1. IQR/med 25%. . The results suggested a fibrosis level of F3. The CAP score suggests fatty liver  no evidence of fatty liver. 11/2018. FibroScan performed at [facility]. EkPa was 12.5. Suggested fibrosis level is F3 fibrosis. CAP score of 394, this is consistent with significant steatosis. ENDOSCOPIC PROCEDURES:  Not available or performed    RADIOLOGY:  Not available or performed    OTHER TESTING:  Not available or performed    FOLLOW-UP:  All of the issues listed above in the assessment and plan were discussed with the patient. All questions were answered. The patient expressed a clear understanding of the above. The patient will follow-up with the referring clinician.     Supa Chanel [facility]  Liver [facility] [address] [facility], [address] [place] [address] [phone]

## 2021-07-01 NOTE — PROGRESS NOTES
Identified pt with two pt identifiers(name and ). Reviewed record in preparation for visit and have obtained necessary documentation. Chief Complaint   Patient presents with    Other     Fibroscan only      Vitals:    21 1552   BP: (!) 88/60   Pulse: 60   Resp: 18   Temp: 96.9 °F (36.1 °C)   TempSrc: Temporal   SpO2: 94%   Weight: 238 lb 3.2 oz (108 kg)   Height: 5' 6\" (1.676 m)   PainSc:   0 - No pain       Health Maintenance Review: Patient reminded of \"due or due soon\" health maintenance. I have asked the patient to contact his/her primary care provider (PCP) for follow-up on his/her health maintenance. Coordination of Care Questionnaire:  :   1) Have you been to an emergency room, urgent care, or hospitalized since your last visit? If yes, where when, and reason for visit? no       2. Have seen or consulted any other health care provider since your last visit? If yes, where when, and reason for visit? Yes, Endo and PCP f/u visits    Patient is accompanied by self I have received verbal consent from Richard Carmichael to discuss any/all medical information while they are present in the room.

## 2021-07-09 LAB
CREATININE, EXTERNAL: 0.94
HBA1C MFR BLD HPLC: 12.5 %
LDL-C, EXTERNAL: 90
MICROALBUMIN UR TEST STR-MCNC: 4 MG/DL

## 2021-10-15 LAB
CREATININE, EXTERNAL: 1.36
HBA1C MFR BLD HPLC: 12.4 %

## 2021-10-16 LAB — MICROALBUMIN UR TEST STR-MCNC: 18.1 MG/DL

## 2021-12-30 RX ORDER — ESCITALOPRAM OXALATE 10 MG/1
TABLET ORAL
Qty: 30 TABLET | Refills: 0 | Status: SHIPPED | OUTPATIENT
Start: 2021-12-30 | End: 2022-02-01

## 2021-12-30 RX ORDER — ATORVASTATIN CALCIUM 40 MG/1
TABLET, FILM COATED ORAL
Qty: 30 TABLET | Refills: 0 | Status: SHIPPED | OUTPATIENT
Start: 2021-12-30 | End: 2022-02-01

## 2022-02-01 RX ORDER — ESCITALOPRAM OXALATE 10 MG/1
TABLET ORAL
Qty: 30 TABLET | Refills: 0 | Status: SHIPPED | OUTPATIENT
Start: 2022-02-01 | End: 2022-05-19 | Stop reason: SDUPTHER

## 2022-02-01 RX ORDER — ATORVASTATIN CALCIUM 40 MG/1
TABLET, FILM COATED ORAL
Qty: 30 TABLET | Refills: 0 | Status: SHIPPED | OUTPATIENT
Start: 2022-02-01

## 2022-03-18 PROBLEM — E66.01 SEVERE OBESITY (BMI 35.0-39.9) WITH COMORBIDITY (HCC): Status: ACTIVE | Noted: 2018-05-30

## 2022-03-19 PROBLEM — R74.8 ELEVATED ALKALINE PHOSPHATASE LEVEL: Status: ACTIVE | Noted: 2018-11-15

## 2022-03-20 PROBLEM — L08.9 FOOT INFECTION: Status: ACTIVE | Noted: 2019-03-12

## 2022-03-21 LAB — HBA1C MFR BLD HPLC: 11.9 %

## 2022-05-19 ENCOUNTER — OFFICE VISIT (OUTPATIENT)
Dept: INTERNAL MEDICINE CLINIC | Age: 72
End: 2022-05-19
Payer: MEDICARE

## 2022-05-19 VITALS
RESPIRATION RATE: 20 BRPM | SYSTOLIC BLOOD PRESSURE: 107 MMHG | DIASTOLIC BLOOD PRESSURE: 61 MMHG | HEART RATE: 62 BPM | TEMPERATURE: 97.8 F | OXYGEN SATURATION: 97 % | BODY MASS INDEX: 38.15 KG/M2 | HEIGHT: 66 IN | WEIGHT: 237.4 LBS

## 2022-05-19 DIAGNOSIS — I10 ESSENTIAL HYPERTENSION, BENIGN: ICD-10-CM

## 2022-05-19 DIAGNOSIS — Z00.00 MEDICARE ANNUAL WELLNESS VISIT, SUBSEQUENT: Primary | ICD-10-CM

## 2022-05-19 DIAGNOSIS — E78.2 MIXED HYPERLIPIDEMIA: ICD-10-CM

## 2022-05-19 DIAGNOSIS — E11.49 TYPE II OR UNSPECIFIED TYPE DIABETES MELLITUS WITH NEUROLOGICAL MANIFESTATIONS, UNCONTROLLED(250.62) (HCC): ICD-10-CM

## 2022-05-19 DIAGNOSIS — L97.529 DIABETIC ULCER OF TOE OF LEFT FOOT ASSOCIATED WITH TYPE 2 DIABETES MELLITUS, UNSPECIFIED ULCER STAGE (HCC): ICD-10-CM

## 2022-05-19 DIAGNOSIS — E11.621 DIABETIC ULCER OF TOE OF LEFT FOOT ASSOCIATED WITH TYPE 2 DIABETES MELLITUS, UNSPECIFIED ULCER STAGE (HCC): ICD-10-CM

## 2022-05-19 DIAGNOSIS — E06.3 HYPOTHYROIDISM, ACQUIRED, AUTOIMMUNE: ICD-10-CM

## 2022-05-19 DIAGNOSIS — Z12.31 ENCOUNTER FOR SCREENING MAMMOGRAM FOR MALIGNANT NEOPLASM OF BREAST: ICD-10-CM

## 2022-05-19 DIAGNOSIS — Z79.899 ENCOUNTER FOR LONG-TERM (CURRENT) USE OF MEDICATIONS: ICD-10-CM

## 2022-05-19 DIAGNOSIS — E66.01 SEVERE OBESITY (BMI 35.0-39.9) WITH COMORBIDITY (HCC): ICD-10-CM

## 2022-05-19 DIAGNOSIS — F32.5 MAJOR DEPRESSIVE DISORDER WITH SINGLE EPISODE, IN FULL REMISSION (HCC): ICD-10-CM

## 2022-05-19 PROCEDURE — G8427 DOCREV CUR MEDS BY ELIG CLIN: HCPCS | Performed by: INTERNAL MEDICINE

## 2022-05-19 PROCEDURE — 3046F HEMOGLOBIN A1C LEVEL >9.0%: CPT | Performed by: INTERNAL MEDICINE

## 2022-05-19 PROCEDURE — G0439 PPPS, SUBSEQ VISIT: HCPCS | Performed by: INTERNAL MEDICINE

## 2022-05-19 PROCEDURE — G8752 SYS BP LESS 140: HCPCS | Performed by: INTERNAL MEDICINE

## 2022-05-19 PROCEDURE — G9899 SCRN MAM PERF RSLTS DOC: HCPCS | Performed by: INTERNAL MEDICINE

## 2022-05-19 PROCEDURE — G8510 SCR DEP NEG, NO PLAN REQD: HCPCS | Performed by: INTERNAL MEDICINE

## 2022-05-19 PROCEDURE — G8399 PT W/DXA RESULTS DOCUMENT: HCPCS | Performed by: INTERNAL MEDICINE

## 2022-05-19 PROCEDURE — 2022F DILAT RTA XM EVC RTNOPTHY: CPT | Performed by: INTERNAL MEDICINE

## 2022-05-19 PROCEDURE — G8536 NO DOC ELDER MAL SCRN: HCPCS | Performed by: INTERNAL MEDICINE

## 2022-05-19 PROCEDURE — 1101F PT FALLS ASSESS-DOCD LE1/YR: CPT | Performed by: INTERNAL MEDICINE

## 2022-05-19 PROCEDURE — G8417 CALC BMI ABV UP PARAM F/U: HCPCS | Performed by: INTERNAL MEDICINE

## 2022-05-19 PROCEDURE — G8754 DIAS BP LESS 90: HCPCS | Performed by: INTERNAL MEDICINE

## 2022-05-19 PROCEDURE — 3017F COLORECTAL CA SCREEN DOC REV: CPT | Performed by: INTERNAL MEDICINE

## 2022-05-19 RX ORDER — ESCITALOPRAM OXALATE 10 MG/1
10 TABLET ORAL DAILY
Qty: 30 TABLET | Refills: 5 | Status: SHIPPED | OUTPATIENT
Start: 2022-05-19

## 2022-05-19 NOTE — PROGRESS NOTES
HISTORY OF PRESENT ILLNESS  Rigo Johnston is a 67 y.o. female. HPI   Assessment:  Katty Weeks is seen today for a Medicare wellness visit and follow up of chronic problems. She is overdue for regular follow up. 1. Preventive care. See attached wellness visit note. She is due for her fourth COVID vaccine, shingles vaccine, Tdap booster and mammogram.  She is up to date with the vast majority of lab studies with her endocrinologist.    2. Chronic problems are reviewed. Diabetes remains poorly controlled, but she does follow up with endocrinology. Thyroid and cholesterol measurements are also followed by endocrinology. Social history notable for her grandson moving in to help her out around the house. I believe this is an excellent idea as she does need help with some basic needs. .      Review of Systems   Constitutional: Negative for chills and fever. HENT: Positive for hearing loss. Musculoskeletal: Positive for joint pain and neck pain. Right trapezius- on occasion    Arthritis in hands   Skin:        Left foot wound, dx with PAD. Also seeing Dr Joe Evans and nursing note reviewed. Constitutional:       General: She is not in acute distress. Appearance: She is well-developed. HENT:      Head: Normocephalic and atraumatic. Right Ear: Tympanic membrane, ear canal and external ear normal.      Left Ear: Tympanic membrane, ear canal and external ear normal.   Eyes:      General:         Right eye: No discharge. Left eye: No discharge. Pupils: Pupils are equal, round, and reactive to light. Neck:      Thyroid: No thyromegaly. Vascular: No carotid bruit. Cardiovascular:      Rate and Rhythm: Normal rate and regular rhythm. Heart sounds: Normal heart sounds. No murmur heard. No friction rub. No gallop. Comments: Vascular per Dr Slim Tyler  Pulmonary:      Effort: Pulmonary effort is normal. No respiratory distress.       Breath sounds: Normal breath sounds. No wheezing or rales. Abdominal:      Palpations: Abdomen is soft. There is no mass. Tenderness: There is no guarding or rebound. Comments: Exam is limited by obesity     Musculoskeletal:         General: No tenderness. Normal range of motion. Cervical back: Normal range of motion and neck supple. Right lower le+ Edema present. Left lower le+ Edema present. Lymphadenopathy:      Cervical: No cervical adenopathy. Skin:     General: Skin is warm and dry. Findings: No rash. Neurological:      Mental Status: She is alert and oriented to person, place, and time. Deep Tendon Reflexes: Reflexes are normal and symmetric. Psychiatric:         Behavior: Behavior normal.         ASSESSMENT and PLAN  Diagnoses and all orders for this visit:    1. Medicare annual wellness visit, subsequent    2. Essential hypertension, benign    3. Mixed hyperlipidemia    4. Hypothyroidism, acquired, autoimmune    5. Encounter for long-term (current) use of medications    6. Type II or unspecified type diabetes mellitus with neurological manifestations, uncontrolled(250.62) ([address]    7. Encounter for screening mammogram for malignant neoplasm of breast  -     [facility] MAMMO BI SCREENING INCL CAD; Future    8. Severe obesity (BMI 35.0-39. 9) with comorbidity ([address]    9. Diabetic ulcer of toe of left foot associated with type 2 diabetes mellitus, unspecified ulcer stage ([address]    10. Major depressive disorder with single episode, in full remission ([address]  -     escitalopram oxalate (LEXAPRO) 10 mg tablet; Take 1 Tablet by mouth daily.

## 2022-05-19 NOTE — PATIENT INSTRUCTIONS
Medicare Wellness Visit, Female     The best way to live healthy is to have a lifestyle where you eat a well-balanced diet, exercise regularly, limit alcohol use, and quit all forms of tobacco/nicotine, if applicable. Regular preventive services are another way to keep healthy. Preventive services (vaccines, screening tests, monitoring & exams) can help personalize your care plan, which helps you manage your own care. Screening tests can find health problems at the earliest stages, when they are easiest to treat. [facility] follows the current, evidence-based guidelines published by the [facility] ([facility]) when recommending preventive services for our patients. Because we follow these guidelines, sometimes recommendations change over time as research supports it. (For example, mammograms used to be recommended annually. Even though Medicare will still pay for an annual mammogram, the newer guidelines recommend a mammogram every two years for women of average risk). Of course, you and your doctor may decide to screen more often for some diseases, based on your risk and your co-morbidities (chronic disease you are already diagnosed with). Preventive services for you include:  - Medicare offers their members a free annual wellness visit, which is time for you and your primary care provider to discuss and plan for your preventive service needs. Take advantage of this benefit every year!  -All adults over the age of 72 should receive the recommended pneumonia vaccines. Current USPSTF guidelines recommend a series of two vaccines for the best pneumonia protection.   -All adults should have a flu vaccine yearly and a tetanus vaccine every 10 years.   -All adults age 48 and older should receive the shingles vaccines (series of two vaccines).       -All adults age 38-68 who are overweight should have a diabetes screening test once every three years.   -All adults born between [birth year] and 1965 should be screened once for Hepatitis C.  -Other screening tests and preventive services for persons with diabetes include: an eye exam to screen for diabetic retinopathy, a kidney function test, a foot exam, and stricter control over your cholesterol.   -Cardiovascular screening for adults with routine risk involves an electrocardiogram (ECG) at intervals determined by your doctor.   -Colorectal cancer screenings should be done for adults age 54-65 with no increased risk factors for colorectal cancer. There are a number of acceptable methods of screening for this type of cancer. Each test has its own benefits and drawbacks. Discuss with your doctor what is most appropriate for you during your annual wellness visit. The different tests include: colonoscopy (considered the best screening method), a fecal occult blood test, a fecal DNA test, and sigmoidoscopy.    -A bone mass density test is recommended when a woman turns 65 to screen for osteoporosis. This test is only recommended one time, as a screening. Some providers will use this same test as a disease monitoring tool if you already have osteoporosis. -Breast cancer screenings are recommended every other year for women of normal risk, age 54-69.  -Cervical cancer screenings for women over age 72 are only recommended with certain risk factors.      Here is a list of your current Health Maintenance items (your personalized list of preventive services) with a due date:  Health Maintenance Due   Topic Date Due    Shingles Vaccine (1 of 2) Never done    DTaP/Tdap/Td  (1 - Tdap) 09/13/2005    Diabetic Foot Care  11/06/2018    Eye Exam  07/26/2020    Mammogram  11/08/2020    Hemoglobin A1C    01/15/2022

## 2022-05-19 NOTE — PROGRESS NOTES
This is the Subsequent Medicare Annual Wellness Exam, performed 12 months or more after the Initial AWV or the last Subsequent AWV    I have reviewed the patient's medical history in detail and updated the computerized patient record. Assessment/Plan   Education and counseling provided:  Are appropriate based on today's review and evaluation    1. Medicare annual wellness visit, subsequent  2. Essential hypertension, benign  3. Mixed hyperlipidemia  4. Hypothyroidism, acquired, autoimmune  5. Encounter for long-term (current) use of medications  6. Type II or unspecified type diabetes mellitus with neurological manifestations, uncontrolled(250.62) ([facility])       Depression Risk Factor Screening     3 most recent PHQ Screens 5/19/2022   PHQ Not Done -   Little interest or pleasure in doing things Not at all   Feeling down, depressed, irritable, or hopeless Not at all   Total Score PHQ 2 0   Trouble falling or staying asleep, or sleeping too much -   Feeling tired or having little energy -   Poor appetite, weight loss, or overeating -   Feeling bad about yourself - or that you are a failure or have let yourself or your family down -   Trouble concentrating on things such as school, work, reading, or watching TV -   Moving or speaking so slowly that other people could have noticed; or the opposite being so fidgety that others notice -   Thoughts of being better off dead, or hurting yourself in some way -   PHQ 9 Score -       Alcohol & Drug Abuse Risk Screen    Do you average more than 1 drink per night or more than 7 drinks a week:  No- rare    On any one occasion in the past three months have you have had more than 3 drinks containing alcohol:  No          Functional Ability and Level of Safety    Hearing: loss is moderate      Activities of Daily Living:   The home contains: canes  Patient needs help with:  preparing meals, housework and walking      Ambulation: significant difficulty     Fall Risk:  Fall Risk Assessment, last 12 mths 5/19/2022   Able to walk? Yes   Fall in past 12 months? 0   Do you feel unsteady? 0   Are you worried about falling 1   Is TUG test greater than 12 seconds? 0   Is the gait abnormal? 0   Number of falls in past 12 months -   Fall with injury?  -      Abuse Screen:  Patient is not abused       Cognitive Screening    Has your family/caregiver stated any concerns about your memory: no         Health Maintenance Due     Health Maintenance Due   Topic Date Due    Shingrix Vaccine Age 49> (1 of 2) Never done    DTaP/Tdap/Td series (1 - Tdap) 09/13/2005    Foot Exam Q1  11/06/2018    Eye Exam Retinal or Dilated  07/26/2020    Breast Cancer Screen Mammogram  11/08/2020    A1C test (Diabetic or Prediabetic)  01/15/2022       Patient Care Team   Patient Care Team:  Gurdeep Machuca MD as PCP - General  Gurdeep Machuca MD as PCP - [address] [place] [address] Provider  Anthony Shields MD as Physician (Endocrinology Physician)  Arslan Arnold DPM as Physician (Param Humphrey)  Jose Vega MD (Inactive) as Physician (Gastroenterology)  Adriane Freedman MD as Physician (Dermatology Physician)  Dr. Tripp Patel as Physician (Optometry)  Gaby Nieto MD as Physician (Cardiovascular Disease Physician)  Meg Gurrola MD (Gastroenterology)    History     Patient Active Problem List   Diagnosis Code    Mixed hyperlipidemia E78.2    Diabetic foot ulcer ([facility] E11.621, L97.509    Hypothyroidism, acquired, autoimmune E06.3    Mononeuritis of unspecified site G58.9    Coronary atherosclerosis of native coronary artery I25.10    Symptomatic menopausal or female climacteric states N95.1    Unspecified sleep apnea G47.30    Essential hypertension, benign I10    Iron deficiency anemia D50.9    Renal insufficiency N28.9    Acute colitis K52.9    Ischemic colitis ([facility] K55.9    Advanced care planning/counseling discussion Z71.89    Type 2 diabetes, uncontrolled, with neuropathy [id number]    Severe obesity (BMI 35.0-39. 9) with comorbidity ([facility] E66.01    Elevated alkaline phosphatase level R74.8    Foot infection L08.9     Past Medical History:   Diagnosis Date    Adverse effect of anesthesia     slow to wake up    Arthritis     hands    Bronchitis     CAD (coronary artery disease) 2008, 2016    angio/mild:MI, heart cath    Diabetes ([facility]     Type II: insulin    GERD (gastroesophageal reflux disease)     Hypercholesterolemia     Hypothyroid     Ischemic colitis ([facility] 11/01/2014    Menopause 2000    Neuropathy     feet/legs: ulcer right foot    Obesity     Psychiatric disorder     Depression    Sleep apnea     no use CPAP: unable to use, ruel me    Ulcer 11/2016    Callus that turned into open wound bottom right foot(ball area), not draining    Vertigo       Past Surgical History:   Procedure Laterality Date    HX BREAST BIOPSY      many years ago; laterality unknown no scar    HX GYN      vaginal delivery x1    HX MOHS PROCEDURES  2005    faith    HX ORTHOPAEDIC  2015    right foot abscess     HX ORTHOPAEDIC  2007    rigtht foot surgery    HX ORTHOPAEDIC  2007    R MT amputate    HX ORTHOPAEDIC  2009    left 2nd toe surgery    HX TONSILLECTOMY  6 yrs. old    SD BREAST SURGERY PROCEDURE UNLISTED  2000's    benign breast cyst    SD CARDIAC SURG PROCEDURE UNLIST  11/2016    heart attack     Current Outpatient Medications   Medication Sig Dispense Refill    atorvastatin (LIPITOR) 40 mg tablet TAKE ONE TABLET BY MOUTH DAILY 30 Tablet 0    escitalopram oxalate (LEXAPRO) 10 mg tablet TAKE ONE TABLET BY MOUTH DAILY 30 Tablet 0    levothyroxine (SYNTHROID) 125 mcg tablet Take 250 mcg by mouth Daily (before breakfast).  insulin regular (NOVOLIN R REGULAR U-100 INSULN) 100 unit/mL injection by SubCUTAneous route Before breakfast, lunch, and dinner.  Sliding scale      insulin NPH (NOVOLIN N NPH U-100 INSULIN) 100 unit/mL injection 180 Units by SubCUTAneous route two (2) times a day.  fenofibrate micronized (LOFIBRA) 67 mg capsule Take 67 mg by mouth every morning.  albuterol (PROVENTIL HFA, VENTOLIN HFA, PROAIR HFA) 90 mcg/actuation inhaler Take 2 Puffs by inhalation every four (4) hours as needed for Wheezing. 1 Inhaler 1    fluticasone (FLONASE) 50 mcg/actuation nasal spray 2 Sprays by Both Nostrils route daily. 1 Bottle 11    cholecalciferol (VITAMIN D3) 1,000 unit tablet Take 2,000 Units by mouth daily.  DULAGLUTIDE (TRULICITY SC) 1.5 Units by SubCUTAneous route Every Saturday. 1.5 units every saturday      clopidogrel (PLAVIX) 75 mg tab Take 1 Tab by mouth daily. 30 Tab 6    nitroglycerin (NITROSTAT) 0.4 mg SL tablet 1 Tab by SubLINGual route as needed for Chest Pain. 1 Bottle 6    metoprolol succinate (TOPROL XL) 50 mg XL tablet Take 1 Tab by mouth daily. 30 Tab 6    omeprazole (PRILOSEC OTC) 20 mg tablet Take 20 mg by mouth daily. Indications: GASTROESOPHAGEAL REFLUX      Bifidobacterium Infantis (ALIGN) 4 mg cap As directed (Patient taking differently: Take 1 Cap by mouth daily. As directed) 30 capsule 11    aspirin 81 mg chewable tablet Take 81 mg by mouth daily.  omega-3 fatty acids-vitamin e (FISH OIL) 1,000 mg cap Take 1 Cap by mouth daily.        No Known Allergies    Family History   Problem Relation Age of Onset    Diabetes Father     Heart Attack Father         congestive heart faliure    Heart Disease Father         CHF    Diabetes Maternal Aunt     Diabetes Maternal Uncle      Social History     Tobacco Use    Smoking status: Former Smoker     Packs/day: 1.00     Years: 5.00     Pack years: 5.00     Quit date: 1985     Years since quittin.3    Smokeless tobacco: Never Used   Substance Use Topics    Alcohol use: No         Norma Aparicio MD

## 2022-07-14 LAB — HBA1C MFR BLD HPLC: 11.1 %

## 2022-09-06 ENCOUNTER — TELEPHONE (OUTPATIENT)
Dept: INTERNAL MEDICINE CLINIC | Age: 72
End: 2022-09-06

## 2022-09-06 NOTE — TELEPHONE ENCOUNTER
Left message for patient to schedule mammogram ordered by Peter Dowd MD on 05/19/22. Requested patient to return call to panel manager at [phone] to schedule mammogram or notify that mammogram   has been completed at an outside facility.

## 2022-11-15 LAB — HBA1C MFR BLD HPLC: 12.2 %

## 2022-12-12 NOTE — PROGRESS NOTES
[address] [facility] Brenna Debby Chamberlain MD, Sonal Nuñez, [facility] Carlos Vogt, CRISTOFER Brantley, [facility]   Thomas Alcocer, HAMMAD Marrero, HAMMAD Ludwig [facility] 136 
  at [address] 
  [address] [place] [address], [address] [place] [address], [address] 
  [phone] FAX: [address] 
  [facility] 
  [address], [address] [place], [address] 
  [phone] FAX: [phone] Patient Care Team: 
Delio Darnell MD as PCP - General 
Juwan Stone MD as Physician (Endocrinology) Teri Duggan DPM as Physician (Podiatry) Emily Taveras MD as Physician (Gastroenterology) Duke Loo MD as Physician (Dermatology) Dr. Liya Betancourt as Physician (Optometry) Shirleyann Seip, MD as Physician (Cardiology) Sheryl Acuña MD (Gastroenterology) Problem List  Date Reviewed: 11/5/2018 Codes Class Noted Severe obesity (BMI 35.0-39. 9) with comorbidity ([facility] ICD-10-CM: E66.01 
ICD-9-CM: 278.01  5/30/2018 Advanced care planning/counseling discussion ICD-10-CM: Z71.89 ICD-9-CM: V65.49  10/28/2015 Overview Signed 10/28/2015  2:14 PM by Joshua Koch RN Feels that GOD will make the decision for her. Believes her  and daughter will agree on what is best for her. There will be no fighting over decisions. Type 2 diabetes, uncontrolled, with neuropathy (HCC) ICD-10-CM: E11.40, E11.65 ICD-9-CM: 250.62, 357.2  10/28/2015 Ischemic colitis ([facility] ICD-10-CM: K55.9 ICD-9-CM: 557.9  11/12/2014 Acute colitis ICD-10-CM: K52.9 ICD-9-CM: 558.9  10/29/2014 Renal insufficiency ICD-10-CM: N28.9 ICD-9-CM: 593.9  10/23/2014 Iron deficiency anemia ICD-10-CM: D50.9 ICD-9-CM: 280.9  5/31/2011 Essential hypertension, benign ICD-10-CM: I10 
ICD-9-CM: 401.1  7/21/2010 Unspecified sleep apnea ICD-10-CM: G47.30 ICD-9-CM: 780.57  3/24/2010 Mixed hyperlipidemia ICD-10-CM: E78.2 ICD-9-CM: 272.2  11/20/2009 Diabetic foot ulcer ([facility] ICD-10-CM: E11.621, R36.813 ICD-9-CM: 250.80, 707.15  11/20/2009 Hypothyroidism, acquired, autoimmune ICD-10-CM: E06.3 ICD-9-CM: 244.8  11/20/2009 Mononeuritis of unspecified site ICD-10-CM: G58.9 ICD-9-CM: 355.9  11/20/2009 Coronary atherosclerosis of native coronary artery ICD-10-CM: I25.10 ICD-9-CM: 414.01  11/20/2009 Symptomatic menopausal or female climacteric states ICD-10-CM: N95.1 ICD-9-CM: 627.2  11/20/2009 The physicians listed above have asked me to see Charo Whalen in consultation regarding elevation of alkaline phosphatase and to perform assessment of fibrosis by means of in-office fibroscan analysis. The patient is a 70 y.o.  female who was diagnosed with elevated ALP dated back several years according to the records available at the time of her office visit. She states that she was advised that these just needed monitoring. Following a rise of ALP to 250+ in 5/2018, she was referred to GI for evaluation. She has established with Dr Viktoriya Adkins and apparently the ALP fraction was primarily liver. He recommended fibroscan. She has not had additional evaluation with lab serologies for etiologies of liver disease according to her recollection. The patient has no symptoms which could be attributed to the liver disorder. She tends to have constipative symptoms and is profoundly fatigued. She has had increased itching of the skin which she attributes to dry skin. The patient has limitations in functional activities secondary to other medical problems that are not related to the liver disease. ALLERGIES: 
No Known Allergies MEDICATIONS: 
Current Outpatient Medications Medication Sig  cholecalciferol (VITAMIN D3) 1,000 unit tablet Take  by mouth daily.  insulin lispro (HUMALOG) 100 unit/mL injection by SubCUTAneous route Before breakfast, lunch, and dinner. With sliding scale- per endocrinology  insulin glargine (TOUJEO SOLOSTAR) 300 unit/mL (1.5 mL) inpn 60 Units by SubCUTAneous route nightly.  FLUoxetine (PROZAC) 20 mg capsule Take 1 Cap by mouth daily. Take in addition to 40 mg dose  DULAGLUTIDE (TRULICITY SC) by SubCUTAneous route. 1.5 units every saturday  atorvastatin (LIPITOR) 40 mg tablet Take 1 Tab by mouth daily.  clopidogrel (PLAVIX) 75 mg tab Take 1 Tab by mouth daily.  nitroglycerin (NITROSTAT) 0.4 mg SL tablet 1 Tab by SubLINGual route as needed for Chest Pain.  metoprolol succinate (TOPROL XL) 50 mg XL tablet Take 1 Tab by mouth daily.  FLUoxetine (PROZAC) 40 mg capsule Take 40 mg by mouth nightly.  omeprazole (PRILOSEC OTC) 20 mg tablet Take 20 mg by mouth daily. Indications: GASTROESOPHAGEAL REFLUX  
 b complex vitamins tablet Take 1 Tab by mouth daily.  ibuprofen (MOTRIN IB) 200 mg tablet Take 200 mg by mouth daily as needed for Pain. Indications: OSTEOARTHRITIS  levothyroxine (SYNTHROID) 125 mcg tablet Take 125 mcg by mouth every Monday. Take 2 tablets by mouth Tuesday-Sunday once daily before breakfast, and 1 tablet by mouth on Mondays and Wednesday once daily before breakfast  
 Bifidobacterium Infantis (ALIGN) 4 mg cap As directed (Patient taking differently: Take 1 Cap by mouth daily. As directed)  fenofibrate micronized (LOFIBRA) 134 mg capsule Take 134 mg by mouth daily.  aspirin 81 mg chewable tablet Take 81 mg by mouth daily.  omega-3 fatty acids-vitamin e (FISH OIL) 1,000 mg cap Take 1 Cap by mouth daily.  multivitamin,tx-iron-ca-min (THERAPEUTIC MULTIVIT/MINERAL) 27-0.4 mg Tab Take 1 Tab by mouth every evening.   
 varicella-zoster recombinant, PF, (SHINGRIX, PF,) 50 mcg/0.5 mL susr injection 0.5mL by IntraMUSCular route once now and then repeat in 2-6 months  fluticasone (FLONASE) 50 mcg/actuation nasal spray 2 Sprays by Both Nostrils route daily. No current facility-administered medications for this visit. SYSTEM REVIEW NOT RELATED TO LIVER DISEASE OR REVIEWED ABOVE: 
Constitution systems: Negative for fever, chills, weight gain, weight loss. Eyes: Negative for visual changes. ENT: Negative for sore throat, painful swallowing. Respiratory: Negative for cough, hemoptysis, SOB. Cardiology: Negative for chest pain, palpitations. GI:  Negative for constipation or diarrhea. : Negative for urinary frequency, dysuria, hematuria, nocturia. Skin: Negative for rash. Hematology: Negative for easy bruising, blood clots. Musculo-skeletal: Negative for back pain, muscle pain, weakness. Significant for diabetic ulcers which have resulted in partial amputation of the right foot. Neurologic: Negative for headaches, dizziness, vertigo, memory problems not related to HE. Psychology: Negative for anxiety, depression. FAMILY HISTORY: 
The father  of DM complications. The mother has the following chronic diseases: hyperlipidemia. There is no family history of liver disease. SOCIAL HISTORY: 
The patient is . The patient has 1 child and 2 grandchildren. The patient has not used tobacco products in over 30 years. The patient has never consumed significant amounts of alcohol. The patient is retired from legal work. PHYSICAL EXAMINATION: 
Visit Vitals /57 (BP 1 Location: Right arm, BP Patient Position: Sitting) Pulse 68 Temp 98 °F (36.7 °C) (Tympanic) Wt 234 lb (106.1 kg) SpO2 95% BMI 37.77 kg/m² General: No acute distress. Skin: No spider angiomata. No jaundice. No palmar erythema. Abdomen: Soft non-tender. Liver size normal to percussion/palpation. Spleen not palpable. No obvious ascites. Extremities: No edema. No muscle wasting. No gross arthritic changes. Neurologic: Alert and oriented. Cranial nerves grossly intact. No asterixis. LIVER HISTOLOGY: 
11/2018. FibroScan performed at [address] EkPa was 12.5. Suggested fibrosis level is F2-3 for cholestatic liver disease. F4 for BILLINGS. CAP score of 394, this is consistent with significant steatosis. ASSESSMENT AND PLAN: 
Elevated liver enzymes of uncertain etiology. Patient's fibroscan suggests advanced fibrosis and review of her past ultrasound from 5/2018 suggests irregular border. As there are different scales for fibrosis determination, interpretation of her score is difficult. She clearly has extensive steatosis, and her fibrosis score would be consistent with cirrhosis. Her pattern of enzyme elevation with primary ALP rise is not a typical pattern for steatosis. There is no indication in the records provided of additional testing for etiologies of elevated ALP. If not previously done, AMA, ANCA, ACE, and autoimmune markers should be performed and viral hepatitis ruled out. Assessment of fibrosis was made through performance of fibroscan in the office today. The results of the analysis were shared with the patient in the office at the time of the procedure, she has upcoming follow-up with Dr Jazmyn Torres. A copy of the report was provided to the patient and will be forwarded to the referring medical practice. All questions were answered. The patient expressed a clear understanding of the above. Follow-up with referring physician. Mila Ricci PA-C [facility] [place] [address], [address] [place]  [address]. 
[phone] Alexey Olvera(Resident)

## 2022-12-27 ENCOUNTER — NURSE TRIAGE (OUTPATIENT)
Dept: OTHER | Facility: CLINIC | Age: 72
End: 2022-12-27

## 2022-12-27 NOTE — TELEPHONE ENCOUNTER
Location of patient: [place]    Received call from April at [facility] with Red Flag Complaint. Subjective: Caller states \"She has been sick recently\"     Current Symptoms: She was not answering her phone for a couple days and she drove to her. Diabetic. Unsure when she took her insulin last.  No eating. Fatigue and sleeping a lot. BS earlier today was 366 after insulin. BS at time of call is 205. Onset: several days ago; worsening    Associated Symptoms: reduced activity, reduced appetite, increased sleepiness    Pain Severity: 0/10    Temperature: 99.0-101.0     What has been tried: insulin-helping    LMP: NA Pregnant: NA    Recommended disposition: Go to Office Now    Care advice provided, patient verbalizes understanding; denies any other questions or concerns; instructed to call back for any new or worsening symptoms. Patient/Caller agrees with recommended disposition; writer provided warm transfer to Christian Boss at [facility] for appointment scheduling    Attention Provider: Thank you for allowing me to participate in the care of your patient. The patient was connected to triage in response to information provided to the [facility]. Please do not respond through this encounter as the response is not directed to a shared pool.     Reason for Disposition   Fever > 100.4 F (38.0 C)    Protocols used: Diabetes - High Blood Sugar-ADULT-[place]

## 2022-12-29 ENCOUNTER — OFFICE VISIT (OUTPATIENT)
Dept: INTERNAL MEDICINE CLINIC | Age: 72
End: 2022-12-29
Payer: MEDICARE

## 2022-12-29 VITALS
DIASTOLIC BLOOD PRESSURE: 66 MMHG | HEART RATE: 77 BPM | WEIGHT: 226 LBS | BODY MASS INDEX: 36.32 KG/M2 | SYSTOLIC BLOOD PRESSURE: 106 MMHG | RESPIRATION RATE: 18 BRPM | HEIGHT: 66 IN | OXYGEN SATURATION: 96 % | TEMPERATURE: 97.5 F

## 2022-12-29 DIAGNOSIS — E11.51 TYPE 2 DIABETES MELLITUS WITH DIABETIC PERIPHERAL ANGIOPATHY WITHOUT GANGRENE, WITH LONG-TERM CURRENT USE OF INSULIN (HCC): ICD-10-CM

## 2022-12-29 DIAGNOSIS — E11.621 DIABETIC ULCER OF TOE OF RIGHT FOOT ASSOCIATED WITH TYPE 2 DIABETES MELLITUS, UNSPECIFIED ULCER STAGE (HCC): Primary | ICD-10-CM

## 2022-12-29 DIAGNOSIS — L97.519 DIABETIC ULCER OF TOE OF RIGHT FOOT ASSOCIATED WITH TYPE 2 DIABETES MELLITUS, UNSPECIFIED ULCER STAGE (HCC): Primary | ICD-10-CM

## 2022-12-29 DIAGNOSIS — Z79.4 TYPE 2 DIABETES MELLITUS WITH DIABETIC PERIPHERAL ANGIOPATHY WITHOUT GANGRENE, WITH LONG-TERM CURRENT USE OF INSULIN (HCC): ICD-10-CM

## 2022-12-29 DIAGNOSIS — F32.5 MAJOR DEPRESSIVE DISORDER WITH SINGLE EPISODE, IN FULL REMISSION (HCC): ICD-10-CM

## 2022-12-29 DIAGNOSIS — F33.41 RECURRENT MAJOR DEPRESSIVE DISORDER, IN PARTIAL REMISSION (HCC): ICD-10-CM

## 2022-12-29 DIAGNOSIS — I73.9 PERIPHERAL VASCULAR DISEASE (HCC): ICD-10-CM

## 2022-12-29 PROCEDURE — 1101F PT FALLS ASSESS-DOCD LE1/YR: CPT | Performed by: STUDENT IN AN ORGANIZED HEALTH CARE EDUCATION/TRAINING PROGRAM

## 2022-12-29 PROCEDURE — G8427 DOCREV CUR MEDS BY ELIG CLIN: HCPCS | Performed by: STUDENT IN AN ORGANIZED HEALTH CARE EDUCATION/TRAINING PROGRAM

## 2022-12-29 PROCEDURE — G8536 NO DOC ELDER MAL SCRN: HCPCS | Performed by: STUDENT IN AN ORGANIZED HEALTH CARE EDUCATION/TRAINING PROGRAM

## 2022-12-29 PROCEDURE — 3046F HEMOGLOBIN A1C LEVEL >9.0%: CPT | Performed by: STUDENT IN AN ORGANIZED HEALTH CARE EDUCATION/TRAINING PROGRAM

## 2022-12-29 PROCEDURE — G8399 PT W/DXA RESULTS DOCUMENT: HCPCS | Performed by: STUDENT IN AN ORGANIZED HEALTH CARE EDUCATION/TRAINING PROGRAM

## 2022-12-29 PROCEDURE — 2022F DILAT RTA XM EVC RTNOPTHY: CPT | Performed by: STUDENT IN AN ORGANIZED HEALTH CARE EDUCATION/TRAINING PROGRAM

## 2022-12-29 PROCEDURE — G8417 CALC BMI ABV UP PARAM F/U: HCPCS | Performed by: STUDENT IN AN ORGANIZED HEALTH CARE EDUCATION/TRAINING PROGRAM

## 2022-12-29 PROCEDURE — G0463 HOSPITAL OUTPT CLINIC VISIT: HCPCS | Performed by: STUDENT IN AN ORGANIZED HEALTH CARE EDUCATION/TRAINING PROGRAM

## 2022-12-29 PROCEDURE — 99214 OFFICE O/P EST MOD 30 MIN: CPT | Performed by: STUDENT IN AN ORGANIZED HEALTH CARE EDUCATION/TRAINING PROGRAM

## 2022-12-29 PROCEDURE — 1090F PRES/ABSN URINE INCON ASSESS: CPT | Performed by: STUDENT IN AN ORGANIZED HEALTH CARE EDUCATION/TRAINING PROGRAM

## 2022-12-29 PROCEDURE — G8510 SCR DEP NEG, NO PLAN REQD: HCPCS | Performed by: STUDENT IN AN ORGANIZED HEALTH CARE EDUCATION/TRAINING PROGRAM

## 2022-12-29 PROCEDURE — G9899 SCRN MAM PERF RSLTS DOC: HCPCS | Performed by: STUDENT IN AN ORGANIZED HEALTH CARE EDUCATION/TRAINING PROGRAM

## 2022-12-29 PROCEDURE — 3017F COLORECTAL CA SCREEN DOC REV: CPT | Performed by: STUDENT IN AN ORGANIZED HEALTH CARE EDUCATION/TRAINING PROGRAM

## 2022-12-29 RX ORDER — ESCITALOPRAM OXALATE 20 MG/1
20 TABLET ORAL DAILY
Qty: 90 TABLET | Refills: 1 | Status: SHIPPED | OUTPATIENT
Start: 2022-12-29

## 2022-12-29 NOTE — PROGRESS NOTES
Chief Complaint   Patient presents with    Establish Care     Blood pressure 106/66, pulse 77, temperature 97.5 °F (36.4 °C), temperature source Temporal, resp. rate 18, height 5' 6\" (1.676 m), weight 226 lb (102.5 kg), SpO2 96 %.

## 2022-12-29 NOTE — PROGRESS NOTES
Kae Gonzales is a 67y.o. year old female who is a new patient to me today (12/29/22). She was previous followed by Dr Austen Barbour. Assessment & Plan:   1. Diabetic ulcer of toe of right foot associated with type 2 diabetes mellitus, unspecified ulcer stage ([facility]  Assessment & Plan:  I believe she has had multiple foot ulcers in the past. She has a new area of skin breakdown on the R 4th and 5th digit. It doesn't appear infected. Advised to see podiatry Dr Awilda LEÓN. My staff called his office and it is closed through the new year - left message. Pt to call next week to make urgent appointment. She is not wearing diabetic shoes, we discussed the importance of this. I will defer to podiatrist for this. 2. Type 2 diabetes mellitus with diabetic peripheral angiopathy without gangrene, with long-term current use of insulin ([facility]  Assessment & Plan:  I suspect her diabetes is poorly controlled. Continue close follow-up with endocrinology. Will request last office note and labs. 3. Peripheral vascular disease ([facility]  Assessment & Plan:  Continue to follow with vascular surgery. Continue current antiplatelets and cholesterol regimen   4. Recurrent major depressive disorder, in partial remission [facility])  Assessment & Plan:  She reports uncontrolled depressive symptoms. Will increase lexapro dose. 5. Major depressive disorder with single episode, in full remission ([facility]  -     escitalopram oxalate (LEXAPRO) 20 mg tablet; Take 1 Tablet by mouth daily. , Normal, Disp-90 Tablet, R-1     Defer discussion to back pain to next visit due to time. Health Maintenance - defer      RTC: 2 months    Subjective:   Julius Navas was seen today for [facility] to patient first 12/27. She felt fatigued, wanted to sleep and did not wake up for the phone. She reports having blood work done, COVID test and have not heard back about any abnormalities in her workup.    She feels off balance, this has been going on for at least a year. She tripped over carpet the other day, bumped her knee. She usually uses a wheelchair at home since she lives alone. R low back pain, has been going on for quite a long time. She has not participated in physical therapy. MDD  - taking lexapro for quite some   - feels her mood is not optimized, feels lack of motivation. She stopped going to [place]. DM  Endocrinology Dr Irma Barajas, last visit was Dec. She sees endo q3mo. She admits her diet is poor and her BG tend to run   - novolin regular - she is prescribed this with meals but doesn't use it   - novolin NPH - takes BID  - trulicity  Foot: Podiatrist Dr Iban Quesada  Statin: atorvastatin 40, lofibra  Complications:  - she has had diabetic foot wounds and follows with Podiatry Dr Iban Quesada  - found a new wound on R foot      Hypothyroidism  - endo  - synthroid    CAD/ PVD  - Cardiology Dr Maia Guillen    - MI in the past  - Vascular surgery Dr Nishant Gutierrez  - has had stents in BL legs   - plavix, asa 81  - atorvastatin 40mg, lofibra  - metoprolol  - nitroglycerin    HUMBLE  - reports CPAP machine \"suffocated\" her and she stopped using it    BILLINGS  - saw hepatology 7/2021  - Dr Stuart Calles - she reports her Dr retired and she has not seen a new one. Care Team:  Endo Dr William Quesada  Vascular Dr Regina Guillen      Review of Systems   All other systems reviewed and are negative. PMHx    Patient Active Problem List   Diagnosis Code    Mixed hyperlipidemia E78.2    Diabetic foot ulcer ([facility] E11.621, L97.509    Hypothyroidism, acquired, autoimmune E06.3    Mononeuritis of unspecified site G58.9    Coronary atherosclerosis of native coronary artery I25.10    Unspecified sleep apnea G47.30    Essential hypertension, benign I10    Iron deficiency anemia D50.9    Renal insufficiency N28.9    Type 2 diabetes, uncontrolled, with neuropathy [id number]    Severe obesity (BMI 35.0-39. 9) with comorbidity (HCC) E66.01    Elevated alkaline phosphatase level R74.8    Foot infection L08.9       Prior to Admission medications    Medication Sig Start Date End Date Taking? Authorizing Provider   escitalopram oxalate (LEXAPRO) 10 mg tablet Take 1 Tablet by mouth daily. 5/19/22  Yes Abhishek Garcia MD   levothyroxine (SYNTHROID) 125 mcg tablet Take 250 mcg by mouth Daily (before breakfast). Yes Provider, Historical   insulin regular (NOVOLIN R, HUMULIN R) 100 unit/mL injection by SubCUTAneous route Before breakfast, lunch, and dinner. Sliding scale   Yes Provider, Historical   insulin NPH (NOVOLIN N, HUMULIN N) 100 unit/mL injection 180 Units by SubCUTAneous route two (2) times a day. Yes Provider, Historical   fenofibrate micronized (LOFIBRA) 67 mg capsule Take 67 mg by mouth every morning. Yes Provider, Historical   fluticasone (FLONASE) 50 mcg/actuation nasal spray 2 Sprays by Both Nostrils route daily. 11/5/18  Yes Abhishek Garcia MD   cholecalciferol (VITAMIN D3) (1000 Units /25 mcg) tablet Take 2,000 Units by mouth daily. Yes Provider, Historical   DULAGLUTIDE (TRULICITY SC) 1.5 Units by SubCUTAneous route Every Saturday. 1.5 units every saturday   Yes Provider, Historical   clopidogrel (PLAVIX) 75 mg tab Take 1 Tab by mouth daily. 11/22/16  Yes Charlee Hill MD   nitroglycerin (NITROSTAT) 0.4 mg SL tablet 1 Tab by SubLINGual route as needed for Chest Pain. 11/22/16  Yes Charlee Hill MD   metoprolol succinate (TOPROL XL) 50 mg XL tablet Take 1 Tab by mouth daily. 11/22/16  Yes Charlee Hill MD   omeprazole (PRILOSEC OTC) 20 mg tablet Take 20 mg by mouth daily. Indications: GASTROESOPHAGEAL REFLUX   Yes Provider, Historical   Bifidobacterium Infantis (ALIGN) 4 mg cap As directed  Patient taking differently: Take 1 Capsule by mouth daily. As directed 10/23/14  Yes María Elena Chavez MD   aspirin 81 mg chewable tablet Take 81 mg by mouth daily.    Yes Provider, Historical   omega-3 fatty acids-vitamin e 1,000 mg cap Take 1 Cap by mouth daily. Yes Provider, Historical   atorvastatin (LIPITOR) 40 mg tablet TAKE ONE TABLET BY MOUTH DAILY  Patient not taking: Reported on 12/29/2022 2/1/22   Keith Rangel MD   albuterol (PROVENTIL HFA, VENTOLIN HFA, PROAIR HFA) 90 mcg/actuation inhaler Take 2 Puffs by inhalation every four (4) hours as needed for Wheezing. Patient not taking: Reported on 12/29/2022 1/15/19   Karo Hollis MD       The following sections were reviewed & updated as appropriate: Problem List, Allergies, PMH, PSH, FH, and SH. Objective:   Visit Vitals  /66   Pulse 77   Temp 97.5 °F (36.4 °C) (Temporal)   Resp 18   Ht 5' 6\" (1.676 m)   Wt 226 lb (102.5 kg)   SpO2 96%   BMI 36.48 kg/m²       Physical Exam  Eyes:      Extraocular Movements: Extraocular movements intact. Cardiovascular:      Rate and Rhythm: Normal rate and regular rhythm. Heart sounds: No murmur heard. Pulmonary:      Effort: Pulmonary effort is normal. No respiratory distress. Abdominal:      General: Bowel sounds are normal.      Palpations: Abdomen is soft. Musculoskeletal:      Right lower leg: No edema. Left lower leg: No edema. Comments: R foot with ulcer on 4rth and 5th digit. No muscle or bone exposed. She has and 1 toe amputation on the R foot. L foot no wounds. Neurological:      General: No focal deficit present. Mental Status: She is alert.    Psychiatric:         Mood and Affect: Mood normal.         Behavior: Behavior normal.            Rolando Valverde MD

## 2022-12-30 PROBLEM — F33.41 RECURRENT MAJOR DEPRESSIVE DISORDER, IN PARTIAL REMISSION (HCC): Status: ACTIVE | Noted: 2022-12-30

## 2022-12-30 PROBLEM — L08.9 FOOT INFECTION: Status: RESOLVED | Noted: 2019-03-12 | Resolved: 2022-12-30

## 2022-12-30 PROBLEM — K75.81 NASH (NONALCOHOLIC STEATOHEPATITIS): Status: ACTIVE | Noted: 2018-11-15

## 2022-12-30 PROBLEM — I73.9 PERIPHERAL VASCULAR DISEASE (HCC): Status: ACTIVE | Noted: 2022-12-30

## 2022-12-30 NOTE — ASSESSMENT & PLAN NOTE
I believe she has had multiple foot ulcers in the past. She has a new area of skin breakdown on the R 4th and 5th digit. It doesn't appear infected. Advised to see podiatry Dr Rhys LEÓN. My staff called his office and it is closed through the new year - left message. Pt to call next week to make urgent appointment. She is not wearing diabetic shoes, we discussed the importance of this. I will defer to podiatrist for this.

## 2022-12-30 NOTE — ASSESSMENT & PLAN NOTE
I suspect her diabetes is poorly controlled. Continue close follow-up with endocrinology. Will request last office note and labs.

## 2023-02-09 ENCOUNTER — HOSPITAL ENCOUNTER (EMERGENCY)
Age: 73
Discharge: HOME | End: 2023-02-10
Attending: EMERGENCY MEDICINE
Payer: MEDICARE

## 2023-02-09 ENCOUNTER — APPOINTMENT (OUTPATIENT)
Dept: CT IMAGING | Age: 73
End: 2023-02-09
Attending: EMERGENCY MEDICINE
Payer: MEDICARE

## 2023-02-09 DIAGNOSIS — R73.9 HYPERGLYCEMIA: Primary | ICD-10-CM

## 2023-02-09 LAB
ALBUMIN SERPL-MCNC: 3.5 G/DL (ref 3.5–5)
ALBUMIN/GLOB SERPL: 0.9 (ref 1.1–2.2)
ALP SERPL-CCNC: 276 U/L (ref 45–117)
ALT SERPL-CCNC: 31 U/L (ref 12–78)
ANION GAP SERPL CALC-SCNC: 8 MMOL/L (ref 5–15)
AST SERPL-CCNC: 15 U/L (ref 15–37)
BASOPHILS # BLD: 0.1 K/UL (ref 0–0.1)
BASOPHILS NFR BLD: 1 % (ref 0–1)
BILIRUB SERPL-MCNC: 0.4 MG/DL (ref 0.2–1)
BUN SERPL-MCNC: 34 MG/DL (ref 6–20)
BUN/CREAT SERPL: 27 (ref 12–20)
CALCIUM SERPL-MCNC: 9.5 MG/DL (ref 8.5–10.1)
CHLORIDE SERPL-SCNC: 97 MMOL/L (ref 97–108)
CO2 SERPL-SCNC: 26 MMOL/L (ref 21–32)
CREAT SERPL-MCNC: 1.27 MG/DL (ref 0.55–1.02)
DIFFERENTIAL METHOD BLD: ABNORMAL
EOSINOPHIL # BLD: 0.2 K/UL (ref 0–0.4)
EOSINOPHIL NFR BLD: 3 % (ref 0–7)
ERYTHROCYTE [DISTWIDTH] IN BLOOD BY AUTOMATED COUNT: 14.6 % (ref 11.5–14.5)
GLOBULIN SER CALC-MCNC: 3.9 G/DL (ref 2–4)
GLUCOSE BLD STRIP.AUTO-MCNC: 405 MG/DL (ref 65–117)
GLUCOSE BLD STRIP.AUTO-MCNC: 460 MG/DL (ref 65–117)
GLUCOSE SERPL-MCNC: 465 MG/DL (ref 65–100)
HCT VFR BLD AUTO: 37.8 % (ref 35–47)
HGB BLD-MCNC: 12.4 G/DL (ref 11.5–16)
IMM GRANULOCYTES # BLD AUTO: 0.1 K/UL (ref 0–0.04)
IMM GRANULOCYTES NFR BLD AUTO: 1 % (ref 0–0.5)
LACTATE BLD-SCNC: 1.41 MMOL/L (ref 0.4–2)
LYMPHOCYTES # BLD: 1.9 K/UL (ref 0.8–3.5)
LYMPHOCYTES NFR BLD: 23 % (ref 12–49)
MCH RBC QN AUTO: 28.1 PG (ref 26–34)
MCHC RBC AUTO-ENTMCNC: 32.8 G/DL (ref 30–36.5)
MCV RBC AUTO: 85.5 FL (ref 80–99)
MONOCYTES # BLD: 0.6 K/UL (ref 0–1)
MONOCYTES NFR BLD: 7 % (ref 5–13)
NEUTS SEG # BLD: 5.3 K/UL (ref 1.8–8)
NEUTS SEG NFR BLD: 65 % (ref 32–75)
NRBC # BLD: 0 K/UL (ref 0–0.01)
NRBC BLD-RTO: 0 PER 100 WBC
PLATELET # BLD AUTO: 302 K/UL (ref 150–400)
PMV BLD AUTO: 10.7 FL (ref 8.9–12.9)
POTASSIUM SERPL-SCNC: 4.3 MMOL/L (ref 3.5–5.1)
PROT SERPL-MCNC: 7.4 G/DL (ref 6.4–8.2)
RBC # BLD AUTO: 4.42 M/UL (ref 3.8–5.2)
SERVICE CMNT-IMP: ABNORMAL
SERVICE CMNT-IMP: ABNORMAL
SODIUM SERPL-SCNC: 131 MMOL/L (ref 136–145)
WBC # BLD AUTO: 8.1 K/UL (ref 3.6–11)

## 2023-02-09 PROCEDURE — 74011250636 HC RX REV CODE- 250/636: Performed by: EMERGENCY MEDICINE

## 2023-02-09 PROCEDURE — 36415 COLL VENOUS BLD VENIPUNCTURE: CPT

## 2023-02-09 PROCEDURE — 74011636637 HC RX REV CODE- 636/637: Performed by: EMERGENCY MEDICINE

## 2023-02-09 PROCEDURE — 85025 COMPLETE CBC W/AUTO DIFF WBC: CPT

## 2023-02-09 PROCEDURE — 70450 CT HEAD/BRAIN W/O DYE: CPT

## 2023-02-09 PROCEDURE — 83605 ASSAY OF LACTIC ACID: CPT

## 2023-02-09 PROCEDURE — 80053 COMPREHEN METABOLIC PANEL: CPT

## 2023-02-09 PROCEDURE — 96374 THER/PROPH/DIAG INJ IV PUSH: CPT

## 2023-02-09 PROCEDURE — 82962 GLUCOSE BLOOD TEST: CPT

## 2023-02-09 PROCEDURE — 99284 EMERGENCY DEPT VISIT MOD MDM: CPT

## 2023-02-09 PROCEDURE — 96361 HYDRATE IV INFUSION ADD-ON: CPT

## 2023-02-09 RX ADMIN — Medication 10 UNITS: at 23:45

## 2023-02-09 RX ADMIN — SODIUM CHLORIDE 1000 ML: 9 INJECTION, SOLUTION INTRAVENOUS at 23:46

## 2023-02-10 VITALS
TEMPERATURE: 98.2 F | DIASTOLIC BLOOD PRESSURE: 63 MMHG | RESPIRATION RATE: 16 BRPM | HEART RATE: 66 BPM | OXYGEN SATURATION: 97 % | SYSTOLIC BLOOD PRESSURE: 118 MMHG

## 2023-02-10 LAB
GLUCOSE BLD STRIP.AUTO-MCNC: 343 MG/DL (ref 65–117)
SERVICE CMNT-IMP: ABNORMAL

## 2023-02-10 PROCEDURE — 82962 GLUCOSE BLOOD TEST: CPT

## 2023-02-10 NOTE — DISCHARGE INSTRUCTIONS
It was a pleasure taking care of you in our Emergency Department today. We know that when you come to [facility], you are entrusting us with your health, comfort, and safety. Our physicians and nurses honor that trust, and truly appreciate the opportunity to care for you and your loved ones. We also value your feedback. If you receive a survey about your Emergency Department experience today, please fill it out. We care about our patients' feedback, and we listen to what you have to say. Thank you!       Dr. Tom Barrientos MD.

## 2023-02-10 NOTE — ED PROVIDER NOTES
EMERGENCY DEPARTMENT HISTORY AND PHYSICAL EXAM     ----------------------------------------------------------------------------  Please note that this dictation was completed with [employer], the [employer] voice recognition software. Quite often unanticipated grammatical, syntax, homophones, and other interpretive errors are inadvertently transcribed by the computer software. Please disregard these errors. Please excuse any errors that have escaped final proofreading  ----------------------------------------------------------------------------      Date: 2/9/2023  Patient Name: Maurilio Worley    History of Presenting Illness     Chief Complaint   Patient presents with    High Blood Sugar     Pt arrives via EMS after being observed hitting curbs in a parking lot this evening. Pt has no complaints, and does not remember being disoriented at the time of hitting curbs. EMS reports when they first saw pt, she was lethargic, but A&Ox4. Pt is currently A&Ox4, does not appear lethargic. BG in triage 460. Pt does had diabetes. History obtainted from:  Patient    Other independent source of history: family members    HPI: Maurilio Worley is a 67 y.o. female, with significant pmhx of diabetes, hypothyroidism CAD, who presents via EMS to the ED with concern with witnessing  that she was disoriented earlier today. Patient relays to me that she was attempting to go to the drive-through at [employer] when she misjudged the curb causing her to bump into an additional curb while trying to correct herself. EMS was called and it was reported that patient was lethargic but A&O x4. Patient notes that she does not feel she was disoriented and remembers the entire event. Notes that they checked her blood sugar at the scene and that it was markedly elevated but was unaware that that was a problem. Notes that she had gone to lunch with a friend earlier in the day and had been doing well.   Notes that she would have gone on with the rest of her day had a not urged her to come seek medical treatment. Denies headache, visual disturbance, nausea, vomiting, chest pain or shortness of breath. No recent changes in her medications. PCP: Treva Ospina MD    No Known Allergies    Current Outpatient Medications   Medication Sig Dispense Refill    escitalopram oxalate (LEXAPRO) 20 mg tablet Take 1 Tablet by mouth daily. 90 Tablet 1    atorvastatin (LIPITOR) 40 mg tablet TAKE ONE TABLET BY MOUTH DAILY 30 Tablet 0    levothyroxine (SYNTHROID) 125 mcg tablet Take 250 mcg by mouth Daily (before breakfast). insulin regular (NOVOLIN R, HUMULIN R) 100 unit/mL injection by SubCUTAneous route Before breakfast, lunch, and dinner. Sliding scale      insulin NPH (NOVOLIN N, HUMULIN N) 100 unit/mL injection 180 Units by SubCUTAneous route two (2) times a day. fenofibrate micronized (LOFIBRA) 67 mg capsule Take 67 mg by mouth every morning. fluticasone (FLONASE) 50 mcg/actuation nasal spray 2 Sprays by Both Nostrils route daily. 1 Bottle 11    cholecalciferol (VITAMIN D3) (1000 Units /25 mcg) tablet Take 2,000 Units by mouth daily. DULAGLUTIDE (TRULICITY SC) 1.5 Units by SubCUTAneous route Every Saturday. 1.5 units every saturday      clopidogrel (PLAVIX) 75 mg tab Take 1 Tab by mouth daily. 30 Tab 6    nitroglycerin (NITROSTAT) 0.4 mg SL tablet 1 Tab by SubLINGual route as needed for Chest Pain. 1 Bottle 6    metoprolol succinate (TOPROL XL) 50 mg XL tablet Take 1 Tab by mouth daily. 30 Tab 6    omeprazole (PRILOSEC OTC) 20 mg tablet Take 20 mg by mouth daily. Indications: GASTROESOPHAGEAL REFLUX      Bifidobacterium Infantis (ALIGN) 4 mg cap As directed (Patient taking differently: Take 1 Capsule by mouth daily. As directed) 30 capsule 11    aspirin 81 mg chewable tablet Take 81 mg by mouth daily. omega-3 fatty acids-vitamin e 1,000 mg cap Take 1 Cap by mouth daily.          Past History     Past Medical History:  Past Medical History:   Diagnosis Date    Acute colitis 10/29/2014    Adverse effect of anesthesia     slow to wake up    Arthritis     hands    Bronchitis     CAD (coronary artery disease) ,     angio/mild:MI, heart cath    Diabetes [facility])     Type II: insulin    Foot infection 3/12/2019    GERD (gastroesophageal reflux disease)     Hypercholesterolemia     Hypothyroid     Ischemic colitis ([facility] [address] 2014    Menopause 2000    Neuropathy     feet/legs: ulcer right foot    Obesity     Psychiatric disorder     Depression    Sleep apnea     no use CPAP: unable to use, ruel me    Ulcer 2016    Callus that turned into open wound bottom right foot(ball area), not draining    Vertigo        Past Surgical History:  Past Surgical History:   Procedure Laterality Date    HX BREAST BIOPSY      many years ago; laterality unknown no scar    HX GYN      vaginal delivery x1    HX MOHS PROCEDURES      faith    HX ORTHOPAEDIC      right foot abscess     HX ORTHOPAEDIC      rigtht foot surgery    HX ORTHOPAEDIC  2007    R MT amputate    HX ORTHOPAEDIC  2009    left 2nd toe surgery    HX TONSILLECTOMY  6 yrs.  old    IL UNLISTED PROCEDURE BREAST  2000's    benign breast cyst    IL UNLISTED PROCEDURE CARDIAC SURGERY  2016    heart attack       Family History:  Family History   Problem Relation Age of Onset    Diabetes Father     Heart Attack Father         congestive heart faliure    Heart Disease Father         CHF    Diabetes Maternal Aunt     Diabetes Maternal Uncle        Social History:  Social History     Tobacco Use    Smoking status: Former     Packs/day: 1.00     Years: 5.00     Pack years: 5.00     Types: Cigarettes     Quit date: 1985     Years since quittin.0    Smokeless tobacco: Never   Vaping Use    Vaping Use: Never used   Substance Use Topics    Alcohol use: Yes     Comment: glass of wine on holidays    Drug use: No       Allergies:  No Known Allergies      Review of Systems   Review of Systems   Constitutional: Negative. Negative for fever. Eyes: Negative. Negative for photophobia and visual disturbance. Respiratory: Negative. Negative for shortness of breath. Cardiovascular:  Negative for chest pain. Gastrointestinal:  Negative for abdominal pain, diarrhea, nausea and vomiting. Endocrine: Negative. Genitourinary: Negative. Musculoskeletal:  Positive for gait problem. Skin: Negative. Neurological:  Positive for headaches. Negative for dizziness, facial asymmetry, speech difficulty, light-headedness and numbness. Physical Exam   Physical Exam  Vitals and nursing note reviewed. Constitutional:       General: She is not in acute distress. Appearance: She is well-developed. She is obese. She is not ill-appearing, toxic-appearing or diaphoretic. HENT:      Head: Normocephalic and atraumatic. Nose: Nose normal.   Eyes:      General: No scleral icterus. Conjunctiva/sclera: Conjunctivae normal.   Neck:      Trachea: No tracheal deviation. Cardiovascular:      Rate and Rhythm: Normal rate and regular rhythm. Heart sounds: Normal heart sounds. Pulmonary:      Effort: Pulmonary effort is normal. No respiratory distress. Abdominal:      General: There is no distension. Musculoskeletal:         General: No tenderness. Normal range of motion. Cervical back: Normal range of motion. Skin:     General: Skin is warm and dry. Neurological:      General: No focal deficit present. Mental Status: She is alert and oriented to person, place, and time. Mental status is at baseline. Cranial Nerves: No cranial nerve deficit. Sensory: No sensory deficit. Motor: No weakness. Coordination: Coordination normal.      Gait: Gait normal.   Psychiatric:         Behavior: Behavior normal.         Thought Content:  Thought content normal.         Diagnostic Study Results     Labs:     Recent Results (from the past 12 hour(s))   GLUCOSE, POC    Collection Time: 02/09/23  7:36 PM   Result Value Ref Range    Glucose (POC) 460 (H) 65 - 117 mg/dL    Performed by Keena Lamar \"Sherly\" RN    CBC WITH AUTOMATED DIFF    Collection Time: 02/09/23  7:46 PM   Result Value Ref Range    WBC 8.1 3.6 - 11.0 K/uL    RBC 4.42 3.80 - 5.20 M/uL    HGB 12.4 11.5 - 16.0 g/dL    HCT 37.8 35.0 - 47.0 %    MCV 85.5 80.0 - 99.0 FL    MCH 28.1 26.0 - 34.0 PG    MCHC 32.8 30.0 - 36.5 g/dL    RDW 14.6 (H) 11.5 - 14.5 %    PLATELET 316 255 - 443 K/uL    MPV 10.7 8.9 - 12.9 FL    NRBC 0.0 0  WBC    ABSOLUTE NRBC 0.00 0.00 - 0.01 K/uL    NEUTROPHILS 65 32 - 75 %    LYMPHOCYTES 23 12 - 49 %    MONOCYTES 7 5 - 13 %    EOSINOPHILS 3 0 - 7 %    BASOPHILS 1 0 - 1 %    IMMATURE GRANULOCYTES 1 (H) 0.0 - 0.5 %    ABS. NEUTROPHILS 5.3 1.8 - 8.0 K/UL    ABS. LYMPHOCYTES 1.9 0.8 - 3.5 K/UL    ABS. MONOCYTES 0.6 0.0 - 1.0 K/UL    ABS. EOSINOPHILS 0.2 0.0 - 0.4 K/UL    ABS. BASOPHILS 0.1 0.0 - 0.1 K/UL    ABS. IMM. GRANS. 0.1 (H) 0.00 - 0.04 K/UL    DF AUTOMATED     METABOLIC PANEL, COMPREHENSIVE    Collection Time: 02/09/23  7:46 PM   Result Value Ref Range    Sodium 131 (L) 136 - 145 mmol/L    Potassium 4.3 3.5 - 5.1 mmol/L    Chloride 97 97 - 108 mmol/L    CO2 26 21 - 32 mmol/L    Anion gap 8 5 - 15 mmol/L    Glucose 465 (H) 65 - 100 mg/dL    BUN 34 (H) 6 - 20 MG/DL    Creatinine 1.27 (H) 0.55 - 1.02 MG/DL    BUN/Creatinine ratio 27 (H) 12 - 20      eGFR 45 (L) >60 ml/min/1.73m2    Calcium 9.5 8.5 - 10.1 MG/DL    Bilirubin, total 0.4 0.2 - 1.0 MG/DL    ALT (SGPT) 31 12 - 78 U/L    AST (SGOT) 15 15 - 37 U/L    Alk.  phosphatase 276 (H) 45 - 117 U/L    Protein, total 7.4 6.4 - 8.2 g/dL    Albumin 3.5 3.5 - 5.0 g/dL    Globulin 3.9 2.0 - 4.0 g/dL    A-G Ratio 0.9 (L) 1.1 - 2.2     POC LACTIC ACID    Collection Time: 02/09/23 10:27 PM   Result Value Ref Range    Lactic Acid (POC) 1.41 0.40 - 2.00 mmol/L   GLUCOSE, POC    Collection Time: 02/09/23 10:44 PM Result Value Ref Range    Glucose (POC) 405 (H) 65 - 117 mg/dL    Performed by Calista Allen (CAL RN)    GLUCOSE, POC    Collection Time: 02/10/23 12:16 AM   Result Value Ref Range    Glucose (POC) 343 (H) 65 - 117 mg/dL    Performed by Calista Allen (CAL RN)        Radiologic Studies:  CT HEAD WO CONT   Final Result   No acute intracranial process. CT Results  (Last 48 hours)                 02/09/23 2308  CT HEAD WO CONT Final result    Impression:  No acute intracranial process. Narrative:  EXAM: CT HEAD WO CONT       INDICATION: confusion       COMPARISON: March 12, 2019. CONTRAST: None. TECHNIQUE: Unenhanced CT of the head was performed using 5 mm images. Brain and   bone windows were generated. Coronal and sagittal reformats. CT dose reduction   was achieved through use of a standardized protocol tailored for this   examination and automatic exposure control for dose modulation. FINDINGS:   The ventricles and sulci are normal in size, shape and configuration. There is   no significant white matter disease. There is no intracranial hemorrhage,   extra-axial collection, or mass effect. The basilar cisterns are open. No CT   evidence of acute infarct. The bone windows demonstrate no abnormalities. The visualized portions of the   paranasal sinuses and mastoid air cells are clear. CXR Results  (Last 48 hours)      None              ED Course     I am the first provider for this patient. Initial assessment performed. The patients presenting problems have been discussed, and they are in agreement with the care plan formulated and outlined with them. I have encouraged them to ask questions as they arise throughout their visit.     Records Review:  I reviewed and interpreted the nursing notes and and vital signs from today's visit, as well as the electronic medical record system for any external medical records that were available that may contribute to the patients current condition, including independent interpretation of previous primary care visits for diabetic ulcer of toe    Nursing notes will be reviewed and interpreted by me as they become available in realtime while the pt has been in the ED. Vital Signs-Reviewed the patient's vital signs. Patient Vitals for the past 12 hrs:   Temp Pulse Resp BP SpO2   02/10/23 0026 98.2 °F (36.8 °C) 66 16 118/63 97 %   02/09/23 1940 98.6 °F (37 °C) 65 18 (!) 110/56 96 %       Pulse Oximetry Analysis:  96% on RA, normal    Heart Monitory Analysis:  Rate: 65 bpm  Rhythm: nsr    DDX:  Hyperglycemia, DKA, electrolyte derangement, UTI, dehydration, intracranial bleed    Comorbidities:  Past Medical History:   Diagnosis Date    Acute colitis 10/29/2014    Adverse effect of anesthesia     slow to wake up    Arthritis     hands    Bronchitis     CAD (coronary artery disease) 2008, 2016    angio/mild:MI, heart cath    Diabetes [facility])     Type II: insulin    Foot infection 3/12/2019    GERD (gastroesophageal reflux disease)     Hypercholesterolemia     Hypothyroid     Ischemic colitis ([facility] [address] 11/01/2014    Menopause 2000    Neuropathy     feet/legs: ulcer right foot    Obesity     Psychiatric disorder     Depression    Sleep apnea     no use CPAP: unable to use, ruel no    Ulcer 11/2016    Callus that turned into open wound bottom right foot(ball area), not draining    Vertigo          Plan: labs, insulin, IV fluids, UA, CT head    I personally reviewed/interpreted pt's imaging of head ct. Please refer to official read by radiology as noted above. MDM:  Patient is an obese 53-year-old female in no acute distress who was brought in by ambulance after having erratic driving in an [employer] parking lot. Patient has no concerns about her behavior today and does not feel it was contributed due to confusion. Work-up unremarkable including head CT.   Noted to have improvement of blood sugar and no signs of DKA.  Patient was stable gait requiring no assistance. Feels at her baseline is with friend who conveys that she is in fact at her baseline. Discussed plan for follow-up with her primary care doctor and endocrine specialist for further management of her diabetes. Discharge Planning:  Pt noted to be feeling better, and after shared decision making conversation, pt is ready for discharge. Discussed lab and imaging findings with pt, specifically noting hyperglycemia. Pt will follow up with pcp as instructed. All questions have been answered, pt voiced understanding and agreement with plan. Specific return precautions provided in addition to instructions for pt to return to the ED immediately should sx worsen at any time. Critical Care Time:    none      Diagnosis     Clinical Impression:   1. Hyperglycemia        PLAN:  1. Discharge Medication List as of 2/9/2023 11:56 PM        2. Follow-up Information       Follow up With Specialties Details Why Contact Info    Bayron Lui MD Internal Medicine Physician Schedule an appointment as soon as possible for a visit in 2 days  [address] [address]  [phone]      \[facility]\"" [address] [facility] DEPT Emergency Medicine  As needed, If symptoms worsen [address] [facility]  [address] [facility]  [phone]          Return to ED if worse       Social Determinants of Health:  none    Disposition:    The patient's results have been reviewed with family and/or caregiver. They verbally convey their understanding and agreement of the patient's signs, symptoms, diagnosis, treatment and prognosis and additionally agree to follow up as recommended in the discharge instructions or to return to the Emergency Room should the patient's condition change prior to their follow-up appointment. The family and/or caregiver verbally agrees with the care-plan and all of their questions have been answered.  The discharge instructions have also been provided to the them with educational information regarding the patient's diagnosis as well a list of reasons why the patient would want to return to the ER prior to their follow-up appointment should their condition change.         Electronically Signed:  Bindu Hong MD

## 2023-02-24 ENCOUNTER — OFFICE VISIT (OUTPATIENT)
Dept: INTERNAL MEDICINE CLINIC | Age: 73
End: 2023-02-24
Payer: MEDICARE

## 2023-02-24 VITALS
WEIGHT: 234 LBS | HEIGHT: 66 IN | DIASTOLIC BLOOD PRESSURE: 62 MMHG | SYSTOLIC BLOOD PRESSURE: 95 MMHG | HEART RATE: 61 BPM | RESPIRATION RATE: 20 BRPM | BODY MASS INDEX: 37.61 KG/M2 | TEMPERATURE: 97.8 F | OXYGEN SATURATION: 97 %

## 2023-02-24 DIAGNOSIS — M54.2 CERVICALGIA: ICD-10-CM

## 2023-02-24 DIAGNOSIS — E11.42 DIABETIC POLYNEUROPATHY ASSOCIATED WITH TYPE 2 DIABETES MELLITUS (HCC): ICD-10-CM

## 2023-02-24 DIAGNOSIS — I73.9 PERIPHERAL VASCULAR DISEASE (HCC): ICD-10-CM

## 2023-02-24 DIAGNOSIS — E11.621 DIABETIC ULCER OF TOE OF RIGHT FOOT ASSOCIATED WITH TYPE 2 DIABETES MELLITUS, UNSPECIFIED ULCER STAGE (HCC): ICD-10-CM

## 2023-02-24 DIAGNOSIS — E11.51 TYPE 2 DIABETES MELLITUS WITH DIABETIC PERIPHERAL ANGIOPATHY WITHOUT GANGRENE, WITH LONG-TERM CURRENT USE OF INSULIN (HCC): Primary | ICD-10-CM

## 2023-02-24 DIAGNOSIS — Z79.4 TYPE 2 DIABETES MELLITUS WITH DIABETIC PERIPHERAL ANGIOPATHY WITHOUT GANGRENE, WITH LONG-TERM CURRENT USE OF INSULIN (HCC): Primary | ICD-10-CM

## 2023-02-24 DIAGNOSIS — M48.061 SPINAL STENOSIS OF LUMBAR REGION WITHOUT NEUROGENIC CLAUDICATION: ICD-10-CM

## 2023-02-24 DIAGNOSIS — F33.41 RECURRENT MAJOR DEPRESSIVE DISORDER, IN PARTIAL REMISSION (HCC): ICD-10-CM

## 2023-02-24 DIAGNOSIS — L97.519 DIABETIC ULCER OF TOE OF RIGHT FOOT ASSOCIATED WITH TYPE 2 DIABETES MELLITUS, UNSPECIFIED ULCER STAGE (HCC): ICD-10-CM

## 2023-02-24 PROCEDURE — G8399 PT W/DXA RESULTS DOCUMENT: HCPCS | Performed by: STUDENT IN AN ORGANIZED HEALTH CARE EDUCATION/TRAINING PROGRAM

## 2023-02-24 PROCEDURE — G0463 HOSPITAL OUTPT CLINIC VISIT: HCPCS | Performed by: STUDENT IN AN ORGANIZED HEALTH CARE EDUCATION/TRAINING PROGRAM

## 2023-02-24 PROCEDURE — G8417 CALC BMI ABV UP PARAM F/U: HCPCS | Performed by: STUDENT IN AN ORGANIZED HEALTH CARE EDUCATION/TRAINING PROGRAM

## 2023-02-24 PROCEDURE — G9717 DOC PT DX DEP/BP F/U NT REQ: HCPCS | Performed by: STUDENT IN AN ORGANIZED HEALTH CARE EDUCATION/TRAINING PROGRAM

## 2023-02-24 PROCEDURE — G8427 DOCREV CUR MEDS BY ELIG CLIN: HCPCS | Performed by: STUDENT IN AN ORGANIZED HEALTH CARE EDUCATION/TRAINING PROGRAM

## 2023-02-24 PROCEDURE — 1090F PRES/ABSN URINE INCON ASSESS: CPT | Performed by: STUDENT IN AN ORGANIZED HEALTH CARE EDUCATION/TRAINING PROGRAM

## 2023-02-24 PROCEDURE — 99214 OFFICE O/P EST MOD 30 MIN: CPT | Performed by: STUDENT IN AN ORGANIZED HEALTH CARE EDUCATION/TRAINING PROGRAM

## 2023-02-24 PROCEDURE — 3046F HEMOGLOBIN A1C LEVEL >9.0%: CPT | Performed by: STUDENT IN AN ORGANIZED HEALTH CARE EDUCATION/TRAINING PROGRAM

## 2023-02-24 PROCEDURE — G8536 NO DOC ELDER MAL SCRN: HCPCS | Performed by: STUDENT IN AN ORGANIZED HEALTH CARE EDUCATION/TRAINING PROGRAM

## 2023-02-24 PROCEDURE — G9899 SCRN MAM PERF RSLTS DOC: HCPCS | Performed by: STUDENT IN AN ORGANIZED HEALTH CARE EDUCATION/TRAINING PROGRAM

## 2023-02-24 PROCEDURE — 2022F DILAT RTA XM EVC RTNOPTHY: CPT | Performed by: STUDENT IN AN ORGANIZED HEALTH CARE EDUCATION/TRAINING PROGRAM

## 2023-02-24 PROCEDURE — 3017F COLORECTAL CA SCREEN DOC REV: CPT | Performed by: STUDENT IN AN ORGANIZED HEALTH CARE EDUCATION/TRAINING PROGRAM

## 2023-02-24 PROCEDURE — 1101F PT FALLS ASSESS-DOCD LE1/YR: CPT | Performed by: STUDENT IN AN ORGANIZED HEALTH CARE EDUCATION/TRAINING PROGRAM

## 2023-02-24 NOTE — PROGRESS NOTES
Domenico Ingram is a 67y.o. year old female who presents today (02/25/23) for follow-up. Assessment & Plan:   1. Type 2 diabetes mellitus with diabetic peripheral angiopathy without gangrene, with long-term current use of insulin ([facility])  Assessment & Plan:  Encouraged compliance with insulin therapy per Dr Bessie Sarkar. I can see her A1c from Nov is 12% indicating poorly controlled diabetes  Continue to follow with Dr Noa Ruvalcaba, podiatry, for foot wounds. She feels off balance due to diabetic neuropathy and impaired proprioception. Will refer to PT for gait and balance training. 2. Diabetic ulcer of toe of right foot associated with type 2 diabetes mellitus, unspecified ulcer stage [facility])  Assessment & Plan: It is healing. Following with Dr Noa Ruvalcaba, podiatrist.   3. Diabetic polyneuropathy associated with type 2 diabetes mellitus ([facility] [address]  -     REFERRAL TO PHYSICAL THERAPY  4. Peripheral vascular disease ([facility]  Assessment & Plan:  Followed by vascular Dr Brigida Campos. Pt reports recent atherectomy in R leg. 5. Recurrent major depressive disorder, in partial remission [facility])  Assessment & Plan: It is difficult to tell if she is feeling better on the increased lexapro. She is still reporting missing her . Her overall disposition is more light this visit. Keep current dose lexapro. I encouraged her to start meeting with a therapist and she plans to do this within her [employer] because she is highly francois based. 6. Spinal stenosis of lumbar region without neurogenic claudication  Assessment & Plan:  Her back pain sx align most closely with lumbar spinal stenosis. I feel she is a poor candidate for any interventions thus will hold off on imaging and referral to ortho. Discussed PT at length, she is agreeable to this.     Orders:  -     REFERRAL TO PHYSICAL THERAPY  7. Cervicalgia  Assessment & Plan:  I provided her with a list of neck stretches to relieve the muscular tension she experiences        Health Maintenance - defer      RTC: 3 mo    Subjective:   Joel Alexandre was seen today for Diabetes, Hypertension, Hyperlipidemia, Hypothyroidism, and Foot Ulcer    Her daughter is here with her today. DM, neuropathy, foot wound  - last visit presented with new foot ulcer, directed to podiatry Dr Clifton Walton. They have been meeting regularly and the ulcer is healing. Dr Veronique Wagner preformed atherectomy last month. - endocrinology Dr Lesa Do - saw last week, she believes her insulin regimen was adjusted. She reports compliance with basal and mealtime insulin  - off balance - uses a 4 point cane usually. She has a rollator that was given to her. No falls lately    MDD  - increased lexapro last visit  - she feels a lot better lately, especially when her daughter is here. She recently loss her cat recently. She feels up and downs. Today is the 3 year anniversary of her husbands' death. - she attends [employer]   - her family is trying to move her out o boni     Back pain  - she has low back pain that is worse with walking or standing. No radicular sx. She can walk short distances but has to use a motor cart at the grocery. She feels like the pain is achy. No pain with sitting. CAD/PVD  - cards Dr aNvin Dow   - vascular Dr Veronique Wagner - 1/26 opened a blood vessel in her R leg recently. This is the second procedure on the R leg and she has also had it done twice on the left  Nephro  - MD retired  [place]  - Dr Juaquin Strauss    11/2022 Dr Jae Payne 0.97  GRF 62  - A1c 12.2%    Review of Systems   Eyes:  Negative for blurred vision. Respiratory:  Negative for cough and shortness of breath. Cardiovascular:  Negative for chest pain and palpitations. Gastrointestinal:  Negative for abdominal pain, blood in stool, constipation and diarrhea. Genitourinary:  Negative for dysuria and hematuria. Musculoskeletal:  Negative for joint pain and myalgias. Skin:  Negative for rash.    Neurological:  Negative for weakness and headaches. PMHx    Patient Active Problem List   Diagnosis Code    Mixed hyperlipidemia E78.2    Diabetic foot ulcer ([facility] E11.621, L97.509    Hypothyroidism, acquired, autoimmune E06.3    Mononeuritis of unspecified site G58.9    Atherosclerosis of native coronary artery of native heart without angina pectoris I25.10    Unspecified sleep apnea G47.30    Essential hypertension, benign I10    Iron deficiency anemia D50.9    Renal insufficiency N28.9    Type 2 diabetes mellitus with diabetic peripheral angiopathy without gangrene, with long-term current use of insulin ([facility]) E11.51, Z79.4    Severe obesity (BMI 35.0-39. 9) with comorbidity ([facility]) E66.01    BILLINGS (nonalcoholic steatohepatitis) K75.81    Peripheral vascular disease ([facility]) I73.9    Recurrent major depressive disorder, in partial remission ([facility] F33.41    Spinal stenosis of lumbar region without neurogenic claudication M48.061    Cervicalgia M54.2       Prior to Admission medications    Medication Sig Start Date End Date Taking? Authorizing Provider   escitalopram oxalate (LEXAPRO) 20 mg tablet Take 1 Tablet by mouth daily. 12/29/22  Yes Brigida Linder MD   atorvastatin (LIPITOR) 40 mg tablet TAKE ONE TABLET BY MOUTH DAILY 2/1/22  Yes Isabel Garcia MD   levothyroxine (SYNTHROID) 125 mcg tablet Take 250 mcg by mouth. Taking every other day 2 tabs and then 3 tabs. Yes Provider, Historical   insulin regular (NOVOLIN R, HUMULIN R) 100 unit/mL injection by SubCUTAneous route Before breakfast, lunch, and dinner. Sliding scale   Yes Provider, Historical   insulin NPH (NOVOLIN N, HUMULIN N) 100 unit/mL injection 170 Units by SubCUTAneous route two (2) times a day. Yes Provider, Historical   fenofibrate micronized (LOFIBRA) 67 mg capsule Take 67 mg by mouth every morning. Yes Provider, Historical   fluticasone (FLONASE) 50 mcg/actuation nasal spray 2 Sprays by Both Nostrils route daily.   Patient taking differently: 2 Sprays by Both Nostrils route daily as needed. 11/5/18  Yes Lisandro Garcia MD   cholecalciferol (VITAMIN D3) (1000 Units /25 mcg) tablet Take 2,000 Units by mouth daily. Yes Provider, Historical   DULAGLUTIDE (TRULICITY SC) 1.5 Units by SubCUTAneous route Every Saturday. 1.5 units every saturday   Yes Provider, Historical   clopidogrel (PLAVIX) 75 mg tab Take 1 Tab by mouth daily. 11/22/16  Yes Cathleen Hill MD   nitroglycerin (NITROSTAT) 0.4 mg SL tablet 1 Tab by SubLINGual route as needed for Chest Pain. 11/22/16  Yes Cathleen Hill MD   metoprolol succinate (TOPROL XL) 50 mg XL tablet Take 1 Tab by mouth daily. 11/22/16  Yes Cathleen Hill MD   omeprazole (PRILOSEC OTC) 20 mg tablet Take 20 mg by mouth daily. Indications: GASTROESOPHAGEAL REFLUX   Yes Provider, Historical   Bifidobacterium Infantis (ALIGN) 4 mg cap As directed  Patient taking differently: Take 1 Capsule by mouth daily. As directed 10/23/14  Yes Trung Bundy MD   aspirin 81 mg chewable tablet Take 81 mg by mouth daily. Yes Provider, Historical   omega-3 fatty acids-vitamin e 1,000 mg cap Take 1 Cap by mouth daily. Yes Provider, Historical       The following sections were reviewed & updated as appropriate: Problem List, Allergies, PMH, PSH, FH, and SH. Objective:   Visit Vitals  BP 95/62   Pulse 61   Temp 97.8 °F (36.6 °C) (Temporal)   Resp 20   Ht 5' 6\" (1.676 m)   Wt 234 lb (106.1 kg)   SpO2 97%   BMI 37.77 kg/m²       Physical Exam  Eyes:      Extraocular Movements: Extraocular movements intact. Cardiovascular:      Rate and Rhythm: Normal rate and regular rhythm. Heart sounds: No murmur heard. Pulmonary:      Effort: Pulmonary effort is normal. No respiratory distress. Abdominal:      General: Bowel sounds are normal.      Palpations: Abdomen is soft. Musculoskeletal:      Right lower leg: No edema. Left lower leg: No edema. Comments: Multiple toe amputations, healed. Foot ulcer healing.     Neurological: General: No focal deficit present. Mental Status: She is alert.    Psychiatric:         Mood and Affect: Mood normal.         Behavior: Behavior normal.            Michael Flaherty MD

## 2023-02-25 PROBLEM — M54.2 CERVICALGIA: Status: ACTIVE | Noted: 2023-02-25

## 2023-02-25 PROBLEM — M48.061 SPINAL STENOSIS OF LUMBAR REGION WITHOUT NEUROGENIC CLAUDICATION: Status: ACTIVE | Noted: 2023-02-25

## 2023-02-25 NOTE — ASSESSMENT & PLAN NOTE
Encouraged compliance with insulin therapy per Dr Marian Govea. I can see her A1c from Nov is 12% indicating poorly controlled diabetes  Continue to follow with Dr Nadege Shannon, podiatry, for foot wounds. She feels off balance due to diabetic neuropathy and impaired proprioception. Will refer to PT for gait and balance training.

## 2023-02-25 NOTE — ASSESSMENT & PLAN NOTE
Her back pain sx align most closely with lumbar spinal stenosis. I feel she is a poor candidate for any interventions thus will hold off on imaging and referral to ortho. Discussed PT at length, she is agreeable to this.

## 2023-02-25 NOTE — ASSESSMENT & PLAN NOTE
It is difficult to tell if she is feeling better on the increased lexapro. She is still reporting missing her . Her overall disposition is more light this visit. Keep current dose lexapro. I encouraged her to start meeting with a therapist and she plans to do this within her [facility] because she is highly francois based.

## 2023-03-23 ENCOUNTER — PATIENT OUTREACH (OUTPATIENT)
Dept: CASE MANAGEMENT | Age: 73
End: 2023-03-23

## 2023-03-23 NOTE — PROGRESS NOTES
Called patient; verified ID with two identifiers. Discussed CCM and [facility] program. Patient appreciated call but declined any services at this time. Patient has this writer's contact information if needed.

## 2023-05-30 ENCOUNTER — OFFICE VISIT (OUTPATIENT)
Age: 73
End: 2023-05-30
Payer: MEDICARE

## 2023-05-30 VITALS
TEMPERATURE: 98.7 F | WEIGHT: 223.4 LBS | DIASTOLIC BLOOD PRESSURE: 65 MMHG | SYSTOLIC BLOOD PRESSURE: 98 MMHG | HEART RATE: 98 BPM | RESPIRATION RATE: 20 BRPM | OXYGEN SATURATION: 97 % | BODY MASS INDEX: 35.9 KG/M2 | HEIGHT: 66 IN

## 2023-05-30 DIAGNOSIS — H66.92 LEFT OTITIS MEDIA, UNSPECIFIED OTITIS MEDIA TYPE: Primary | ICD-10-CM

## 2023-05-30 PROCEDURE — 1123F ACP DISCUSS/DSCN MKR DOCD: CPT | Performed by: NURSE PRACTITIONER

## 2023-05-30 PROCEDURE — 99213 OFFICE O/P EST LOW 20 MIN: CPT | Performed by: NURSE PRACTITIONER

## 2023-05-30 PROCEDURE — 3078F DIAST BP <80 MM HG: CPT | Performed by: NURSE PRACTITIONER

## 2023-05-30 PROCEDURE — 3074F SYST BP LT 130 MM HG: CPT | Performed by: NURSE PRACTITIONER

## 2023-05-30 RX ORDER — AMOXICILLIN AND CLAVULANATE POTASSIUM 875; 125 MG/1; MG/1
1 TABLET, FILM COATED ORAL 2 TIMES DAILY
Qty: 20 TABLET | Refills: 0 | Status: SHIPPED | OUTPATIENT
Start: 2023-05-30 | End: 2023-06-09

## 2023-05-30 SDOH — ECONOMIC STABILITY: FOOD INSECURITY: WITHIN THE PAST 12 MONTHS, THE FOOD YOU BOUGHT JUST DIDN'T LAST AND YOU DIDN'T HAVE MONEY TO GET MORE.: NEVER TRUE

## 2023-05-30 SDOH — ECONOMIC STABILITY: INCOME INSECURITY: HOW HARD IS IT FOR YOU TO PAY FOR THE VERY BASICS LIKE FOOD, HOUSING, MEDICAL CARE, AND HEATING?: NOT HARD AT ALL

## 2023-05-30 SDOH — ECONOMIC STABILITY: HOUSING INSECURITY
IN THE LAST 12 MONTHS, WAS THERE A TIME WHEN YOU DID NOT HAVE A STEADY PLACE TO SLEEP OR SLEPT IN A SHELTER (INCLUDING NOW)?: NO

## 2023-05-30 SDOH — ECONOMIC STABILITY: FOOD INSECURITY: WITHIN THE PAST 12 MONTHS, YOU WORRIED THAT YOUR FOOD WOULD RUN OUT BEFORE YOU GOT MONEY TO BUY MORE.: NEVER TRUE

## 2023-05-30 ASSESSMENT — PATIENT HEALTH QUESTIONNAIRE - PHQ9
8. MOVING OR SPEAKING SO SLOWLY THAT OTHER PEOPLE COULD HAVE NOTICED. OR THE OPPOSITE, BEING SO FIGETY OR RESTLESS THAT YOU HAVE BEEN MOVING AROUND A LOT MORE THAN USUAL: 0
SUM OF ALL RESPONSES TO PHQ QUESTIONS 1-9: 15
SUM OF ALL RESPONSES TO PHQ QUESTIONS 1-9: 2
3. TROUBLE FALLING OR STAYING ASLEEP: 3
2. FEELING DOWN, DEPRESSED OR HOPELESS: 1
SUM OF ALL RESPONSES TO PHQ QUESTIONS 1-9: 15
5. POOR APPETITE OR OVEREATING: 3
10. IF YOU CHECKED OFF ANY PROBLEMS, HOW DIFFICULT HAVE THESE PROBLEMS MADE IT FOR YOU TO DO YOUR WORK, TAKE CARE OF THINGS AT HOME, OR GET ALONG WITH OTHER PEOPLE: 1
9. THOUGHTS THAT YOU WOULD BE BETTER OFF DEAD, OR OF HURTING YOURSELF: 0
SUM OF ALL RESPONSES TO PHQ9 QUESTIONS 1 & 2: 2
4. FEELING TIRED OR HAVING LITTLE ENERGY: 3
2. FEELING DOWN, DEPRESSED OR HOPELESS: 1
SUM OF ALL RESPONSES TO PHQ QUESTIONS 1-9: 2
SUM OF ALL RESPONSES TO PHQ QUESTIONS 1-9: 2
7. TROUBLE CONCENTRATING ON THINGS, SUCH AS READING THE NEWSPAPER OR WATCHING TELEVISION: 2
SUM OF ALL RESPONSES TO PHQ QUESTIONS 1-9: 15
6. FEELING BAD ABOUT YOURSELF - OR THAT YOU ARE A FAILURE OR HAVE LET YOURSELF OR YOUR FAMILY DOWN: 2
1. LITTLE INTEREST OR PLEASURE IN DOING THINGS: 1
SUM OF ALL RESPONSES TO PHQ QUESTIONS 1-9: 15
SUM OF ALL RESPONSES TO PHQ9 QUESTIONS 1 & 2: 2
SUM OF ALL RESPONSES TO PHQ QUESTIONS 1-9: 2
1. LITTLE INTEREST OR PLEASURE IN DOING THINGS: 1

## 2023-05-30 ASSESSMENT — ENCOUNTER SYMPTOMS
COUGH: 1
SORE THROAT: 1

## 2023-06-08 ENCOUNTER — TELEPHONE (OUTPATIENT)
Age: 73
End: 2023-06-08

## 2023-06-09 ENCOUNTER — TELEPHONE (OUTPATIENT)
Age: 73
End: 2023-06-09

## 2023-07-05 ENCOUNTER — OFFICE VISIT (OUTPATIENT)
Age: 73
End: 2023-07-05
Payer: MEDICARE

## 2023-07-05 VITALS
OXYGEN SATURATION: 96 % | HEIGHT: 64 IN | DIASTOLIC BLOOD PRESSURE: 55 MMHG | HEART RATE: 77 BPM | BODY MASS INDEX: 38.31 KG/M2 | RESPIRATION RATE: 20 BRPM | SYSTOLIC BLOOD PRESSURE: 92 MMHG | TEMPERATURE: 98.4 F | WEIGHT: 224.4 LBS

## 2023-07-05 DIAGNOSIS — Z78.0 MENOPAUSE: ICD-10-CM

## 2023-07-05 DIAGNOSIS — E06.3 HYPOTHYROIDISM, ACQUIRED, AUTOIMMUNE: ICD-10-CM

## 2023-07-05 DIAGNOSIS — H91.92 DECREASED HEARING OF LEFT EAR: ICD-10-CM

## 2023-07-05 DIAGNOSIS — E11.51 TYPE 2 DIABETES MELLITUS WITH DIABETIC PERIPHERAL ANGIOPATHY WITHOUT GANGRENE, WITH LONG-TERM CURRENT USE OF INSULIN (HCC): ICD-10-CM

## 2023-07-05 DIAGNOSIS — I95.9 HYPOTENSION, UNSPECIFIED HYPOTENSION TYPE: ICD-10-CM

## 2023-07-05 DIAGNOSIS — Z79.4 TYPE 2 DIABETES MELLITUS WITH DIABETIC PERIPHERAL ANGIOPATHY WITHOUT GANGRENE, WITH LONG-TERM CURRENT USE OF INSULIN (HCC): ICD-10-CM

## 2023-07-05 DIAGNOSIS — F33.41 RECURRENT MAJOR DEPRESSIVE DISORDER, IN PARTIAL REMISSION (HCC): ICD-10-CM

## 2023-07-05 DIAGNOSIS — Z00.00 MEDICARE ANNUAL WELLNESS VISIT, SUBSEQUENT: Primary | ICD-10-CM

## 2023-07-05 DIAGNOSIS — Z12.31 BREAST CANCER SCREENING BY MAMMOGRAM: ICD-10-CM

## 2023-07-05 DIAGNOSIS — M48.061 SPINAL STENOSIS OF LUMBAR REGION WITHOUT NEUROGENIC CLAUDICATION: ICD-10-CM

## 2023-07-05 PROCEDURE — 3046F HEMOGLOBIN A1C LEVEL >9.0%: CPT | Performed by: STUDENT IN AN ORGANIZED HEALTH CARE EDUCATION/TRAINING PROGRAM

## 2023-07-05 PROCEDURE — G0439 PPPS, SUBSEQ VISIT: HCPCS | Performed by: STUDENT IN AN ORGANIZED HEALTH CARE EDUCATION/TRAINING PROGRAM

## 2023-07-05 PROCEDURE — 1123F ACP DISCUSS/DSCN MKR DOCD: CPT | Performed by: STUDENT IN AN ORGANIZED HEALTH CARE EDUCATION/TRAINING PROGRAM

## 2023-07-05 PROCEDURE — 3017F COLORECTAL CA SCREEN DOC REV: CPT | Performed by: STUDENT IN AN ORGANIZED HEALTH CARE EDUCATION/TRAINING PROGRAM

## 2023-07-05 ASSESSMENT — PATIENT HEALTH QUESTIONNAIRE - PHQ9
SUM OF ALL RESPONSES TO PHQ QUESTIONS 1-9: 7
1. LITTLE INTEREST OR PLEASURE IN DOING THINGS: 0
4. FEELING TIRED OR HAVING LITTLE ENERGY: 1
SUM OF ALL RESPONSES TO PHQ QUESTIONS 1-9: 7
3. TROUBLE FALLING OR STAYING ASLEEP: 2
2. FEELING DOWN, DEPRESSED OR HOPELESS: 1
SUM OF ALL RESPONSES TO PHQ9 QUESTIONS 1 & 2: 1
SUM OF ALL RESPONSES TO PHQ QUESTIONS 1-9: 7
8. MOVING OR SPEAKING SO SLOWLY THAT OTHER PEOPLE COULD HAVE NOTICED. OR THE OPPOSITE, BEING SO FIGETY OR RESTLESS THAT YOU HAVE BEEN MOVING AROUND A LOT MORE THAN USUAL: 1
5. POOR APPETITE OR OVEREATING: 1
6. FEELING BAD ABOUT YOURSELF - OR THAT YOU ARE A FAILURE OR HAVE LET YOURSELF OR YOUR FAMILY DOWN: 0
10. IF YOU CHECKED OFF ANY PROBLEMS, HOW DIFFICULT HAVE THESE PROBLEMS MADE IT FOR YOU TO DO YOUR WORK, TAKE CARE OF THINGS AT HOME, OR GET ALONG WITH OTHER PEOPLE: 0
7. TROUBLE CONCENTRATING ON THINGS, SUCH AS READING THE NEWSPAPER OR WATCHING TELEVISION: 1
9. THOUGHTS THAT YOU WOULD BE BETTER OFF DEAD, OR OF HURTING YOURSELF: 0
SUM OF ALL RESPONSES TO PHQ QUESTIONS 1-9: 7

## 2023-07-05 ASSESSMENT — LIFESTYLE VARIABLES
HOW MANY STANDARD DRINKS CONTAINING ALCOHOL DO YOU HAVE ON A TYPICAL DAY: 1 OR 2
HOW OFTEN DO YOU HAVE A DRINK CONTAINING ALCOHOL: MONTHLY OR LESS

## 2023-07-05 NOTE — ASSESSMENT & PLAN NOTE
Sister reports pt was not taking synthroid for a while and now that she is back on the medication they have seen an improvement in her energy and mood

## 2023-07-05 NOTE — ASSESSMENT & PLAN NOTE
Continue to follow with endocrinology team. Requested records for labs done - she reports her lipids are typically checked by endo.    Continue to follow with podiatry - hx foot ulcers and high risk for recurrent foot ulcers

## 2023-07-05 NOTE — PATIENT INSTRUCTIONS
sleep better. Muscle-strengthening. This type of activity can help maintain muscle and strengthen bones. Includes climbing stairs, using resistance bands, and lifting or carrying heavy loads. Aim for at least twice a week. It can help protect the knees and other joints. Stretching. Stretching gives you better range of motion in joints and muscles. Includes upper arm stretches, calf stretches, and gentle yoga. Aim for at least twice a week, preferably after your muscles are warmed up from other activities. It can help you function better in daily life. Balancing. This helps you stay coordinated and have good posture. Includes heel-to-toe walking, kelvin chi, and certain types of yoga. Aim for at least 3 days a week. It can reduce your risk of falling. Even if you have a hard time meeting the recommendations, it's better to be more active than less active. All activity done in each category counts toward your weekly total. You'd be surprised how daily things like carrying groceries, keeping up with grandchildren, and taking the stairs can add up. What keeps you from being active? If you've had a hard time being more active, you're not alone. Maybe you remember being able to do more. Or maybe you've never thought of yourself as being active. It's frustrating when you can't do the things you want. Being more active can help. What's holding you back? Getting started. Have a goal, but break it into easy tasks. Small steps build into big accomplishments. Staying motivated. If you feel like skipping your activity, remember your goal. Maybe you want to move better and stay independent. Every activity gets you one step closer. Not feeling your best.  Start with 5 minutes of an activity you enjoy. Prove to yourself you can do it. As you get comfortable, increase your time. You may not be where you want to be. But you're in the process of getting there. Everyone starts somewhere.   How can you find safe ways

## 2023-07-05 NOTE — ASSESSMENT & PLAN NOTE
Will refer to [address] [place] [address] [address]. Hopefully they service her area.    When she moves to [facility] she can transfer PT care there

## 2023-07-05 NOTE — PROGRESS NOTES
Medicare Annual Wellness Visit    Colten Abrams is here for Medicare AWV    Assessment & Plan   Medicare annual wellness visit, subsequent  - ACP on file  - discussed code status at length, full code for now  Hypotension, unspecified hypotension type  - she has been symptomatic from her low BP  - she reports ambulatory monitoring with cardiology, no one has been concerned about her BP on there. She is on metoprolol through cardiology. I will send her cardiologist a letter to question the need for the medication going forward  - I wonder if she could have adrenal insufficiency. Will send a message to her endocrine providers to see if this has been considered. Would need to come off metoprolol and re-assess before considering testing of the adrenal axis. Type 2 diabetes mellitus with diabetic peripheral angiopathy without gangrene, with long-term current use of insulin ([address])  Assessment & Plan:  Continue to follow with endocrinology team. Requested records for labs done - she reports her lipids are typically checked by endo. Continue to follow with podiatry - hx foot ulcers and high risk for recurrent foot ulcers  Spinal stenosis of lumbar region without neurogenic claudication  Assessment & Plan: Will refer to [facility] [place] [address] [address] physical therapy. Hopefully they service her area. When she moves to [facility] she can transfer PT care there    Recurrent major depressive disorder, in partial remission ([address])  Assessment & Plan:  Improved on lexpro, continue   Hypothyroidism, acquired, autoimmune  Assessment & Plan:  Sister reports pt was not taking synthroid for a while and now that she is back on the medication they have seen an improvement in her energy and mood. Continue to follow with endocrinology for management  Decreased hearing of left ear  Comments:  suspect middle ear effusion. advised this takes several weeks to resolve. requests ENT referral, provided today to use if not better in 1 mo.    Orders:  -

## 2023-07-07 ENCOUNTER — TELEPHONE (OUTPATIENT)
Age: 73
End: 2023-07-07

## 2023-07-07 NOTE — TELEPHONE ENCOUNTER
Reason for call:  Spoke with with   Vaibhav Carbajal from [facility]. Per Vaibhav Carbajal pt leave in [address] [place] [address] Drive.  They will not be able to help pt since they do not have anyone that can offer service in [address] [place] [address]     Is this a new problem: Yes    Date of last appointment:  7/5/2023     Can we respond via [employer]: No    Best call back number:  Vaibhav Carbajal from [facility]    [phone]

## 2023-07-13 ENCOUNTER — TELEPHONE (OUTPATIENT)
Age: 73
End: 2023-07-13

## 2023-07-13 NOTE — TELEPHONE ENCOUNTER
Left detailed message that MD does not have this medication (Singulair) on her med list. She needs more info - does she still have the bottle? What doctor wrote for the medication and when was it last filled? Request to call back to office.

## 2023-07-13 NOTE — TELEPHONE ENCOUNTER
Please call pt - I do not have this medication on her med list. Does she still have the bottle? What doctor wrote for the medication and when was it last filled?     Suzi Ardon MD

## 2023-07-13 NOTE — TELEPHONE ENCOUNTER
Medication Refill Request    Reneekristal Radha is requesting a refill of the following medication(s):    MONTELUKAST 10MG TABLET            Please send refill to:       [id number] - [address] [place]  [place] [phone] [facility] [phone] (Pharmacy)

## 2023-07-13 NOTE — TELEPHONE ENCOUNTER
Pt last seen on 7/5/23. Due to return in 6 months. Has appt on 1/5/24. Rx not showing on med list.    Will forward to MD for refill.

## 2023-07-14 NOTE — TELEPHONE ENCOUNTER
Spoke with pt - advised her that MD does not have this medication (Singulair) on her med list. She needs more info - does she still have the bottle? What doctor wrote for the medication and when was it last filled? Pt states she has not heard of this medication. She does not take it. Advised her may have been an automatic refill by her pharmacy.  Will forward to MD.

## 2023-07-21 ENCOUNTER — HOSPITAL ENCOUNTER (OUTPATIENT)
Age: 73
End: 2023-07-21
Payer: MEDICARE

## 2023-07-21 DIAGNOSIS — Z78.0 MENOPAUSE: ICD-10-CM

## 2023-07-21 DIAGNOSIS — Z12.31 BREAST CANCER SCREENING BY MAMMOGRAM: ICD-10-CM

## 2023-07-21 PROCEDURE — 77067 SCR MAMMO BI INCL CAD: CPT

## 2023-07-21 PROCEDURE — 77080 DXA BONE DENSITY AXIAL: CPT

## 2023-08-02 LAB
GAMMA INTERFERON BACKGROUND BLD IA-ACNC: 0.2 IU/ML
M TB IFN-G BLD-IMP: NEGATIVE
M TB IFN-G CD4+ T-CELLS BLD-ACNC: 0.05 IU/ML
M TBIFN-G CD4+ CD8+T-CELLS BLD-ACNC: 0.04 IU/ML
MITOGEN IGNF BLD-ACNC: >10 IU/ML
QUANTIFERON, INCUBATION: NORMAL
SERVICE CMNT-IMP: NORMAL

## 2023-08-23 ENCOUNTER — TELEPHONE (OUTPATIENT)
Age: 73
End: 2023-08-23

## 2023-08-23 DIAGNOSIS — M48.061 SPINAL STENOSIS OF LUMBAR REGION WITHOUT NEUROGENIC CLAUDICATION: Primary | ICD-10-CM

## 2023-08-23 NOTE — TELEPHONE ENCOUNTER
Reny from [facility] called requesting orders for home health, for OT and PT, for the patient. She said the orders the patient had are .     Omar Max - [phone]  Fax  - [phone]

## 2023-08-24 NOTE — TELEPHONE ENCOUNTER
As requested - faxed PT referral to ATTN: Sima Payne with [facility]  [phone]. Confirmation received.

## 2023-08-25 ENCOUNTER — TELEPHONE (OUTPATIENT)
Age: 73
End: 2023-08-25

## 2023-09-06 ENCOUNTER — TELEPHONE (OUTPATIENT)
Age: 73
End: 2023-09-06

## 2023-09-06 DIAGNOSIS — Z79.4 TYPE 2 DIABETES MELLITUS WITH DIABETIC PERIPHERAL ANGIOPATHY WITHOUT GANGRENE, WITH LONG-TERM CURRENT USE OF INSULIN (HCC): Primary | ICD-10-CM

## 2023-09-06 DIAGNOSIS — E11.51 TYPE 2 DIABETES MELLITUS WITH DIABETIC PERIPHERAL ANGIOPATHY WITHOUT GANGRENE, WITH LONG-TERM CURRENT USE OF INSULIN (HCC): Primary | ICD-10-CM

## 2023-09-06 NOTE — TELEPHONE ENCOUNTER
Cristino from [facility] called. She is requesting an order for home health, for skilled nursing, due to patient's medication discrepancy and fluctuating blood sugar levels. Aury Likes would also like to know if TIERNEY received her fax from Friday with a list of patient's medications.     Aury Likes - [phone]

## 2023-09-06 NOTE — TELEPHONE ENCOUNTER
Spoke with Reno and Tony with [employer]. Advised her MD did receive her fax from Friday. MD said pt's specialists may have changed their recommendations for medications for diabetes, thyroid as Dr Mechelle Goyal do not manage these conditions. She will order home health skilled nursing - does she have a number for us to fax this to? Cristino will fax over order for MD to sign.

## 2023-09-07 ENCOUNTER — TELEPHONE (OUTPATIENT)
Age: 73
End: 2023-09-07

## 2023-09-07 NOTE — TELEPHONE ENCOUNTER
Please call pt and advise her that [facility] wants to start home health skilled nursing for her. This is mostly to help check in on her and make sure she is taking her medications as prescribed. They will also do physical therapy and occupational therapy evaluations and provide treatment if necessary. In order to do this we need to have a visit in person or virtually with video capabilities in the last 30 days. I can not sign the orders until this is done. Please have her schedule a follow-up with me ASAP (next available open spot) if she wants these services to start soon.      Marilin Bailey MD

## 2023-09-08 ENCOUNTER — TELEPHONE (OUTPATIENT)
Age: 73
End: 2023-09-08

## 2023-09-08 NOTE — TELEPHONE ENCOUNTER
Called [employer] at home - home health company director Alecia Powers. Left voicemail. I can not sign home health orders until have face-to-face visit because our last visit was >30d ago. Will fax back forms to update after the visit.

## 2023-09-12 ENCOUNTER — OFFICE VISIT (OUTPATIENT)
Age: 73
End: 2023-09-12
Payer: MEDICARE

## 2023-09-12 VITALS
RESPIRATION RATE: 20 BRPM | HEART RATE: 84 BPM | WEIGHT: 221 LBS | BODY MASS INDEX: 37.73 KG/M2 | HEIGHT: 64 IN | TEMPERATURE: 98.4 F | DIASTOLIC BLOOD PRESSURE: 60 MMHG | SYSTOLIC BLOOD PRESSURE: 92 MMHG | OXYGEN SATURATION: 96 %

## 2023-09-12 DIAGNOSIS — Z79.4 TYPE 2 DIABETES MELLITUS WITH DIABETIC PERIPHERAL ANGIOPATHY WITHOUT GANGRENE, WITH LONG-TERM CURRENT USE OF INSULIN (HCC): Primary | ICD-10-CM

## 2023-09-12 DIAGNOSIS — H91.93 DECREASED HEARING OF BOTH EARS: ICD-10-CM

## 2023-09-12 DIAGNOSIS — E11.51 TYPE 2 DIABETES MELLITUS WITH DIABETIC PERIPHERAL ANGIOPATHY WITHOUT GANGRENE, WITH LONG-TERM CURRENT USE OF INSULIN (HCC): Primary | ICD-10-CM

## 2023-09-12 PROCEDURE — 3046F HEMOGLOBIN A1C LEVEL >9.0%: CPT | Performed by: STUDENT IN AN ORGANIZED HEALTH CARE EDUCATION/TRAINING PROGRAM

## 2023-09-12 PROCEDURE — 1090F PRES/ABSN URINE INCON ASSESS: CPT | Performed by: STUDENT IN AN ORGANIZED HEALTH CARE EDUCATION/TRAINING PROGRAM

## 2023-09-12 PROCEDURE — 1036F TOBACCO NON-USER: CPT | Performed by: STUDENT IN AN ORGANIZED HEALTH CARE EDUCATION/TRAINING PROGRAM

## 2023-09-12 PROCEDURE — 3017F COLORECTAL CA SCREEN DOC REV: CPT | Performed by: STUDENT IN AN ORGANIZED HEALTH CARE EDUCATION/TRAINING PROGRAM

## 2023-09-12 PROCEDURE — 2022F DILAT RTA XM EVC RTNOPTHY: CPT | Performed by: STUDENT IN AN ORGANIZED HEALTH CARE EDUCATION/TRAINING PROGRAM

## 2023-09-12 PROCEDURE — G8399 PT W/DXA RESULTS DOCUMENT: HCPCS | Performed by: STUDENT IN AN ORGANIZED HEALTH CARE EDUCATION/TRAINING PROGRAM

## 2023-09-12 PROCEDURE — 99213 OFFICE O/P EST LOW 20 MIN: CPT | Performed by: STUDENT IN AN ORGANIZED HEALTH CARE EDUCATION/TRAINING PROGRAM

## 2023-09-12 PROCEDURE — G8417 CALC BMI ABV UP PARAM F/U: HCPCS | Performed by: STUDENT IN AN ORGANIZED HEALTH CARE EDUCATION/TRAINING PROGRAM

## 2023-09-12 PROCEDURE — 1123F ACP DISCUSS/DSCN MKR DOCD: CPT | Performed by: STUDENT IN AN ORGANIZED HEALTH CARE EDUCATION/TRAINING PROGRAM

## 2023-09-12 PROCEDURE — G8427 DOCREV CUR MEDS BY ELIG CLIN: HCPCS | Performed by: STUDENT IN AN ORGANIZED HEALTH CARE EDUCATION/TRAINING PROGRAM

## 2023-09-12 RX ORDER — CHOLECALCIFEROL (VITAMIN D3) 1250 MCG
5000 CAPSULE ORAL
COMMUNITY

## 2023-09-12 RX ORDER — MULTIVITAMIN WITH IRON
100 TABLET ORAL DAILY
COMMUNITY

## 2023-09-12 NOTE — PROGRESS NOTES
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  Authorizing Provider   vitamin B-6 (PYRIDOXINE) 100 MG tablet Take 1 tablet by mouth daily Unsure of dose   Yes Historical Provider, MD   Cholecalciferol (VITAMIN D3) 1.25 MG (82926 UT) CAPS Take 5,000 Units by mouth   Yes Historical Provider, MD   OZEMPIC, 0.25 OR 0.5 MG/DOSE, 2 MG/3ML SOPN Inject 0.25 mg into the skin every 7 days 6/2/23  Yes Historical Provider, MD   aspirin 81 MG chewable tablet Take by mouth daily   Yes Ar Automatic Reconciliation   atorvastatin (LIPITOR) 40 MG tablet TAKE ONE TABLET BY MOUTH DAILY 2/1/22  Yes Ar Automatic Reconciliation   clopidogrel (PLAVIX) 75 MG tablet Take by mouth daily 11/22/16  Yes Ar Automatic Reconciliation   escitalopram (LEXAPRO) 20 MG tablet Take 1 tablet by mouth daily 12/29/22  Yes Ar Automatic Reconciliation   fenofibrate micronized (LOFIBRA) 67 MG capsule Take 1 capsule by mouth every morning (before breakfast)   Yes Ar Automatic Reconciliation   fluticasone (FLONASE) 50 MCG/ACT nasal spray 2 sprays by Nasal route daily as needed 11/5/18  Yes Ar Automatic Reconciliation   insulin regular (HUMULIN R;NOVOLIN R) 100 UNIT/ML injection Inject into the skin 2 times daily (before meals) Taking 200 units at 10 am and 45 units 6 pm.   Yes Audrey Zepeda MD   levothyroxine (SYNTHROID) 125 MCG tablet Take 2 tablets by mouth Daily   Yes Audrey Zepeda MD   metoprolol succinate (TOPROL XL) 50 MG extended release tablet Take by mouth daily 11/22/16  Yes Ar Automatic Reconciliation   nitroGLYCERIN (NITROSTAT) 0.4 MG SL tablet Place under the tongue as needed 11/22/16  Yes Ar Automatic Reconciliation   omeprazole (PRILOSEC OTC) 20 MG tablet Take by mouth daily   Yes Ar

## 2023-09-14 ENCOUNTER — TELEPHONE (OUTPATIENT)
Age: 73
End: 2023-09-14

## 2023-09-14 NOTE — TELEPHONE ENCOUNTER
Mann Quispe from [facility] called.   She would like to know if Dr. Grant Sites received patient's Plan of Care for home health and if it could be faxed to her at:  [facility]

## 2023-10-01 RX ORDER — ESCITALOPRAM OXALATE 20 MG/1
20 TABLET ORAL DAILY
Qty: 90 TABLET | Refills: 3 | Status: SHIPPED | OUTPATIENT
Start: 2023-10-01

## 2023-10-02 ENCOUNTER — TELEPHONE (OUTPATIENT)
Age: 73
End: 2023-10-02

## 2023-10-02 NOTE — TELEPHONE ENCOUNTER
Marin Lopez OT at [facility] [phone]     Requesting a verbal order to extend pt's OT for once a week for 4 weeks.

## 2023-10-02 NOTE — TELEPHONE ENCOUNTER
Spoke with Robert Gonzalez, OT at [facility] - advised her MD has approved request to extend pt's OT.

## 2023-10-25 ENCOUNTER — TELEPHONE (OUTPATIENT)
Age: 73
End: 2023-10-25

## 2023-10-25 NOTE — TELEPHONE ENCOUNTER
Advised jasbir with [facility] to add visits for DM teaching and to consult with their in house  for assistance with blood sugar monitoring.

## 2023-10-25 NOTE — TELEPHONE ENCOUNTER
Tess Whitmore with [facility] [phone]   Requesting more visits with this patient to continue diabetic teaching. Also, would like an order to be able to speak with the  concerning available benefits for patient.

## 2023-10-25 NOTE — TELEPHONE ENCOUNTER
Erica Monson, from [facility], called to let Dr. Nadine Angel know that the patient has been discharged from home health OT.    Erica Monson - [phone]

## 2023-10-29 ENCOUNTER — TELEPHONE (OUTPATIENT)
Age: 73
End: 2023-10-29

## 2023-11-03 ENCOUNTER — TELEPHONE (OUTPATIENT)
Age: 73
End: 2023-11-03

## 2023-11-03 NOTE — TELEPHONE ENCOUNTER
Reason for call:  Spoke with [facility] from [facility]. Per [facility] they have send a fax with a verbal order for home health they are waiting on a respond       Is this a new problem: Yes    Date of last appointment:  9/12/2023     Can we respond via [employer]: No    Best call back number: Erika  [facility]  [phone]

## 2023-11-10 ENCOUNTER — TELEPHONE (OUTPATIENT)
Age: 73
End: 2023-11-10

## 2023-11-10 NOTE — TELEPHONE ENCOUNTER
Aurora Martinez with [facility] called to inform MD that pt has large ulcer on right shin and right heel. Pt is scheduled to see her podiatrist on Monday 11/13/23 for her heel. Aurora Martinez [facility] has a wound care specialist Dr Gordon Fraga that she can schedule an appt today for pt to see for her lopez wound if Dr Benito Perez approves. Discussed with Dr Benito Perez and has approve request. I have given vo to Aurora Martinez. She will fax order for MD to sign.  Will forward to MD.

## 2023-11-30 ENCOUNTER — TELEPHONE (OUTPATIENT)
Age: 73
End: 2023-11-30

## 2023-11-30 NOTE — TELEPHONE ENCOUNTER
Catie Mendoza from [facility] called. She is requesting an order to discharge patient from diabetic training. Catie Mendoza stated that she has been working with the patient for several months, pt has two new diabetic wounds and has been non-compliant.     Catie Mendoza - [phone] [TextBox_4] : This letter  is regarding your patient  who  attended pulmonary out patient office today.  I have reviewed  patient's  past history, social history, family history and medication list. I also  reviewed nurse practitioners/ and fellows  notes and assessment and agree with it.  \par The patient was referred by Dr.n. Jung\par \par 68 yo last seen by us in 2017\par . Pt w/h/o DVT 20 years ago- treated w/coumadin x 6 months. Developed another left leg dvt  2017\par PMH of MARCIE\par \par ------No history of , fever, chills , rigors, chest pain, or hemoptysis. Questionable history of Raynaud's phenomenon. No h/o significant weight loss in last few months. No history of liver dysfunction , collagen vascular disorder or chronic thromboembolic disease. I would classify the patient's dyspnea as WHO  FUNCTIONAL CLASS II--------\par \par ----Echo  date------2022---PASP 22 MMHG ----\par ----Pft date---------2023---normal\par ----Ct scan date-------pending\par \par march 2023-----reports slight exercise related dyspnea------ cardiac work-up as per patient is normal--------- needs chest x-ray PFT and labs-----history suggestive of hyperreactive airway disease----\par \par

## 2023-12-09 PROBLEM — A41.9 SEVERE SEPSIS (HCC): Status: ACTIVE | Noted: 2023-12-09

## 2023-12-09 PROBLEM — R65.20 SEVERE SEPSIS (HCC): Status: ACTIVE | Noted: 2023-12-09

## 2023-12-11 ENCOUNTER — ANESTHESIA EVENT (OUTPATIENT)
Facility: HOSPITAL | Age: 73
End: 2023-12-11
Payer: MEDICARE

## 2023-12-11 ENCOUNTER — ANESTHESIA (OUTPATIENT)
Facility: HOSPITAL | Age: 73
End: 2023-12-11
Payer: MEDICARE

## 2023-12-11 PROCEDURE — 6360000002 HC RX W HCPCS

## 2023-12-11 PROCEDURE — 2500000003 HC RX 250 WO HCPCS

## 2023-12-11 PROCEDURE — 2580000003 HC RX 258

## 2023-12-11 RX ORDER — MIDAZOLAM HYDROCHLORIDE 1 MG/ML
INJECTION INTRAMUSCULAR; INTRAVENOUS PRN
Status: DISCONTINUED | OUTPATIENT
Start: 2023-12-11 | End: 2023-12-11 | Stop reason: SDUPTHER

## 2023-12-11 RX ORDER — PHENYLEPHRINE HCL IN 0.9% NACL 0.4MG/10ML
SYRINGE (ML) INTRAVENOUS PRN
Status: DISCONTINUED | OUTPATIENT
Start: 2023-12-11 | End: 2023-12-11 | Stop reason: SDUPTHER

## 2023-12-11 RX ORDER — EPHEDRINE SULFATE/0.9% NACL/PF 50 MG/5 ML
SYRINGE (ML) INTRAVENOUS PRN
Status: DISCONTINUED | OUTPATIENT
Start: 2023-12-11 | End: 2023-12-11 | Stop reason: SDUPTHER

## 2023-12-11 RX ORDER — FENTANYL CITRATE 50 UG/ML
INJECTION, SOLUTION INTRAMUSCULAR; INTRAVENOUS PRN
Status: DISCONTINUED | OUTPATIENT
Start: 2023-12-11 | End: 2023-12-11 | Stop reason: SDUPTHER

## 2023-12-11 RX ORDER — PROPOFOL 10 MG/ML
INJECTION, EMULSION INTRAVENOUS CONTINUOUS PRN
Status: DISCONTINUED | OUTPATIENT
Start: 2023-12-11 | End: 2023-12-11 | Stop reason: SDUPTHER

## 2023-12-11 RX ORDER — SODIUM CHLORIDE, SODIUM LACTATE, POTASSIUM CHLORIDE, CALCIUM CHLORIDE 600; 310; 30; 20 MG/100ML; MG/100ML; MG/100ML; MG/100ML
INJECTION, SOLUTION INTRAVENOUS CONTINUOUS PRN
Status: DISCONTINUED | OUTPATIENT
Start: 2023-12-11 | End: 2023-12-11 | Stop reason: SDUPTHER

## 2023-12-11 RX ORDER — ONDANSETRON 2 MG/ML
INJECTION INTRAMUSCULAR; INTRAVENOUS PRN
Status: DISCONTINUED | OUTPATIENT
Start: 2023-12-11 | End: 2023-12-11 | Stop reason: SDUPTHER

## 2023-12-11 RX ADMIN — Medication 10 MG: at 12:06

## 2023-12-11 RX ADMIN — FENTANYL CITRATE 25 MCG: 50 INJECTION, SOLUTION INTRAMUSCULAR; INTRAVENOUS at 11:33

## 2023-12-11 RX ADMIN — PROPOFOL 125 MCG/KG/MIN: 10 INJECTION, EMULSION INTRAVENOUS at 11:28

## 2023-12-11 RX ADMIN — Medication 120 MCG: at 11:55

## 2023-12-11 RX ADMIN — FENTANYL CITRATE 50 MCG: 50 INJECTION, SOLUTION INTRAMUSCULAR; INTRAVENOUS at 11:39

## 2023-12-11 RX ADMIN — SODIUM CHLORIDE, POTASSIUM CHLORIDE, SODIUM LACTATE AND CALCIUM CHLORIDE: 600; 310; 30; 20 INJECTION, SOLUTION INTRAVENOUS at 11:23

## 2023-12-11 RX ADMIN — Medication 120 MCG: at 12:00

## 2023-12-11 RX ADMIN — MIDAZOLAM HYDROCHLORIDE 2 MG: 1 INJECTION, SOLUTION INTRAMUSCULAR; INTRAVENOUS at 11:22

## 2023-12-11 RX ADMIN — Medication 80 MCG: at 12:03

## 2023-12-11 RX ADMIN — Medication 80 MCG: at 11:51

## 2023-12-11 RX ADMIN — ONDANSETRON 4 MG: 2 INJECTION INTRAMUSCULAR; INTRAVENOUS at 11:34

## 2023-12-11 RX ADMIN — FENTANYL CITRATE 25 MCG: 50 INJECTION, SOLUTION INTRAMUSCULAR; INTRAVENOUS at 11:34

## 2023-12-11 NOTE — ANESTHESIA POSTPROCEDURE EVALUATION
Department of Anesthesiology  Postprocedure Note    Patient: Gabbi Phillips  MRN: [id number]  YOB: 1950  Date of evaluation: 12/11/2023      Procedure Summary       Date: 12/11/23 Room / Location: [facility] MA[facility] OR [facility] / [facility] MA[facility] OR    Anesthesia Start: 8997 Anesthesia Stop: 4622    Procedure: RIGHT FOOT INCISION AND DRAINAGE (Right: Foot) Diagnosis:       Osteomyelitis of right foot, unspecified type ([address])      (Osteomyelitis of right foot, unspecified type ([address]) [M86.9])    Providers: Sushma Zuniga DPM Responsible Provider: Sara Pop DO    Anesthesia Type: MAC ASA Status: 3            Anesthesia Type: MAC    Gwen Phase I: Gwen Score: 8    Gwen Phase II:        Anesthesia Post Evaluation    Patient location during evaluation: PACU  Patient participation: complete - patient participated  Level of consciousness: awake and alert  Pain score: 0  Airway patency: patent  Nausea & Vomiting: no nausea and no vomiting  Complications: no  Cardiovascular status: hemodynamically stable  Respiratory status: acceptable  Hydration status: euvolemic  Pain management: adequate

## 2023-12-18 ENCOUNTER — TELEPHONE (OUTPATIENT)
Age: 73
End: 2023-12-18

## 2023-12-18 PROBLEM — G93.41 METABOLIC ENCEPHALOPATHY: Status: ACTIVE | Noted: 2023-12-18

## 2023-12-18 PROBLEM — E03.9 HYPOTHYROIDISM: Status: ACTIVE | Noted: 2023-12-18

## 2023-12-18 PROBLEM — M86.171 ACUTE OSTEOMYELITIS OF RIGHT CALCANEUS (HCC): Status: ACTIVE | Noted: 2023-12-18

## 2023-12-18 PROBLEM — L03.115 CELLULITIS OF RIGHT LOWER EXTREMITY: Status: ACTIVE | Noted: 2023-12-18

## 2023-12-18 PROBLEM — A49.8 INFECTION CAUSED BY ENTEROBACTER CLOACAE: Status: ACTIVE | Noted: 2023-12-18

## 2023-12-18 PROBLEM — E66.9 OBESITY (BMI 30-39.9): Status: ACTIVE | Noted: 2023-12-18

## 2023-12-18 PROBLEM — G93.40 ENCEPHALOPATHY ACUTE: Status: ACTIVE | Noted: 2023-12-18

## 2023-12-18 PROBLEM — A49.1 ENTEROCOCCAL INFECTION: Status: ACTIVE | Noted: 2023-12-18

## 2023-12-18 PROBLEM — A49.8 ANAEROBIC BACTERIAL INFECTION: Status: ACTIVE | Noted: 2023-12-18

## 2024-01-17 ENCOUNTER — TELEPHONE (OUTPATIENT)
Age: 74
End: 2024-01-17

## 2024-01-17 NOTE — TELEPHONE ENCOUNTER
Kenyatta from [facility] called, patient will need to reschedule her appointment on   1/23/24  she is having out patient surgery that day    Please call with new date & time    [phone]

## 2024-01-22 ENCOUNTER — TELEPHONE (OUTPATIENT)
Age: 74
End: 2024-01-22

## 2024-01-22 NOTE — TELEPHONE ENCOUNTER
Kenyatta Rodriguez with [facility] [phone] needs to reschedule Courtney Escamillagess 1950 due to pt is having surgery on 1/23/24

## 2024-01-23 ENCOUNTER — ANESTHESIA (OUTPATIENT)
Facility: HOSPITAL | Age: 74
End: 2024-01-23
Payer: MEDICARE

## 2024-01-23 ENCOUNTER — ANESTHESIA EVENT (OUTPATIENT)
Facility: HOSPITAL | Age: 74
End: 2024-01-23
Payer: MEDICARE

## 2024-01-23 ENCOUNTER — HOSPITAL ENCOUNTER (OUTPATIENT)
Facility: HOSPITAL | Age: 74
Setting detail: OUTPATIENT SURGERY
Discharge: HOME | End: 2024-01-23
Attending: PODIATRIST | Admitting: PODIATRIST
Payer: MEDICARE

## 2024-01-23 VITALS
WEIGHT: 213 LBS | SYSTOLIC BLOOD PRESSURE: 130 MMHG | RESPIRATION RATE: 3 BRPM | DIASTOLIC BLOOD PRESSURE: 70 MMHG | HEIGHT: 66 IN | OXYGEN SATURATION: 92 % | HEART RATE: 93 BPM | BODY MASS INDEX: 34.23 KG/M2 | TEMPERATURE: 98.7 F

## 2024-01-23 DIAGNOSIS — G89.18 POST-OP PAIN: Primary | ICD-10-CM

## 2024-01-23 LAB
GLUCOSE BLD STRIP.AUTO-MCNC: 283 MG/DL (ref 65–117)
SERVICE CMNT-IMP: ABNORMAL

## 2024-01-23 PROCEDURE — 3600000012 HC SURGERY LEVEL 2 ADDTL 15MIN: Performed by: PODIATRIST

## 2024-01-23 PROCEDURE — 2580000003 HC RX 258: Performed by: ANESTHESIOLOGY

## 2024-01-23 PROCEDURE — 3700000001 HC ADD 15 MINUTES (ANESTHESIA): Performed by: PODIATRIST

## 2024-01-23 PROCEDURE — 2580000003 HC RX 258: Performed by: PODIATRIST

## 2024-01-23 PROCEDURE — 7100000001 HC PACU RECOVERY - ADDTL 15 MIN: Performed by: PODIATRIST

## 2024-01-23 PROCEDURE — 87077 CULTURE AEROBIC IDENTIFY: CPT

## 2024-01-23 PROCEDURE — 3700000000 HC ANESTHESIA ATTENDED CARE: Performed by: PODIATRIST

## 2024-01-23 PROCEDURE — 88311 DECALCIFY TISSUE: CPT

## 2024-01-23 PROCEDURE — 87205 SMEAR GRAM STAIN: CPT

## 2024-01-23 PROCEDURE — 6360000002 HC RX W HCPCS: Performed by: PODIATRIST

## 2024-01-23 PROCEDURE — 2500000003 HC RX 250 WO HCPCS: Performed by: ANESTHESIOLOGY

## 2024-01-23 PROCEDURE — 87075 CULTR BACTERIA EXCEPT BLOOD: CPT

## 2024-01-23 PROCEDURE — 7100000000 HC PACU RECOVERY - FIRST 15 MIN: Performed by: PODIATRIST

## 2024-01-23 PROCEDURE — 2709999900 HC NON-CHARGEABLE SUPPLY: Performed by: PODIATRIST

## 2024-01-23 PROCEDURE — 7100000011 HC PHASE II RECOVERY - ADDTL 15 MIN: Performed by: PODIATRIST

## 2024-01-23 PROCEDURE — 87070 CULTURE OTHR SPECIMN AEROBIC: CPT

## 2024-01-23 PROCEDURE — 82962 GLUCOSE BLOOD TEST: CPT

## 2024-01-23 PROCEDURE — 7100000010 HC PHASE II RECOVERY - FIRST 15 MIN: Performed by: PODIATRIST

## 2024-01-23 PROCEDURE — 88307 TISSUE EXAM BY PATHOLOGIST: CPT

## 2024-01-23 PROCEDURE — 3600000002 HC SURGERY LEVEL 2 BASE: Performed by: PODIATRIST

## 2024-01-23 PROCEDURE — 6360000002 HC RX W HCPCS: Performed by: ANESTHESIOLOGY

## 2024-01-23 RX ORDER — LIDOCAINE HYDROCHLORIDE 20 MG/ML
INJECTION, SOLUTION EPIDURAL; INFILTRATION; INTRACAUDAL; PERINEURAL PRN
Status: DISCONTINUED | OUTPATIENT
Start: 2024-01-23 | End: 2024-01-23 | Stop reason: SDUPTHER

## 2024-01-23 RX ORDER — FENTANYL CITRATE 50 UG/ML
25 INJECTION, SOLUTION INTRAMUSCULAR; INTRAVENOUS EVERY 5 MIN PRN
Status: DISCONTINUED | OUTPATIENT
Start: 2024-01-23 | End: 2024-01-23 | Stop reason: HOSPADM

## 2024-01-23 RX ORDER — ONDANSETRON 2 MG/ML
INJECTION INTRAMUSCULAR; INTRAVENOUS PRN
Status: DISCONTINUED | OUTPATIENT
Start: 2024-01-23 | End: 2024-01-23 | Stop reason: SDUPTHER

## 2024-01-23 RX ORDER — EPHEDRINE SULFATE 50 MG/ML
INJECTION INTRAVENOUS PRN
Status: DISCONTINUED | OUTPATIENT
Start: 2024-01-23 | End: 2024-01-23 | Stop reason: SDUPTHER

## 2024-01-23 RX ORDER — LIDOCAINE HYDROCHLORIDE 10 MG/ML
1 INJECTION, SOLUTION EPIDURAL; INFILTRATION; INTRACAUDAL; PERINEURAL
Status: DISCONTINUED | OUTPATIENT
Start: 2024-01-23 | End: 2024-01-23 | Stop reason: HOSPADM

## 2024-01-23 RX ORDER — SODIUM CHLORIDE 0.9 % (FLUSH) 0.9 %
5-40 SYRINGE (ML) INJECTION PRN
Status: DISCONTINUED | OUTPATIENT
Start: 2024-01-23 | End: 2024-01-23 | Stop reason: HOSPADM

## 2024-01-23 RX ORDER — HYDROMORPHONE HYDROCHLORIDE 1 MG/ML
0.5 INJECTION, SOLUTION INTRAMUSCULAR; INTRAVENOUS; SUBCUTANEOUS EVERY 5 MIN PRN
Status: DISCONTINUED | OUTPATIENT
Start: 2024-01-23 | End: 2024-01-23 | Stop reason: HOSPADM

## 2024-01-23 RX ORDER — ACETAMINOPHEN 500 MG
1000 TABLET ORAL ONCE
Status: DISCONTINUED | OUTPATIENT
Start: 2024-01-23 | End: 2024-01-23 | Stop reason: HOSPADM

## 2024-01-23 RX ORDER — OXYCODONE HYDROCHLORIDE AND ACETAMINOPHEN 5; 325 MG/1; MG/1
1 TABLET ORAL EVERY 6 HOURS PRN
Qty: 12 TABLET | Refills: 0 | Status: SHIPPED | OUTPATIENT
Start: 2024-01-23 | End: 2024-01-26

## 2024-01-23 RX ORDER — SODIUM CHLORIDE 9 MG/ML
INJECTION, SOLUTION INTRAVENOUS PRN
Status: DISCONTINUED | OUTPATIENT
Start: 2024-01-23 | End: 2024-01-23 | Stop reason: HOSPADM

## 2024-01-23 RX ORDER — FENTANYL CITRATE 50 UG/ML
INJECTION, SOLUTION INTRAMUSCULAR; INTRAVENOUS PRN
Status: DISCONTINUED | OUTPATIENT
Start: 2024-01-23 | End: 2024-01-23 | Stop reason: SDUPTHER

## 2024-01-23 RX ORDER — MIDAZOLAM HYDROCHLORIDE 2 MG/2ML
2 INJECTION, SOLUTION INTRAMUSCULAR; INTRAVENOUS
Status: DISCONTINUED | OUTPATIENT
Start: 2024-01-23 | End: 2024-01-23 | Stop reason: HOSPADM

## 2024-01-23 RX ORDER — ONDANSETRON 2 MG/ML
4 INJECTION INTRAMUSCULAR; INTRAVENOUS
Status: DISCONTINUED | OUTPATIENT
Start: 2024-01-23 | End: 2024-01-23 | Stop reason: HOSPADM

## 2024-01-23 RX ORDER — DIPHENHYDRAMINE HYDROCHLORIDE 50 MG/ML
INJECTION INTRAMUSCULAR; INTRAVENOUS PRN
Status: DISCONTINUED | OUTPATIENT
Start: 2024-01-23 | End: 2024-01-23 | Stop reason: SDUPTHER

## 2024-01-23 RX ORDER — SODIUM CHLORIDE 0.9 % (FLUSH) 0.9 %
5-40 SYRINGE (ML) INJECTION EVERY 12 HOURS SCHEDULED
Status: DISCONTINUED | OUTPATIENT
Start: 2024-01-23 | End: 2024-01-23 | Stop reason: HOSPADM

## 2024-01-23 RX ORDER — PHENYLEPHRINE HCL IN 0.9% NACL 0.4MG/10ML
SYRINGE (ML) INTRAVENOUS PRN
Status: DISCONTINUED | OUTPATIENT
Start: 2024-01-23 | End: 2024-01-23 | Stop reason: SDUPTHER

## 2024-01-23 RX ORDER — PROMETHAZINE HYDROCHLORIDE 25 MG/1
25 TABLET ORAL 4 TIMES DAILY PRN
Qty: 20 TABLET | Refills: 0 | Status: SHIPPED | OUTPATIENT
Start: 2024-01-23 | End: 2024-01-30

## 2024-01-23 RX ORDER — HYDRALAZINE HYDROCHLORIDE 20 MG/ML
10 INJECTION INTRAMUSCULAR; INTRAVENOUS
Status: DISCONTINUED | OUTPATIENT
Start: 2024-01-23 | End: 2024-01-23 | Stop reason: HOSPADM

## 2024-01-23 RX ORDER — OXYCODONE HYDROCHLORIDE 5 MG/1
5 TABLET ORAL
Status: DISCONTINUED | OUTPATIENT
Start: 2024-01-23 | End: 2024-01-23 | Stop reason: HOSPADM

## 2024-01-23 RX ORDER — SODIUM CHLORIDE, SODIUM LACTATE, POTASSIUM CHLORIDE, CALCIUM CHLORIDE 600; 310; 30; 20 MG/100ML; MG/100ML; MG/100ML; MG/100ML
INJECTION, SOLUTION INTRAVENOUS CONTINUOUS PRN
Status: DISCONTINUED | OUTPATIENT
Start: 2024-01-23 | End: 2024-01-23 | Stop reason: SDUPTHER

## 2024-01-23 RX ORDER — DEXAMETHASONE SODIUM PHOSPHATE 4 MG/ML
INJECTION, SOLUTION INTRA-ARTICULAR; INTRALESIONAL; INTRAMUSCULAR; INTRAVENOUS; SOFT TISSUE PRN
Status: DISCONTINUED | OUTPATIENT
Start: 2024-01-23 | End: 2024-01-23 | Stop reason: SDUPTHER

## 2024-01-23 RX ORDER — SODIUM CHLORIDE, SODIUM LACTATE, POTASSIUM CHLORIDE, CALCIUM CHLORIDE 600; 310; 30; 20 MG/100ML; MG/100ML; MG/100ML; MG/100ML
INJECTION, SOLUTION INTRAVENOUS CONTINUOUS
Status: DISCONTINUED | OUTPATIENT
Start: 2024-01-23 | End: 2024-01-23 | Stop reason: HOSPADM

## 2024-01-23 RX ORDER — FENTANYL CITRATE 50 UG/ML
100 INJECTION, SOLUTION INTRAMUSCULAR; INTRAVENOUS
Status: DISCONTINUED | OUTPATIENT
Start: 2024-01-23 | End: 2024-01-23 | Stop reason: HOSPADM

## 2024-01-23 RX ORDER — PROPOFOL 10 MG/ML
INJECTION, EMULSION INTRAVENOUS PRN
Status: DISCONTINUED | OUTPATIENT
Start: 2024-01-23 | End: 2024-01-23 | Stop reason: SDUPTHER

## 2024-01-23 RX ORDER — PROCHLORPERAZINE EDISYLATE 5 MG/ML
5 INJECTION INTRAMUSCULAR; INTRAVENOUS
Status: DISCONTINUED | OUTPATIENT
Start: 2024-01-23 | End: 2024-01-23 | Stop reason: HOSPADM

## 2024-01-23 RX ORDER — MIDAZOLAM HYDROCHLORIDE 1 MG/ML
INJECTION INTRAMUSCULAR; INTRAVENOUS PRN
Status: DISCONTINUED | OUTPATIENT
Start: 2024-01-23 | End: 2024-01-23 | Stop reason: SDUPTHER

## 2024-01-23 RX ADMIN — PROPOFOL 70 MG: 10 INJECTION, EMULSION INTRAVENOUS at 10:05

## 2024-01-23 RX ADMIN — DEXAMETHASONE SODIUM PHOSPHATE 4 MG: 4 INJECTION INTRA-ARTICULAR; INTRALESIONAL; INTRAMUSCULAR; INTRAVENOUS; SOFT TISSUE at 10:05

## 2024-01-23 RX ADMIN — FENTANYL CITRATE 50 MCG: 50 INJECTION, SOLUTION INTRAMUSCULAR; INTRAVENOUS at 10:51

## 2024-01-23 RX ADMIN — EPHEDRINE SULFATE 10 MG: 50 INJECTION INTRAVENOUS at 11:05

## 2024-01-23 RX ADMIN — FENTANYL CITRATE 50 MCG: 50 INJECTION, SOLUTION INTRAMUSCULAR; INTRAVENOUS at 10:05

## 2024-01-23 RX ADMIN — SODIUM CHLORIDE, POTASSIUM CHLORIDE, SODIUM LACTATE AND CALCIUM CHLORIDE: 600; 310; 30; 20 INJECTION, SOLUTION INTRAVENOUS at 09:42

## 2024-01-23 RX ADMIN — MIDAZOLAM 1 MG: 1 INJECTION INTRAMUSCULAR; INTRAVENOUS at 09:49

## 2024-01-23 RX ADMIN — WATER 2000 MG: 1 INJECTION INTRAMUSCULAR; INTRAVENOUS; SUBCUTANEOUS at 10:07

## 2024-01-23 RX ADMIN — LIDOCAINE HYDROCHLORIDE 100 MG: 20 INJECTION, SOLUTION EPIDURAL; INFILTRATION; INTRACAUDAL; PERINEURAL at 10:05

## 2024-01-23 RX ADMIN — PHENYLEPHRINE HYDROCHLORIDE 50 MCG/MIN: 10 INJECTION INTRAVENOUS at 10:05

## 2024-01-23 RX ADMIN — SODIUM CHLORIDE, POTASSIUM CHLORIDE, SODIUM LACTATE AND CALCIUM CHLORIDE: 600; 310; 30; 20 INJECTION, SOLUTION INTRAVENOUS at 09:40

## 2024-01-23 RX ADMIN — Medication 120 MCG: at 11:05

## 2024-01-23 RX ADMIN — DIPHENHYDRAMINE HYDROCHLORIDE 12.5 MG: 50 INJECTION, SOLUTION INTRAMUSCULAR; INTRAVENOUS at 10:05

## 2024-01-23 RX ADMIN — ONDANSETRON 4 MG: 2 INJECTION INTRAMUSCULAR; INTRAVENOUS at 10:56

## 2024-01-23 ASSESSMENT — PAIN - FUNCTIONAL ASSESSMENT: PAIN_FUNCTIONAL_ASSESSMENT: 0-10

## 2024-01-23 NOTE — DISCHARGE INSTRUCTIONS
INSTRUCTIONS FOLLOWING FOOT SURGERY    ACTIVITY:  Elevate feet for 48 hours  Use ice as directed by your doctor  Use crutches / walker as directed by your doctor, and follow your doctors instructions as to your weight bearing status - NO WEIGHT ON THE SURGICAL SIDE     DIET:  Clear liquids until no nausea or vomiting; then light diet for the first day  An advance to regular diet on second day, unless your doctor orders otherwise.    PAIN:  Take pain medication as directed by your doctor.  Call your doctor if pain is not relieved by medication.  Do not take aspirin or blood thinners until directed by your doctor.    DRESSING CARE: keep dressing clean and dry, do not remove       FOLLOW-UP PHONE CALLS:  Calls will be made by nursing staff.  If you had any problems, call your doctor as needed.    CALL YOUR DOCTOR IF:  Excessive bleeding that does not stop after holding mild pressure over the area  Temperature of 101° F or above  Redness, excessive swelling or bruising, and/or green or yellow, smelly discharge from incision  Loss of sensation -cold, white, or blue toes    AFTER ANESTHESIA :   For the first 24 hours: do not drive, drink alcoholic beverages, or make important decisions.  Be aware of dizziness following anesthesia and while taking pain medication.      DISCHARGE MEDICATIONS: @[email or web address]@      APPOINTMENT DATE/TIME: please call for an appointment    YOUR DOCTOR’S PHONE NUMBER: [phone]    Patient’s signature:  Date: 1/23/2024  Discharging Nurse:       ______________________________________________________________________    Anesthesia Discharge Instructions    After general anesthesia or intervenous sedation, for 24 hours or while taking prescription Narcotics:  Limit your activities  Do not drive or operate hazardous machinery  If you have not urinated within 8 hours after discharge, please contact your surgeon on call.  Do not make important personal or business decisions  Do not drink alcoholic

## 2024-01-23 NOTE — OP NOTE
Operative Note      Patient: Courtney Cortez  YOB: 1950  MRN: [id number]    Date of Procedure: 1/23/2024    Pre-Op Diagnosis Codes:     * Ulcer of right foot, unspecified ulcer stage (HCC) [L97.519]    Post-Op Diagnosis: Same       Procedure(s):  EXCISIONAL DEBRIDEMENT TO BONE WITH BONE BIOPSIES, EXCISIONAL DEBRIDEMENT ULCERATION ACHILLES WITH GRAFT APPLICATION RIGHT    Surgeon(s):  Susan Murray DPM Tammaro, Sarah, DPM    Assistant:   * No surgical staff found *    Anesthesia: Monitor Anesthesia Care    Estimated Blood Loss (mL): Minimal    Complications: None    Specimens:   ID Type Source Tests Collected by Time Destination   1 : MEDIAL CALCANEOUS RIGHT Bone Foot SURGICAL PATHOLOGY Susan Murray DPM 1/23/2024 1035    2 : LATERAL CALCANEOUS RIGHT Bone Foot SURGICAL PATHOLOGY Susan Murray DPM 1/23/2024 1037    A : CALCANEOUS RIGHT Swab Foot CULTURE, ANAEROBIC, GRAM STAIN, CULTURE, WOUND Susan Murray DPM 1/23/2024 1033        Implants:  * No implants in log *      Drains: * No LDAs found *    Findings: compromised bone quality lateral calcaneus, adequate bleeding soft tissues        Detailed Description of Procedure:   Patient was wheeled to the operating room and placed on the operating table in the supine position. General anesthesia was achieved by the anesthesia team and the right foot was prepped and draped in the normal aseptic technique. The posterior ankle eschar was visualized to be decreased in size and stable, so no debridement was performed posteriorly. Attention was then drawn to the plantar heel ulceration. All nonviable tissue was excisionally debrided with a #15 blade, curette, and rongeur down to bone. The soft tissues were visualized to bleed adequately. The lateral calcaneus was felt to be soft and compromised. A curette was used to debride the calcaneus until hard, viable bone was exposed. Bone biopsies were taken from the medial and lateral calcaneus and a

## 2024-01-23 NOTE — PROGRESS NOTES
Discharge instructions reviewed with patient, nurse at [facility] and family using teachback. All questions have been answered. Prescriptions sent to [facility] pharmacy. Vital signs stable, pain appropriately managed. Patient wheeled off the unit with PACU staff.

## 2024-01-23 NOTE — ANESTHESIA PRE PROCEDURE
Department of Anesthesiology  Preprocedure Note       Name:  Courtney Cortez   Age:  73 y.o.  :  1950                                          MRN:  [id number]         Date:  2024      Surgeon: Surgeon(s):  Susan Murray DPM Tammaro, Sarah, DPM    Procedure: Procedure(s):  EXCISIONAL DEBRIDEMENT TO BONE WITH BONE BIOPSIES, EXCISIONAL DEBRIDEMENT ULCERATION ACHILLES WITH GRAFT APPLICATION RIGHT    Medications prior to admission:   Prior to Admission medications    Medication Sig Start Date End Date Taking? Authorizing Provider   insulin glargine (LANTUS) 100 UNIT/ML injection vial Inject 50 Units into the skin 2 times daily 23   Janett Chun MD   insulin lispro, 1 Unit Dial, (HUMALOG KWIKPEN) 100 UNIT/ML SOPN Inject 50 Units into the skin 3 times daily (before meals) 23   Janett Chun MD   escitalopram (LEXAPRO) 20 MG tablet Take 1 tablet by mouth daily    Yaneli Youngblood MD   vitamin B-6 (PYRIDOXINE) 100 MG tablet Take 1 tablet by mouth daily Unsure of dose    Yaneli Youngblood MD   Cholecalciferol (VITAMIN D3) 1.25 MG (87002 UT) CAPS Take 5,000 Units by mouth daily    Yaneli Youngblood MD   OZEMPIC, 0.25 OR 0.5 MG/DOSE, 2 MG/3ML SOPN Inject 0.25 mg into the skin every 7 days 23   Yaneli Youngblood MD   aspirin 81 MG chewable tablet Take by mouth daily    Automatic Reconciliation, Ar   atorvastatin (LIPITOR) 40 MG tablet TAKE ONE TABLET BY MOUTH DAILY 22   Automatic Reconciliation, Ar   clopidogrel (PLAVIX) 75 MG tablet Take by mouth daily 16   Automatic Reconciliation, Ar   fenofibrate micronized (LOFIBRA) 67 MG capsule Take 1 capsule by mouth every morning (before breakfast)    Automatic Reconciliation, Ar   fluticasone (FLONASE) 50 MCG/ACT nasal spray 2 sprays by Nasal route daily as needed 18   Automatic Reconciliation, Ar   levothyroxine (SYNTHROID) 125 MCG tablet Take 2 tablets by mouth Daily    Angela Gusman MD   omeprazole

## 2024-01-23 NOTE — PERIOP NOTE
Anabel from [facility] verified Medication administration documentation correct from [facility] and she has not given patient any medication since she was on duty today.

## 2024-01-23 NOTE — PROGRESS NOTES
1355 VS stable. Awake and alert. Resting comfortably. Patient denies nausea. No signs of excessive bleeding. Tolerating ice chips without difficulty. Ready to transfer to Phase 2.

## 2024-01-24 NOTE — ANESTHESIA POSTPROCEDURE EVALUATION
Department of Anesthesiology  Postprocedure Note    Patient: Courtney Cortez  MRN: [id number]  YOB: 1950  Date of evaluation: 1/24/2024    Procedure Summary       Date: 01/23/24 Room / Location: [facility] MAIN OR [address] [place] [address] MAIN OR    Anesthesia Start: 0949 Anesthesia Stop: 1106    Procedure: EXCISIONAL DEBRIDEMENT TO BONE WITH BONE BIOPSIES, EXCISIONAL DEBRIDEMENT ULCERATION ACHILLES WITH GRAFT APPLICATION RIGHT (Right) Diagnosis:       Ulcer of right foot, unspecified ulcer stage (HCC)      (Ulcer of right foot, unspecified ulcer stage (HCC) [L97.519])    Providers: Susan Murray DPM Responsible Provider: Pastor Santos MD    Anesthesia Type: general ASA Status: 3            Anesthesia Type: No value filed.    Gwen Phase I: Gwen Score: 10    Gwen Phase II: Gwen Score: 10    Anesthesia Post Evaluation    Patient location during evaluation: PACU  Patient participation: complete - patient participated  Level of consciousness: awake  Airway patency: patent  Nausea & Vomiting: no nausea  Cardiovascular status: blood pressure returned to baseline and hemodynamically stable  Respiratory status: acceptable  Hydration status: stable  Multimodal analgesia pain management approach    No notable events documented.

## 2024-01-25 LAB
BACTERIA SPEC CULT: ABNORMAL
BACTERIA SPEC CULT: NORMAL
GRAM STN SPEC: ABNORMAL
GRAM STN SPEC: ABNORMAL
SERVICE CMNT-IMP: ABNORMAL
SERVICE CMNT-IMP: NORMAL

## 2024-02-05 ENCOUNTER — ANESTHESIA EVENT (OUTPATIENT)
Facility: HOSPITAL | Age: 74
End: 2024-02-05
Payer: MEDICARE

## 2024-02-06 ENCOUNTER — HOSPITAL ENCOUNTER (OUTPATIENT)
Facility: HOSPITAL | Age: 74
Setting detail: OUTPATIENT SURGERY
Discharge: HOME | End: 2024-02-06
Attending: PODIATRIST | Admitting: PODIATRIST
Payer: MEDICARE

## 2024-02-06 ENCOUNTER — ANESTHESIA (OUTPATIENT)
Facility: HOSPITAL | Age: 74
End: 2024-02-06
Payer: MEDICARE

## 2024-02-06 VITALS
OXYGEN SATURATION: 93 % | DIASTOLIC BLOOD PRESSURE: 54 MMHG | WEIGHT: 208 LBS | TEMPERATURE: 98.1 F | HEIGHT: 66 IN | BODY MASS INDEX: 33.43 KG/M2 | HEART RATE: 78 BPM | RESPIRATION RATE: 10 BRPM | SYSTOLIC BLOOD PRESSURE: 122 MMHG

## 2024-02-06 DIAGNOSIS — L03.115 CELLULITIS OF RIGHT LOWER EXTREMITY: Primary | ICD-10-CM

## 2024-02-06 LAB
GLUCOSE BLD STRIP.AUTO-MCNC: 262 MG/DL (ref 65–117)
GLUCOSE BLD STRIP.AUTO-MCNC: 301 MG/DL (ref 65–117)
SERVICE CMNT-IMP: ABNORMAL
SERVICE CMNT-IMP: ABNORMAL

## 2024-02-06 PROCEDURE — 2720000010 HC SURG SUPPLY STERILE: Performed by: PODIATRIST

## 2024-02-06 PROCEDURE — 2580000003 HC RX 258: Performed by: ANESTHESIOLOGY

## 2024-02-06 PROCEDURE — 87070 CULTURE OTHR SPECIMN AEROBIC: CPT

## 2024-02-06 PROCEDURE — 7100000011 HC PHASE II RECOVERY - ADDTL 15 MIN: Performed by: PODIATRIST

## 2024-02-06 PROCEDURE — 3700000001 HC ADD 15 MINUTES (ANESTHESIA): Performed by: PODIATRIST

## 2024-02-06 PROCEDURE — 88307 TISSUE EXAM BY PATHOLOGIST: CPT

## 2024-02-06 PROCEDURE — 3700000000 HC ANESTHESIA ATTENDED CARE: Performed by: PODIATRIST

## 2024-02-06 PROCEDURE — 3600000002 HC SURGERY LEVEL 2 BASE: Performed by: PODIATRIST

## 2024-02-06 PROCEDURE — 6360000002 HC RX W HCPCS: Performed by: PODIATRIST

## 2024-02-06 PROCEDURE — 87075 CULTR BACTERIA EXCEPT BLOOD: CPT

## 2024-02-06 PROCEDURE — 2709999900 HC NON-CHARGEABLE SUPPLY: Performed by: PODIATRIST

## 2024-02-06 PROCEDURE — C1713 ANCHOR/SCREW BN/BN,TIS/BN: HCPCS | Performed by: PODIATRIST

## 2024-02-06 PROCEDURE — 87102 FUNGUS ISOLATION CULTURE: CPT

## 2024-02-06 PROCEDURE — 7100000010 HC PHASE II RECOVERY - FIRST 15 MIN: Performed by: PODIATRIST

## 2024-02-06 PROCEDURE — 2500000003 HC RX 250 WO HCPCS: Performed by: NURSE ANESTHETIST, CERTIFIED REGISTERED

## 2024-02-06 PROCEDURE — 7100000000 HC PACU RECOVERY - FIRST 15 MIN: Performed by: PODIATRIST

## 2024-02-06 PROCEDURE — 6370000000 HC RX 637 (ALT 250 FOR IP): Performed by: ANESTHESIOLOGY

## 2024-02-06 PROCEDURE — 3600000012 HC SURGERY LEVEL 2 ADDTL 15MIN: Performed by: PODIATRIST

## 2024-02-06 PROCEDURE — 82962 GLUCOSE BLOOD TEST: CPT

## 2024-02-06 PROCEDURE — 88311 DECALCIFY TISSUE: CPT

## 2024-02-06 PROCEDURE — 7100000001 HC PACU RECOVERY - ADDTL 15 MIN: Performed by: PODIATRIST

## 2024-02-06 PROCEDURE — 6360000002 HC RX W HCPCS: Performed by: NURSE ANESTHETIST, CERTIFIED REGISTERED

## 2024-02-06 PROCEDURE — 87205 SMEAR GRAM STAIN: CPT

## 2024-02-06 DEVICE — SMARTSET GHV GENTAMICIN HIGH VISCOSITY BONE CEMENT 40G
Type: IMPLANTABLE DEVICE | Site: HEEL | Status: FUNCTIONAL
Brand: SMARTSET

## 2024-02-06 RX ORDER — PROPOFOL 10 MG/ML
INJECTION, EMULSION INTRAVENOUS CONTINUOUS PRN
Status: DISCONTINUED | OUTPATIENT
Start: 2024-02-06 | End: 2024-02-06 | Stop reason: SDUPTHER

## 2024-02-06 RX ORDER — BUPIVACAINE HYDROCHLORIDE 5 MG/ML
INJECTION, SOLUTION EPIDURAL; INTRACAUDAL PRN
Status: DISCONTINUED | OUTPATIENT
Start: 2024-02-06 | End: 2024-02-06 | Stop reason: HOSPADM

## 2024-02-06 RX ORDER — SODIUM CHLORIDE, SODIUM LACTATE, POTASSIUM CHLORIDE, CALCIUM CHLORIDE 600; 310; 30; 20 MG/100ML; MG/100ML; MG/100ML; MG/100ML
INJECTION, SOLUTION INTRAVENOUS CONTINUOUS
Status: DISCONTINUED | OUTPATIENT
Start: 2024-02-06 | End: 2024-02-06 | Stop reason: HOSPADM

## 2024-02-06 RX ORDER — SODIUM CHLORIDE 0.9 % (FLUSH) 0.9 %
5-40 SYRINGE (ML) INJECTION PRN
Status: DISCONTINUED | OUTPATIENT
Start: 2024-02-06 | End: 2024-02-06 | Stop reason: HOSPADM

## 2024-02-06 RX ORDER — CEFAZOLIN SODIUM 1 G/3ML
INJECTION, POWDER, FOR SOLUTION INTRAMUSCULAR; INTRAVENOUS PRN
Status: DISCONTINUED | OUTPATIENT
Start: 2024-02-06 | End: 2024-02-06 | Stop reason: SDUPTHER

## 2024-02-06 RX ORDER — HYDROMORPHONE HYDROCHLORIDE 1 MG/ML
0.5 INJECTION, SOLUTION INTRAMUSCULAR; INTRAVENOUS; SUBCUTANEOUS EVERY 5 MIN PRN
Status: DISCONTINUED | OUTPATIENT
Start: 2024-02-06 | End: 2024-02-06 | Stop reason: HOSPADM

## 2024-02-06 RX ORDER — FENTANYL CITRATE 50 UG/ML
25 INJECTION, SOLUTION INTRAMUSCULAR; INTRAVENOUS EVERY 5 MIN PRN
Status: DISCONTINUED | OUTPATIENT
Start: 2024-02-06 | End: 2024-02-06 | Stop reason: HOSPADM

## 2024-02-06 RX ORDER — SODIUM CHLORIDE 0.9 % (FLUSH) 0.9 %
5-40 SYRINGE (ML) INJECTION EVERY 12 HOURS SCHEDULED
Status: DISCONTINUED | OUTPATIENT
Start: 2024-02-06 | End: 2024-02-06 | Stop reason: HOSPADM

## 2024-02-06 RX ORDER — HYDRALAZINE HYDROCHLORIDE 20 MG/ML
10 INJECTION INTRAMUSCULAR; INTRAVENOUS
Status: DISCONTINUED | OUTPATIENT
Start: 2024-02-06 | End: 2024-02-06 | Stop reason: HOSPADM

## 2024-02-06 RX ORDER — OXYCODONE HYDROCHLORIDE 5 MG/1
5 TABLET ORAL
Status: DISCONTINUED | OUTPATIENT
Start: 2024-02-06 | End: 2024-02-06 | Stop reason: HOSPADM

## 2024-02-06 RX ORDER — FENTANYL CITRATE 50 UG/ML
100 INJECTION, SOLUTION INTRAMUSCULAR; INTRAVENOUS
Status: DISCONTINUED | OUTPATIENT
Start: 2024-02-06 | End: 2024-02-06 | Stop reason: HOSPADM

## 2024-02-06 RX ORDER — ONDANSETRON 2 MG/ML
4 INJECTION INTRAMUSCULAR; INTRAVENOUS
Status: DISCONTINUED | OUTPATIENT
Start: 2024-02-06 | End: 2024-02-06 | Stop reason: HOSPADM

## 2024-02-06 RX ORDER — PROCHLORPERAZINE EDISYLATE 5 MG/ML
5 INJECTION INTRAMUSCULAR; INTRAVENOUS
Status: DISCONTINUED | OUTPATIENT
Start: 2024-02-06 | End: 2024-02-06 | Stop reason: HOSPADM

## 2024-02-06 RX ORDER — SODIUM CHLORIDE 9 MG/ML
INJECTION, SOLUTION INTRAVENOUS PRN
Status: DISCONTINUED | OUTPATIENT
Start: 2024-02-06 | End: 2024-02-06 | Stop reason: HOSPADM

## 2024-02-06 RX ORDER — LIDOCAINE HYDROCHLORIDE 10 MG/ML
1 INJECTION, SOLUTION EPIDURAL; INFILTRATION; INTRACAUDAL; PERINEURAL
Status: DISCONTINUED | OUTPATIENT
Start: 2024-02-06 | End: 2024-02-06 | Stop reason: HOSPADM

## 2024-02-06 RX ORDER — MIDAZOLAM HYDROCHLORIDE 2 MG/2ML
2 INJECTION, SOLUTION INTRAMUSCULAR; INTRAVENOUS
Status: DISCONTINUED | OUTPATIENT
Start: 2024-02-06 | End: 2024-02-06 | Stop reason: HOSPADM

## 2024-02-06 RX ORDER — ACETAMINOPHEN 500 MG
1000 TABLET ORAL ONCE
Status: COMPLETED | OUTPATIENT
Start: 2024-02-06 | End: 2024-02-06

## 2024-02-06 RX ORDER — HYDROCODONE BITARTRATE AND ACETAMINOPHEN 5; 325 MG/1; MG/1
1 TABLET ORAL EVERY 6 HOURS PRN
Qty: 8 TABLET | Refills: 0 | Status: SHIPPED | OUTPATIENT
Start: 2024-02-06 | End: 2024-02-08

## 2024-02-06 RX ORDER — LIDOCAINE HYDROCHLORIDE 20 MG/ML
INJECTION, SOLUTION EPIDURAL; INFILTRATION; INTRACAUDAL; PERINEURAL PRN
Status: DISCONTINUED | OUTPATIENT
Start: 2024-02-06 | End: 2024-02-06 | Stop reason: SDUPTHER

## 2024-02-06 RX ADMIN — PROPOFOL 50 MCG/KG/MIN: 10 INJECTION, EMULSION INTRAVENOUS at 07:40

## 2024-02-06 RX ADMIN — LIDOCAINE HYDROCHLORIDE 60 MG: 20 INJECTION, SOLUTION EPIDURAL; INFILTRATION; INTRACAUDAL; PERINEURAL at 07:40

## 2024-02-06 RX ADMIN — CEFAZOLIN 2 G: 330 INJECTION, POWDER, FOR SOLUTION INTRAMUSCULAR; INTRAVENOUS at 07:44

## 2024-02-06 RX ADMIN — SODIUM CHLORIDE, POTASSIUM CHLORIDE, SODIUM LACTATE AND CALCIUM CHLORIDE: 600; 310; 30; 20 INJECTION, SOLUTION INTRAVENOUS at 06:48

## 2024-02-06 RX ADMIN — ACETAMINOPHEN 1000 MG: 500 TABLET ORAL at 06:56

## 2024-02-06 ASSESSMENT — PAIN - FUNCTIONAL ASSESSMENT: PAIN_FUNCTIONAL_ASSESSMENT: 0-10

## 2024-02-06 NOTE — DISCHARGE INSTRUCTIONS
INSTRUCTIONS FOLLOWING FOOT SURGERY    ACTIVITY:  Elevate feet for 48 hours  Use ice as directed by your doctor  Use crutches / walker as directed by your doctor, and follow your doctors instructions as to your weight bearing status: NONWEIGHTBEARING RIGHT FOOT    DIET:  Clear liquids until no nausea or vomiting; then light diet for the first day  An advance to regular diet on second day, unless your doctor orders otherwise.    PAIN:  Take pain medication as directed by your doctor.  Call your doctor if pain is not relieved by medication.  Do not take aspirin or blood thinners until directed by your doctor.    DRESSING CARE: keep dressing clean and dry, do not remove       FOLLOW-UP PHONE CALLS:  Calls will be made by nursing staff.  If you had any problems, call your doctor as needed.    CALL YOUR DOCTOR IF:  Excessive bleeding that does not stop after holding mild pressure over the area  Temperature of 101° F or above  Redness, excessive swelling or bruising, and/or green or yellow, smelly discharge from incision  Loss of sensation -cold, white, or blue toes    AFTER ANESTHESIA :   For the first 24 hours: do not drive, drink alcoholic beverages, or make important decisions.  Be aware of dizziness following anesthesia and while taking pain medication.      DISCHARGE MEDICATIONS: @[email or web address]@      APPOINTMENT DATE/TIME: please call for an appointment    YOUR DOCTOR’S PHONE NUMBER: [phone]    Patient’s signature:  Date: 2/6/2024  Discharging Nurse:     ______________________________________________________________________    Anesthesia Discharge Instructions    After general anesthesia or intervenous sedation, for 24 hours or while taking prescription Narcotics:  Limit your activities  Do not drive or operate hazardous machinery  If you have not urinated within 8 hours after discharge, please contact your surgeon on call.  Do not make important personal or business decisions  Do not drink alcoholic

## 2024-02-06 NOTE — ANESTHESIA PRE PROCEDURE
Department of Anesthesiology  Preprocedure Note       Name:  Courtney Cortez   Age:  73 y.o.  :  1950                                          MRN:  [id number]         Date:  2024      Surgeon: Surgeon(s):  Susan Murray DPM Tammaro, Sarah, DPM    Procedure: Procedure(s):  RIGHT FOOT DEBRIDEMENT OF BONE WITH BIOPSIES    Medications prior to admission:   Prior to Admission medications    Medication Sig Start Date End Date Taking? Authorizing Provider   insulin glargine (LANTUS) 100 UNIT/ML injection vial Inject 50 Units into the skin 2 times daily 23   Janett Chun MD   insulin lispro, 1 Unit Dial, (HUMALOG KWIKPEN) 100 UNIT/ML SOPN Inject 50 Units into the skin 3 times daily (before meals) 23   Janett Chun MD   escitalopram (LEXAPRO) 20 MG tablet Take 1 tablet by mouth daily    Yaneli Youngblood MD   vitamin B-6 (PYRIDOXINE) 100 MG tablet Take 1 tablet by mouth daily Unsure of dose    Yaneli Youngblood MD   Cholecalciferol (VITAMIN D3) 1.25 MG (96811 UT) CAPS Take 5,000 Units by mouth daily    Yaneli Youngblood MD   OZEMPIC, 0.25 OR 0.5 MG/DOSE, 2 MG/3ML SOPN Inject 0.25 mg into the skin every 7 days 23   Yaneli Youngblood MD   aspirin 81 MG chewable tablet Take by mouth daily    Automatic Reconciliation, Ar   atorvastatin (LIPITOR) 40 MG tablet TAKE ONE TABLET BY MOUTH DAILY 22   Automatic Reconciliation, Ar   clopidogrel (PLAVIX) 75 MG tablet Take by mouth daily 16   Automatic Reconciliation, Ar   fenofibrate micronized (LOFIBRA) 67 MG capsule Take 1 capsule by mouth every morning (before breakfast)    Automatic Reconciliation, Ar   fluticasone (FLONASE) 50 MCG/ACT nasal spray 2 sprays by Nasal route daily as needed 18   Automatic Reconciliation, Ar   levothyroxine (SYNTHROID) 125 MCG tablet Take 2 tablets by mouth Daily    Angela Gusman MD   omeprazole (PRILOSEC OTC) 20 MG tablet Take by mouth daily    Automatic Reconciliation, Ar

## 2024-02-06 NOTE — H&P
by mouth daily    Automatic Reconciliation, Ar      Allergies   Allergen Reactions    Lisinopril Dizziness or Vertigo       Review of Systems:  Pertinent items are noted in the History of Present Illness.    Objective:      Patient Vitals for the past 8 hrs:   BP Temp Temp src Pulse Resp SpO2 Height Weight   24 0615 117/64 98.7 °F (37.1 °C) Oral 78 16 95 % 1.676 m (5' 6\") 94.3 kg (208 lb)       Temp (24hrs), Av.7 °F (37.1 °C), Min:98.7 °F (37.1 °C), Max:98.7 °F (37.1 °C)      Physical Exam:  Open wound plantar heel   exposed bone  infected    Assessment:     Open wound  osteomyelitis     Plan:     Discussed the risk of surgery including loss of leg ,  and the risks of general anesthetic.  The patient understands the risks; any and all questions were answered to the patient's satisfaction.    Signed By: Susan Murray DPM     2024

## 2024-02-06 NOTE — BRIEF OP NOTE
Brief Postoperative Note      Patient: Courtney Cortez  YOB: 1950  MRN: [id number]    Date of Procedure: 2/6/2024    Pre-Op Diagnosis Codes:     * Acute osteomyelitis (HCC) [M86.10]    Post-Op Diagnosis: Same       Procedure(s):  RIGHT FOOT DEBRIDEMENT OF CALCANEUS/ BONE BIOPSIES/ INSERTION OF ANTIBIOTIC BEADS    Surgeon(s):  Susan Murray DPM Tammaro, Sarah, DPM    Assistant:  * No surgical staff found *    Anesthesia: Monitor Anesthesia Care    Estimated Blood Loss (mL): less than 50     Complications: None    Specimens:   ID Type Source Tests Collected by Time Destination   1 : RIGHT MEDIAL CALCANEUS Bone Foot SURGICAL PATHOLOGY Susan Murray DPM 2/6/2024 0758    2 : RIGHT LATERAL CALCANEUS Bone Foot SURGICAL PATHOLOGY Susan Murray DPM 2/6/2024 0759    A : BONE CULTURE RIGHT FOOT Swab Foot CULTURE, FUNGUS, GRAM STAIN, CULTURE, WOUND, SUSCEPT, AER + ANAER REFL Susan Murray DPM 2/6/2024 0803        Implants:  Implant Name Type Inv. Item Serial No.  Lot No. LRB No. Used Action   CEMENT BNE 40GM FULL DOSE PMMA W/ GENT HI VISC RADPQ LNG - SN/A  CEMENT BNE 40GM FULL DOSE PMMA W/ GENT HI VISC RADPQ LNG N/A [employer]- [id number] Right 1 Implanted         Drains: * No LDAs found *    Findings: compromised integrity remaining plantar calcaneus, adequate bleeding  This procedure was not performed to treat primary cutaneous melanoma through wide local excision      Electronically signed by Shonda Lemons DPM on 2/6/2024 at 8:31 AM

## 2024-02-06 NOTE — OP NOTE
Operative Note      Patient: Courtney Cortez  YOB: 1950  MRN: [id number]    Date of Procedure: 2/6/2024    Pre-Op Diagnosis Codes:     * Acute osteomyelitis (HCC) [M86.10]    Post-Op Diagnosis: Same       Procedure(s):  RIGHT FOOT DEBRIDEMENT OF CALCANEUS/ BONE BIOPSIES/ INSERTION OF ANTIBIOTIC BEADS    Surgeon(s):  Susan Murray, Shonda Brownlee DPM    Assistant:   * No surgical staff found *    Anesthesia: Monitor Anesthesia Care    Estimated Blood Loss (mL): less than 50     Complications: None    Specimens:   ID Type Source Tests Collected by Time Destination   1 : RIGHT MEDIAL CALCANEUS Bone Foot SURGICAL PATHOLOGY Susan Murray DPM 2/6/2024 0758    2 : RIGHT LATERAL CALCANEUS Bone Foot SURGICAL PATHOLOGY Susan Murray, YEIMI 2/6/2024 0759    A : BONE CULTURE RIGHT FOOT Swab Foot CULTURE, FUNGUS, GRAM STAIN, CULTURE, WOUND, SUSCEPT, AER + ANAER REFL Susan Murray DPM 2/6/2024 0803        Implants:  Implant Name Type Inv. Item Serial No.  Lot No. LRB No. Used Action   CEMENT BNE 40GM FULL DOSE PMMA W/ GENT HI VISC RADPQ LNG - SN/A  CEMENT BNE 40GM FULL DOSE PMMA W/ GENT HI VISC RADPQ LNG N/A [facility] [facility] [employer]-WD [id number] Right 1 Implanted         Drains: * No LDAs found *    Findings: compromised integrity remaining plantar calcaneus, adequate bleeding    This procedure was not performed to treat primary cutaneous melanoma through wide local excision      Detailed Description of Procedure:   Patient was wheeled to the operating room and placed on the operating table in the supine position. MAC anesthesia was achieved by the anesthesia team. A supplementary local block consisting of 20cc of 0.5% marcaine plain was injected around the right hindfoot. The right foot was then prepped and draped in the normal aseptic technique. Attention was drawn to the plantar calcaneal ulceration with exposed calcaneus. All nonviable soft tissue was excisionally debrided

## 2024-02-06 NOTE — ANESTHESIA POSTPROCEDURE EVALUATION
Post-Anesthesia Evaluation and Assessment    Patient: Courtney Cortez MRN: [id number]  SSN: [id number]    YOB: 1950  Age: 73 y.o.  Sex: female      I have evaluated the patient and they are stable and ready for discharge from the PACU.     Cardiovascular Function/Vital Signs  Visit Vitals  BP (!) 122/54   Pulse 78   Temp 98.1 °F (36.7 °C) (Axillary)   Resp 10   Ht 1.676 m (5' 6\")   Wt 94.3 kg (208 lb)   SpO2 93%   BMI 33.57 kg/m²       Patient is status post Monitor Anesthesia Care anesthesia for Procedure(s):  RIGHT FOOT DEBRIDEMENT OF CALCANEUS/ BONE BIOPSIES/ INSERTION OF ANTIBIOTIC BEADS.    Nausea/Vomiting: None    Postoperative hydration reviewed and adequate.    Pain:  Managed    Neurological Status:   At baseline    Mental Status, Level of Consciousness: Alert and  oriented to person, place, and time    Pulmonary Status:   Adequate oxygenation and airway patent    Complications related to anesthesia: None    Post-anesthesia assessment completed. No concerns    Signed By: Peng Smallwood MD     February 6, 2024

## 2024-02-09 LAB
BACTERIA SPEC CULT: ABNORMAL
BACTERIA SPEC CULT: NORMAL
GRAM STN SPEC: ABNORMAL
SERVICE CMNT-IMP: ABNORMAL
SERVICE CMNT-IMP: NORMAL

## 2024-02-12 LAB
BACTERIA SPEC CULT: NORMAL
SERVICE CMNT-IMP: NORMAL

## 2024-02-22 LAB
BACTERIA SPEC CULT: NORMAL
SERVICE CMNT-IMP: NORMAL

## 2024-02-23 ENCOUNTER — ANESTHESIA (OUTPATIENT)
Facility: HOSPITAL | Age: 74
End: 2024-02-23
Payer: MEDICARE

## 2024-02-23 ENCOUNTER — ANESTHESIA EVENT (OUTPATIENT)
Facility: HOSPITAL | Age: 74
End: 2024-02-23
Payer: MEDICARE

## 2024-02-23 ENCOUNTER — HOSPITAL ENCOUNTER (OUTPATIENT)
Facility: HOSPITAL | Age: 74
Setting detail: OUTPATIENT SURGERY
Discharge: HOME | End: 2024-02-23
Attending: PODIATRIST | Admitting: PODIATRIST
Payer: MEDICARE

## 2024-02-23 VITALS
OXYGEN SATURATION: 95 % | SYSTOLIC BLOOD PRESSURE: 115 MMHG | TEMPERATURE: 99.2 F | HEART RATE: 97 BPM | DIASTOLIC BLOOD PRESSURE: 65 MMHG | RESPIRATION RATE: 12 BRPM

## 2024-02-23 DIAGNOSIS — G89.18 POST-OP PAIN: Primary | ICD-10-CM

## 2024-02-23 LAB
GLUCOSE BLD STRIP.AUTO-MCNC: 183 MG/DL (ref 65–117)
GLUCOSE BLD STRIP.AUTO-MCNC: 197 MG/DL (ref 65–117)
SERVICE CMNT-IMP: ABNORMAL
SERVICE CMNT-IMP: ABNORMAL

## 2024-02-23 PROCEDURE — 82962 GLUCOSE BLOOD TEST: CPT

## 2024-02-23 PROCEDURE — 3700000001 HC ADD 15 MINUTES (ANESTHESIA): Performed by: PODIATRIST

## 2024-02-23 PROCEDURE — 3700000000 HC ANESTHESIA ATTENDED CARE: Performed by: PODIATRIST

## 2024-02-23 PROCEDURE — 2500000003 HC RX 250 WO HCPCS: Performed by: NURSE ANESTHETIST, CERTIFIED REGISTERED

## 2024-02-23 PROCEDURE — 88311 DECALCIFY TISSUE: CPT

## 2024-02-23 PROCEDURE — 6360000002 HC RX W HCPCS: Performed by: NURSE ANESTHETIST, CERTIFIED REGISTERED

## 2024-02-23 PROCEDURE — 6360000002 HC RX W HCPCS: Performed by: PODIATRIST

## 2024-02-23 PROCEDURE — 87075 CULTR BACTERIA EXCEPT BLOOD: CPT

## 2024-02-23 PROCEDURE — 2580000003 HC RX 258: Performed by: PODIATRIST

## 2024-02-23 PROCEDURE — 3600000002 HC SURGERY LEVEL 2 BASE: Performed by: PODIATRIST

## 2024-02-23 PROCEDURE — 2709999900 HC NON-CHARGEABLE SUPPLY: Performed by: PODIATRIST

## 2024-02-23 PROCEDURE — 3600000012 HC SURGERY LEVEL 2 ADDTL 15MIN: Performed by: PODIATRIST

## 2024-02-23 PROCEDURE — 7100000001 HC PACU RECOVERY - ADDTL 15 MIN: Performed by: PODIATRIST

## 2024-02-23 PROCEDURE — 88307 TISSUE EXAM BY PATHOLOGIST: CPT

## 2024-02-23 PROCEDURE — 7100000000 HC PACU RECOVERY - FIRST 15 MIN: Performed by: PODIATRIST

## 2024-02-23 PROCEDURE — 87205 SMEAR GRAM STAIN: CPT

## 2024-02-23 PROCEDURE — 87070 CULTURE OTHR SPECIMN AEROBIC: CPT

## 2024-02-23 PROCEDURE — C1713 ANCHOR/SCREW BN/BN,TIS/BN: HCPCS | Performed by: PODIATRIST

## 2024-02-23 DEVICE — SMARTSET GMV HIGH PERFORMANCE GENTAMICIN MEDIUM VISCOSITY BONE CEMENT 40G
Type: IMPLANTABLE DEVICE | Site: HEEL | Status: FUNCTIONAL
Brand: SMARTSET

## 2024-02-23 RX ORDER — LIDOCAINE HYDROCHLORIDE 20 MG/ML
INJECTION, SOLUTION EPIDURAL; INFILTRATION; INTRACAUDAL; PERINEURAL PRN
Status: DISCONTINUED | OUTPATIENT
Start: 2024-02-23 | End: 2024-02-23 | Stop reason: SDUPTHER

## 2024-02-23 RX ORDER — VANCOMYCIN HYDROCHLORIDE 1 G/20ML
INJECTION, POWDER, LYOPHILIZED, FOR SOLUTION INTRAVENOUS PRN
Status: DISCONTINUED | OUTPATIENT
Start: 2024-02-23 | End: 2024-02-23 | Stop reason: HOSPADM

## 2024-02-23 RX ORDER — BUPIVACAINE HYDROCHLORIDE 5 MG/ML
INJECTION, SOLUTION EPIDURAL; INTRACAUDAL PRN
Status: DISCONTINUED | OUTPATIENT
Start: 2024-02-23 | End: 2024-02-23 | Stop reason: HOSPADM

## 2024-02-23 RX ORDER — PROPOFOL 10 MG/ML
INJECTION, EMULSION INTRAVENOUS PRN
Status: DISCONTINUED | OUTPATIENT
Start: 2024-02-23 | End: 2024-02-23 | Stop reason: SDUPTHER

## 2024-02-23 RX ORDER — FENTANYL CITRATE 50 UG/ML
INJECTION, SOLUTION INTRAMUSCULAR; INTRAVENOUS PRN
Status: DISCONTINUED | OUTPATIENT
Start: 2024-02-23 | End: 2024-02-23 | Stop reason: SDUPTHER

## 2024-02-23 RX ORDER — SODIUM CHLORIDE, SODIUM LACTATE, POTASSIUM CHLORIDE, CALCIUM CHLORIDE 600; 310; 30; 20 MG/100ML; MG/100ML; MG/100ML; MG/100ML
INJECTION, SOLUTION INTRAVENOUS CONTINUOUS
Status: DISCONTINUED | OUTPATIENT
Start: 2024-02-23 | End: 2024-02-23 | Stop reason: HOSPADM

## 2024-02-23 RX ORDER — PROMETHAZINE HYDROCHLORIDE 25 MG/1
25 TABLET ORAL 4 TIMES DAILY PRN
Qty: 20 TABLET | Refills: 0 | Status: SHIPPED | OUTPATIENT
Start: 2024-02-23 | End: 2024-03-01

## 2024-02-23 RX ORDER — MIDAZOLAM HYDROCHLORIDE 1 MG/ML
INJECTION INTRAMUSCULAR; INTRAVENOUS PRN
Status: DISCONTINUED | OUTPATIENT
Start: 2024-02-23 | End: 2024-02-23 | Stop reason: SDUPTHER

## 2024-02-23 RX ORDER — PHENYLEPHRINE HCL IN 0.9% NACL 0.4MG/10ML
SYRINGE (ML) INTRAVENOUS PRN
Status: DISCONTINUED | OUTPATIENT
Start: 2024-02-23 | End: 2024-02-23 | Stop reason: SDUPTHER

## 2024-02-23 RX ORDER — GENTAMICIN SULFATE 40 MG/ML
INJECTION, SOLUTION INTRAMUSCULAR; INTRAVENOUS PRN
Status: DISCONTINUED | OUTPATIENT
Start: 2024-02-23 | End: 2024-02-23 | Stop reason: HOSPADM

## 2024-02-23 RX ORDER — OXYCODONE HYDROCHLORIDE AND ACETAMINOPHEN 5; 325 MG/1; MG/1
1 TABLET ORAL EVERY 4 HOURS PRN
Qty: 15 TABLET | Refills: 0 | Status: SHIPPED | OUTPATIENT
Start: 2024-02-23 | End: 2024-02-26

## 2024-02-23 RX ADMIN — FENTANYL CITRATE 25 MCG: 50 INJECTION, SOLUTION INTRAMUSCULAR; INTRAVENOUS at 17:11

## 2024-02-23 RX ADMIN — LIDOCAINE HYDROCHLORIDE 80 MG: 20 INJECTION, SOLUTION EPIDURAL; INFILTRATION; INTRACAUDAL; PERINEURAL at 16:40

## 2024-02-23 RX ADMIN — Medication 160 MCG: at 17:31

## 2024-02-23 RX ADMIN — PROPOFOL 25 MCG/KG/MIN: 10 INJECTION, EMULSION INTRAVENOUS at 16:46

## 2024-02-23 RX ADMIN — PROPOFOL 50 MG: 10 INJECTION, EMULSION INTRAVENOUS at 16:40

## 2024-02-23 RX ADMIN — FENTANYL CITRATE 25 MCG: 50 INJECTION, SOLUTION INTRAMUSCULAR; INTRAVENOUS at 16:40

## 2024-02-23 RX ADMIN — SODIUM CHLORIDE, POTASSIUM CHLORIDE, SODIUM LACTATE AND CALCIUM CHLORIDE: 600; 310; 30; 20 INJECTION, SOLUTION INTRAVENOUS at 15:10

## 2024-02-23 RX ADMIN — MIDAZOLAM 2 MG: 1 INJECTION INTRAMUSCULAR; INTRAVENOUS at 16:30

## 2024-02-23 RX ADMIN — FENTANYL CITRATE 25 MCG: 50 INJECTION, SOLUTION INTRAMUSCULAR; INTRAVENOUS at 16:48

## 2024-02-23 RX ADMIN — WATER 2000 MG: 1 INJECTION INTRAMUSCULAR; INTRAVENOUS; SUBCUTANEOUS at 17:15

## 2024-02-23 RX ADMIN — PROPOFOL 50 MG: 10 INJECTION, EMULSION INTRAVENOUS at 17:11

## 2024-02-23 RX ADMIN — PROPOFOL 25 MG: 10 INJECTION, EMULSION INTRAVENOUS at 16:43

## 2024-02-23 RX ADMIN — Medication 120 MCG: at 17:15

## 2024-02-23 RX ADMIN — Medication 120 MCG: at 17:18

## 2024-02-23 RX ADMIN — FENTANYL CITRATE 25 MCG: 50 INJECTION, SOLUTION INTRAMUSCULAR; INTRAVENOUS at 17:13

## 2024-02-23 ASSESSMENT — PAIN SCALES - GENERAL: PAINLEVEL_OUTOF10: 0

## 2024-02-23 NOTE — OP NOTE
Operative Note      Patient: Courtney Cortez  YOB: 1950  MRN: [id number]    Date of Procedure: 2/23/2024    Pre-Op Diagnosis Codes:     * Ulcer of right foot, unspecified ulcer stage (HCC) [L97.519]    Post-Op Diagnosis: Same       Procedure(s):  RIGHT HEEL DEBRIDEMENT, RIGHT FOOT BONE BIOPSY, RIGHT FOOT ANTIBOTIC BEAD EXCHANGE    Surgeon(s):  Susan Murray DPM    Assistant:   * No surgical staff found *    Anesthesia: Monitor Anesthesia Care    Estimated Blood Loss (mL): less than 50     Complications: None    Specimens:   ID Type Source Tests Collected by Time Destination   1 : LATERAL CALCANEOUS BIOPSY RIGHT FOOT Bone Foot SURGICAL PATHOLOGY Susan Murray DPM 2/23/2024 1715    2 : MEDIAL CALCANEOUS BIOPSY RIGHT FOOT Bone Foot SURGICAL PATHOLOGY Susan Murray DPM 2/23/2024 1723    A : RIGHT HEEL WOUND Swab Foot CULTURE, ANAEROBIC, CULTURE, WOUND Susan Murray DPM 2/23/2024 1725        Implants:  * No implants in log *      Drains: * No LDAs found *    Findings: OPEN WOUND TO BONE        Detailed Description of Procedure:   Patient was seen in the preoperative area we discussed her surgery including the risks and complications and the potential of amputation.  She was taken the operating placed on the operating table supine position after adequate induction of anesthesia she was blocker 0.5% Marcaine plain.  Attention was directed towards the heel where excisional debridement of the wound to the level of bone was performed with the use of rongeur's Metzenbaums scissors and a 10 blade.  The calcaneus was debrided to healthy bleeding bone and bone biopsies from the medial lateral aspects were sent off for pathologic specimen.  The area was lavaged with saline cultures were taken.  Antibiotic beads created on the back table were then placed into the wound and then the wound was loosely approximated over the beads using the beads as a tissue expander with a #2 nylon bolster sutures.   Xeroform dressing was placed over this.      Electronically signed by Susan Murray DPM on 2/23/2024 at 5:42 PM

## 2024-02-23 NOTE — PROGRESS NOTES
Report called to [facility] supervisor. Discharge instructions given to patient to give to home. IV removed. VSS.

## 2024-02-23 NOTE — ANESTHESIA PRE PROCEDURE
Department of Anesthesiology  Preprocedure Note       Name:  Courtney Cortez   Age:  73 y.o.  :  1950                                          MRN:  [id number]         Date:  2024      Surgeon: Surgeon(s):  Susan Murray DPM Tammaro, Sarah, DPM    Procedure: Procedure(s):  RIGHT HEEL DEBRIDEMENT, RIGHT FOOT BONE BIOPSY, RIGHT FOOT ANTIBOTIC BEAD EXCHANGE    Medications prior to admission:   Prior to Admission medications    Medication Sig Start Date End Date Taking? Authorizing Provider   insulin glargine (LANTUS) 100 UNIT/ML injection vial Inject 50 Units into the skin 2 times daily 23   Janett Chun MD   insulin lispro, 1 Unit Dial, (HUMALOG KWIKPEN) 100 UNIT/ML SOPN Inject 50 Units into the skin 3 times daily (before meals) 23   Janett Chun MD   escitalopram (LEXAPRO) 20 MG tablet Take 1 tablet by mouth daily    Yaneli Youngblood MD   vitamin B-6 (PYRIDOXINE) 100 MG tablet Take 1 tablet by mouth daily Unsure of dose    Yaneli Youngblood MD   Cholecalciferol (VITAMIN D3) 1.25 MG (60437 UT) CAPS Take 5,000 Units by mouth daily    Yaneli Youngblood MD   OZEMPIC, 0.25 OR 0.5 MG/DOSE, 2 MG/3ML SOPN Inject 0.25 mg into the skin every 7 days 23   Yaneli Youngblood MD   aspirin 81 MG chewable tablet Take by mouth daily    Automatic Reconciliation, Ar   atorvastatin (LIPITOR) 40 MG tablet TAKE ONE TABLET BY MOUTH DAILY 22   Automatic Reconciliation, Ar   clopidogrel (PLAVIX) 75 MG tablet Take by mouth daily 16   Automatic Reconciliation, Ar   fenofibrate micronized (LOFIBRA) 67 MG capsule Take 1 capsule by mouth every morning (before breakfast)    Automatic Reconciliation, Ar   fluticasone (FLONASE) 50 MCG/ACT nasal spray 2 sprays by Nasal route daily as needed 18   Automatic Reconciliation, Ar   levothyroxine (SYNTHROID) 125 MCG tablet Take 2 tablets by mouth Daily    Angela Gusman MD   omeprazole (PRILOSEC OTC) 20 MG tablet Take by mouth  (Current):   Vitals:    02/23/24 1446   BP: 111/73   Pulse: 87   Resp: 16   Temp: 98.9 °F (37.2 °C)   TempSrc: Oral   SpO2: 99%                                              BP Readings from Last 3 Encounters:   02/23/24 111/73   02/06/24 (!) 122/54   01/23/24 130/70       NPO Status: Time of last liquid consumption: 2200                        Time of last solid consumption: 2200                        Date of last liquid consumption: 02/22/24                        Date of last solid food consumption: 02/22/24    BMI:   Wt Readings from Last 3 Encounters:   02/06/24 94.3 kg (208 lb)   01/23/24 96.6 kg (213 lb)   12/18/23 100 kg (220 lb 7.4 oz)     There is no height or weight on file to calculate BMI.    CBC:   Lab Results   Component Value Date/Time    WBC 6.9 12/17/2023 02:09 AM    RBC 3.88 12/17/2023 02:09 AM    HGB 10.4 12/17/2023 02:09 AM    HCT 32.5 12/17/2023 02:09 AM    MCV 83.8 12/17/2023 02:09 AM    RDW 14.2 12/17/2023 02:09 AM     12/17/2023 02:09 AM       CMP:   Lab Results   Component Value Date/Time     12/18/2023 05:24 AM    K 3.7 12/18/2023 05:24 AM     12/18/2023 05:24 AM    CO2 25 12/18/2023 05:24 AM    BUN 16 12/18/2023 05:24 AM    CREATININE 0.76 12/18/2023 05:24 AM    AGRATIO 0.4 12/08/2023 09:43 PM    AGRATIO 0.9 02/09/2023 07:46 PM    LABGLOM >60 12/18/2023 05:24 AM    GLUCOSE 137 12/18/2023 05:24 AM    PROT 8.9 12/08/2023 09:43 PM    CALCIUM 9.3 12/18/2023 05:24 AM    BILITOT 0.4 12/08/2023 09:43 PM    ALKPHOS 722 12/08/2023 09:43 PM    ALKPHOS 276 02/09/2023 07:46 PM    AST 43 12/08/2023 09:43 PM    ALT 44 12/08/2023 09:43 PM       POC Tests:   Recent Labs     02/23/24  1512   POCGLU 197*       Coags: No results found for: \"PROTIME\", \"INR\", \"APTT\"    HCG (If Applicable): No results found for: \"PREGTESTUR\", \"PREGSERUM\", \"HCG\", \"HCGQUANT\"     ABGs: No results found for: \"PHART\", \"PO2ART\", \"LAU0QPS\", \"MTG8PGP\", \"BEART\", \"I2VQXVDK\"     Type & Screen (If Applicable):  No  results found for: \"LABABO\", \"LABRH\"    Drug/Infectious Status (If Applicable):  Lab Results   Component Value Date/Time    HEPCAB <0.1 10/28/2015 03:31 PM       COVID-19 Screening (If Applicable):   Lab Results   Component Value Date/Time    COVID19 Not detected 12/08/2023 11:43 PM           Anesthesia Evaluation  Patient summary reviewed and Nursing notes reviewed  Airway: Mallampati: II  TM distance: >3 FB   Neck ROM: full  Mouth opening: > = 3 FB   Dental: normal exam         Pulmonary:normal exam    (+)     sleep apnea: on noncompliant,                                  Cardiovascular:  Exercise tolerance: poor (<4 METS)                 ROS comment: Preserved EF     Neuro/Psych:   (+) psychiatric history:            GI/Hepatic/Renal:   (+) liver disease:         ROS comment: LOCKHART.   Endo/Other:    (+) DiabetesType II DM, poorly controlled, using insulin, hypothyroidism::..                 Abdominal: normal exam            Vascular:   + PVD, aortic or cerebral.       Other Findings:       Anesthesia Plan      MAC     ASA 3       Induction: intravenous.      Anesthetic plan and risks discussed with patient.      Plan discussed with CRNA.    Attending anesthesiologist reviewed and agrees with Preprocedure content            Vikram Calloway MD   2/23/2024

## 2024-02-23 NOTE — ANESTHESIA PRE PROCEDURE
Department of Anesthesiology  Preprocedure Note       Name:  Courtney Cortez   Age:  73 y.o.  :  1950                                          MRN:  [id number]         Date:  2024      Surgeon: Surgeon(s):  Susan Murray DPM Tammaro, Sarah, DPM    Procedure: Procedure(s):  RIGHT HEEL DEBRIDEMENT, RIGHT FOOT BONE BIOPSY, RIGHT FOOT ANTIBOTIC BEAD EXCHANGE    Medications prior to admission:   Prior to Admission medications    Medication Sig Start Date End Date Taking? Authorizing Provider   insulin glargine (LANTUS) 100 UNIT/ML injection vial Inject 50 Units into the skin 2 times daily 23   Janett Chun MD   insulin lispro, 1 Unit Dial, (HUMALOG KWIKPEN) 100 UNIT/ML SOPN Inject 50 Units into the skin 3 times daily (before meals) 23   Janett Chun MD   escitalopram (LEXAPRO) 20 MG tablet Take 1 tablet by mouth daily    Yaneli Youngblood MD   vitamin B-6 (PYRIDOXINE) 100 MG tablet Take 1 tablet by mouth daily Unsure of dose    Yaneli Youngblood MD   Cholecalciferol (VITAMIN D3) 1.25 MG (80017 UT) CAPS Take 5,000 Units by mouth daily    Yaneli Youngblood MD   OZEMPIC, 0.25 OR 0.5 MG/DOSE, 2 MG/3ML SOPN Inject 0.25 mg into the skin every 7 days 23   Yaneli Youngblood MD   aspirin 81 MG chewable tablet Take by mouth daily    Automatic Reconciliation, Ar   atorvastatin (LIPITOR) 40 MG tablet TAKE ONE TABLET BY MOUTH DAILY 22   Automatic Reconciliation, Ar   clopidogrel (PLAVIX) 75 MG tablet Take by mouth daily 16   Automatic Reconciliation, Ar   fenofibrate micronized (LOFIBRA) 67 MG capsule Take 1 capsule by mouth every morning (before breakfast)    Automatic Reconciliation, Ar   fluticasone (FLONASE) 50 MCG/ACT nasal spray 2 sprays by Nasal route daily as needed 18   Automatic Reconciliation, Ar   levothyroxine (SYNTHROID) 125 MCG tablet Take 2 tablets by mouth Daily    Angela Gusman MD   omeprazole (PRILOSEC OTC) 20 MG tablet Take by mouth  daily    Automatic Reconciliation, Ar       Current medications:    No current facility-administered medications for this encounter.       Allergies:    Allergies   Allergen Reactions   • Lisinopril Dizziness or Vertigo       Problem List:    Patient Active Problem List   Diagnosis Code   • Severe obesity (BMI 35.0-39.9) with comorbidity ([facility]) E66.01   • Renal insufficiency N28.9   • Hypothyroidism, acquired, autoimmune E06.3   • Mixed hyperlipidemia E78.2   • Type 2 diabetes mellitus with diabetic peripheral angiopathy without gangrene, with long-term current use of insulin ([facility]) E11.51, Z79.4   • Peripheral vascular disease ([facility]) I73.9   • Atherosclerosis of native coronary artery of native heart without angina pectoris I25.10   • Recurrent major depressive disorder, in partial remission ([facility]) F33.41   • LOCKHART (nonalcoholic steatohepatitis) K75.81   • Spinal stenosis of lumbar region without neurogenic claudication M48.061   • Cervicalgia M54.2   • Septicemia ([facility]) A41.9   • Cellulitis of right lower extremity L03.115   • Encephalopathy acute G93.40   • Acute osteomyelitis of right calcaneus ([facility]) M86.171   • Enterococcal infection A49.1   • Infection caused by Enterobacter cloacae A49.8   • Anaerobic bacterial infection A49.8   • Hypothyroidism E03.9   • Metabolic encephalopathy G93.41   • Obesity (BMI 30-39.9) E66.9       Past Medical History:        Diagnosis Date   • Acute colitis 10/29/2014   • Adverse effect of anesthesia     slow to wake up   • Arthritis     hands   • Bronchitis    • CAD (coronary artery disease) 2008, 2016    angio/mild:MI, heart cath   • Diabetes ([facility])     Type II: insulin   • Diabetic foot ulcer ([facility]) 11/20/2009   • Foot infection 3/12/2019   • GERD (gastroesophageal reflux disease)    • Hypercholesterolemia    • Hypothyroid    • Ischemic colitis ([facility]) 11/01/2014   • Menopause 2000   • Neuropathy     feet/legs: ulcer right foot   • Obesity    • Psychiatric disorder     Depression   •  Sleep apnea     no use CPAP: unable to use, aleksandr me   • Ulcer 2016    Callus that turned into open wound bottom right foot(ball area), not draining   • Vertigo        Past Surgical History:        Procedure Laterality Date   • ACHILLES TENDON SURGERY Right 2024    EXCISIONAL DEBRIDEMENT TO BONE WITH BONE BIOPSIES, EXCISIONAL DEBRIDEMENT ULCERATION ACHILLES WITH GRAFT APPLICATION RIGHT performed by Susan Murray DPM at [facility] MAIN OR   • BREAST BIOPSY      many years ago; laterality unknown no scar   • BREAST SURGERY  's    benign breast cyst   • FOOT SURGERY Right 2024    RIGHT FOOT DEBRIDEMENT OF CALCANEUS/ BONE BIOPSIES/ INSERTION OF ANTIBIOTIC BEADS performed by Susan Murray DPM at [facility] MAIN OR   • GYN      vaginal delivery x1   • LEG SURGERY Right 2023    RIGHT FOOT INCISION AND DRAINAGE performed by Shonda Lemons DPM at [facility] MAIN OR   • MOHS SURGERY      [facility]   • ORTHOPEDIC SURGERY      R MT amputate   • ORTHOPEDIC SURGERY      right foot abscess    • ORTHOPEDIC SURGERY      [facility] foot surgery   • ORTHOPEDIC SURGERY      left 2nd toe surgery   • [place] UNLISTED PROCEDURE CARDIAC SURGERY  2016    heart attack   • TONSILLECTOMY  6 yrs. old       Social History:    Social History     Tobacco Use   • Smoking status: Former     Current packs/day: 0.00     Average packs/day: 1 pack/day for 25.0 years (25.0 ttl pk-yrs)     Types: Cigarettes     Start date: 1960     Quit date: 1985     Years since quittin.0   • Smokeless tobacco: Never   Substance Use Topics   • Alcohol use: Yes                                Counseling given: Not Answered      Vital Signs (Current): There were no vitals filed for this visit.                                           BP Readings from Last 3 Encounters:   24 (!) 122/54   24 130/70   23 95/60       NPO Status:                                                                                 BMI:   Wt

## 2024-02-23 NOTE — BRIEF OP NOTE
Brief Postoperative Note      Patient: Courtney Cortez  YOB: 1950  MRN: [id number]    Date of Procedure: 2/23/2024    Pre-Op Diagnosis Codes:     * Ulcer of right foot, unspecified ulcer stage (HCC) [L97.519]    Post-Op Diagnosis: Same       Procedure(s):  RIGHT HEEL DEBRIDEMENT, RIGHT FOOT BONE BIOPSY, RIGHT FOOT ANTIBOTIC BEAD EXCHANGE    Surgeon(s):  Susan Murray DPM    Assistant:  * No surgical staff found *    Anesthesia: Monitor Anesthesia Care    Estimated Blood Loss (mL): less than 50     Complications: None    Specimens:   ID Type Source Tests Collected by Time Destination   1 : LATERAL CALCANEOUS BIOPSY RIGHT FOOT Bone Foot SURGICAL PATHOLOGY Susan Murray DPM 2/23/2024 1715    2 : MEDIAL CALCANEOUS BIOPSY RIGHT FOOT Bone Foot SURGICAL PATHOLOGY Susan Murray DPM 2/23/2024 1723    A : RIGHT HEEL WOUND Swab Foot CULTURE, ANAEROBIC, CULTURE, WOUND Susan Murray DPM 2/23/2024 1725        Implants:  * No implants in log *      Drains: * No LDAs found *    Findings: OPEN WOUND TO BONE       Electronically signed by Susan Murray DPM on 2/23/2024 at 5:41 PM

## 2024-02-23 NOTE — ANESTHESIA PRE PROCEDURE
Department of Anesthesiology  Preprocedure Note       Name:  Courtney Cortez   Age:  73 y.o.  :  1950                                          MRN:  [id number]         Date:  2024      Surgeon: Surgeon(s):  Susan Murray DPM Tammaro, Sarah, DPM    Procedure: Procedure(s):  RIGHT HEEL DEBRIDEMENT, RIGHT FOOT BONE BIOPSY, RIGHT FOOT ANTIBOTIC BEAD EXCHANGE    Medications prior to admission:   Prior to Admission medications    Medication Sig Start Date End Date Taking? Authorizing Provider   insulin glargine (LANTUS) 100 UNIT/ML injection vial Inject 50 Units into the skin 2 times daily 23   Janett Chun MD   insulin lispro, 1 Unit Dial, (HUMALOG KWIKPEN) 100 UNIT/ML SOPN Inject 50 Units into the skin 3 times daily (before meals) 23   Janett Chun MD   escitalopram (LEXAPRO) 20 MG tablet Take 1 tablet by mouth daily    Yaneli Youngblood MD   vitamin B-6 (PYRIDOXINE) 100 MG tablet Take 1 tablet by mouth daily Unsure of dose    Yaneli Youngblood MD   Cholecalciferol (VITAMIN D3) 1.25 MG (77491 UT) CAPS Take 5,000 Units by mouth daily    Yaneli Youngblood MD   OZEMPIC, 0.25 OR 0.5 MG/DOSE, 2 MG/3ML SOPN Inject 0.25 mg into the skin every 7 days 23   Yaneli Youngblood MD   aspirin 81 MG chewable tablet Take by mouth daily    Automatic Reconciliation, Ar   atorvastatin (LIPITOR) 40 MG tablet TAKE ONE TABLET BY MOUTH DAILY 22   Automatic Reconciliation, Ar   clopidogrel (PLAVIX) 75 MG tablet Take by mouth daily 16   Automatic Reconciliation, Ar   fenofibrate micronized (LOFIBRA) 67 MG capsule Take 1 capsule by mouth every morning (before breakfast)    Automatic Reconciliation, Ar   fluticasone (FLONASE) 50 MCG/ACT nasal spray 2 sprays by Nasal route daily as needed 18   Automatic Reconciliation, Ar   levothyroxine (SYNTHROID) 125 MCG tablet Take 2 tablets by mouth Daily    Angela Gusman MD   omeprazole (PRILOSEC OTC) 20 MG tablet Take by mouth

## 2024-02-23 NOTE — DISCHARGE INSTRUCTIONS
INSTRUCTIONS FOLLOWING FOOT SURGERY    ACTIVITY:  Elevate feet for 48 hours  Use ice as directed by your doctor  Use crutches / walker as directed by your doctor, and follow your doctors instructions as to your weight bearing status - NO WEIGHT ON THE RIGHT FOOT     DIET:  Clear liquids until no nausea or vomiting; then light diet for the first day  An advance to regular diet on second day, unless your doctor orders otherwise.    PAIN:  Take pain medication as directed by your doctor.  Call your doctor if pain is not relieved by medication.  Do not take aspirin or blood thinners until directed by your doctor.    DRESSING CARE: keep dressing clean and dry, do not remove       FOLLOW-UP PHONE CALLS:  Calls will be made by nursing staff.  If you had any problems, call your doctor as needed.    CALL YOUR DOCTOR IF:  Excessive bleeding that does not stop after holding mild pressure over the area  Temperature of 101° F or above  Redness, excessive swelling or bruising, and/or green or yellow, smelly discharge from incision  Loss of sensation -cold, white, or blue toes    AFTER ANESTHESIA :   For the first 24 hours: do not drive, drink alcoholic beverages, or make important decisions.  Be aware of dizziness following anesthesia and while taking pain medication.      DISCHARGE MEDICATIONS: @[email or web address]@      APPOINTMENT DATE/TIME: please call for an appointment    YOUR DOCTOR’S PHONE NUMBER: [phone]    Patient’s signature:  Date: 2/23/2024    ______________________________________________________________________    Anesthesia Discharge Instructions    After general anesthesia or intervenous sedation, for 24 hours or while taking prescription Narcotics:  Limit your activities  Do not drive or operate hazardous machinery  If you have not urinated within 8 hours after discharge, please contact your surgeon on call.  Do not make important personal or business decisions  Do not drink alcoholic beverages    Report the

## 2024-02-24 LAB
BACTERIA SPEC CULT: NORMAL
BACTERIA SPEC CULT: NORMAL
GRAM STN SPEC: NORMAL
GRAM STN SPEC: NORMAL
SERVICE CMNT-IMP: NORMAL
SERVICE CMNT-IMP: NORMAL

## 2024-02-24 NOTE — ANESTHESIA POSTPROCEDURE EVALUATION
Department of Anesthesiology  Postprocedure Note    Patient: Courtney Cortez  MRN: [id number]  YOB: 1950  Date of evaluation: 2/23/2024    Procedure Summary       Date: 02/23/24 Room / Location: [facility] MAIN OR [address] [place] [address] MAIN OR    Anesthesia Start: 1630 Anesthesia Stop: 1744    Procedure: RIGHT HEEL DEBRIDEMENT, RIGHT FOOT BONE BIOPSY, RIGHT FOOT ANTIBOTIC BEAD EXCHANGE (Right: Foot) Diagnosis:       Ulcer of right foot, unspecified ulcer stage (HCC)      (Ulcer of right foot, unspecified ulcer stage (HCC) [L97.519])    Providers: Susan Murray DPM Responsible Provider: Stuart Ríos DO    Anesthesia Type: MAC ASA Status: 3            Anesthesia Type: No value filed.    Gwen Phase I: Gwen Score: 9    Gwen Phase II:      Anesthesia Post Evaluation    Patient location during evaluation: PACU  Patient participation: complete - patient participated  Level of consciousness: awake  Pain score: 0  Airway patency: patent  Nausea & Vomiting: no nausea  Cardiovascular status: hemodynamically stable  Respiratory status: acceptable  Hydration status: euvolemic  Comments: BP: 129/64 Pulse: 95  Resp: 12 SpO2: 95  Temp: 99.2 °F (37.3 °C)      Pain management: adequate    No notable events documented.

## 2024-02-26 LAB
BACTERIA SPEC CULT: NORMAL
SERVICE CMNT-IMP: NORMAL

## 2024-03-04 LAB
BACTERIA SPEC CULT: NORMAL
SERVICE CMNT-IMP: NORMAL

## 2024-03-04 NOTE — PERIOP NOTE
PAT PHONE INTERVIEW COMPLETED WITH PT WITH THE EXCEPTION OF HER MEDICATION LIST. PT STATES SHE IS CURRENTLY IN [facility] AND IS NOT SURE WHICH MEDICATIONS SHE IS CURRENTLY TAKING. CALLED [facility] AND SPOKE WITH NURSE ON THE FLOOR PT IS ON. REQUESTED MEDICATION LIST BE FAXED AS SOON AS POSSIBLE. THEY WILL FAX. CONFIRMED WITH NURSE THAT PT WILL BE USING [facility] TRANSPORTATION DOS. PT'S DAUGHTER ALSO TO BE PRESENT DOS.     CALLED DR. CARUSO'S OFFICE TO REQUEST CARDIAC NOTES AND EKG. THEY WILL FAX. RECEIVED CARDIAC NOTES AND PLACED IN FILE.

## 2024-03-05 RX ORDER — INSULIN GLARGINE 100 [IU]/ML
90 INJECTION, SOLUTION SUBCUTANEOUS EVERY MORNING
COMMUNITY

## 2024-03-05 RX ORDER — PROMETHAZINE HYDROCHLORIDE 25 MG/1
25 TABLET ORAL EVERY 6 HOURS PRN
Status: ON HOLD | COMMUNITY
End: 2024-03-07 | Stop reason: HOSPADM

## 2024-03-05 RX ORDER — ACETAMINOPHEN 500 MG
500 TABLET ORAL EVERY 8 HOURS PRN
COMMUNITY

## 2024-03-05 RX ORDER — INSULIN LISPRO 100 [IU]/ML
12 INJECTION, SOLUTION INTRAVENOUS; SUBCUTANEOUS
COMMUNITY

## 2024-03-05 RX ORDER — PREGABALIN 75 MG/1
75 CAPSULE ORAL 2 TIMES DAILY
COMMUNITY

## 2024-03-05 RX ORDER — ZINC SULFATE 50(220)MG
50 CAPSULE ORAL DAILY
COMMUNITY

## 2024-03-05 RX ORDER — ONDANSETRON 8 MG/1
4 TABLET, ORALLY DISINTEGRATING ORAL EVERY 6 HOURS PRN
COMMUNITY

## 2024-03-05 RX ORDER — OXYCODONE HYDROCHLORIDE AND ACETAMINOPHEN 5; 325 MG/1; MG/1
1 TABLET ORAL EVERY 6 HOURS PRN
Status: ON HOLD | COMMUNITY
End: 2024-03-07 | Stop reason: HOSPADM

## 2024-03-05 RX ORDER — ASCORBIC ACID 500 MG
500 TABLET ORAL 2 TIMES DAILY
COMMUNITY

## 2024-03-05 RX ORDER — SACCHAROMYCES BOULARDII 250 MG
250 CAPSULE ORAL 2 TIMES DAILY
COMMUNITY

## 2024-03-05 NOTE — PERIOP NOTE
[facility]  PREOPERATIVE INSTRUCTIONS    Surgery Date:   3/7/24    Your surgeon's office or [facility] staff will call you between 4 PM- 8 PM the day before surgery with your arrival time. If your surgery is on a Monday, you will receive a call the preceding Friday. If your surgeon;s office has given you arrival time, then go by that time.    Please report to [facility] Patient Access/Admitting on the 1st floor.  Bring your insurance card, photo identification, and any copayment ( if applicable).   If you are going home the same day of your surgery, you must have a responsible adult to drive you home. You need to have a responsible adult to stay with you the first 24 hours after surgery and you should not drive a car for 24 hours following your surgery.  If you are being admitted to the hospital, please leave personal belongings/luggage in your car until you have an assigned hospital room number.  Nothing to eat or drink after midnight the night before surgery. This includes no water, gum, mints, coffee, juice, etc.  Please note special instructions, if applicable, below for medications.  Do NOT drink alcohol or smoke 24 hours before surgery. STOP smoking for 14 days prior as it helps with breathing and healing after surgery.  Please wear comfortable clothes. Wear glasses instead of contacts. We ask that all money and jewelry and valuables to be left at home. Wear no makeup, particularly mascara the day of surgery.  All body piercings, rings, and jewelry need to be removed and left at home. Remove all nail polish except for clear. Please wear your hair loose or down. Please no pony-tails, buns, or any metal hair accessories. You may wear deodorant, unless having breast surgery. Do not shave any body area within 24 hours of your surgery.  Please follow all instructions to avoid any potential surgical cancellation.  Should your physical condition change, (i.e. fever, cold, flu, etc.) please notify your  your Preadmission Testing (PAT) appointment.  If you do not have a PAT appointment before surgery, you may arrange to  CHG soap from our office or purchase CHG soap at a pharmacy, grocery or department store.  You need to purchase TWO 4 ounce bottles to use for your 2 showers.    Steps to follow:  Wash your hair with your normal shampoo and your body with regular soap and rinse well to remove shampoo and soap from your skin.  Wet a clean washcloth and turn off the shower.  Put CHG soap on washcloth and apply to your entire body from the neck down. Do not use on your head, face or private parts(genitals). Do not use CHG soap on open sores, wounds or areas of skin irritation.  Wash you body gently for 5 minutes. Do not wash your skin too hard. This soap does not create lather. Pay special attention to your underarms and from your belly button to your feet.  Turn the shower back on and rinse well to get CHG soap off your body.  Pat your skin dry with a clean, dry towel. Do not apply lotions or moisturizer.  Put on clean clothes and sleep on fresh bed sheets and do not allow pets to sleep with you.    Shower with CHG soap 2 times before your surgery  The evening before your surgery  The morning of your surgery    IF PATIENT DOES NOT HAVE ANY CHG SOAP, SHE MAY SHOWER/BATHE WITH DOVE/DIAL ANTIBACTERIAL SOAP THE NIGHT BEFORE AND MORNING OF SURGERY.      Tips to help prevent infections after your surgery:  Protect your surgical wound from germs:  Hand washing is the most important thing you and your caregivers can do to prevent infections.  Keep your bandage clean and dry!  Do not touch your surgical wound.  Use clean, freshly washed towels and washcloths every time you shower; do not share bath linens with others.  Until your surgical wound is healed, wear clothing and sleep on bed linens each day that are clean and freshly washed.  Do not allow pets to sleep in your bed with you or touch your surgical wound.  Do

## 2024-03-05 NOTE — PERIOP NOTE
STILL HAVE NOT REC'D MEDICATION LIST FROM [facility].  CALLED TO FACILITY/[phone] AND WAS TRANSFERRED TO 3RD [facility].  SPOKE TO BARBARA WHO WILL REFAX LIST SINCE WE HAVE NOT REC'D THIS.    REC'D UPDATED MEDICATION LIST FROM BARBARA AT [facility].  UPDATED PT'S MEDICATIONS IN CC.  IT IS NOTED THAT PT TAKES PLAVIX PO DAILY AND HER LAST DOSE WAS 3/4/24 PER [facility]BARA.  I CALLED AND LEFT A VM FOR DR. AUGUSTIN'S SURGERY COORDINATOR/RETA/[phone] REGARDING THIS.      PAT INSTRUCTIONS WITH UPDATED MEDICATION LIST FAXED TO BARBARA AT [facility]/FAX # [phone].      CALLED AND SPOKE TO PT TO SEE HOW SHE WILL BE GETTING TO HOSPITAL D.O.S.  PER PT, [facility] WILL BE PROVIDING TRANSPORTATION TO THE HOSPITAL.

## 2024-03-06 ENCOUNTER — ANESTHESIA EVENT (OUTPATIENT)
Facility: HOSPITAL | Age: 74
End: 2024-03-06
Payer: MEDICARE

## 2024-03-07 ENCOUNTER — ANESTHESIA (OUTPATIENT)
Facility: HOSPITAL | Age: 74
End: 2024-03-07
Payer: MEDICARE

## 2024-03-07 ENCOUNTER — HOSPITAL ENCOUNTER (OUTPATIENT)
Facility: HOSPITAL | Age: 74
Setting detail: OUTPATIENT SURGERY
Discharge: NURSING, REHAB OR LONG-TERM CARE | End: 2024-03-07
Payer: MEDICARE

## 2024-03-07 VITALS
DIASTOLIC BLOOD PRESSURE: 50 MMHG | BODY MASS INDEX: 32.63 KG/M2 | WEIGHT: 203.04 LBS | SYSTOLIC BLOOD PRESSURE: 104 MMHG | OXYGEN SATURATION: 93 % | TEMPERATURE: 97.9 F | RESPIRATION RATE: 14 BRPM | HEART RATE: 85 BPM | HEIGHT: 66 IN

## 2024-03-07 DIAGNOSIS — M86.171 ACUTE OSTEOMYELITIS OF RIGHT CALCANEUS (HCC): Primary | ICD-10-CM

## 2024-03-07 LAB
GLUCOSE BLD STRIP.AUTO-MCNC: 328 MG/DL (ref 65–117)
GLUCOSE BLD STRIP.AUTO-MCNC: 368 MG/DL (ref 65–117)
SERVICE CMNT-IMP: ABNORMAL
SERVICE CMNT-IMP: ABNORMAL

## 2024-03-07 PROCEDURE — 7100000000 HC PACU RECOVERY - FIRST 15 MIN: Performed by: PODIATRIST

## 2024-03-07 PROCEDURE — 3600000002 HC SURGERY LEVEL 2 BASE: Performed by: PODIATRIST

## 2024-03-07 PROCEDURE — 87070 CULTURE OTHR SPECIMN AEROBIC: CPT

## 2024-03-07 PROCEDURE — 6360000002 HC RX W HCPCS

## 2024-03-07 PROCEDURE — C1713 ANCHOR/SCREW BN/BN,TIS/BN: HCPCS | Performed by: PODIATRIST

## 2024-03-07 PROCEDURE — 6370000000 HC RX 637 (ALT 250 FOR IP): Performed by: ANESTHESIOLOGY

## 2024-03-07 PROCEDURE — 87205 SMEAR GRAM STAIN: CPT

## 2024-03-07 PROCEDURE — 2709999900 HC NON-CHARGEABLE SUPPLY: Performed by: PODIATRIST

## 2024-03-07 PROCEDURE — 3700000000 HC ANESTHESIA ATTENDED CARE: Performed by: PODIATRIST

## 2024-03-07 PROCEDURE — 88307 TISSUE EXAM BY PATHOLOGIST: CPT

## 2024-03-07 PROCEDURE — 2580000003 HC RX 258: Performed by: ANESTHESIOLOGY

## 2024-03-07 PROCEDURE — 88311 DECALCIFY TISSUE: CPT

## 2024-03-07 PROCEDURE — 87075 CULTR BACTERIA EXCEPT BLOOD: CPT

## 2024-03-07 PROCEDURE — 82962 GLUCOSE BLOOD TEST: CPT

## 2024-03-07 PROCEDURE — 6360000002 HC RX W HCPCS: Performed by: NURSE ANESTHETIST, CERTIFIED REGISTERED

## 2024-03-07 PROCEDURE — 3600000012 HC SURGERY LEVEL 2 ADDTL 15MIN: Performed by: PODIATRIST

## 2024-03-07 PROCEDURE — 7100000001 HC PACU RECOVERY - ADDTL 15 MIN: Performed by: PODIATRIST

## 2024-03-07 PROCEDURE — 3700000001 HC ADD 15 MINUTES (ANESTHESIA): Performed by: PODIATRIST

## 2024-03-07 PROCEDURE — 2580000003 HC RX 258

## 2024-03-07 PROCEDURE — 6360000002 HC RX W HCPCS: Performed by: PODIATRIST

## 2024-03-07 DEVICE — SMARTSET HV HIGH VISCOSITY BONE CEMENT 40G
Type: IMPLANTABLE DEVICE | Site: FOOT | Status: FUNCTIONAL
Brand: SMARTSET

## 2024-03-07 RX ORDER — IPRATROPIUM BROMIDE AND ALBUTEROL SULFATE 2.5; .5 MG/3ML; MG/3ML
1 SOLUTION RESPIRATORY (INHALATION)
Status: DISCONTINUED | OUTPATIENT
Start: 2024-03-07 | End: 2024-03-07 | Stop reason: HOSPADM

## 2024-03-07 RX ORDER — LORAZEPAM 2 MG/ML
0.5 INJECTION INTRAMUSCULAR
Status: DISCONTINUED | OUTPATIENT
Start: 2024-03-07 | End: 2024-03-07 | Stop reason: HOSPADM

## 2024-03-07 RX ORDER — GENTAMICIN SULFATE 40 MG/ML
INJECTION, SOLUTION INTRAMUSCULAR; INTRAVENOUS PRN
Status: DISCONTINUED | OUTPATIENT
Start: 2024-03-07 | End: 2024-03-07 | Stop reason: HOSPADM

## 2024-03-07 RX ORDER — SODIUM CHLORIDE 0.9 % (FLUSH) 0.9 %
5-40 SYRINGE (ML) INJECTION EVERY 12 HOURS SCHEDULED
Status: DISCONTINUED | OUTPATIENT
Start: 2024-03-07 | End: 2024-03-07 | Stop reason: HOSPADM

## 2024-03-07 RX ORDER — HYDROMORPHONE HYDROCHLORIDE 1 MG/ML
0.5 INJECTION, SOLUTION INTRAMUSCULAR; INTRAVENOUS; SUBCUTANEOUS EVERY 5 MIN PRN
Status: DISCONTINUED | OUTPATIENT
Start: 2024-03-07 | End: 2024-03-07 | Stop reason: HOSPADM

## 2024-03-07 RX ORDER — PROMETHAZINE HYDROCHLORIDE 25 MG/1
25 TABLET ORAL 4 TIMES DAILY PRN
Qty: 20 TABLET | Refills: 0 | Status: SHIPPED | OUTPATIENT
Start: 2024-03-07 | End: 2024-03-14

## 2024-03-07 RX ORDER — ONDANSETRON 2 MG/ML
INJECTION INTRAMUSCULAR; INTRAVENOUS PRN
Status: DISCONTINUED | OUTPATIENT
Start: 2024-03-07 | End: 2024-03-07 | Stop reason: SDUPTHER

## 2024-03-07 RX ORDER — SODIUM CHLORIDE 0.9 % (FLUSH) 0.9 %
5-40 SYRINGE (ML) INJECTION PRN
Status: DISCONTINUED | OUTPATIENT
Start: 2024-03-07 | End: 2024-03-07 | Stop reason: HOSPADM

## 2024-03-07 RX ORDER — HYDRALAZINE HYDROCHLORIDE 20 MG/ML
10 INJECTION INTRAMUSCULAR; INTRAVENOUS
Status: DISCONTINUED | OUTPATIENT
Start: 2024-03-07 | End: 2024-03-07 | Stop reason: HOSPADM

## 2024-03-07 RX ORDER — MIDAZOLAM HYDROCHLORIDE 1 MG/ML
INJECTION INTRAMUSCULAR; INTRAVENOUS PRN
Status: DISCONTINUED | OUTPATIENT
Start: 2024-03-07 | End: 2024-03-07 | Stop reason: SDUPTHER

## 2024-03-07 RX ORDER — INSULIN GLARGINE 100 [IU]/ML
70 INJECTION, SOLUTION SUBCUTANEOUS NIGHTLY
COMMUNITY

## 2024-03-07 RX ORDER — OXYCODONE HYDROCHLORIDE AND ACETAMINOPHEN 5; 325 MG/1; MG/1
1 TABLET ORAL EVERY 6 HOURS PRN
Qty: 12 TABLET | Refills: 0 | Status: SHIPPED | OUTPATIENT
Start: 2024-03-07 | End: 2024-03-10

## 2024-03-07 RX ORDER — SODIUM CHLORIDE, SODIUM LACTATE, POTASSIUM CHLORIDE, CALCIUM CHLORIDE 600; 310; 30; 20 MG/100ML; MG/100ML; MG/100ML; MG/100ML
INJECTION, SOLUTION INTRAVENOUS CONTINUOUS
Status: DISCONTINUED | OUTPATIENT
Start: 2024-03-07 | End: 2024-03-07 | Stop reason: HOSPADM

## 2024-03-07 RX ORDER — FENTANYL CITRATE 50 UG/ML
INJECTION, SOLUTION INTRAMUSCULAR; INTRAVENOUS PRN
Status: DISCONTINUED | OUTPATIENT
Start: 2024-03-07 | End: 2024-03-07 | Stop reason: SDUPTHER

## 2024-03-07 RX ORDER — DIPHENHYDRAMINE HYDROCHLORIDE 50 MG/ML
12.5 INJECTION INTRAMUSCULAR; INTRAVENOUS
Status: DISCONTINUED | OUTPATIENT
Start: 2024-03-07 | End: 2024-03-07 | Stop reason: HOSPADM

## 2024-03-07 RX ORDER — FENTANYL CITRATE 50 UG/ML
25 INJECTION, SOLUTION INTRAMUSCULAR; INTRAVENOUS EVERY 5 MIN PRN
Status: DISCONTINUED | OUTPATIENT
Start: 2024-03-07 | End: 2024-03-07 | Stop reason: HOSPADM

## 2024-03-07 RX ORDER — LIDOCAINE HYDROCHLORIDE 10 MG/ML
1 INJECTION, SOLUTION EPIDURAL; INFILTRATION; INTRACAUDAL; PERINEURAL
Status: DISCONTINUED | OUTPATIENT
Start: 2024-03-07 | End: 2024-03-07 | Stop reason: HOSPADM

## 2024-03-07 RX ORDER — SODIUM CHLORIDE 9 MG/ML
INJECTION, SOLUTION INTRAVENOUS PRN
Status: DISCONTINUED | OUTPATIENT
Start: 2024-03-07 | End: 2024-03-07 | Stop reason: HOSPADM

## 2024-03-07 RX ORDER — MIDAZOLAM HYDROCHLORIDE 2 MG/2ML
2 INJECTION, SOLUTION INTRAMUSCULAR; INTRAVENOUS
Status: DISCONTINUED | OUTPATIENT
Start: 2024-03-07 | End: 2024-03-07 | Stop reason: HOSPADM

## 2024-03-07 RX ORDER — BUPIVACAINE HYDROCHLORIDE 5 MG/ML
INJECTION, SOLUTION EPIDURAL; INTRACAUDAL PRN
Status: DISCONTINUED | OUTPATIENT
Start: 2024-03-07 | End: 2024-03-07 | Stop reason: HOSPADM

## 2024-03-07 RX ORDER — ACETAMINOPHEN 500 MG
1000 TABLET ORAL ONCE
Status: COMPLETED | OUTPATIENT
Start: 2024-03-07 | End: 2024-03-07

## 2024-03-07 RX ORDER — PHENYLEPHRINE HCL IN 0.9% NACL 0.4MG/10ML
SYRINGE (ML) INTRAVENOUS PRN
Status: DISCONTINUED | OUTPATIENT
Start: 2024-03-07 | End: 2024-03-07 | Stop reason: SDUPTHER

## 2024-03-07 RX ORDER — NALOXONE HYDROCHLORIDE 0.4 MG/ML
INJECTION, SOLUTION INTRAMUSCULAR; INTRAVENOUS; SUBCUTANEOUS PRN
Status: DISCONTINUED | OUTPATIENT
Start: 2024-03-07 | End: 2024-03-07 | Stop reason: HOSPADM

## 2024-03-07 RX ORDER — ONDANSETRON 2 MG/ML
4 INJECTION INTRAMUSCULAR; INTRAVENOUS
Status: DISCONTINUED | OUTPATIENT
Start: 2024-03-07 | End: 2024-03-07 | Stop reason: HOSPADM

## 2024-03-07 RX ORDER — VANCOMYCIN HYDROCHLORIDE 1 G/20ML
INJECTION, POWDER, LYOPHILIZED, FOR SOLUTION INTRAVENOUS PRN
Status: DISCONTINUED | OUTPATIENT
Start: 2024-03-07 | End: 2024-03-07 | Stop reason: HOSPADM

## 2024-03-07 RX ORDER — PROCHLORPERAZINE EDISYLATE 5 MG/ML
5 INJECTION INTRAMUSCULAR; INTRAVENOUS
Status: DISCONTINUED | OUTPATIENT
Start: 2024-03-07 | End: 2024-03-07 | Stop reason: HOSPADM

## 2024-03-07 RX ORDER — OXYCODONE HYDROCHLORIDE 5 MG/1
5 TABLET ORAL
Status: DISCONTINUED | OUTPATIENT
Start: 2024-03-07 | End: 2024-03-07 | Stop reason: HOSPADM

## 2024-03-07 RX ORDER — FENTANYL CITRATE 50 UG/ML
100 INJECTION, SOLUTION INTRAMUSCULAR; INTRAVENOUS
Status: DISCONTINUED | OUTPATIENT
Start: 2024-03-07 | End: 2024-03-07 | Stop reason: HOSPADM

## 2024-03-07 RX ADMIN — PROPOFOL 30 MG: 10 INJECTION, EMULSION INTRAVENOUS at 11:32

## 2024-03-07 RX ADMIN — FENTANYL CITRATE 50 MCG: 50 INJECTION, SOLUTION INTRAMUSCULAR; INTRAVENOUS at 11:41

## 2024-03-07 RX ADMIN — ACETAMINOPHEN 1000 MG: 500 TABLET ORAL at 10:44

## 2024-03-07 RX ADMIN — PROPOFOL 50 MCG/KG/MIN: 10 INJECTION, EMULSION INTRAVENOUS at 11:25

## 2024-03-07 RX ADMIN — SODIUM CHLORIDE, POTASSIUM CHLORIDE, SODIUM LACTATE AND CALCIUM CHLORIDE: 600; 310; 30; 20 INJECTION, SOLUTION INTRAVENOUS at 11:00

## 2024-03-07 RX ADMIN — FENTANYL CITRATE 25 MCG: 50 INJECTION, SOLUTION INTRAMUSCULAR; INTRAVENOUS at 12:27

## 2024-03-07 RX ADMIN — ONDANSETRON 4 MG: 2 INJECTION INTRAMUSCULAR; INTRAVENOUS at 12:04

## 2024-03-07 RX ADMIN — Medication 120 MCG: at 11:45

## 2024-03-07 RX ADMIN — FENTANYL CITRATE 25 MCG: 50 INJECTION, SOLUTION INTRAMUSCULAR; INTRAVENOUS at 12:19

## 2024-03-07 RX ADMIN — MIDAZOLAM 1 MG: 1 INJECTION INTRAMUSCULAR; INTRAVENOUS at 11:15

## 2024-03-07 RX ADMIN — Medication 120 MCG: at 11:53

## 2024-03-07 RX ADMIN — WATER 2000 MG: 1 INJECTION INTRAMUSCULAR; INTRAVENOUS; SUBCUTANEOUS at 11:36

## 2024-03-07 RX ADMIN — Medication 120 MCG: at 11:34

## 2024-03-07 RX ADMIN — MIDAZOLAM 1 MG: 1 INJECTION INTRAMUSCULAR; INTRAVENOUS at 11:25

## 2024-03-07 ASSESSMENT — PAIN - FUNCTIONAL ASSESSMENT
PAIN_FUNCTIONAL_ASSESSMENT: 0-10
PAIN_FUNCTIONAL_ASSESSMENT: NONE - DENIES PAIN
PAIN_FUNCTIONAL_ASSESSMENT: ADULT NONVERBAL PAIN SCALE (NPVS)

## 2024-03-07 NOTE — DISCHARGE INSTRUCTIONS
INSTRUCTIONS FOLLOWING FOOT SURGERY    ACTIVITY:  Elevate foot for 48 hours  Use crutches / walker as directed by your doctor, and follow your doctors instructions as to your weight bearing status NO WEIGHT ON THE SURGICAL SIDE     DIET:  Clear liquids until no nausea or vomiting; then light diet for the first day  An advance to regular diet on second day, unless your doctor orders otherwise.    PAIN:  Take pain medication as directed by your doctor.  Call your doctor if pain is not relieved by medication.  Do not take aspirin or blood thinners until directed by your doctor.    DRESSING CARE: keep dressing clean and dry, do not remove       FOLLOW-UP PHONE CALLS:  Calls will be made by nursing staff.  If you had any problems, call your doctor as needed.    CALL YOUR DOCTOR IF:  Excessive bleeding that does not stop after holding mild pressure over the area  Temperature of 101° F or above  Redness, excessive swelling or bruising, and/or green or yellow, smelly discharge from incision  Loss of sensation -cold, white, or blue toes    AFTER ANESTHESIA :   For the first 24 hours: do not drive, drink alcoholic beverages, or make important decisions.  Be aware of dizziness following anesthesia and while taking pain medication.        APPOINTMENT DATE/TIME: please call for an appointment    YOUR DOCTOR’S PHONE NUMBER: [phone]    ______________________________________________________________________    Anesthesia Discharge Instructions    After general anesthesia or intervenous sedation, for 24 hours or while taking prescription Narcotics:  Limit your activities  Do not drive or operate hazardous machinery  If you have not urinated within 8 hours after discharge, please contact your surgeon on call.  Do not make important personal or business decisions  Do not drink alcoholic beverages    Report the following to your surgeon:  Excessive pain, swelling, redness or odor of or around the surgical area  Temperature over  100.5 degrees  Nausea and vomiting lasting longer than 4 hours or if unable to take medication  Any signs of decreased circulation or nerve impairment to extremity:  Change in color, persistent numbness, tingling, coldness or increased pain.  Any questions

## 2024-03-07 NOTE — H&P
Foot and Ankle Surgery History and Physical    Subjective:      Courtney Cortez is a 73 y.o. female who presents for right foot debridement and antibiotic bead exchange  Past Medical History:   Diagnosis Date    Acute colitis 10/29/2014    Adverse effect of anesthesia     slow to wake up    Arthritis     hands    Bronchitis     CAD (coronary artery disease) 2008, 2016    angio/mild:MI, heart cath    Diabetes ([facility])     Type II: insulin    Diabetic foot ulcer ([facility]) 11/20/2009    Foot infection 03/12/2019    GERD (gastroesophageal reflux disease)     Hypercholesterolemia     PT DENIES    Hypothyroid     Ischemic colitis ([facility]) 11/01/2014    Menopause 2000    MRSA (methicillin resistant staph aureus) culture positive 2000    PT UNSURE OF LOCATION    Neuropathy     feet/legs: ulcer right foot    Obesity     Psychiatric disorder     Depression    Sleep apnea     no use CPAP: unable to use, aleksandr me    Ulcer 11/2016    Callus that turned into open wound bottom right foot(ball area), not draining    Vertigo      Past Surgical History:   Procedure Laterality Date    ACHILLES TENDON SURGERY Right 01/23/2024    EXCISIONAL DEBRIDEMENT TO BONE WITH BONE BIOPSIES, EXCISIONAL DEBRIDEMENT ULCERATION ACHILLES WITH GRAFT APPLICATION RIGHT performed by Susan Murray DPM at [facility] MAIN OR    ACHILLES TENDON SURGERY Right 02/23/2024    RIGHT HEEL DEBRIDEMENT, RIGHT FOOT BONE BIOPSY, RIGHT FOOT ANTIBOTIC BEAD EXCHANGE performed by Susan Murray DPM at [facility] MAIN OR    BREAST BIOPSY      many years ago; laterality unknown no scar    BREAST SURGERY  2000's    benign breast cyst    COLONOSCOPY      FOOT SURGERY Right 02/06/2024    RIGHT FOOT DEBRIDEMENT OF CALCANEUS/ BONE BIOPSIES/ INSERTION OF ANTIBIOTIC BEADS performed by Susan Murray DPM at [facility] MAIN OR    GYN      vaginal delivery x1    LEG SURGERY Right 12/11/2023    RIGHT FOOT INCISION AND DRAINAGE performed by Shonda Lemons DPM at [facility] MAIN OR    MOHS SURGERY  2005

## 2024-03-07 NOTE — ANESTHESIA POSTPROCEDURE EVALUATION
Post-Anesthesia Evaluation and Assessment    Patient: Courtney Cortez MRN: [id number]  SSN: [id number]    YOB: 1950  Age: 73 y.o.  Sex: female      I have evaluated the patient and they are stable and ready for discharge from the PACU.     Cardiovascular Function/Vital Signs  Visit Vitals  BP 90/60   Pulse 79   Temp 97.5 °F (36.4 °C) (Axillary)   Resp 11   Ht 1.676 m (5' 6\")   Wt 92.1 kg (203 lb 0.7 oz)   SpO2 97%   BMI 32.77 kg/m²       Patient is status post Monitor Anesthesia Care anesthesia for Procedure(s):  RIGHT FOOT DEBRIDEMENT OF CALCANEUS, Antibiotic bead exchange and bone biopsy.    Nausea/Vomiting: None    Postoperative hydration reviewed and adequate.    Pain:      Managed    Neurological Status:       At baseline    Mental Status, Level of Consciousness: Alert and  oriented to person, place, and time    Pulmonary Status:       Adequate oxygenation and airway patent    Complications related to anesthesia: None    Post-anesthesia assessment completed. No concerns    Signed By: Vikram Calloway MD     March 7, 2024            Department of Anesthesiology  Postprocedure Note    Patient: Courtney Cortez  MRN: [id number]  YOB: 1950  Date of evaluation: 3/7/2024    Procedure Summary       Date: 03/07/24 Room / Location: [facility] MAIN OR [address] [place] [address] MAIN OR    Anesthesia Start: 1117 Anesthesia Stop: 1240    Procedure: RIGHT FOOT DEBRIDEMENT OF CALCANEUS, Antibiotic bead exchange and bone biopsy (Right: Foot) Diagnosis:       Osteomyelitis, acute (HCC)      (Osteomyelitis, acute (HCC) [M86.10])    Providers: Susan Murray DPM Responsible Provider: Vikram Calloway MD    Anesthesia Type: MAC ASA Status: 3            Anesthesia Type: MAC    Gwen Phase I: Gwen Score: 4    Gwen Phase II:      Anesthesia Post Evaluation    No notable events documented.

## 2024-03-07 NOTE — OP NOTE
Operative Note      Patient: Courtney Cortez  YOB: 1950  MRN: [id number]    Date of Procedure: 3/7/2024    Pre-Op Diagnosis Codes:     * Osteomyelitis, acute (HCC) [M86.10]    Post-Op Diagnosis: Same       Procedure(s):  RIGHT FOOT DEBRIDEMENT OF CALCANEUS, Antibiotic bead exchange and bone biopsy    Surgeon(s):  Susan Murray DPM    Assistant:   * No surgical staff found *    Anesthesia: Monitor Anesthesia Care    Estimated Blood Loss (mL): less than 50     Complications: None    Specimens:   ID Type Source Tests Collected by Time Destination   1 : Lateral calcaneus Bone Foot SURGICAL PATHOLOGY Susan Murray, YEIMI 3/7/2024 1210    2 : Medial Calcaneus Bone Foot SURGICAL PATHOLOGY Susan Murray DPM 3/7/2024 1211    A : Wound right heel Swab Foot CULTURE, ANAEROBIC, CULTURE, WOUND Susan Murray DPM 3/7/2024 1211        Implants:  Implant Name Type Inv. Item Serial No.  Lot No. LRB No. Used Action   CEMENT BNE 40GM FULL DOSE PMMA W/O ANTIBIO HI VISC N RADPQ - SN/A  CEMENT BNE 40GM FULL DOSE PMMA W/O ANTIBIO HI VISC N RADPQ N/A [facility]- [id number] Right 1 Implanted         Drains: * No LDAs found *    Findings: OPEN WOUND TO BONE         Detailed Description of Procedure:   Patient was seen in the preoperative area where we discussed her procedure.  She was taken the operating room and placed on the operating table in supine position after adequate induction of sedation the patient was blocked with 0.5% Marcaine plain.  The antibiotic beads were removed the bone of the calcaneus was debrided with use of rongeurs and curettes.  Once I was down to healthy bleeding bone I harvested bone biopsies and prepped them on the back table from the medial lateral aspect of the calcaneus.  The area was flushed antibiotic beads that were created on the back table out of gentamicin and vancomycin were placed of the wound and then 2-0 nylon with bolster sutures were used  to pull the tissue together over the beads and the tissue expander version, holding the antibiotic beads in place.  The dressing was Xeroform 4 x 4's Curlex and ABD use was placed onto the foot.  Patient left the operating to the cover room in stable condition.        Electronically signed by Susan Murray DPM on 3/7/2024 at 12:40 PM

## 2024-03-07 NOTE — ANESTHESIA PRE PROCEDURE
Department of Anesthesiology  Preprocedure Note       Name:  Courtney Cortez   Age:  73 y.o.  :  1950                                          MRN:  [id number]         Date:  3/7/2024      Surgeon: Surgeon(s):  Shonda Lemons DPM    Procedure: Procedure(s):  RIGHT FOOT DEBRIDEMENT OF CALCANEUS    Medications prior to admission:   Prior to Admission medications    Medication Sig Start Date End Date Taking? Authorizing Provider   insulin glargine (LANTUS) 100 UNIT/ML injection vial Inject 70 Units into the skin nightly   Yes Provider, Historical, MD   saccharomyces boulardii (FLORASTOR) 250 MG capsule Take 1 capsule by mouth 2 times daily   Yes Yaneli Youngblood MD   ondansetron (ZOFRAN-ODT) 8 MG TBDP disintegrating tablet Place 0.5 tablets under the tongue every 6 hours as needed for Nausea   Yes Yaneli Youngblood MD   acetaminophen (TYLENOL) 500 MG tablet Take 1 tablet by mouth every 8 hours as needed for Pain   Yes Yaneli Youngblood MD   Polyethylene Glycol 3350 (MIRALAX PO) Take 17 g by mouth daily as needed   Yes Yaneli Youngblood MD   pregabalin (LYRICA) 75 MG capsule Take 1 capsule by mouth 2 times daily.   Yes Yaneli Youngblood MD   vitamin C (ASCORBIC ACID) 500 MG tablet Take 1 tablet by mouth 2 times daily   Yes Yaneli Youngblood MD   zinc sulfate (ZINCATE) 220 (50 Zn)  mg capsule - elemental zinc Take 1 capsule by mouth daily   Yes Yaneli Youngblood MD   Pantoprazole Sodium (PROTONIX PO) Take 40 mg by mouth daily   Yes Yaneli Youngblood MD   promethazine (PHENERGAN) 25 MG tablet Take 1 tablet by mouth every 6 hours as needed for Nausea   Yes Yaneli Youngblood MD   oxyCODONE-acetaminophen (PERCOCET) 5-325 MG per tablet Take 1 tablet by mouth every 6 hours as needed for Pain. Max Daily Amount: 4 tablets   Yes Yaneli Youngblood MD   insulin glargine (LANTUS) 100 UNIT/ML injection vial Inject 90 Units into the skin every morning   Yes Yaneli Youngblood

## 2024-03-07 NOTE — BRIEF OP NOTE
Brief Postoperative Note      Patient: Courtney Cortez  YOB: 1950  MRN: [id number]    Date of Procedure: 3/7/2024    Pre-Op Diagnosis Codes:     * Osteomyelitis, acute (HCC) [M86.10]    Post-Op Diagnosis: Same       Procedure(s):  RIGHT FOOT DEBRIDEMENT OF CALCANEUS, Antibiotic bead exchange and bone biopsy    Surgeon(s):  Susan Murray DPM    Assistant:  * No surgical staff found *    Anesthesia: Monitor Anesthesia Care    Estimated Blood Loss (mL): less than 50     Complications: None    Specimens:   ID Type Source Tests Collected by Time Destination   1 : Lateral calcaneus Bone Foot SURGICAL PATHOLOGY Susan Murray DPM 3/7/2024 1210    2 : Medial Calcaneus Bone Foot SURGICAL PATHOLOGY Susan Murray DPM 3/7/2024 1211    A : Wound right heel Swab Foot CULTURE, ANAEROBIC, CULTURE, WOUND Susan Murray DPM 3/7/2024 1211        Implants:  Implant Name Type Inv. Item Serial No.  Lot No. LRB No. Used Action   CEMENT BNE 40GM FULL DOSE PMMA W/O ANTIBIO HI VISC N RADPQ - SN/A  CEMENT BNE 40GM FULL DOSE PMMA W/O ANTIBIO HI VISC N RADPQ N/A [facility]- [id number] Right 1 Implanted         Drains: * No LDAs found *    Findings: OPEN WOUND WITH BONE EXPOSED      Electronically signed by Susan Murray DPM on 3/7/2024 at 12:40 PM

## 2024-03-11 LAB
BACTERIA SPEC CULT: NORMAL
GRAM STN SPEC: NORMAL
GRAM STN SPEC: NORMAL
SERVICE CMNT-IMP: NORMAL

## 2024-03-12 ENCOUNTER — TELEPHONE (OUTPATIENT)
Age: 74
End: 2024-03-12

## 2024-03-12 NOTE — TELEPHONE ENCOUNTER
Kenyatta from [facility] called, stating patient is off of antibiotics and pic line has been removed         Last antibiotic was given to her 1/22/24,  pic line removed  1/23/24    Patient wants to know if the appointment scheduled for 3-14-24 can be cancelled    Please call [phone]

## 2024-03-13 NOTE — TELEPHONE ENCOUNTER
Spoke with Kenyatta from [facility] advised that it is ok for patient to cancel appointment .

## 2024-03-13 NOTE — TELEPHONE ENCOUNTER
Pls call to confirm - keep appt or cancel   Transportation has to be arranged - she is at [facility]

## 2024-03-27 RX ORDER — ONDANSETRON 4 MG/1
4 TABLET, FILM COATED ORAL AS NEEDED
COMMUNITY

## 2024-03-27 NOTE — PERIOP NOTE
Spoke with Anabel at [facility] who stated there have been no changes in Pt's medical history since last [facility] admission.  Anabel to fax medication list.    Anabel stated pt took last dose of Aspirin and Plavix on 3/26/24.    Anabel stated [facility] will provide transportation for pt and that pt signs her own consent.      [place] phone:  [phone]  [place] fax:  [phone]

## 2024-03-27 NOTE — PERIOP NOTE
[facility]  PREOPERATIVE INSTRUCTIONS    Surgery Date:   3/28/24    Your surgeon's office or [facility] staff will call you between 4 PM- 8 PM the day before surgery with your arrival time. If your surgery is on a Monday, you will receive a call the preceding Friday. If your surgeon;s office has given you arrival time, then go by that time.    Please report to [facility] Patient Access/Admitting on the 1st floor.  Bring your insurance card, photo identification, and any copayment ( if applicable).   If you are going home the same day of your surgery, you must have a responsible adult to drive you home. You need to have a responsible adult to stay with you the first 24 hours after surgery and you should not drive a car for 24 hours following your surgery.  If you are being admitted to the hospital, please leave personal belongings/luggage in your car until you have an assigned hospital room number.  Nothing to eat or drink after midnight the night before surgery. This includes no water, gum, mints, coffee, juice, etc.  Please note special instructions, if applicable, below for medications.  Do NOT drink alcohol or smoke 24 hours before surgery. STOP smoking for 14 days prior as it helps with breathing and healing after surgery.  Please wear comfortable clothes. Wear glasses instead of contacts. We ask that all money and jewelry and valuables to be left at home. Wear no makeup, particularly mascara the day of surgery.  All body piercings, rings, and jewelry need to be removed and left at home. Remove all nail polish except for clear. Please wear your hair loose or down. Please no pony-tails, buns, or any metal hair accessories. You may wear deodorant, unless having breast surgery. Do not shave any body area within 24 hours of your surgery.  Please follow all instructions to avoid any potential surgical cancellation.  Should your physical condition change, (i.e. fever, cold, flu, etc.) please notify your  67 MG capsule Take 1 capsule by mouth every morning (before breakfast)    levothyroxine (SYNTHROID) 125 MCG tablet Take 2 tablets by mouth Daily       YOU MUST ONLY TAKE THESE MEDICATIONS THE MORNING OF SURGERY WITH A SIP OF WATER: LYRICA, ESCITALOPRAM, ATORVASTATIN, LEVOTHYROXINE, PANTOPRAZOLE.  MEDICATIONS TO TAKE THE MORNING OF SURGERY ONLY IF NEEDED: MAY HAVE TYLENOL 4 HRS PRIOR TO ARRIVAL TIME IF NEEDED.  HOLD these prescription medications BEFORE Surgery: ASPIRIN AND PLAVIX AS INSTRUCTED BY SURGEON. HOLD INSULIN MORNING OF SURGERY.  Ask your surgeon/prescribing physician about when/if to STOP taking these medications: ASPIRIN, PLAVIX.  ASK PRESCRIBER FOR DOSE OF LANTUS INSULIN TO TAKE EVENING PRIOR TO SURGERY..  (If you are currently taking Plavix, Coumadin, or any other blood-thinning/anticoagulant medication contact your prescribing physician for instructions).  Stop all vitamins, herbal medicines and Aspirin containing products 7 days prior to surgery. Stop any non-steroidal anti-inflammatory drugs (i.e. Ibuprofen, Naproxen, Advil, Aleve) 3 days before surgery. You may take Tylenol.        Preventing Infections Before and After - Your Surgery    IMPORTANT INSTRUCTIONS      You play an important role in your health and preparation for surgery. To reduce the germs on your skin you will need to shower with CHG soap (Chorhexidine gluconate 4%) two times before surgery.    CHG soap (Hibiclens, Hex-A-Clens or store brand)  CHG soap will be provided at your Preadmission Testing (PAT) appointment.  If you do not have a PAT appointment before surgery, you may arrange to  CHG soap from our office or purchase CHG soap at a pharmacy, grocery or department store.  You need to purchase TWO 4 ounce bottles to use for your 2 showers.    Steps to follow:  Wash your hair with your normal shampoo and your body with regular soap and rinse well to remove shampoo and soap from your skin.  Wet a clean washcloth and turn

## 2024-03-28 ENCOUNTER — ANESTHESIA EVENT (OUTPATIENT)
Facility: HOSPITAL | Age: 74
End: 2024-03-28
Payer: MEDICARE

## 2024-03-28 ENCOUNTER — ANESTHESIA (OUTPATIENT)
Facility: HOSPITAL | Age: 74
End: 2024-03-28
Payer: MEDICARE

## 2024-03-28 ENCOUNTER — HOSPITAL ENCOUNTER (OUTPATIENT)
Facility: HOSPITAL | Age: 74
Setting detail: OUTPATIENT SURGERY
Discharge: HOME | End: 2024-03-28
Attending: PODIATRIST | Admitting: PODIATRIST
Payer: MEDICARE

## 2024-03-28 VITALS
BODY MASS INDEX: 34.68 KG/M2 | OXYGEN SATURATION: 97 % | DIASTOLIC BLOOD PRESSURE: 62 MMHG | SYSTOLIC BLOOD PRESSURE: 111 MMHG | TEMPERATURE: 97.9 F | RESPIRATION RATE: 10 BRPM | HEART RATE: 69 BPM | WEIGHT: 215.8 LBS | HEIGHT: 66 IN

## 2024-03-28 DIAGNOSIS — M86.171 ACUTE OSTEOMYELITIS OF RIGHT CALCANEUS (HCC): Primary | ICD-10-CM

## 2024-03-28 LAB
GLUCOSE BLD STRIP.AUTO-MCNC: 179 MG/DL (ref 65–117)
GLUCOSE BLD STRIP.AUTO-MCNC: 246 MG/DL (ref 65–117)
SERVICE CMNT-IMP: ABNORMAL
SERVICE CMNT-IMP: ABNORMAL

## 2024-03-28 PROCEDURE — 82962 GLUCOSE BLOOD TEST: CPT

## 2024-03-28 PROCEDURE — 3700000001 HC ADD 15 MINUTES (ANESTHESIA): Performed by: PODIATRIST

## 2024-03-28 PROCEDURE — 2580000003 HC RX 258: Performed by: PODIATRIST

## 2024-03-28 PROCEDURE — C1763 CONN TISS, NON-HUMAN: HCPCS | Performed by: PODIATRIST

## 2024-03-28 PROCEDURE — 2500000003 HC RX 250 WO HCPCS

## 2024-03-28 PROCEDURE — C1763 CONN TISS, NON-HUMAN: HCPCS

## 2024-03-28 PROCEDURE — 6360000002 HC RX W HCPCS

## 2024-03-28 PROCEDURE — C1889 IMPLANT/INSERT DEVICE, NOC: HCPCS

## 2024-03-28 PROCEDURE — 6360000002 HC RX W HCPCS: Performed by: PODIATRIST

## 2024-03-28 PROCEDURE — 6370000000 HC RX 637 (ALT 250 FOR IP): Performed by: ANESTHESIOLOGY

## 2024-03-28 PROCEDURE — 3600000002 HC SURGERY LEVEL 2 BASE: Performed by: PODIATRIST

## 2024-03-28 PROCEDURE — 7100000000 HC PACU RECOVERY - FIRST 15 MIN: Performed by: PODIATRIST

## 2024-03-28 PROCEDURE — 3600000012 HC SURGERY LEVEL 2 ADDTL 15MIN: Performed by: PODIATRIST

## 2024-03-28 PROCEDURE — 7100000001 HC PACU RECOVERY - ADDTL 15 MIN: Performed by: PODIATRIST

## 2024-03-28 PROCEDURE — 2580000003 HC RX 258

## 2024-03-28 PROCEDURE — 2709999900 HC NON-CHARGEABLE SUPPLY: Performed by: PODIATRIST

## 2024-03-28 PROCEDURE — 3700000000 HC ANESTHESIA ATTENDED CARE: Performed by: PODIATRIST

## 2024-03-28 DEVICE — TISSUE BIO MATRIDERM 5.2 X 7.4CM 1MM SM: Type: IMPLANTABLE DEVICE | Site: FOOT | Status: FUNCTIONAL

## 2024-03-28 RX ORDER — HYDROMORPHONE HYDROCHLORIDE 1 MG/ML
0.5 INJECTION, SOLUTION INTRAMUSCULAR; INTRAVENOUS; SUBCUTANEOUS EVERY 5 MIN PRN
Status: DISCONTINUED | OUTPATIENT
Start: 2024-03-28 | End: 2024-03-28 | Stop reason: HOSPADM

## 2024-03-28 RX ORDER — FENTANYL CITRATE 50 UG/ML
25 INJECTION, SOLUTION INTRAMUSCULAR; INTRAVENOUS EVERY 5 MIN PRN
Status: DISCONTINUED | OUTPATIENT
Start: 2024-03-28 | End: 2024-03-28 | Stop reason: HOSPADM

## 2024-03-28 RX ORDER — INSULIN LISPRO 100 [IU]/ML
5 INJECTION, SOLUTION INTRAVENOUS; SUBCUTANEOUS ONCE
Status: COMPLETED | OUTPATIENT
Start: 2024-03-28 | End: 2024-03-28

## 2024-03-28 RX ORDER — DEXMEDETOMIDINE HYDROCHLORIDE 100 UG/ML
INJECTION, SOLUTION INTRAVENOUS PRN
Status: DISCONTINUED | OUTPATIENT
Start: 2024-03-28 | End: 2024-03-28 | Stop reason: SDUPTHER

## 2024-03-28 RX ORDER — SODIUM CHLORIDE 0.9 % (FLUSH) 0.9 %
5-40 SYRINGE (ML) INJECTION PRN
Status: DISCONTINUED | OUTPATIENT
Start: 2024-03-28 | End: 2024-03-28 | Stop reason: HOSPADM

## 2024-03-28 RX ORDER — HYDROCODONE BITARTRATE AND ACETAMINOPHEN 5; 325 MG/1; MG/1
1 TABLET ORAL EVERY 8 HOURS PRN
Qty: 10 TABLET | Refills: 0 | Status: SHIPPED | OUTPATIENT
Start: 2024-03-28 | End: 2024-03-31

## 2024-03-28 RX ORDER — MIDAZOLAM HYDROCHLORIDE 2 MG/2ML
2 INJECTION, SOLUTION INTRAMUSCULAR; INTRAVENOUS
Status: DISCONTINUED | OUTPATIENT
Start: 2024-03-28 | End: 2024-03-28 | Stop reason: HOSPADM

## 2024-03-28 RX ORDER — LIDOCAINE HYDROCHLORIDE 10 MG/ML
1 INJECTION, SOLUTION EPIDURAL; INFILTRATION; INTRACAUDAL; PERINEURAL
Status: DISCONTINUED | OUTPATIENT
Start: 2024-03-28 | End: 2024-03-28 | Stop reason: HOSPADM

## 2024-03-28 RX ORDER — MIDAZOLAM HYDROCHLORIDE 1 MG/ML
INJECTION INTRAMUSCULAR; INTRAVENOUS PRN
Status: DISCONTINUED | OUTPATIENT
Start: 2024-03-28 | End: 2024-03-28 | Stop reason: SDUPTHER

## 2024-03-28 RX ORDER — ONDANSETRON 2 MG/ML
INJECTION INTRAMUSCULAR; INTRAVENOUS PRN
Status: DISCONTINUED | OUTPATIENT
Start: 2024-03-28 | End: 2024-03-28 | Stop reason: SDUPTHER

## 2024-03-28 RX ORDER — SODIUM CHLORIDE 9 MG/ML
INJECTION, SOLUTION INTRAVENOUS PRN
Status: DISCONTINUED | OUTPATIENT
Start: 2024-03-28 | End: 2024-03-28 | Stop reason: HOSPADM

## 2024-03-28 RX ORDER — ONDANSETRON 2 MG/ML
4 INJECTION INTRAMUSCULAR; INTRAVENOUS
Status: DISCONTINUED | OUTPATIENT
Start: 2024-03-28 | End: 2024-03-28 | Stop reason: HOSPADM

## 2024-03-28 RX ORDER — FENTANYL CITRATE 50 UG/ML
100 INJECTION, SOLUTION INTRAMUSCULAR; INTRAVENOUS
Status: DISCONTINUED | OUTPATIENT
Start: 2024-03-28 | End: 2024-03-28 | Stop reason: HOSPADM

## 2024-03-28 RX ORDER — SODIUM CHLORIDE, SODIUM LACTATE, POTASSIUM CHLORIDE, CALCIUM CHLORIDE 600; 310; 30; 20 MG/100ML; MG/100ML; MG/100ML; MG/100ML
INJECTION, SOLUTION INTRAVENOUS CONTINUOUS PRN
Status: DISCONTINUED | OUTPATIENT
Start: 2024-03-28 | End: 2024-03-28 | Stop reason: SDUPTHER

## 2024-03-28 RX ORDER — PROCHLORPERAZINE EDISYLATE 5 MG/ML
5 INJECTION INTRAMUSCULAR; INTRAVENOUS
Status: DISCONTINUED | OUTPATIENT
Start: 2024-03-28 | End: 2024-03-28 | Stop reason: HOSPADM

## 2024-03-28 RX ORDER — ACETAMINOPHEN 500 MG
1000 TABLET ORAL ONCE
Status: COMPLETED | OUTPATIENT
Start: 2024-03-28 | End: 2024-03-28

## 2024-03-28 RX ORDER — FENTANYL CITRATE 50 UG/ML
INJECTION, SOLUTION INTRAMUSCULAR; INTRAVENOUS PRN
Status: DISCONTINUED | OUTPATIENT
Start: 2024-03-28 | End: 2024-03-28 | Stop reason: SDUPTHER

## 2024-03-28 RX ORDER — LIDOCAINE HYDROCHLORIDE 20 MG/ML
INJECTION, SOLUTION EPIDURAL; INFILTRATION; INTRACAUDAL; PERINEURAL PRN
Status: DISCONTINUED | OUTPATIENT
Start: 2024-03-28 | End: 2024-03-28 | Stop reason: SDUPTHER

## 2024-03-28 RX ORDER — SODIUM CHLORIDE 0.9 % (FLUSH) 0.9 %
5-40 SYRINGE (ML) INJECTION EVERY 12 HOURS SCHEDULED
Status: DISCONTINUED | OUTPATIENT
Start: 2024-03-28 | End: 2024-03-28 | Stop reason: HOSPADM

## 2024-03-28 RX ORDER — SODIUM CHLORIDE, SODIUM LACTATE, POTASSIUM CHLORIDE, CALCIUM CHLORIDE 600; 310; 30; 20 MG/100ML; MG/100ML; MG/100ML; MG/100ML
INJECTION, SOLUTION INTRAVENOUS CONTINUOUS
Status: DISCONTINUED | OUTPATIENT
Start: 2024-03-28 | End: 2024-03-28 | Stop reason: HOSPADM

## 2024-03-28 RX ORDER — NALOXONE HYDROCHLORIDE 0.4 MG/ML
INJECTION, SOLUTION INTRAMUSCULAR; INTRAVENOUS; SUBCUTANEOUS PRN
Status: DISCONTINUED | OUTPATIENT
Start: 2024-03-28 | End: 2024-03-28 | Stop reason: HOSPADM

## 2024-03-28 RX ORDER — OXYCODONE HYDROCHLORIDE 5 MG/1
5 TABLET ORAL
Status: DISCONTINUED | OUTPATIENT
Start: 2024-03-28 | End: 2024-03-28 | Stop reason: HOSPADM

## 2024-03-28 RX ORDER — HYDRALAZINE HYDROCHLORIDE 20 MG/ML
10 INJECTION INTRAMUSCULAR; INTRAVENOUS
Status: DISCONTINUED | OUTPATIENT
Start: 2024-03-28 | End: 2024-03-28 | Stop reason: HOSPADM

## 2024-03-28 RX ORDER — PHENYLEPHRINE HCL IN 0.9% NACL 0.4MG/10ML
SYRINGE (ML) INTRAVENOUS PRN
Status: DISCONTINUED | OUTPATIENT
Start: 2024-03-28 | End: 2024-03-28 | Stop reason: SDUPTHER

## 2024-03-28 RX ORDER — DEXTROSE MONOHYDRATE 100 MG/ML
INJECTION, SOLUTION INTRAVENOUS CONTINUOUS PRN
Status: DISCONTINUED | OUTPATIENT
Start: 2024-03-28 | End: 2024-03-28 | Stop reason: HOSPADM

## 2024-03-28 RX ADMIN — MIDAZOLAM 1 MG: 1 INJECTION INTRAMUSCULAR; INTRAVENOUS at 14:09

## 2024-03-28 RX ADMIN — FENTANYL CITRATE 25 MCG: 50 INJECTION, SOLUTION INTRAMUSCULAR; INTRAVENOUS at 14:24

## 2024-03-28 RX ADMIN — PROPOFOL 50 MCG/KG/MIN: 10 INJECTION, EMULSION INTRAVENOUS at 14:19

## 2024-03-28 RX ADMIN — WATER 2000 MG: 1 INJECTION INTRAMUSCULAR; INTRAVENOUS; SUBCUTANEOUS at 14:20

## 2024-03-28 RX ADMIN — PROPOFOL 25 MG: 10 INJECTION, EMULSION INTRAVENOUS at 14:18

## 2024-03-28 RX ADMIN — Medication 5 UNITS: at 13:27

## 2024-03-28 RX ADMIN — Medication 80 MCG: at 14:26

## 2024-03-28 RX ADMIN — LIDOCAINE HYDROCHLORIDE 40 MG: 20 INJECTION, SOLUTION EPIDURAL; INFILTRATION; INTRACAUDAL; PERINEURAL at 14:18

## 2024-03-28 RX ADMIN — DEXMEDETOMIDINE HYDROCHLORIDE 5 MCG: 100 INJECTION, SOLUTION, CONCENTRATE INTRAVENOUS at 14:18

## 2024-03-28 RX ADMIN — SODIUM CHLORIDE, POTASSIUM CHLORIDE, SODIUM LACTATE AND CALCIUM CHLORIDE: 600; 310; 30; 20 INJECTION, SOLUTION INTRAVENOUS at 14:04

## 2024-03-28 RX ADMIN — ONDANSETRON 4 MG: 2 INJECTION INTRAMUSCULAR; INTRAVENOUS at 14:20

## 2024-03-28 RX ADMIN — MIDAZOLAM 1 MG: 1 INJECTION INTRAMUSCULAR; INTRAVENOUS at 14:13

## 2024-03-28 RX ADMIN — ACETAMINOPHEN 1000 MG: 500 TABLET ORAL at 13:11

## 2024-03-28 ASSESSMENT — PAIN - FUNCTIONAL ASSESSMENT
PAIN_FUNCTIONAL_ASSESSMENT: NONE - DENIES PAIN
PAIN_FUNCTIONAL_ASSESSMENT: NONE - DENIES PAIN
PAIN_FUNCTIONAL_ASSESSMENT: 0-10
PAIN_FUNCTIONAL_ASSESSMENT: NONE - DENIES PAIN
PAIN_FUNCTIONAL_ASSESSMENT: NONE - DENIES PAIN

## 2024-03-28 NOTE — DISCHARGE INSTRUCTIONS
INSTRUCTIONS FOLLOWING FOOT SURGERY    ACTIVITY:  Elevate feet for 48 hours  Use ice as directed by your doctor  Use crutches / walker as directed by your doctor, and follow your doctors instructions as to your weight bearing status: NONWEIGHTBEARING RIGHT FOOT    DIET:  Clear liquids until no nausea or vomiting; then light diet for the first day  An advance to regular diet on second day, unless your doctor orders otherwise.    PAIN:  Take pain medication as directed by your doctor.  Call your doctor if pain is not relieved by medication.  Do not take aspirin or blood thinners until directed by your doctor.    DRESSING CARE: keep dressing clean and dry, do not remove       FOLLOW-UP PHONE CALLS:  Calls will be made by nursing staff.  If you had any problems, call your doctor as needed.    CALL YOUR DOCTOR IF:  Excessive bleeding that does not stop after holding mild pressure over the area  Temperature of 101° F or above  Redness, excessive swelling or bruising, and/or green or yellow, smelly discharge from incision  Loss of sensation -cold, white, or blue toes    AFTER ANESTHESIA :   For the first 24 hours: do not drive, drink alcoholic beverages, or make important decisions.  Be aware of dizziness following anesthesia and while taking pain medication.      DISCHARGE MEDICATIONS: @[email or web address]@      APPOINTMENT DATE/TIME: please call for an appointment    YOUR DOCTOR’S PHONE NUMBER: [phone]    Patient’s signature:  Date: 3/28/2024  Discharging Nurse:     ______________________________________________________________________    Anesthesia Discharge Instructions    After general anesthesia or intervenous sedation, for 24 hours or while taking prescription Narcotics:  Limit your activities  Do not drive or operate hazardous machinery  If you have not urinated within 8 hours after discharge, please contact your surgeon on call.  Do not make important personal or business decisions  Do not drink alcoholic

## 2024-03-28 NOTE — ANESTHESIA POSTPROCEDURE EVALUATION
Department of Anesthesiology  Postprocedure Note    Patient: Courtney Cortez  MRN: [id number]  YOB: 1950  Date of evaluation: 3/28/2024    Procedure Summary       Date: 03/28/24 Room / Location: [facility] MAIN OR [address] [place] [address] MAIN OR    Anesthesia Start: 1409 Anesthesia Stop: 1444    Procedure: RIGHT FOOT DEBRIDE WOUND, APPLY GRAFT, REMOVE ANTIBOTIC BEADS (Right: Foot) Diagnosis:       Acute osteomyelitis (HCC)      Ulcer of foot, chronic, right, with fat layer exposed (HCC)      (Acute osteomyelitis (HCC) [M86.10])      (Ulcer of foot, chronic, right, with fat layer exposed (HCC) [L97.512])    Providers: Susan Murray DPM Responsible Provider: Pastor Santos MD    Anesthesia Type: MAC ASA Status: 3            Anesthesia Type: MAC    Gwen Phase I: Gwen Score: 10    Gwen Phase II:      Anesthesia Post Evaluation    Patient location during evaluation: PACU  Patient participation: complete - patient participated  Level of consciousness: awake  Pain score: 0  Airway patency: patent  Nausea & Vomiting: no nausea  Cardiovascular status: blood pressure returned to baseline and hemodynamically stable  Respiratory status: acceptable  Hydration status: stable  Pain management: adequate and satisfactory to patient    No notable events documented.

## 2024-03-28 NOTE — BRIEF OP NOTE
Brief Postoperative Note      Patient: Courtney Cortez  YOB: 1950  MRN: [id number]    Date of Procedure: 3/28/2024    Pre-Op Diagnosis Codes:     * Acute osteomyelitis (HCC) [M86.10]     * Ulcer of foot, chronic, right, with fat layer exposed (HCC) [L97.512]    Post-Op Diagnosis: Same       Procedure(s):  RIGHT FOOT DEBRIDE WOUND, APPLY GRAFT, REMOVE ANTIBOTIC BEADS    Surgeon(s):  Susan Murray DPM Tammaro, Sarah, DPM    Assistant:  * No surgical staff found *    Anesthesia: Monitor Anesthesia Care    Estimated Blood Loss (mL): less than 50     Complications: None    Specimens:   * No specimens in log *    Implants:  Implant Name Type Inv. Item Serial No.  Lot No. LRB No. Used Action   TISSUE BIO MATRIDERM 5.2 X 7.4CM 1MM SM - SN/A  TISSUE BIO MATRIDERM 5.2 X 7.4CM 1MM SM N/A [facility] [id number] Right 1 Implanted         Drains: * No LDAs found *    Findings: soft tissue coverage over calcaneus, no clinical signs of infection, no ischemia      Electronically signed by Shonda Lemons DPM on 3/28/2024 at 2:40 PM

## 2024-03-28 NOTE — ANESTHESIA PRE PROCEDURE
Department of Anesthesiology  Preprocedure Note       Name:  Courtney Cortez   Age:  73 y.o.  :  1950                                          MRN:  [id number]         Date:  3/28/2024      Surgeon: Surgeon(s):  Susan Murray DPM    Procedure: Procedure(s):  RIGHT FOOT DEBRIDE WOUND, APPLY GRAFT, REMOVE ANTIBOTIC BEADS  .    Medications prior to admission:   Prior to Admission medications    Medication Sig Start Date End Date Taking? Authorizing Provider   ondansetron (ZOFRAN) 4 MG tablet Take 1 tablet by mouth as needed for Nausea or Vomiting Disintegtaing tablet.  Every 6 hrs prn   Yes Yaneli Youngblood MD   Vitamin D3 125 MCG (5000 UT) TABS tablet Take 1 tablet by mouth daily   Yes Yaneli Youngblood MD   insulin glargine (LANTUS) 100 UNIT/ML injection vial Inject 80 Units into the skin nightly    Yaneli Youngblood MD   saccharomyces boulardii (FLORASTOR) 250 MG capsule Take 1 capsule by mouth 2 times daily    Yaneli Youngblood MD   acetaminophen (TYLENOL) 500 MG tablet Take 1 tablet by mouth every 8 hours as needed for Pain    Yaneli Youngblood MD   Polyethylene Glycol 3350 (MIRALAX PO) Take 17 g by mouth daily as needed    Yaneli Youngblood MD   pregabalin (LYRICA) 75 MG capsule Take 1 capsule by mouth 2 times daily.    Yaneli Youngblood MD   vitamin C (ASCORBIC ACID) 500 MG tablet Take 1 tablet by mouth 2 times daily    Yaneli Youngblood MD   zinc sulfate (ZINCATE) 220 (50 Zn)  mg capsule - elemental zinc Take 1 capsule by mouth daily    Yaneli Youngblood MD   Pantoprazole Sodium (PROTONIX PO) Take 40 mg by mouth daily    Yaneli Youngblood MD   insulin glargine (LANTUS) 100 UNIT/ML injection vial Inject 100 Units into the skin every morning    Yaneli Youngblood MD   insulin lispro (HUMALOG) 100 UNIT/ML SOLN injection vial Inject 12 Units into the skin 3 times daily (with meals)    Yaneli Youngblood MD   escitalopram (LEXAPRO) 20 MG tablet Take 1

## 2024-03-28 NOTE — OP NOTE
Operative Note      Patient: Courtney Cortez  YOB: 1950  MRN: [id number]    Date of Procedure: 3/28/2024    Pre-Op Diagnosis Codes:     * Acute osteomyelitis (HCC) [M86.10]     * Ulcer of foot, chronic, right, with fat layer exposed (HCC) [L97.512]    Post-Op Diagnosis: Same       Procedure(s):  RIGHT FOOT DEBRIDE WOUND, APPLY GRAFT, REMOVE ANTIBOTIC BEADS    Surgeon(s):  Susan Murray DPM Tammaro, Sarah, DPM    Assistant:   * No surgical staff found *    Anesthesia: Monitor Anesthesia Care    Estimated Blood Loss (mL): Minimal    Complications: None    Specimens:   * No specimens in log *    Implants:  Implant Name Type Inv. Item Serial No.  Lot No. LRB No. Used Action   TISSUE BIO MATRIDERM 5.2 X 7.4CM 1MM SM - SN/A  TISSUE BIO MATRIDERM 5.2 X 7.4CM 1MM SM N/A [facility] [id number] Right 1 Implanted         Drains: * No LDAs found *    Findings: soft tissue coverage over calcaneus, no clinical signs of infection, no ischemia        Detailed Description of Procedure:   Patient was wheeled to the operating room and remained on the hospital stretcher in the supine position. MAC anesthesia was achieved by the anesthesia team and the right foot was prepped and draped in the normal aseptic technique. Attention was drawn to the plantar right heel wound. The existing antibiotic beads were completely removed with a hemostat. The wound was then excisionally debrided with a #15 blade. Fibrotic tissue was moved down to healthy bleeding subcutaneous tissue. The site was copiously flushed with sterile saline. A 5x7 matriderm graft was folded and placed in the wound before retention sutures with bolsters and #2 nylon were placed in a horizontal mattress fashion. Adaptic, fluffed 4x4s, ABD, kerlix, and an ace wrap were used to dress the foot. Patient was wheeled to recovery with stable vitals and neurovascular status intact to the right foot.     Electronically signed by Shonda Lemons DPM on

## 2024-03-28 NOTE — PERIOP NOTE
Discharge instructions were reviewed and given to the pt and prerna Little RN. Patient given a current medication reconciliation form and verbalized understanding of their medications, side affects, medication administration, and time when due. Importance of follow-up and appointment times and dates reviewed. The patient verbalized understanding of discharge instructions and prescriptions, all questions were answered. Pt/ RN has no further concerns at this time. Patient stable at time of discharge.

## 2024-04-22 NOTE — PERIOP NOTE
[place] phone:  [phone]  [facility] fax:  [phone]    Spoke with DESTINI at [facility] who stated there have been no changes in Pt's medical history since last [facility] admission.     Nurse reviewed all of pts medications with Destini, and our current list of medications, is up to date.       Destini stated [facility] will provide transportation for pt and that pt signs her own consent.      PER DESTINI PT STOPPED HER PLAVIX AND ASA ON 4/17 FOR SURGERY.     Pre-op instructions faxed to Destini at [facility].   
a nurse in the recovery area will contact your designated visitor to inform them of your room number or to otherwise review other pertinent information regarding your care.    Social distancing practices are strongly encouraged in waiting areas and the cafeteria.

## 2024-04-23 ENCOUNTER — ANESTHESIA (OUTPATIENT)
Facility: HOSPITAL | Age: 74
End: 2024-04-23
Payer: MEDICARE

## 2024-04-23 ENCOUNTER — ANESTHESIA EVENT (OUTPATIENT)
Facility: HOSPITAL | Age: 74
End: 2024-04-23
Payer: MEDICARE

## 2024-04-23 ENCOUNTER — HOSPITAL ENCOUNTER (OUTPATIENT)
Facility: HOSPITAL | Age: 74
Setting detail: OUTPATIENT SURGERY
Discharge: OTHER INSTITUTION | End: 2024-04-23
Attending: PODIATRIST | Admitting: PODIATRIST
Payer: MEDICARE

## 2024-04-23 VITALS
HEART RATE: 76 BPM | RESPIRATION RATE: 12 BRPM | TEMPERATURE: 98.1 F | OXYGEN SATURATION: 100 % | SYSTOLIC BLOOD PRESSURE: 107 MMHG | DIASTOLIC BLOOD PRESSURE: 61 MMHG

## 2024-04-23 LAB
GLUCOSE BLD STRIP.AUTO-MCNC: 128 MG/DL (ref 65–117)
SERVICE CMNT-IMP: ABNORMAL

## 2024-04-23 PROCEDURE — 3600000002 HC SURGERY LEVEL 2 BASE: Performed by: PODIATRIST

## 2024-04-23 PROCEDURE — 2580000003 HC RX 258: Performed by: PODIATRIST

## 2024-04-23 PROCEDURE — 2580000003 HC RX 258: Performed by: ANESTHESIOLOGY

## 2024-04-23 PROCEDURE — 6360000002 HC RX W HCPCS: Performed by: PODIATRIST

## 2024-04-23 PROCEDURE — 3600000012 HC SURGERY LEVEL 2 ADDTL 15MIN: Performed by: PODIATRIST

## 2024-04-23 PROCEDURE — 6370000000 HC RX 637 (ALT 250 FOR IP): Performed by: ANESTHESIOLOGY

## 2024-04-23 PROCEDURE — 7100000001 HC PACU RECOVERY - ADDTL 15 MIN: Performed by: PODIATRIST

## 2024-04-23 PROCEDURE — 2500000003 HC RX 250 WO HCPCS

## 2024-04-23 PROCEDURE — 2709999900 HC NON-CHARGEABLE SUPPLY: Performed by: PODIATRIST

## 2024-04-23 PROCEDURE — 3700000001 HC ADD 15 MINUTES (ANESTHESIA): Performed by: PODIATRIST

## 2024-04-23 PROCEDURE — A2007: HCPCS | Performed by: PODIATRIST

## 2024-04-23 PROCEDURE — 3700000000 HC ANESTHESIA ATTENDED CARE: Performed by: PODIATRIST

## 2024-04-23 PROCEDURE — 7100000000 HC PACU RECOVERY - FIRST 15 MIN: Performed by: PODIATRIST

## 2024-04-23 PROCEDURE — 6360000002 HC RX W HCPCS

## 2024-04-23 PROCEDURE — 82962 GLUCOSE BLOOD TEST: CPT

## 2024-04-23 DEVICE — THE RESTRATA™ WOUND MATRIX IS A STERILE, SINGLE USE DEVICE INTENDED FOR USE IN LOCAL MANAGEMENT OF WOUNDS. THE RESTRATA™ WOUND MATRIX IS A SOFT, WHITE, CONFORMABLE, NON-FRIABLE, ABSORBABLE MATRIX THAT ACTS AS A PROTECTIVE COVERING FOR WOUND DEFECTS, PROVIDING A MOIST ENVIRONMENT FOR THE BODY’S NATURAL HEALING PROCESS TO OCCUR.
Type: IMPLANTABLE DEVICE | Site: FOOT | Status: FUNCTIONAL
Brand: RESTRATA™ WOUND MATRIX

## 2024-04-23 RX ORDER — HYDROMORPHONE HYDROCHLORIDE 1 MG/ML
0.5 INJECTION, SOLUTION INTRAMUSCULAR; INTRAVENOUS; SUBCUTANEOUS EVERY 5 MIN PRN
Status: DISCONTINUED | OUTPATIENT
Start: 2024-04-23 | End: 2024-04-23 | Stop reason: HOSPADM

## 2024-04-23 RX ORDER — SODIUM CHLORIDE 0.9 % (FLUSH) 0.9 %
5-40 SYRINGE (ML) INJECTION EVERY 12 HOURS SCHEDULED
Status: DISCONTINUED | OUTPATIENT
Start: 2024-04-23 | End: 2024-04-23 | Stop reason: HOSPADM

## 2024-04-23 RX ORDER — NALOXONE HYDROCHLORIDE 0.4 MG/ML
INJECTION, SOLUTION INTRAMUSCULAR; INTRAVENOUS; SUBCUTANEOUS PRN
Status: DISCONTINUED | OUTPATIENT
Start: 2024-04-23 | End: 2024-04-23 | Stop reason: HOSPADM

## 2024-04-23 RX ORDER — OXYCODONE HYDROCHLORIDE 5 MG/1
5 TABLET ORAL
Status: DISCONTINUED | OUTPATIENT
Start: 2024-04-23 | End: 2024-04-23 | Stop reason: HOSPADM

## 2024-04-23 RX ORDER — SODIUM CHLORIDE 0.9 % (FLUSH) 0.9 %
5-40 SYRINGE (ML) INJECTION PRN
Status: DISCONTINUED | OUTPATIENT
Start: 2024-04-23 | End: 2024-04-23 | Stop reason: HOSPADM

## 2024-04-23 RX ORDER — LIDOCAINE HYDROCHLORIDE 10 MG/ML
1 INJECTION, SOLUTION EPIDURAL; INFILTRATION; INTRACAUDAL; PERINEURAL
Status: DISCONTINUED | OUTPATIENT
Start: 2024-04-23 | End: 2024-04-23 | Stop reason: HOSPADM

## 2024-04-23 RX ORDER — PROPOFOL 10 MG/ML
INJECTION, EMULSION INTRAVENOUS PRN
Status: DISCONTINUED | OUTPATIENT
Start: 2024-04-23 | End: 2024-04-23

## 2024-04-23 RX ORDER — BUPIVACAINE HYDROCHLORIDE 5 MG/ML
INJECTION, SOLUTION EPIDURAL; INTRACAUDAL PRN
Status: DISCONTINUED | OUTPATIENT
Start: 2024-04-23 | End: 2024-04-23 | Stop reason: HOSPADM

## 2024-04-23 RX ORDER — PHENYLEPHRINE HCL IN 0.9% NACL 0.4MG/10ML
SYRINGE (ML) INTRAVENOUS PRN
Status: DISCONTINUED | OUTPATIENT
Start: 2024-04-23 | End: 2024-04-23 | Stop reason: SDUPTHER

## 2024-04-23 RX ORDER — EPHEDRINE SULFATE 50 MG/ML
INJECTION INTRAVENOUS PRN
Status: DISCONTINUED | OUTPATIENT
Start: 2024-04-23 | End: 2024-04-23 | Stop reason: SDUPTHER

## 2024-04-23 RX ORDER — ONDANSETRON 2 MG/ML
4 INJECTION INTRAMUSCULAR; INTRAVENOUS
Status: DISCONTINUED | OUTPATIENT
Start: 2024-04-23 | End: 2024-04-23 | Stop reason: HOSPADM

## 2024-04-23 RX ORDER — LIDOCAINE HYDROCHLORIDE 20 MG/ML
INJECTION, SOLUTION EPIDURAL; INFILTRATION; INTRACAUDAL; PERINEURAL PRN
Status: DISCONTINUED | OUTPATIENT
Start: 2024-04-23 | End: 2024-04-23 | Stop reason: SDUPTHER

## 2024-04-23 RX ORDER — HYDRALAZINE HYDROCHLORIDE 20 MG/ML
10 INJECTION INTRAMUSCULAR; INTRAVENOUS
Status: DISCONTINUED | OUTPATIENT
Start: 2024-04-23 | End: 2024-04-23 | Stop reason: HOSPADM

## 2024-04-23 RX ORDER — ONDANSETRON 2 MG/ML
INJECTION INTRAMUSCULAR; INTRAVENOUS PRN
Status: DISCONTINUED | OUTPATIENT
Start: 2024-04-23 | End: 2024-04-23 | Stop reason: SDUPTHER

## 2024-04-23 RX ORDER — FENTANYL CITRATE 50 UG/ML
100 INJECTION, SOLUTION INTRAMUSCULAR; INTRAVENOUS
Status: DISCONTINUED | OUTPATIENT
Start: 2024-04-23 | End: 2024-04-23 | Stop reason: HOSPADM

## 2024-04-23 RX ORDER — ONDANSETRON 2 MG/ML
4 INJECTION INTRAMUSCULAR; INTRAVENOUS AS NEEDED
Status: DISCONTINUED | OUTPATIENT
Start: 2024-04-23 | End: 2024-04-23 | Stop reason: HOSPADM

## 2024-04-23 RX ORDER — SODIUM CHLORIDE 9 MG/ML
INJECTION, SOLUTION INTRAVENOUS PRN
Status: DISCONTINUED | OUTPATIENT
Start: 2024-04-23 | End: 2024-04-23 | Stop reason: HOSPADM

## 2024-04-23 RX ORDER — PROCHLORPERAZINE EDISYLATE 5 MG/ML
5 INJECTION INTRAMUSCULAR; INTRAVENOUS
Status: DISCONTINUED | OUTPATIENT
Start: 2024-04-23 | End: 2024-04-23 | Stop reason: HOSPADM

## 2024-04-23 RX ORDER — ACETAMINOPHEN 500 MG
1000 TABLET ORAL ONCE
Status: COMPLETED | OUTPATIENT
Start: 2024-04-23 | End: 2024-04-23

## 2024-04-23 RX ORDER — DEXMEDETOMIDINE HYDROCHLORIDE 100 UG/ML
INJECTION, SOLUTION INTRAVENOUS PRN
Status: DISCONTINUED | OUTPATIENT
Start: 2024-04-23 | End: 2024-04-23 | Stop reason: SDUPTHER

## 2024-04-23 RX ORDER — FENTANYL CITRATE 50 UG/ML
25 INJECTION, SOLUTION INTRAMUSCULAR; INTRAVENOUS EVERY 5 MIN PRN
Status: DISCONTINUED | OUTPATIENT
Start: 2024-04-23 | End: 2024-04-23 | Stop reason: HOSPADM

## 2024-04-23 RX ORDER — MIDAZOLAM HYDROCHLORIDE 2 MG/2ML
2 INJECTION, SOLUTION INTRAMUSCULAR; INTRAVENOUS
Status: DISCONTINUED | OUTPATIENT
Start: 2024-04-23 | End: 2024-04-23 | Stop reason: HOSPADM

## 2024-04-23 RX ORDER — SODIUM CHLORIDE, SODIUM LACTATE, POTASSIUM CHLORIDE, CALCIUM CHLORIDE 600; 310; 30; 20 MG/100ML; MG/100ML; MG/100ML; MG/100ML
INJECTION, SOLUTION INTRAVENOUS CONTINUOUS
Status: DISCONTINUED | OUTPATIENT
Start: 2024-04-23 | End: 2024-04-23 | Stop reason: HOSPADM

## 2024-04-23 RX ADMIN — EPHEDRINE SULFATE 10 MG: 50 INJECTION INTRAVENOUS at 12:33

## 2024-04-23 RX ADMIN — Medication 120 MCG: at 12:40

## 2024-04-23 RX ADMIN — WATER 2000 MG: 1 INJECTION INTRAMUSCULAR; INTRAVENOUS; SUBCUTANEOUS at 12:25

## 2024-04-23 RX ADMIN — LIDOCAINE HYDROCHLORIDE 100 MG: 20 INJECTION, SOLUTION EPIDURAL; INFILTRATION; INTRACAUDAL; PERINEURAL at 12:19

## 2024-04-23 RX ADMIN — PROPOFOL 50 MG: 10 INJECTION, EMULSION INTRAVENOUS at 12:19

## 2024-04-23 RX ADMIN — EPHEDRINE SULFATE 10 MG: 50 INJECTION INTRAVENOUS at 12:30

## 2024-04-23 RX ADMIN — ACETAMINOPHEN 1000 MG: 500 TABLET ORAL at 11:39

## 2024-04-23 RX ADMIN — PROPOFOL 50 MCG/KG/MIN: 10 INJECTION, EMULSION INTRAVENOUS at 12:20

## 2024-04-23 RX ADMIN — ONDANSETRON 4 MG: 2 INJECTION INTRAMUSCULAR; INTRAVENOUS at 12:38

## 2024-04-23 RX ADMIN — EPHEDRINE SULFATE 10 MG: 50 INJECTION INTRAVENOUS at 12:52

## 2024-04-23 RX ADMIN — Medication 80 MCG: at 12:45

## 2024-04-23 RX ADMIN — SODIUM CHLORIDE, POTASSIUM CHLORIDE, SODIUM LACTATE AND CALCIUM CHLORIDE: 600; 310; 30; 20 INJECTION, SOLUTION INTRAVENOUS at 11:34

## 2024-04-23 RX ADMIN — DEXMEDETOMIDINE HYDROCHLORIDE 10 MCG: 100 INJECTION, SOLUTION, CONCENTRATE INTRAVENOUS at 12:19

## 2024-04-23 ASSESSMENT — PAIN SCALES - GENERAL
PAINLEVEL_OUTOF10: 0
PAINLEVEL_OUTOF10: 0

## 2024-04-23 NOTE — ANESTHESIA PRE PROCEDURE
Department of Anesthesiology  Preprocedure Note       Name:  Courtney Cortez   Age:  73 y.o.  :  1950                                          MRN:  [id number]         Date:  2024      Surgeon: Surgeon(s):  Susan Murray DPM Tammaro, Sarah, DPM    Procedure: Procedure(s):  EXCISIONAL DEBRIDEMENT OF WOUND, APPLY GRAFT    Medications prior to admission:   Prior to Admission medications    Medication Sig Start Date End Date Taking? Authorizing Provider   ondansetron (ZOFRAN) 4 MG tablet Take 1 tablet by mouth as needed for Nausea or Vomiting Disintegtaing tablet.  Every 6 hrs prn    Yaneli Youngblood MD   Vitamin D3 125 MCG (5000 UT) TABS tablet Take 1 tablet by mouth daily    Yaneli Youngblood MD   insulin glargine (LANTUS) 100 UNIT/ML injection vial Inject 100 Units into the skin nightly    Yaneli Youngblood MD   acetaminophen (TYLENOL) 500 MG tablet Take 1 tablet by mouth every 8 (eight) hours    Yaneli Youngblood MD   Polyethylene Glycol 3350 (MIRALAX PO) Take 17 g by mouth daily as needed    Yaneli Youngblood MD   pregabalin (LYRICA) 75 MG capsule Take 1 capsule by mouth 2 times daily.    Yaneli Youngblood MD   vitamin C (ASCORBIC ACID) 500 MG tablet Take 1 tablet by mouth 2 times daily    Yaneli Youngblood MD   zinc sulfate (ZINCATE) 220 (50 Zn)  mg capsule - elemental zinc Take 1 capsule by mouth daily    Yaneli Youngblood MD   Pantoprazole Sodium (PROTONIX PO) Take 40 mg by mouth daily    Yaneli Youngblood MD   insulin glargine (LANTUS) 100 UNIT/ML injection vial Inject 100 Units into the skin every morning    Yaneli Youngblood MD   insulin lispro (HUMALOG) 100 UNIT/ML SOLN injection vial Inject 16 Units into the skin 3 times daily (with meals)    Yaneli Youngblood MD   escitalopram (LEXAPRO) 20 MG tablet Take 1 tablet by mouth daily    Yaneli Youngblood MD   vitamin B-6 (PYRIDOXINE) 100 MG tablet Take 1 tablet by mouth daily Unsure of dose

## 2024-04-23 NOTE — ANESTHESIA POSTPROCEDURE EVALUATION
4/23/2024    Multimodal analgesia pain management approach  Pain management: satisfactory to patient    No notable events documented.

## 2024-04-23 NOTE — BRIEF OP NOTE
Brief Postoperative Note      Patient: Courtney Cortze  YOB: 1950  MRN: [id number]    Date of Procedure: 4/23/2024    Pre-Op Diagnosis Codes:     * Ulcer of right foot, unspecified ulcer stage (HCC) [L97.519]    Post-Op Diagnosis: Same       Procedure(s):  EXCISIONAL DEBRIDEMENT OF WOUND, APPLY GRAFT RIGHT FOOT     Surgeon(s):  Shonda Lemons DPM Rubin, Laurence G, DPM    Assistant:  * No surgical staff found *    Anesthesia: Monitor Anesthesia Care    Estimated Blood Loss (mL): Minimal    Complications: None    Specimens:   * No specimens in log *    Implants:  * No implants in log *      Drains: * No LDAs found *    Findings:  Infection Present At Time Of Surgery (PATOS) (choose all levels that have infection present):  No infection present  Other Findings: OPEN WOUND RIGHT HEEL     Electronically signed by Susan Murray DPM on 4/23/2024 at 12:50 PM

## 2024-04-23 NOTE — DISCHARGE INSTRUCTIONS
INSTRUCTIONS FOLLOWING FOOT SURGERY    ACTIVITY:  Elevate feet for 48 hours  Use ice as directed by your doctor  Use crutches / walker as directed by your doctor, and follow your doctors instructions as to your weight bearing status - NO WEIGHT ON THE RIGHT SIDE     DIET:  Clear liquids until no nausea or vomiting; then light diet for the first day  An advance to regular diet on second day, unless your doctor orders otherwise.    PAIN:  Take pain medication as directed by your doctor.  Call your doctor if pain is not relieved by medication.  Do not take aspirin or blood thinners until directed by your doctor.    DRESSING CARE: keep dressing clean and dry, do not remove       FOLLOW-UP PHONE CALLS:  Calls will be made by nursing staff.  If you had any problems, call your doctor as needed.    CALL YOUR DOCTOR IF:  Excessive bleeding that does not stop after holding mild pressure over the area  Temperature of 101° F or above  Redness, excessive swelling or bruising, and/or green or yellow, smelly discharge from incision  Loss of sensation -cold, white, or blue toes    AFTER ANESTHESIA :   For the first 24 hours: do not drive, drink alcoholic beverages, or make important decisions.  Be aware of dizziness following anesthesia and while taking pain medication.          APPOINTMENT DATE/TIME: please call for an appointment    YOUR DOCTOR’S PHONE NUMBER: [phone]    ______________________________________________________________________    Anesthesia Discharge Instructions    After general anesthesia or intervenous sedation, for 24 hours or while taking prescription Narcotics:  Limit your activities  Do not drive or operate hazardous machinery  If you have not urinated within 8 hours after discharge, please contact your surgeon on call.  Do not make important personal or business decisions  Do not drink alcoholic beverages    Report the following to your surgeon:  Excessive pain, swelling, redness or odor of or around

## 2024-04-23 NOTE — FLOWSHEET NOTE
04/23/24 1241   Family Communication    Relationship to Patient Sister    Phone Number Kaci Figueroa [phone]   Family/Significant Other Update Called;Updated   Delivery Origin Nurse   Message Disposition Family present - message delivered   Update Given Yes   Family Communication   Family Update Message Procedure started;Surgeon working;Patient stable

## 2024-04-23 NOTE — OP NOTE
Operative Note    Patient: Courtney Cortez  YOB: 1950  MRN: [id number]     Date of Procedure: 4/23/2024     Pre-Op Diagnosis Codes:     * Ulcer of right foot, unspecified ulcer stage (HCC) [L97.519]     Post-Op Diagnosis: Same       Procedure(s):  EXCISIONAL DEBRIDEMENT OF WOUND, APPLY GRAFT RIGHT FOOT      Surgeon(s):  Shonda Lemons DPM Rubin, Laurence G, DPM     Assistant:  * No surgical staff found *     Anesthesia: Monitor Anesthesia Care     Estimated Blood Loss (mL): Minimal     Complications: None     Specimens:   * No specimens in log *     Implants:  * No implants in log *      Drains: * No LDAs found *     Findings:  Infection Present At Time Of Surgery (PATOS) (choose all levels that have infection present):  No infection present  Other Findings: OPEN WOUND RIGHT HEEL     The patient was seen in the preoperative area where we discussed her surgery including the risks and complications.  I reviewed that we would debride the heel and placed the graft and she is agreeable.  She was taken the operating room placed on the operating table supine position after adequate induction of sedation she was prepped and draped.  At this point attention was directed towards the right heel where excisional debridement of the heel was performed with the use of Metzenbaum scissors 15 blade and curettes.  Once the fibrotic tissue was debrided down to healthy bleeding tissue the graft was placed into the wound and then sutured with a 4-0 Monocryl into place.  A nonadherent dressing was placed followed by a dry sterile dressing.

## 2024-07-24 ENCOUNTER — TELEPHONE (OUTPATIENT)
Age: 74
End: 2024-07-24

## 2024-07-24 NOTE — TELEPHONE ENCOUNTER
Please call her - let her know I have not seen her since 9/2023 and we should plan for a visit in the next 2-3 months to touch base. Schedule as medicare AWV.    Janett Camacho MD

## 2024-08-20 RX ORDER — LOPERAMIDE HCL 2 MG
2 CAPSULE ORAL 4 TIMES DAILY PRN
COMMUNITY

## 2024-08-20 NOTE — PERIOP NOTE
CALLED PT, SHE LIVES AT [facility] IS OUT WITH HER SISTER AT THIS MOMENT, WILL HAVE NURSE FAX PATIENTS HX AND MEDS ETC.. FAX NUMBER GIVEN

## 2024-08-21 NOTE — PERIOP NOTE
[address] [address]   MAIN OR                                  [phone]    MAIN PRE OP             [phone]                                                                                AMBULATORY PRE OP          [phone]  PRE-ADMISSION TESTING    [phone]     Surgery Date:  8/22/24       Is surgery arrival time given by surgeon?  YES  NO    If “NO”, [facility] staff will call you between 4 and 7pm the day before your surgery with your arrival time. (If your surgery is on a Monday, we will call you the Friday before.)    Call [phone] after 7pm Monday-Friday if you did not receive this call.    INSTRUCTIONS BEFORE YOUR SURGERY   When You  Arrive Arrive at [facility] Patient Access on 1st floor the day of your surgery.  Have your insurance card, photo ID, living will/advanced directive/POA (if applicable),  and any copayment (if needed)   Food   and   Drink NO food or drink after midnight the night before surgery    This means NO water, gum, mints, coffee, juice, etc.  No alcohol (beer, wine, liquor) or marijuana (smoking) 24 hours prior to surgery, or Marijuana edibles for 3 days prior to surgery.  Stop smoking cigarettes 14 days before surgery (helps w/healing and breathing).   Medications to   TAKE   Morning of Surgery MEDICATIONS TO TAKE THE MORNING OF SURGERY WITH A SIP OF WATER: PREGABALIN, ESCITALOPRAM, ATORVASTATIN, LEVOTHYROXINE, PANTOPRAZOLE    You may take these medications, IF NEEDED, the morning of surgery: TYLENOL (MAY ONLY TAKE UP TO 4 HOURS PRIOR TO SURGERY), GAY  Ask your surgeon/prescribing doctor for instructions on taking or stopping these medications prior to surgery: LANTUS INSULIN DOSE, ASPIRIN (PT STOPPED 8/20/24 PER CRISTEL AT [facility]), PLAVIX (PT STOPPED 8/20/24 PER CRISTEL AT [facility])   Medications to STOP  before surgery Non-Steroidal anti-inflammatory Drugs (NSAID's): for example,

## 2024-08-21 NOTE — PERIOP NOTE
PAT PHONE INTERVIEW COMPLETED WITH CRISTEL, PT'S NURSE AT [facility].  VONDA BAEZ RN COMPLETED PT HISTORY AND MEDS 8/20/24 PER FAX RECEIVED FROM [facility]. CRISTEL STATES PT WILL BE USING [employer] [phone]. PT'S SISTER, CLARA DAWSON, WILL BE PRESENT DOS (POA) [phone]. INSTRUCTED CRISTEL TO SEND A COPY OF PT'S ADVANCED DIRECTIVE TO HOSPITAL WITH PT DOS. PT A&O X 3 AND SIGNS OWN CONSENTS PER PER CRISTEL. PT CAN TRANSFER WITH ASSISTANCE PER CRISTEL. PLAVIX AND ASPIRIN WERE STOPPED YESTERDAY PER CRISTEL. SHE STATES SHE IS UNAWARE OF ANY PT HISTORY OF MRSA. ALL INSTRUCTIONS FAXED TO CRISTEL [facility] [phone] WITH CONFIRMATION RECEIVED.    CALLED INFECTION CONTROL TO NOTIFY OF + MRSA RESULT IN EPIC FROM 2019. PER INFECTION CONTROL, CHART WILL BE FLAGGED FOR MRSA.

## 2024-08-22 ENCOUNTER — ANESTHESIA EVENT (OUTPATIENT)
Facility: HOSPITAL | Age: 74
End: 2024-08-22
Payer: MEDICARE

## 2024-08-22 ENCOUNTER — HOSPITAL ENCOUNTER (OUTPATIENT)
Facility: HOSPITAL | Age: 74
Setting detail: OUTPATIENT SURGERY
Discharge: HOME | End: 2024-08-22
Payer: MEDICARE

## 2024-08-22 ENCOUNTER — ANESTHESIA (OUTPATIENT)
Facility: HOSPITAL | Age: 74
End: 2024-08-22
Payer: MEDICARE

## 2024-08-22 VITALS
RESPIRATION RATE: 13 BRPM | WEIGHT: 241 LBS | BODY MASS INDEX: 38.73 KG/M2 | SYSTOLIC BLOOD PRESSURE: 122 MMHG | HEIGHT: 66 IN | OXYGEN SATURATION: 93 % | DIASTOLIC BLOOD PRESSURE: 55 MMHG | HEART RATE: 80 BPM | TEMPERATURE: 98 F

## 2024-08-22 LAB
GLUCOSE BLD STRIP.AUTO-MCNC: 222 MG/DL (ref 65–117)
GLUCOSE BLD STRIP.AUTO-MCNC: 253 MG/DL (ref 65–117)
SERVICE CMNT-IMP: ABNORMAL
SERVICE CMNT-IMP: ABNORMAL

## 2024-08-22 PROCEDURE — 6360000002 HC RX W HCPCS: Performed by: NURSE ANESTHETIST, CERTIFIED REGISTERED

## 2024-08-22 PROCEDURE — 7100000011 HC PHASE II RECOVERY - ADDTL 15 MIN

## 2024-08-22 PROCEDURE — 6360000002 HC RX W HCPCS

## 2024-08-22 PROCEDURE — 3700000001 HC ADD 15 MINUTES (ANESTHESIA)

## 2024-08-22 PROCEDURE — 2580000003 HC RX 258: Performed by: ANESTHESIOLOGY

## 2024-08-22 PROCEDURE — 7100000010 HC PHASE II RECOVERY - FIRST 15 MIN

## 2024-08-22 PROCEDURE — 6370000000 HC RX 637 (ALT 250 FOR IP): Performed by: ANESTHESIOLOGY

## 2024-08-22 PROCEDURE — C1763 CONN TISS, NON-HUMAN: HCPCS

## 2024-08-22 PROCEDURE — 3700000000 HC ANESTHESIA ATTENDED CARE

## 2024-08-22 PROCEDURE — 7100000000 HC PACU RECOVERY - FIRST 15 MIN

## 2024-08-22 PROCEDURE — 3600000012 HC SURGERY LEVEL 2 ADDTL 15MIN

## 2024-08-22 PROCEDURE — 2580000003 HC RX 258

## 2024-08-22 PROCEDURE — 3600000002 HC SURGERY LEVEL 2 BASE

## 2024-08-22 PROCEDURE — 2500000003 HC RX 250 WO HCPCS

## 2024-08-22 PROCEDURE — 2709999900 HC NON-CHARGEABLE SUPPLY

## 2024-08-22 PROCEDURE — 82962 GLUCOSE BLOOD TEST: CPT

## 2024-08-22 PROCEDURE — 7100000001 HC PACU RECOVERY - ADDTL 15 MIN

## 2024-08-22 DEVICE — IMPLANTABLE DEVICE: Type: IMPLANTABLE DEVICE | Site: FOOT | Status: FUNCTIONAL

## 2024-08-22 RX ORDER — MIDAZOLAM HYDROCHLORIDE 1 MG/ML
INJECTION INTRAMUSCULAR; INTRAVENOUS PRN
Status: DISCONTINUED | OUTPATIENT
Start: 2024-08-22 | End: 2024-08-22 | Stop reason: SDUPTHER

## 2024-08-22 RX ORDER — ONDANSETRON 2 MG/ML
4 INJECTION INTRAMUSCULAR; INTRAVENOUS
Status: DISCONTINUED | OUTPATIENT
Start: 2024-08-22 | End: 2024-08-22 | Stop reason: HOSPADM

## 2024-08-22 RX ORDER — LIDOCAINE HYDROCHLORIDE 20 MG/ML
INJECTION, SOLUTION EPIDURAL; INFILTRATION; INTRACAUDAL; PERINEURAL PRN
Status: DISCONTINUED | OUTPATIENT
Start: 2024-08-22 | End: 2024-08-22

## 2024-08-22 RX ORDER — SODIUM CHLORIDE 0.9 % (FLUSH) 0.9 %
5-40 SYRINGE (ML) INJECTION EVERY 12 HOURS SCHEDULED
Status: DISCONTINUED | OUTPATIENT
Start: 2024-08-22 | End: 2024-08-22 | Stop reason: HOSPADM

## 2024-08-22 RX ORDER — PROCHLORPERAZINE EDISYLATE 5 MG/ML
5 INJECTION INTRAMUSCULAR; INTRAVENOUS
Status: DISCONTINUED | OUTPATIENT
Start: 2024-08-22 | End: 2024-08-22 | Stop reason: HOSPADM

## 2024-08-22 RX ORDER — SODIUM CHLORIDE 9 MG/ML
INJECTION, SOLUTION INTRAVENOUS PRN
Status: DISCONTINUED | OUTPATIENT
Start: 2024-08-22 | End: 2024-08-22 | Stop reason: HOSPADM

## 2024-08-22 RX ORDER — ACETAMINOPHEN 500 MG
1000 TABLET ORAL ONCE
Status: COMPLETED | OUTPATIENT
Start: 2024-08-22 | End: 2024-08-22

## 2024-08-22 RX ORDER — MIDAZOLAM HYDROCHLORIDE 2 MG/2ML
2 INJECTION, SOLUTION INTRAMUSCULAR; INTRAVENOUS
Status: DISCONTINUED | OUTPATIENT
Start: 2024-08-22 | End: 2024-08-22 | Stop reason: HOSPADM

## 2024-08-22 RX ORDER — FENTANYL CITRATE 50 UG/ML
25 INJECTION, SOLUTION INTRAMUSCULAR; INTRAVENOUS EVERY 5 MIN PRN
Status: DISCONTINUED | OUTPATIENT
Start: 2024-08-22 | End: 2024-08-22 | Stop reason: HOSPADM

## 2024-08-22 RX ORDER — HYDROMORPHONE HYDROCHLORIDE 1 MG/ML
0.5 INJECTION, SOLUTION INTRAMUSCULAR; INTRAVENOUS; SUBCUTANEOUS EVERY 5 MIN PRN
Status: DISCONTINUED | OUTPATIENT
Start: 2024-08-22 | End: 2024-08-22 | Stop reason: HOSPADM

## 2024-08-22 RX ORDER — BUPIVACAINE HYDROCHLORIDE 5 MG/ML
INJECTION, SOLUTION EPIDURAL; INTRACAUDAL PRN
Status: DISCONTINUED | OUTPATIENT
Start: 2024-08-22 | End: 2024-08-22 | Stop reason: HOSPADM

## 2024-08-22 RX ORDER — OXYCODONE HYDROCHLORIDE 5 MG/1
5 TABLET ORAL
Status: DISCONTINUED | OUTPATIENT
Start: 2024-08-22 | End: 2024-08-22 | Stop reason: HOSPADM

## 2024-08-22 RX ORDER — SODIUM CHLORIDE 0.9 % (FLUSH) 0.9 %
5-40 SYRINGE (ML) INJECTION PRN
Status: DISCONTINUED | OUTPATIENT
Start: 2024-08-22 | End: 2024-08-22 | Stop reason: HOSPADM

## 2024-08-22 RX ORDER — LIDOCAINE HYDROCHLORIDE 10 MG/ML
1 INJECTION, SOLUTION EPIDURAL; INFILTRATION; INTRACAUDAL; PERINEURAL
Status: DISCONTINUED | OUTPATIENT
Start: 2024-08-22 | End: 2024-08-22 | Stop reason: HOSPADM

## 2024-08-22 RX ORDER — PROPOFOL 10 MG/ML
INJECTION, EMULSION INTRAVENOUS CONTINUOUS PRN
Status: DISCONTINUED | OUTPATIENT
Start: 2024-08-22 | End: 2024-08-22 | Stop reason: SDUPTHER

## 2024-08-22 RX ORDER — SODIUM CHLORIDE, SODIUM LACTATE, POTASSIUM CHLORIDE, CALCIUM CHLORIDE 600; 310; 30; 20 MG/100ML; MG/100ML; MG/100ML; MG/100ML
INJECTION, SOLUTION INTRAVENOUS CONTINUOUS
Status: DISCONTINUED | OUTPATIENT
Start: 2024-08-22 | End: 2024-08-22 | Stop reason: HOSPADM

## 2024-08-22 RX ORDER — HYDRALAZINE HYDROCHLORIDE 20 MG/ML
10 INJECTION INTRAMUSCULAR; INTRAVENOUS ONCE
Status: DISCONTINUED | OUTPATIENT
Start: 2024-08-22 | End: 2024-08-22 | Stop reason: HOSPADM

## 2024-08-22 RX ORDER — FENTANYL CITRATE 50 UG/ML
100 INJECTION, SOLUTION INTRAMUSCULAR; INTRAVENOUS
Status: DISCONTINUED | OUTPATIENT
Start: 2024-08-22 | End: 2024-08-22 | Stop reason: HOSPADM

## 2024-08-22 RX ORDER — NALOXONE HYDROCHLORIDE 0.4 MG/ML
INJECTION, SOLUTION INTRAMUSCULAR; INTRAVENOUS; SUBCUTANEOUS PRN
Status: DISCONTINUED | OUTPATIENT
Start: 2024-08-22 | End: 2024-08-22 | Stop reason: HOSPADM

## 2024-08-22 RX ADMIN — SODIUM CHLORIDE, POTASSIUM CHLORIDE, SODIUM LACTATE AND CALCIUM CHLORIDE: 600; 310; 30; 20 INJECTION, SOLUTION INTRAVENOUS at 08:43

## 2024-08-22 RX ADMIN — MIDAZOLAM HYDROCHLORIDE 1 MG: 1 INJECTION, SOLUTION INTRAMUSCULAR; INTRAVENOUS at 09:16

## 2024-08-22 RX ADMIN — WATER 2000 MG: 1 INJECTION INTRAMUSCULAR; INTRAVENOUS; SUBCUTANEOUS at 09:17

## 2024-08-22 RX ADMIN — SODIUM CHLORIDE, POTASSIUM CHLORIDE, SODIUM LACTATE AND CALCIUM CHLORIDE: 600; 310; 30; 20 INJECTION, SOLUTION INTRAVENOUS at 09:09

## 2024-08-22 RX ADMIN — PROPOFOL 25 MCG/KG/MIN: 10 INJECTION, EMULSION INTRAVENOUS at 09:16

## 2024-08-22 RX ADMIN — ACETAMINOPHEN 1000 MG: 500 TABLET ORAL at 08:33

## 2024-08-22 RX ADMIN — MIDAZOLAM HYDROCHLORIDE 2 MG: 1 INJECTION, SOLUTION INTRAMUSCULAR; INTRAVENOUS at 09:09

## 2024-08-22 ASSESSMENT — PAIN - FUNCTIONAL ASSESSMENT: PAIN_FUNCTIONAL_ASSESSMENT: 0-10

## 2024-08-22 NOTE — ANESTHESIA PRE PROCEDURE
YEIMI FARNSWORTH at [facility] MAIN OR    GYN      vaginal delivery x1    LEG SURGERY Right 2023    RIGHT FOOT INCISION AND DRAINAGE performed by Shonda Lemons DPM at [facility] MAIN OR    MOHS SURGERY      hayde: PT UNSURE    ORTHOPEDIC SURGERY  2007    R MT amputate    ORTHOPEDIC SURGERY      right foot abscess     ORTHOPEDIC SURGERY  2007    rigtht foot surgery    ORTHOPEDIC SURGERY  2009    left 2nd toe surgery    IA UNLISTED PROCEDURE CARDIAC SURGERY  2016    heart attack    TONSILLECTOMY  6 yrs. old    VASCULAR SURGERY Right     STENTS IN RIGHT LEG    WISDOM TOOTH EXTRACTION         Social History:    Social History     Tobacco Use    Smoking status: Former     Current packs/day: 0.00     Average packs/day: 1 pack/day for 25.0 years (25.0 ttl pk-yrs)     Types: Cigarettes     Start date: 1960     Quit date: 1985     Years since quittin.5    Smokeless tobacco: Never   Substance Use Topics    Alcohol use: Yes     Comment: VERY OCCASSIONALLY 1 GLASS OF WINE PER PT                                Counseling given: Not Answered      Vital Signs (Current):   Vitals:    24 1615 24 1123 24 0753   Weight: 109.3 kg (241 lb) 109.6 kg (241 lb 9.6 oz) 109.3 kg (241 lb)   Height: 1.676 m (5' 6\") 1.676 m (5' 6\") 1.676 m (5' 6\")                                              BP Readings from Last 3 Encounters:   24 107/61   24 111/62   24 (!) 104/50       NPO Status:                                                                                 BMI:   Wt Readings from Last 3 Encounters:   24 109.3 kg (241 lb)   24 97.9 kg (215 lb 12.8 oz)   24 92.1 kg (203 lb 0.7 oz)     Body mass index is 38.9 kg/m².    CBC:   Lab Results   Component Value Date/Time    WBC 6.9 2023 02:09 AM    RBC 3.88 2023 02:09 AM    HGB 10.4 2023 02:09 AM    HCT 32.5 2023 02:09 AM    MCV 83.8 2023 02:09 AM    RDW 14.2 2023 02:09 AM     2023

## 2024-08-22 NOTE — DISCHARGE INSTRUCTIONS
INSTRUCTIONS FOLLOWING FOOT SURGERY    ACTIVITY:  Elevate feet for 48 hours  Use ice as directed by your doctor  Use crutches / walker as directed by your doctor, and follow your doctors instructions as to your weight bearing status    DIET:  Clear liquids until no nausea or vomiting; then light diet for the first day  An advance to regular diet on second day, unless your doctor orders otherwise.    PAIN:  Take pain medication as directed by your doctor.  Call your doctor if pain is not relieved by medication.  Do not take aspirin or blood thinners until directed by your doctor.    DRESSING CARE: keep dressing clean and dry, do not remove.      FOLLOW-UP PHONE CALLS:  Calls will be made by nursing staff.  If you had any problems, call your doctor as needed.    CALL YOUR DOCTOR IF:  Excessive bleeding that does not stop after holding mild pressure over the area  Temperature of 101° F or above  Redness, excessive swelling or bruising, and/or green or yellow, smelly discharge from incision  Loss of sensation -cold, white, or blue toes    AFTER ANESTHESIA :   For the first 24 hours: do not drive, drink alcoholic beverages, or make important decisions.  Be aware of dizziness following anesthesia and while taking pain medication.      DISCHARGE MEDICATIONS: none, use OTC pain medication as needed     APPOINTMENT DATE/TIME: as scheduled     YOUR DOCTOR’S PHONE NUMBER: [phone]    WEIGHT BEARING: TOE TOUCH FOR TRANSFERS     Patient’s signature:  Date: 8/22/2024  Discharging Nurse:       ______________________________________________________________________    Anesthesia Discharge Instructions    After general anesthesia or intervenous sedation, for 24 hours or while taking prescription Narcotics:  Limit your activities  Do not drive or operate hazardous machinery  If you have not urinated within 8 hours after discharge, please contact your surgeon on call.  Do not make important personal or business decisions  Do not  drink alcoholic beverages    Report the following to your surgeon:  Excessive pain, swelling, redness or odor of or around the surgical area  Temperature over 100.5 degrees  Nausea and vomiting lasting longer than 4 hours or if unable to take medication  Any signs of decreased circulation or nerve impairment to extremity:  Change in color, persistent numbness, tingling, coldness or increased pain.  Any questions

## 2024-08-22 NOTE — ANESTHESIA POSTPROCEDURE EVALUATION
Department of Anesthesiology  Postprocedure Note    Patient: Courtney Cortez  MRN: [id number]  YOB: 1950  Date of evaluation: 8/22/2024    Procedure Summary       Date: 08/22/24 Room / Location: [facility] MAIN OR [address] [place] [address] MAIN OR    Anesthesia Start: 0909 Anesthesia Stop: 1001    Procedure: RIGHT FOOT WOUND DEBRIDEMENT OF ALL NONVIABLE TISSUE AND APPLICATION OF GRAFT (Right: Foot) Diagnosis:       Open wound of right foot, sequela      (Open wound of right foot, sequela [S91.301S])    Providers: Maddi Palm DPM Responsible Provider: Rochelle Gao DO    Anesthesia Type: MAC ASA Status: 3            Anesthesia Type: MAC    Gwen Phase I: Gwen Score: 8    Gwen Phase II:      Anesthesia Post Evaluation    Patient location during evaluation: PACU  Level of consciousness: awake  Airway patency: patent  Nausea & Vomiting: no nausea  Cardiovascular status: hemodynamically stable  Respiratory status: acceptable  Hydration status: stable  Multimodal analgesia pain management approach  Pain management: adequate    No notable events documented.

## 2024-08-22 NOTE — BRIEF OP NOTE
Brief Postoperative Note      Patient: Courtney Cortez  YOB: 1950  MRN: [id number]    Date of Procedure: 8/22/2024    Pre-Op Diagnosis Codes:      * Open wound of right foot, sequela [S91.301S]    Post-Op Diagnosis: Same       Procedure(s):  RIGHT FOOT WOUND DEBRIDEMENT OF ALL NONVIABLE TISSUE AND APPLICATION OF GRAFT    Surgeon(s):  Maddi Palm DPM    Assistant:  * No surgical staff found *    Anesthesia: General    Estimated Blood Loss (mL): Minimal    Complications: None    Specimens:   * No specimens in log *    Implants:  Implant Name Type Inv. Item Serial No.  Lot No. LRB No. Used Action   TISSUE BIO MATRIDERM 10.5 X 14.8CM 2MM MED - SNA  TISSUE BIO MATRIDERM 10.5 X 14.8CM 2MM MED NA ACCESS [facility] [id number] Right 1 Implanted         Drains: * No LDAs found *    Findings:  Infection Present At Time Of Surgery (PATOS) (choose all levels that have infection present):  No infection present  Other Findings: Healthy granular wound bed after debridement measured 5.5 cm X 3 cm X 1.5 cm    Electronically signed by Maddi Palm DPM on 8/22/2024 at 9:57 AM

## 2024-08-22 NOTE — OP NOTE
Operative Note      Patient: Courtney Cortez  YOB: 1950  MRN: [id number]    Date of Procedure: 8/22/2024    Pre-Op Diagnosis Codes:      * Open wound of right foot, sequela [S91.301S]    Post-Op Diagnosis: Same       Procedure(s):  RIGHT FOOT WOUND DEBRIDEMENT OF ALL NONVIABLE TISSUE AND APPLICATION OF GRAFT    Surgeon(s):  Maddi Palm DPM    Assistant:   * No surgical staff found *    Anesthesia: General    Estimated Blood Loss (mL): Minimal    Complications: None    Specimens:   * No specimens in log *    Implants:  Implant Name Type Inv. Item Serial No.  Lot No. LRB No. Used Action   TISSUE BIO MATRIDERM 10.5 X 14.8CM 2MM MED - SNA  TISSUE BIO MATRIDERM 10.5 X 14.8CM 2MM MED NA ACCESS [facility] [id number] Right 1 Implanted         Drains: * No LDAs found *    Findings:  Infection Present At Time Of Surgery (PATOS) (choose all levels that have infection present):  No infection present  Other Findings: Healthy granular wound bed after debridement measured 5.5 cm X 3 cm X 1.5 cm     Detailed Description of Procedure:   Briefly, Courtney is 75 yo female who presents with recurrent nonhealing diabetic right heel wound that is not responding to local wound care including wound vac.  Discussion took place regarding risks and benefits of surgical and non-surgical interventions as well as alternative treatments.  Patient wished to pursue surgery.  Risks and benefits were discussed at length including but not limited to risk of infection, nerve damage, wound complications, loss of correction/new deformity, swelling, blood clot formation, persistent infection, persistent pain and need for further surgery.  All questions were answered and the patient wished to proceed.       SURGERY PROCEDURE DETAIL: Patient was identified in the preoperative hold area.  Operative site was confirmed with the patient and marked with a marking pen.  Consent was reviewed with the patient and updated appropriately.   History and physical were updated as well. Patient was then transferred to the OR suite by nursing staff and kept on the stretcher  and all bony prominences were well padded.  No tourniquet was needed for the procedure.  The operative extremity was then prepped and draped in the usual sterile fashion.  After satisfactory identification was established by the staff of record, time out was held to confirm the correct operative site and room preparation.  Antibiotics were given.  DVT      Attention was directed to plantar surface of right foot. Wound measured 5 cm X 3 cm X 1 cm pre débridement. Using a combination of 15 blade and sonopet the wound was debrided from all nonviable soft tissue. Wound was granular and healthy. Wound post debridement measured 5.5 cm X 3 cm X 1.5 cm. To obtain hemostats raytec was soaked in thrombin and placed into wound. An escmar was placed around to wound to provide compression. Compression was held for 5 minutes. Once removed the wound bed was stable to graft application. Matriderm was placed into the wound and folded to fill in the depth of the wound. Graft was sutured with 4-0 Monocryl to be used as a skin graft. 10 CC of 0.5% marcaine was used for post op pain control.  Area was dressed with adaptic, gauze, Christian, web roll and coban was placed.   Patient tolerated procedure well was taken to the postanesthesia care in stable edition.  All counts are correct. No complications were identified.     Family/friend was updated on the procedure. She will need to be minimal WB and toe touch for transfers. Dressing will be left until follow up next week.       Electronically signed by Maddi Palm DPM on 8/22/2024 at 10:02 AM

## 2024-10-18 ENCOUNTER — HOSPITAL ENCOUNTER (OUTPATIENT)
Age: 74
Discharge: HOME | End: 2024-10-21
Payer: MEDICARE

## 2024-10-18 VITALS — BODY MASS INDEX: 39.7 KG/M2 | HEIGHT: 66 IN | WEIGHT: 247 LBS

## 2024-10-18 DIAGNOSIS — Z12.31 SCREENING MAMMOGRAM, ENCOUNTER FOR: ICD-10-CM

## 2024-10-18 PROCEDURE — 77063 BREAST TOMOSYNTHESIS BI: CPT

## 2025-06-09 ENCOUNTER — APPOINTMENT (OUTPATIENT)
Facility: HOSPITAL | Age: 75
DRG: 445 | End: 2025-06-09
Payer: MEDICARE

## 2025-06-09 ENCOUNTER — HOSPITAL ENCOUNTER (INPATIENT)
Facility: HOSPITAL | Age: 75
LOS: 2 days | Discharge: HOME HEALTH | DRG: 445 | End: 2025-06-11
Attending: EMERGENCY MEDICINE | Admitting: SURGERY
Payer: MEDICARE

## 2025-06-09 DIAGNOSIS — K81.0 ACUTE CHOLECYSTITIS: Primary | ICD-10-CM

## 2025-06-09 DIAGNOSIS — N39.0 URINARY TRACT INFECTION WITHOUT HEMATURIA, SITE UNSPECIFIED: ICD-10-CM

## 2025-06-09 PROBLEM — K80.20 CALCULUS OF GALLBLADDER WITHOUT CHOLECYSTITIS WITHOUT OBSTRUCTION: Status: ACTIVE | Noted: 2025-06-09

## 2025-06-09 LAB
ALBUMIN SERPL-MCNC: 3.3 G/DL (ref 3.5–5)
ALBUMIN/GLOB SERPL: 0.8 (ref 1.1–2.2)
ALP SERPL-CCNC: 336 U/L (ref 45–117)
ALT SERPL-CCNC: 36 U/L (ref 12–78)
ANION GAP SERPL CALC-SCNC: 4 MMOL/L (ref 2–12)
AST SERPL-CCNC: 23 U/L (ref 15–37)
BASOPHILS # BLD: 0.03 K/UL (ref 0–0.1)
BASOPHILS NFR BLD: 0.5 % (ref 0–1)
BILIRUB DIRECT SERPL-MCNC: 0.2 MG/DL (ref 0–0.2)
BILIRUB SERPL-MCNC: 0.5 MG/DL (ref 0.2–1)
BUN SERPL-MCNC: 35 MG/DL (ref 6–20)
BUN/CREAT SERPL: 32 (ref 12–20)
CALCIUM SERPL-MCNC: 9.2 MG/DL (ref 8.5–10.1)
CHLORIDE SERPL-SCNC: 112 MMOL/L (ref 97–108)
CO2 SERPL-SCNC: 22 MMOL/L (ref 21–32)
COMMENT:: NORMAL
CREAT SERPL-MCNC: 1.1 MG/DL (ref 0.55–1.02)
DIFFERENTIAL METHOD BLD: ABNORMAL
EOSINOPHIL # BLD: 0.15 K/UL (ref 0–0.4)
EOSINOPHIL NFR BLD: 2.6 % (ref 0–7)
ERYTHROCYTE [DISTWIDTH] IN BLOOD BY AUTOMATED COUNT: 15.9 % (ref 11.5–14.5)
GLOBULIN SER CALC-MCNC: 4.1 G/DL (ref 2–4)
GLUCOSE BLD STRIP.AUTO-MCNC: 248 MG/DL (ref 65–117)
GLUCOSE SERPL-MCNC: 179 MG/DL (ref 65–100)
HCT VFR BLD AUTO: 36.3 % (ref 35–47)
HGB BLD-MCNC: 11.1 G/DL (ref 11.5–16)
IMM GRANULOCYTES # BLD AUTO: 0.02 K/UL (ref 0–0.04)
IMM GRANULOCYTES NFR BLD AUTO: 0.4 % (ref 0–0.5)
LIPASE SERPL-CCNC: 17 U/L (ref 13–75)
LYMPHOCYTES # BLD: 1.16 K/UL (ref 0.8–3.5)
LYMPHOCYTES NFR BLD: 20.5 % (ref 12–49)
MCH RBC QN AUTO: 26.2 PG (ref 26–34)
MCHC RBC AUTO-ENTMCNC: 30.6 G/DL (ref 30–36.5)
MCV RBC AUTO: 85.6 FL (ref 80–99)
MONOCYTES # BLD: 0.6 K/UL (ref 0–1)
MONOCYTES NFR BLD: 10.6 % (ref 5–13)
NEUTS SEG # BLD: 3.71 K/UL (ref 1.8–8)
NEUTS SEG NFR BLD: 65.4 % (ref 32–75)
NRBC # BLD: 0 K/UL (ref 0–0.01)
NRBC BLD-RTO: 0 PER 100 WBC
PLATELET # BLD AUTO: 238 K/UL (ref 150–400)
PMV BLD AUTO: 10.3 FL (ref 8.9–12.9)
POTASSIUM SERPL-SCNC: 4.8 MMOL/L (ref 3.5–5.1)
PROT SERPL-MCNC: 7.4 G/DL (ref 6.4–8.2)
RBC # BLD AUTO: 4.24 M/UL (ref 3.8–5.2)
SERVICE CMNT-IMP: ABNORMAL
SODIUM SERPL-SCNC: 138 MMOL/L (ref 136–145)
SPECIMEN HOLD: NORMAL
TROPONIN I SERPL HS-MCNC: 8 NG/L (ref 0–51)
WBC # BLD AUTO: 5.7 K/UL (ref 3.6–11)

## 2025-06-09 PROCEDURE — 93005 ELECTROCARDIOGRAM TRACING: CPT | Performed by: EMERGENCY MEDICINE

## 2025-06-09 PROCEDURE — 2580000003 HC RX 258: Performed by: EMERGENCY MEDICINE

## 2025-06-09 PROCEDURE — 6370000000 HC RX 637 (ALT 250 FOR IP): Performed by: SURGERY

## 2025-06-09 PROCEDURE — 99221 1ST HOSP IP/OBS SF/LOW 40: CPT | Performed by: NURSE PRACTITIONER

## 2025-06-09 PROCEDURE — 74177 CT ABD & PELVIS W/CONTRAST: CPT

## 2025-06-09 PROCEDURE — 82248 BILIRUBIN DIRECT: CPT

## 2025-06-09 PROCEDURE — 84484 ASSAY OF TROPONIN QUANT: CPT

## 2025-06-09 PROCEDURE — 99285 EMERGENCY DEPT VISIT HI MDM: CPT

## 2025-06-09 PROCEDURE — 2500000003 HC RX 250 WO HCPCS: Performed by: EMERGENCY MEDICINE

## 2025-06-09 PROCEDURE — 80053 COMPREHEN METABOLIC PANEL: CPT

## 2025-06-09 PROCEDURE — 1100000000 HC RM PRIVATE

## 2025-06-09 PROCEDURE — 83690 ASSAY OF LIPASE: CPT

## 2025-06-09 PROCEDURE — 6360000002 HC RX W HCPCS: Performed by: EMERGENCY MEDICINE

## 2025-06-09 PROCEDURE — 2580000003 HC RX 258: Performed by: SURGERY

## 2025-06-09 PROCEDURE — 85025 COMPLETE CBC W/AUTO DIFF WBC: CPT

## 2025-06-09 PROCEDURE — 81001 URINALYSIS AUTO W/SCOPE: CPT

## 2025-06-09 PROCEDURE — 82962 GLUCOSE BLOOD TEST: CPT

## 2025-06-09 PROCEDURE — 6360000004 HC RX CONTRAST MEDICATION: Performed by: EMERGENCY MEDICINE

## 2025-06-09 PROCEDURE — 2500000003 HC RX 250 WO HCPCS: Performed by: SURGERY

## 2025-06-09 PROCEDURE — 87086 URINE CULTURE/COLONY COUNT: CPT

## 2025-06-09 PROCEDURE — 76705 ECHO EXAM OF ABDOMEN: CPT

## 2025-06-09 RX ORDER — SODIUM CHLORIDE 0.9 % (FLUSH) 0.9 %
5-40 SYRINGE (ML) INJECTION EVERY 12 HOURS SCHEDULED
Status: DISCONTINUED | OUTPATIENT
Start: 2025-06-09 | End: 2025-06-11 | Stop reason: HOSPADM

## 2025-06-09 RX ORDER — OXYCODONE HYDROCHLORIDE 5 MG/1
10 TABLET ORAL EVERY 4 HOURS PRN
Status: DISCONTINUED | OUTPATIENT
Start: 2025-06-09 | End: 2025-06-11 | Stop reason: HOSPADM

## 2025-06-09 RX ORDER — ESCITALOPRAM OXALATE 10 MG/1
20 TABLET ORAL DAILY
Status: DISCONTINUED | OUTPATIENT
Start: 2025-06-10 | End: 2025-06-11 | Stop reason: HOSPADM

## 2025-06-09 RX ORDER — ONDANSETRON 2 MG/ML
4 INJECTION INTRAMUSCULAR; INTRAVENOUS EVERY 6 HOURS PRN
Status: DISCONTINUED | OUTPATIENT
Start: 2025-06-09 | End: 2025-06-11 | Stop reason: HOSPADM

## 2025-06-09 RX ORDER — SODIUM CHLORIDE, SODIUM LACTATE, POTASSIUM CHLORIDE, AND CALCIUM CHLORIDE .6; .31; .03; .02 G/100ML; G/100ML; G/100ML; G/100ML
1000 INJECTION, SOLUTION INTRAVENOUS ONCE
Status: COMPLETED | OUTPATIENT
Start: 2025-06-09 | End: 2025-06-09

## 2025-06-09 RX ORDER — IOPAMIDOL 755 MG/ML
100 INJECTION, SOLUTION INTRAVASCULAR
Status: COMPLETED | OUTPATIENT
Start: 2025-06-09 | End: 2025-06-09

## 2025-06-09 RX ORDER — LEVOTHYROXINE SODIUM 125 UG/1
250 TABLET ORAL DAILY
Status: DISCONTINUED | OUTPATIENT
Start: 2025-06-10 | End: 2025-06-11 | Stop reason: HOSPADM

## 2025-06-09 RX ORDER — METRONIDAZOLE 500 MG/100ML
500 INJECTION, SOLUTION INTRAVENOUS ONCE
Status: COMPLETED | OUTPATIENT
Start: 2025-06-09 | End: 2025-06-09

## 2025-06-09 RX ORDER — INSULIN GLARGINE 100 [IU]/ML
100 INJECTION, SOLUTION SUBCUTANEOUS NIGHTLY
Status: DISCONTINUED | OUTPATIENT
Start: 2025-06-09 | End: 2025-06-10 | Stop reason: DRUGHIGH

## 2025-06-09 RX ORDER — HYDRALAZINE HYDROCHLORIDE 20 MG/ML
20 INJECTION INTRAMUSCULAR; INTRAVENOUS EVERY 4 HOURS PRN
Status: DISCONTINUED | OUTPATIENT
Start: 2025-06-09 | End: 2025-06-11 | Stop reason: HOSPADM

## 2025-06-09 RX ORDER — ENOXAPARIN SODIUM 100 MG/ML
30 INJECTION SUBCUTANEOUS EVERY 12 HOURS
Status: DISCONTINUED | OUTPATIENT
Start: 2025-06-10 | End: 2025-06-11 | Stop reason: HOSPADM

## 2025-06-09 RX ORDER — INSULIN GLARGINE 100 [IU]/ML
100 INJECTION, SOLUTION SUBCUTANEOUS EVERY MORNING
Status: DISCONTINUED | OUTPATIENT
Start: 2025-06-10 | End: 2025-06-10 | Stop reason: DRUGHIGH

## 2025-06-09 RX ORDER — METRONIDAZOLE 500 MG/100ML
500 INJECTION, SOLUTION INTRAVENOUS EVERY 8 HOURS
Status: DISCONTINUED | OUTPATIENT
Start: 2025-06-10 | End: 2025-06-11 | Stop reason: HOSPADM

## 2025-06-09 RX ORDER — LOPERAMIDE HYDROCHLORIDE 2 MG/1
2 CAPSULE ORAL 2 TIMES DAILY
Status: DISCONTINUED | OUTPATIENT
Start: 2025-06-09 | End: 2025-06-10

## 2025-06-09 RX ORDER — PREGABALIN 75 MG/1
75 CAPSULE ORAL 2 TIMES DAILY
Status: DISCONTINUED | OUTPATIENT
Start: 2025-06-09 | End: 2025-06-11 | Stop reason: HOSPADM

## 2025-06-09 RX ORDER — DEXTROSE MONOHYDRATE 100 MG/ML
INJECTION, SOLUTION INTRAVENOUS CONTINUOUS PRN
Status: DISCONTINUED | OUTPATIENT
Start: 2025-06-09 | End: 2025-06-11 | Stop reason: HOSPADM

## 2025-06-09 RX ORDER — ASPIRIN 81 MG/1
81 TABLET, CHEWABLE ORAL DAILY
Status: DISCONTINUED | OUTPATIENT
Start: 2025-06-10 | End: 2025-06-11 | Stop reason: HOSPADM

## 2025-06-09 RX ORDER — ACETAMINOPHEN 325 MG/1
650 TABLET ORAL EVERY 4 HOURS PRN
Status: DISCONTINUED | OUTPATIENT
Start: 2025-06-09 | End: 2025-06-11 | Stop reason: HOSPADM

## 2025-06-09 RX ORDER — ZINC SULFATE 50(220)MG
50 CAPSULE ORAL DAILY
Status: DISCONTINUED | OUTPATIENT
Start: 2025-06-10 | End: 2025-06-11 | Stop reason: HOSPADM

## 2025-06-09 RX ORDER — HYDROMORPHONE HYDROCHLORIDE 1 MG/ML
1 INJECTION, SOLUTION INTRAMUSCULAR; INTRAVENOUS; SUBCUTANEOUS
Status: DISCONTINUED | OUTPATIENT
Start: 2025-06-09 | End: 2025-06-11 | Stop reason: HOSPADM

## 2025-06-09 RX ORDER — SODIUM CHLORIDE 9 MG/ML
INJECTION, SOLUTION INTRAVENOUS CONTINUOUS
Status: DISCONTINUED | OUTPATIENT
Start: 2025-06-09 | End: 2025-06-11 | Stop reason: HOSPADM

## 2025-06-09 RX ORDER — PANTOPRAZOLE SODIUM 40 MG/1
40 TABLET, DELAYED RELEASE ORAL DAILY
Status: DISCONTINUED | OUTPATIENT
Start: 2025-06-10 | End: 2025-06-11 | Stop reason: HOSPADM

## 2025-06-09 RX ORDER — SODIUM CHLORIDE 9 MG/ML
INJECTION, SOLUTION INTRAVENOUS PRN
Status: DISCONTINUED | OUTPATIENT
Start: 2025-06-09 | End: 2025-06-11 | Stop reason: HOSPADM

## 2025-06-09 RX ORDER — SODIUM CHLORIDE 0.9 % (FLUSH) 0.9 %
5-40 SYRINGE (ML) INJECTION PRN
Status: DISCONTINUED | OUTPATIENT
Start: 2025-06-09 | End: 2025-06-11 | Stop reason: HOSPADM

## 2025-06-09 RX ORDER — ONDANSETRON 4 MG/1
4 TABLET, ORALLY DISINTEGRATING ORAL EVERY 6 HOURS PRN
Status: DISCONTINUED | OUTPATIENT
Start: 2025-06-09 | End: 2025-06-11 | Stop reason: HOSPADM

## 2025-06-09 RX ORDER — OXYCODONE HYDROCHLORIDE 5 MG/1
5 TABLET ORAL EVERY 4 HOURS PRN
Status: DISCONTINUED | OUTPATIENT
Start: 2025-06-09 | End: 2025-06-11 | Stop reason: HOSPADM

## 2025-06-09 RX ORDER — ATORVASTATIN CALCIUM 40 MG/1
40 TABLET, FILM COATED ORAL NIGHTLY
Status: DISCONTINUED | OUTPATIENT
Start: 2025-06-09 | End: 2025-06-11 | Stop reason: HOSPADM

## 2025-06-09 RX ORDER — INSULIN LISPRO 100 [IU]/ML
0-16 INJECTION, SOLUTION INTRAVENOUS; SUBCUTANEOUS
Status: DISCONTINUED | OUTPATIENT
Start: 2025-06-09 | End: 2025-06-11 | Stop reason: HOSPADM

## 2025-06-09 RX ADMIN — SODIUM CHLORIDE, PRESERVATIVE FREE 10 ML: 5 INJECTION INTRAVENOUS at 22:31

## 2025-06-09 RX ADMIN — SODIUM CHLORIDE: 0.9 INJECTION, SOLUTION INTRAVENOUS at 22:41

## 2025-06-09 RX ADMIN — PREGABALIN 75 MG: 75 CAPSULE ORAL at 22:31

## 2025-06-09 RX ADMIN — ATORVASTATIN CALCIUM 40 MG: 40 TABLET, FILM COATED ORAL at 22:30

## 2025-06-09 RX ADMIN — WATER 2000 MG: 1 INJECTION INTRAMUSCULAR; INTRAVENOUS; SUBCUTANEOUS at 19:19

## 2025-06-09 RX ADMIN — INSULIN LISPRO 4 UNITS: 100 INJECTION, SOLUTION INTRAVENOUS; SUBCUTANEOUS at 22:47

## 2025-06-09 RX ADMIN — METRONIDAZOLE 500 MG: 500 INJECTION, SOLUTION INTRAVENOUS at 19:23

## 2025-06-09 RX ADMIN — IOPAMIDOL 100 ML: 755 INJECTION, SOLUTION INTRAVENOUS at 17:14

## 2025-06-09 RX ADMIN — SODIUM CHLORIDE, SODIUM LACTATE, POTASSIUM CHLORIDE, AND CALCIUM CHLORIDE 1000 ML: .6; .31; .03; .02 INJECTION, SOLUTION INTRAVENOUS at 15:13

## 2025-06-09 RX ADMIN — LOPERAMIDE HYDROCHLORIDE 2 MG: 2 CAPSULE ORAL at 22:31

## 2025-06-09 ASSESSMENT — ENCOUNTER SYMPTOMS
COUGH: 0
VOMITING: 1
ABDOMINAL PAIN: 1
DIARRHEA: 0
SHORTNESS OF BREATH: 0
ANAL BLEEDING: 0
ABDOMINAL DISTENTION: 0
BLOOD IN STOOL: 0
NAUSEA: 1

## 2025-06-09 ASSESSMENT — LIFESTYLE VARIABLES
HOW OFTEN DO YOU HAVE A DRINK CONTAINING ALCOHOL: NEVER
HOW MANY STANDARD DRINKS CONTAINING ALCOHOL DO YOU HAVE ON A TYPICAL DAY: PATIENT DOES NOT DRINK

## 2025-06-09 ASSESSMENT — PAIN SCALES - GENERAL
PAINLEVEL_OUTOF10: 0
PAINLEVEL_OUTOF10: 0

## 2025-06-09 ASSESSMENT — PAIN - FUNCTIONAL ASSESSMENT: PAIN_FUNCTIONAL_ASSESSMENT: 0-10

## 2025-06-09 NOTE — ED PROVIDER NOTES
Susan Murray DPM at [facility] MAIN OR    BREAST BIOPSY      many years ago; laterality unknown no scar    BREAST SURGERY  2000's    benign breast cyst    COLONOSCOPY      FOOT DEBRIDEMENT Right 3/7/2024    RIGHT FOOT DEBRIDEMENT OF CALCANEUS, Antibiotic bead exchange and bone biopsy performed by Susan Murray DPM at [facility] MAIN OR    FOOT DEBRIDEMENT Right 4/23/2024    EXCISIONAL DEBRIDEMENT OF WOUND, APPLY GRAFT performed by Susan Murray DPM at [facility] MAIN OR    FOOT DEBRIDEMENT Right 3/28/2024    RIGHT FOOT DEBRIDE WOUND, APPLY GRAFT, REMOVE ANTIBOTIC BEADS performed by Susan Murray DPM at [facility] MAIN OR    FOOT DEBRIDEMENT Right 8/22/2024    RIGHT FOOT WOUND DEBRIDEMENT OF ALL NONVIABLE TISSUE AND APPLICATION OF GRAFT performed by Maddi Palm DPM at [facility] MAIN OR    FOOT SURGERY Right 02/06/2024    RIGHT FOOT DEBRIDEMENT OF CALCANEUS/ BONE BIOPSIES/ INSERTION OF ANTIBIOTIC BEADS performed by Susan Murray DPM at [facility] MAIN OR    GYN      vaginal delivery x1    LEG SURGERY Right 12/11/2023    RIGHT FOOT INCISION AND DRAINAGE performed by Shonda Lemons DPM at [facility] MAIN OR    MOHS SURGERY  2005    hayde: PT UNSURE    ORTHOPEDIC SURGERY  2007    R MT amputate    ORTHOPEDIC SURGERY  2015    right foot abscess     [facility]    ORTHOPEDIC SURGERY  2009    left 2nd toe surgery    FL UNLISTED PROCEDURE CARDIAC SURGERY  11/2016    heart attack    TONSILLECTOMY  6 yrs. old    VASCULAR SURGERY Right     STENTS IN RIGHT LEG    WISDOM TOOTH EXTRACTION           CURRENT MEDICATIONS       Current Discharge Medication List        CONTINUE these medications which have NOT CHANGED    Details   Insulin Glargine, 2 Unit Dial, (TOUJEO MAX SOLOSTAR) 300 UNIT/ML concentrated injection pen Inject 30 Units into the skin daily      Dextromethorphan-guaiFENesin (ROBITUSSIN COUGH+CHEST CAROL DM PO) Take 10 mLs by mouth      loperamide (IMODIUM) 2 MG capsule Take 1 capsule by mouth 4  times daily as needed for Diarrhea      ondansetron (ZOFRAN) 4 MG tablet Take 1 tablet by mouth as needed for Nausea or Vomiting Disintegtaing tablet.  Every 6 hrs prn      Vitamin D3 125 MCG (5000 UT) TABS tablet Take 1 tablet by mouth daily      acetaminophen (TYLENOL) 500 MG tablet Take 1 tablet by mouth every 8 (eight) hours      Polyethylene Glycol 3350 (MIRALAX PO) Take 17 g by mouth daily as needed      pregabalin (LYRICA) 75 MG capsule Take 1 capsule by mouth 2 times daily.      vitamin C (ASCORBIC ACID) 500 MG tablet Take 1 tablet by mouth 2 times daily      zinc sulfate (ZINCATE) 220 (50 Zn)  mg capsule - elemental zinc Take 1 capsule by mouth daily      Pantoprazole Sodium (PROTONIX PO) Take 40 mg by mouth daily      insulin lispro (HUMALOG) 100 UNIT/ML SOLN injection vial Inject 15 Units into the skin 3 times daily (with meals)      escitalopram (LEXAPRO) 20 MG tablet Take 1 tablet by mouth daily      vitamin B-6 (PYRIDOXINE) 100 MG tablet Take 1 tablet by mouth daily Unsure of dose      aspirin 81 MG chewable tablet Take 1 tablet by mouth daily      atorvastatin (LIPITOR) 40 MG tablet Take 1 tablet by mouth at bedtime      clopidogrel (PLAVIX) 75 MG tablet Take 1 tablet by mouth daily PER BARBARA AT [facility],  TOOK LAST DOSE ON 3/4/24      fenofibrate micronized (LOFIBRA) 67 MG capsule Take 1 capsule by mouth every morning (before breakfast)      levothyroxine (SYNTHROID) 125 MCG tablet Take 2 tablets by mouth Daily             ALLERGIES     Patient has no known allergies.    FAMILY HISTORY       Family History   Problem Relation Age of Onset    Breast Cancer Mother         metastatic    Heart Disease Father         CHF    Heart Attack Father         congestive heart faliure    Diabetes Father     Diabetes Maternal Aunt     Diabetes Maternal Uncle     Anesth Problems Neg Hx           SOCIAL HISTORY       Social History     Socioeconomic History    Marital status:    Tobacco

## 2025-06-09 NOTE — PROGRESS NOTES
Possible acute cholecystitis and diarrhea. Will admit and start abx. Will let plavix wear off.    Phi Jarrett MD, FACS, [facility]  Bariatric and General Surgeon  [facility]

## 2025-06-09 NOTE — ED NOTES
ED TO INPATIENT SBAR HANDOFF    Patient Name: Courtney Cortez   :  1950  75 y.o.   MRN:  [id number]  ED Room #:  C/HC     Situation  Code Status: Prior   Allergies: Patient has no known allergies.  Weight: No data found.    Arrived from: home    Chief Complaint:   Chief Complaint   Patient presents with    Abdominal Pain       Hospital Problem/Diagnosis:  Principal Problem:    Acute cholecystitis  Active Problems:    Calculus of gallbladder without cholecystitis without obstruction  Resolved Problems:    * No resolved hospital problems. *      Mobility: no current mobility problem   ED Fall Risk: Presents to emergency department  because of falls (Syncope, seizure, or loss of consciousness): No, Age > 70: Yes, Altered Mental Status, Intoxication with alcohol or substance confusion (Disorientation, impaired judgment, poor safety awaremess, or inability to follow instructions): No, Impaired Mobility: Ambulates or transfers with assistive devices or assistance; Unable to ambulate or transer.: No, Nursing Judgement: Yes   Fell in ED or prior to admission: no   Restraints: no     Sitter: no   Family/Caregiver Present: no    Neet to know social/safety information:     Background  History:   Past Medical History:   Diagnosis Date    Acute colitis 10/29/2014    Adverse effect of anesthesia     slow to wake up    Allergic rhinitis     Arthritis     hands    Athscl heart disease of native coronary artery w/o ang pctrs     Body mass index 34.0-34.9, adult     Bronchitis     CAD (coronary artery disease) ,     angio/mild:MI, heart cath    Candidiasis, unspecified     Cellulitis, unspecified     Diabetes (HCC)     Type II: insulin    Diabetic foot ulcer (HCC) 2009    Foot infection 2019    GERD (gastroesophageal reflux disease)     GERD (gastroesophageal reflux disease)     Hypercholesterolemia     PT DENIES    Hypertension     Hypothyroid     Ischemic colitis 2014    Major depressive disorder      Menopause 2000    Metabolic encephalopathy     Morbid obesity due to excess calories (HCC)     MRSA (methicillin resistant staph aureus) culture positive 2000    PT UNSURE OF LOCATION, PER CRISTEL, NURSE AT [facility], SHE IS UNAWARE OF PT HISTORY OF MRSA AS OF 8/21/24)    Nausea     Neuropathy     feet/legs: ulcer right foot    Obesity     Other acute osteomyelitis, right ankle and foot (HCC)     Peripheral vascular disease     Psychiatric disorder     Depression    Sepsis due to Enterococcus (HCC)     Sepsis, unspecified organism (HCC)     Sleep apnea     no use CPAP: unable to use, aleksandr me    Sleep apnea     Surgical proc, unsp cause abn react/compl, w/o misadvnt     Ulcer 11/2016    Callus that turned into open wound bottom right foot(ball area), not draining    Vertigo     Vitamin B deficiency     Vitamin D deficiency     Vitamin deficiency, unspecified        Assessment    Abnormal Assessment Findings:   Imaging:   CT ABDOMEN PELVIS W IV CONTRAST Additional Contrast? None   Final Result      1. Findings suggestive of acute colitis.   2. Focal area of skin thickening/subcutaneous collection at the right anterior   abdominal wall. This is nonspecific and could reflect area of soft tissue   contusion. Recommend correlation with clinical exam.   3. Other chronic findings as above.         Electronically signed by Kellie Salcido       GALLBLADDER RUQ   Final Result   1. Cholelithiasis with gallbladder distention but without biliary ductal   dilatation..   2. Heterogeneous echotexture of liver parenchyma, making evaluation of the liver   difficult sonographically. Further evaluation would require dedicated hepatic   MRI or CT.         Electronically signed by TYSHAWN SINGH        Abnormal labs:   Abnormal Labs Reviewed   CBC WITH AUTO DIFFERENTIAL - Abnormal; Notable for the following components:       Result Value    Hemoglobin 11.1 (*)     RDW 15.9 (*)     All other components

## 2025-06-09 NOTE — ED TRIAGE NOTES
Patient arrives to the ED by EMS from [facility] for complaints of abdominal pain. Per staff at facility, they did a CT that showed cholecystitis.     Patient denies pain on arrival to ED. Patient NPO.      .

## 2025-06-10 PROBLEM — K81.0 ACUTE CHOLECYSTITIS: Status: RESOLVED | Noted: 2025-06-09 | Resolved: 2025-06-10

## 2025-06-10 PROBLEM — K52.9 COLITIS: Status: ACTIVE | Noted: 2025-06-10

## 2025-06-10 LAB
ANION GAP SERPL CALC-SCNC: 7 MMOL/L (ref 2–12)
APPEARANCE UR: ABNORMAL
BACTERIA URNS QL MICRO: ABNORMAL /HPF
BASOPHILS # BLD: 0.03 K/UL (ref 0–0.1)
BASOPHILS NFR BLD: 0.5 % (ref 0–1)
BILIRUB UR QL: NEGATIVE
BUN SERPL-MCNC: 28 MG/DL (ref 6–20)
BUN/CREAT SERPL: 28 (ref 12–20)
CALCIUM SERPL-MCNC: 9.4 MG/DL (ref 8.5–10.1)
CHLORIDE SERPL-SCNC: 113 MMOL/L (ref 97–108)
CO2 SERPL-SCNC: 20 MMOL/L (ref 21–32)
COLOR UR: ABNORMAL
CREAT SERPL-MCNC: 0.99 MG/DL (ref 0.55–1.02)
DIFFERENTIAL METHOD BLD: ABNORMAL
EKG ATRIAL RATE: 79 BPM
EKG DIAGNOSIS: NORMAL
EKG P AXIS: 27 DEGREES
EKG P-R INTERVAL: 182 MS
EKG Q-T INTERVAL: 414 MS
EKG QRS DURATION: 142 MS
EKG QTC CALCULATION (BAZETT): 474 MS
EKG R AXIS: -65 DEGREES
EKG T AXIS: 37 DEGREES
EKG VENTRICULAR RATE: 79 BPM
EOSINOPHIL # BLD: 0.18 K/UL (ref 0–0.4)
EOSINOPHIL NFR BLD: 3.1 % (ref 0–7)
EPITH CASTS URNS QL MICRO: ABNORMAL /LPF
ERYTHROCYTE [DISTWIDTH] IN BLOOD BY AUTOMATED COUNT: 15.9 % (ref 11.5–14.5)
EST. AVERAGE GLUCOSE BLD GHB EST-MCNC: 183 MG/DL
GLUCOSE BLD STRIP.AUTO-MCNC: 189 MG/DL (ref 65–117)
GLUCOSE BLD STRIP.AUTO-MCNC: 237 MG/DL (ref 65–117)
GLUCOSE BLD STRIP.AUTO-MCNC: 289 MG/DL (ref 65–117)
GLUCOSE BLD STRIP.AUTO-MCNC: 389 MG/DL (ref 65–117)
GLUCOSE SERPL-MCNC: 209 MG/DL (ref 65–100)
GLUCOSE UR STRIP.AUTO-MCNC: NEGATIVE MG/DL
HBA1C MFR BLD: 8 % (ref 4–5.6)
HCT VFR BLD AUTO: 33.3 % (ref 35–47)
HGB BLD-MCNC: 10.2 G/DL (ref 11.5–16)
HGB UR QL STRIP: ABNORMAL
HYALINE CASTS URNS QL MICRO: ABNORMAL /LPF (ref 0–5)
IMM GRANULOCYTES # BLD AUTO: 0.03 K/UL (ref 0–0.04)
IMM GRANULOCYTES NFR BLD AUTO: 0.5 % (ref 0–0.5)
KETONES UR QL STRIP.AUTO: NEGATIVE MG/DL
LEUKOCYTE ESTERASE UR QL STRIP.AUTO: ABNORMAL
LYMPHOCYTES # BLD: 1.43 K/UL (ref 0.8–3.5)
LYMPHOCYTES NFR BLD: 24.3 % (ref 12–49)
MCH RBC QN AUTO: 26 PG (ref 26–34)
MCHC RBC AUTO-ENTMCNC: 30.6 G/DL (ref 30–36.5)
MCV RBC AUTO: 84.9 FL (ref 80–99)
MONOCYTES # BLD: 0.63 K/UL (ref 0–1)
MONOCYTES NFR BLD: 10.7 % (ref 5–13)
NEUTS SEG # BLD: 3.58 K/UL (ref 1.8–8)
NEUTS SEG NFR BLD: 60.9 % (ref 32–75)
NITRITE UR QL STRIP.AUTO: POSITIVE
NRBC # BLD: 0 K/UL (ref 0–0.01)
NRBC BLD-RTO: 0 PER 100 WBC
PH UR STRIP: 5 (ref 5–8)
PLATELET # BLD AUTO: 243 K/UL (ref 150–400)
PMV BLD AUTO: 10.6 FL (ref 8.9–12.9)
POTASSIUM SERPL-SCNC: 4.8 MMOL/L (ref 3.5–5.1)
PROT UR STRIP-MCNC: ABNORMAL MG/DL
RBC # BLD AUTO: 3.92 M/UL (ref 3.8–5.2)
RBC #/AREA URNS HPF: ABNORMAL /HPF (ref 0–5)
SERVICE CMNT-IMP: ABNORMAL
SODIUM SERPL-SCNC: 140 MMOL/L (ref 136–145)
SP GR UR REFRACTOMETRY: 1.03 (ref 1–1.03)
URINE CULTURE IF INDICATED: ABNORMAL
UROBILINOGEN UR QL STRIP.AUTO: 0.2 EU/DL (ref 0.2–1)
WBC # BLD AUTO: 5.9 K/UL (ref 3.6–11)
WBC URNS QL MICRO: >100 /HPF (ref 0–4)

## 2025-06-10 PROCEDURE — 83036 HEMOGLOBIN GLYCOSYLATED A1C: CPT

## 2025-06-10 PROCEDURE — 82962 GLUCOSE BLOOD TEST: CPT

## 2025-06-10 PROCEDURE — 99232 SBSQ HOSP IP/OBS MODERATE 35: CPT | Performed by: SURGERY

## 2025-06-10 PROCEDURE — 2580000003 HC RX 258: Performed by: SURGERY

## 2025-06-10 PROCEDURE — 2500000003 HC RX 250 WO HCPCS: Performed by: SURGERY

## 2025-06-10 PROCEDURE — 6370000000 HC RX 637 (ALT 250 FOR IP): Performed by: SURGERY

## 2025-06-10 PROCEDURE — 1100000000 HC RM PRIVATE

## 2025-06-10 PROCEDURE — 93010 ELECTROCARDIOGRAM REPORT: CPT | Performed by: INTERNAL MEDICINE

## 2025-06-10 PROCEDURE — 6360000002 HC RX W HCPCS: Performed by: SURGERY

## 2025-06-10 PROCEDURE — 80048 BASIC METABOLIC PNL TOTAL CA: CPT

## 2025-06-10 PROCEDURE — 85025 COMPLETE CBC W/AUTO DIFF WBC: CPT

## 2025-06-10 RX ORDER — INSULIN GLARGINE 300 U/ML
30 INJECTION, SOLUTION SUBCUTANEOUS DAILY
COMMUNITY

## 2025-06-10 RX ORDER — INSULIN GLARGINE 100 [IU]/ML
24 INJECTION, SOLUTION SUBCUTANEOUS DAILY
Status: DISCONTINUED | OUTPATIENT
Start: 2025-06-10 | End: 2025-06-11 | Stop reason: HOSPADM

## 2025-06-10 RX ADMIN — PANTOPRAZOLE SODIUM 40 MG: 40 TABLET, DELAYED RELEASE ORAL at 10:10

## 2025-06-10 RX ADMIN — INSULIN LISPRO 16 UNITS: 100 INJECTION, SOLUTION INTRAVENOUS; SUBCUTANEOUS at 22:09

## 2025-06-10 RX ADMIN — ESCITALOPRAM OXALATE 20 MG: 10 TABLET ORAL at 10:10

## 2025-06-10 RX ADMIN — INSULIN LISPRO 4 UNITS: 100 INJECTION, SOLUTION INTRAVENOUS; SUBCUTANEOUS at 13:00

## 2025-06-10 RX ADMIN — INSULIN LISPRO 4 UNITS: 100 INJECTION, SOLUTION INTRAVENOUS; SUBCUTANEOUS at 06:53

## 2025-06-10 RX ADMIN — INSULIN GLARGINE 24 UNITS: 100 INJECTION, SOLUTION SUBCUTANEOUS at 10:10

## 2025-06-10 RX ADMIN — SODIUM CHLORIDE: 0.9 INJECTION, SOLUTION INTRAVENOUS at 12:28

## 2025-06-10 RX ADMIN — ASPIRIN 81 MG CHEWABLE TABLET 81 MG: 81 TABLET CHEWABLE at 10:23

## 2025-06-10 RX ADMIN — INSULIN LISPRO 8 UNITS: 100 INJECTION, SOLUTION INTRAVENOUS; SUBCUTANEOUS at 18:22

## 2025-06-10 RX ADMIN — LEVOTHYROXINE SODIUM 250 MCG: 0.12 TABLET ORAL at 06:49

## 2025-06-10 RX ADMIN — ENOXAPARIN SODIUM 30 MG: 100 INJECTION SUBCUTANEOUS at 22:09

## 2025-06-10 RX ADMIN — PREGABALIN 75 MG: 75 CAPSULE ORAL at 22:08

## 2025-06-10 RX ADMIN — SODIUM CHLORIDE: 0.9 INJECTION, SOLUTION INTRAVENOUS at 22:59

## 2025-06-10 RX ADMIN — CEFTRIAXONE SODIUM 2000 MG: 1 INJECTION, POWDER, FOR SOLUTION INTRAMUSCULAR; INTRAVENOUS at 18:21

## 2025-06-10 RX ADMIN — ATORVASTATIN CALCIUM 40 MG: 40 TABLET, FILM COATED ORAL at 22:08

## 2025-06-10 RX ADMIN — ENOXAPARIN SODIUM 30 MG: 100 INJECTION SUBCUTANEOUS at 10:23

## 2025-06-10 RX ADMIN — METRONIDAZOLE 500 MG: 500 INJECTION, SOLUTION INTRAVENOUS at 22:09

## 2025-06-10 RX ADMIN — METRONIDAZOLE 500 MG: 500 INJECTION, SOLUTION INTRAVENOUS at 07:03

## 2025-06-10 RX ADMIN — ZINC SULFATE 220 MG (50 MG) CAPSULE 50 MG: CAPSULE at 10:09

## 2025-06-10 RX ADMIN — METRONIDAZOLE 500 MG: 500 INJECTION, SOLUTION INTRAVENOUS at 12:30

## 2025-06-10 RX ADMIN — PREGABALIN 75 MG: 75 CAPSULE ORAL at 10:09

## 2025-06-10 NOTE — CARE COORDINATION
Care Management Initial Assessment       RUR: 13% Low  Readmission? No  1st IM letter given? Yes - 6/10/25  1st  letter given: NA     CM met with patient and sister at bedside to introduce self and explain role. Patient resides at [facility]. Return referral sent via [facility] and accepted. Patient requires assistance with all ADL's, IADL's and ambulates with a WC. Likely BLS transport at time of DC.     06/10/25 1310   Service Assessment   Patient Orientation Alert and Oriented;Person;Place;Situation;Self   Cognition Alert   History Provided By Patient   Primary Caregiver Self   Accompanied By/Relationship EMS   Support Systems Family Members   Patient's Healthcare Decision Maker is: Legal Next of Kin   PCP Verified by CM Yes  (Dr. Rai Gao)   Last Visit to PCP Within last 3 months   Prior Functional Level Assistance with the following:;Bathing;Dressing;Housework   Current Functional Level Assistance with the following:;Bathing;Dressing;Housework   Can patient return to prior living arrangement Yes   Ability to make needs known: Good   Family able to assist with home care needs: Yes   Would you like for me to discuss the discharge plan with any other family members/significant others, and if so, who? Yes   Financial Resources Medicare   Social/Functional History   Lives With Alone   Type of Home Facility  ([facility])   Home Equipment Wheelchair - Manual   Prior Level of Assist for ADLs Needs assistance   Prior Level of Assist for Homemaking Needs assistance   Ambulation Assistance Independent   Prior Level of Assist for Transfers Independent   Discharge Planning   Type of Residence Long-Term Care   Living Arrangements Alone   Current Services Prior To Admission Durable Medical Equipment   Current DME Prior to Arrival Wheelchair   DME Ordered? No   Potential Assistance Purchasing Medications No   Patient expects to be discharged to: Long-term care  ([facility])      Carmen George, [facility]   [phone]

## 2025-06-10 NOTE — WOUND CARE
WOCN Note:     New consult placed for \"R heel\"    Assessed in .  Family at the bedside.    Courtney Cortez is a 75 y.o. y/o female who presented for Acute cholecystitis [K81.0]  Admitted on 2025; LOS: 1    Lab Results   Component Value Date/Time    WBC 5.9 06/10/2025 03:58 AM    LABA1C 8.0 (H) 06/10/2025 03:56 AM    POCGLU 189 (H) 06/10/2025 06:49 AM    POCGLU 248 (H) 2025 10:32 PM    HGB 10.2 (L) 06/10/2025 03:58 AM    HCT 33.3 (L) 06/10/2025 03:58 AM     06/10/2025 03:58 AM      Lab Results   Component Value Date/Time    WBC 5.9 06/10/2025 03:58 AM        Tobacco Use      Smoking status: Former        Packs/day: 0.00        Years: 1 pack/day for 25.0 years (25.0 ttl pk-yrs)        Types: Cigarettes        Start date: 1960        Quit date: 1985        Years since quittin.3      Smokeless tobacco: Never     ADULT DIET; Regular     Assessment:   Patient is A&O x 3, communicative and mobile.    Bed: foam mattress  GI/: pure wick  Patient reports no pain.   Heels offloaded with pillows.      Wound Assessment  POA Right plantar heel, DFU: 0.8 x 1 x 0.7 cm tunnels 0.4cm at 12 o'clock;  100% pink; no exudate; no odor. Periwound without erythema.   TX: cleansed with saline; applied collagen ag (puracol ag); covered with foam dressing.      Wound, Pressure Prevention & Skin Care Recommendations:    Minimize layers of linen/pads under patient to optimize support surface.    2.  Turn/reposition approximately every 2 hours and offload heels.   3.  Manage moisture/ Keep skin folds clean and dry/minimize brief usage.  4.  Right heel:  every 3 days cleanse with saline; apply collagen ag (puracol ag); cover with foam dressing.  5.  Sacrum:  Apply Sacral Transparent Dressing; change every 4 days and as needed; do not use with heavy incontinence.    Discussed above plan with patient & Beckie EPSTEIN.    Transition of Care:   Plan to follow as needed while admitted to hospital.    Lilia Dexter,

## 2025-06-10 NOTE — PROGRESS NOTES
Spiritual Health History and Assessment/Progress Note  [facility]    Spiritual/Emotional Needs,  ,  ,      Name: Courtney Cortez MRN: [id number]    Age: 75 y.o.     Sex: female   Language: English   Voodoo: Mandaen   Colitis     Date: 6/10/2025            Total Time Calculated: 20 min              Spiritual Assessment began in [facility] 5E2 SURGICAL UNIT        Referral/Consult From: Nurse   Encounter Overview/Reason: Spiritual/Emotional Needs  Service Provided For: Patient    Nichole, Belief, Meaning:   Patient identifies as spiritual  Family/Friends No family/friends present      Importance and Influence:  Patient has spiritual/personal beliefs that influence decisions regarding their health  Family/Friends     Community:  Patient feels well-supported. Support system includes: Friends  Family/Friends     Assessment and Plan of Care:    visited with patient for spiritual distress consult. Patient was alert and willing to engage with  for the visit. Patient shared life review and that she is feeling more positive today than yesterday. She believes is God and uses this belief as a support system.  experienced patient to be spiritually well-being as evidenced by her present feeling of peace, her ability to identify when she is feeling stress and utilize her coping skills.   Patient Interventions include: Facilitated expression of thoughts and feelings and Explored spiritual coping/struggle/distress,  provided supportive presence and explored patients spiritual orientation.  Outcome: patient felt supported, prayed with    Family/Friends Interventions include:     Patient Plan of Care: Spiritual Care available upon further referral  Family/Friends Plan of Care:     Electronically signed by Geremias Jarrett Chaplain Resident on 6/10/2025 at 10:57 AM

## 2025-06-10 NOTE — ED NOTES
Pt had an incontinent episode of urine. Pt notified that she has a bed upstairs and she would prefer to be changed once she is in her room upstairs for more privacy

## 2025-06-10 NOTE — PROGRESS NOTES
Surgery Progress Note    6/10/2025    Admit Date: 6/9/2025  1:31 PM    CC: Abdominal pain      Subjective:     Patient feeling better.  No BM overnight.  No nausea.    Constitutional: No fever or chills  Neurologic: No headache  Eyes: No scleral icterus or irritated eyes  Nose: No nasal pain or drainage  Mouth: No oral lesions or sore throat  Cardiac: No palpations or chest pain  Pulmonary: No cough or shortness of breath  Gastrointestinal: Abdominal pain, no nausea, emesis, diarrhea, or constipation  Genitourinary: No dysuria  Musculoskeletal: No muscle or joint tenderness  Skin: No rashes or lesions  Psychiatric: No anxiety or depressed mood    Objective:     Vitals:    06/10/25 0846   BP: (!) 122/44   Pulse: 69   Resp: 20   Temp: 98.2 °F (36.8 °C)   SpO2:        General: No acute distress, conversant  Eyes: PERRLA, no scleral icterus  HENT: Normocephalic without oral lesions  Neck: Trachea midline without LAD  Cardiac: Normal pulse rate and rhythm  Pulmonary: Symmetric chest rise with normal effort  GI: Soft, minimal tenderness mid abdomen, ND, no hernia, no splenomegaly  Skin: Warm without rash  Extremities: No edema or joint stiffness  Psych: Appropriate mood and affect    Labs, vital signs, and I/O reviewed.    Assessment:     75-year-old female with gallstones and concern for colitis    Plan:     As needed pain control  Continue IV fluid  Home diabetes regimen  Regular diet  Monitor stool.  Stool cultures ordered  Lovenox  Case management for discharge planning  PT  Possibly home in next 24 to 48 hours    Phi Jarrett MD, [facility], [facility]  Bariatric and General Surgeon  [facility]

## 2025-06-10 NOTE — H&P
[facility] Specialists  H and P     Admit Date: 6/9/2025  Reason for Consultation: abdominal pain, +cholelithaisis     HPI:  Courtney Cortez is a 75 y.o. female w/ very long hx as below and on plavix presents to the ED w/ one week of crampy abd pain and diarrhea x2 days.  No n/v.  She has no pain at present.  She also has no pain after eating a fatty meal.  Alk phos is elevated but no other LFTs or bili.  WBC is nl.   Likely on plavix for multiple endovascular revascularizations, pt not sure why she is on it.       US  IMPRESSION:  1. Cholelithiasis with gallbladder distention but without biliary ductal  dilatation..  2. Heterogeneous echotexture of liver parenchyma, making evaluation of the liver  difficult sonographically. Further evaluation would require dedicated hepatic  MRI or CT.                   Patient Active Problem List     Diagnosis Date Noted    Calculus of gallbladder without cholecystitis without obstruction 06/09/2025    Cellulitis of right lower extremity 12/18/2023    Encephalopathy acute 12/18/2023    Acute osteomyelitis of right calcaneus (HCC) 12/18/2023    Enterococcal infection 12/18/2023    Infection caused by Enterobacter cloacae 12/18/2023    Anaerobic bacterial infection 12/18/2023    Hypothyroidism 12/18/2023    Metabolic encephalopathy 12/18/2023    Obesity (BMI 30-39.9) 12/18/2023    Septicemia (HCC) 12/09/2023    Spinal stenosis of lumbar region without neurogenic claudication 02/25/2023    Cervicalgia 02/25/2023    Peripheral vascular disease 12/30/2022    Recurrent major depressive disorder, in partial remission 12/30/2022    LOCKHART (nonalcoholic steatohepatitis) 11/15/2018    Severe obesity (BMI 35.0-39.9) with comorbidity (HCC) 05/30/2018    Type 2 diabetes mellitus with diabetic peripheral angiopathy without gangrene, with long-term current use of insulin (HCC) 10/28/2015    Renal insufficiency 10/23/2014    Hypothyroidism, acquired, autoimmune 11/20/2009    Mixed hyperlipidemia

## 2025-06-11 VITALS
OXYGEN SATURATION: 94 % | TEMPERATURE: 97.9 F | HEART RATE: 72 BPM | BODY MASS INDEX: 39.95 KG/M2 | DIASTOLIC BLOOD PRESSURE: 73 MMHG | SYSTOLIC BLOOD PRESSURE: 149 MMHG | HEIGHT: 66 IN | RESPIRATION RATE: 16 BRPM | WEIGHT: 248.6 LBS

## 2025-06-11 LAB
BACTERIA SPEC CULT: NORMAL
BASOPHILS # BLD: 0.04 K/UL (ref 0–0.1)
BASOPHILS NFR BLD: 0.8 % (ref 0–1)
CC UR VC: NORMAL
DIFFERENTIAL METHOD BLD: ABNORMAL
EOSINOPHIL # BLD: 0.16 K/UL (ref 0–0.4)
EOSINOPHIL NFR BLD: 3.1 % (ref 0–7)
ERYTHROCYTE [DISTWIDTH] IN BLOOD BY AUTOMATED COUNT: 15.6 % (ref 11.5–14.5)
GLUCOSE BLD STRIP.AUTO-MCNC: 288 MG/DL (ref 65–117)
GLUCOSE BLD STRIP.AUTO-MCNC: 304 MG/DL (ref 65–117)
HCT VFR BLD AUTO: 31.7 % (ref 35–47)
HGB BLD-MCNC: 9.9 G/DL (ref 11.5–16)
IMM GRANULOCYTES # BLD AUTO: 0.07 K/UL (ref 0–0.04)
IMM GRANULOCYTES NFR BLD AUTO: 1.3 % (ref 0–0.5)
LYMPHOCYTES # BLD: 1.41 K/UL (ref 0.8–3.5)
LYMPHOCYTES NFR BLD: 27.1 % (ref 12–49)
MCH RBC QN AUTO: 26.4 PG (ref 26–34)
MCHC RBC AUTO-ENTMCNC: 31.2 G/DL (ref 30–36.5)
MCV RBC AUTO: 84.5 FL (ref 80–99)
MONOCYTES # BLD: 0.64 K/UL (ref 0–1)
MONOCYTES NFR BLD: 12.3 % (ref 5–13)
NEUTS SEG # BLD: 2.88 K/UL (ref 1.8–8)
NEUTS SEG NFR BLD: 55.4 % (ref 32–75)
NRBC # BLD: 0 K/UL (ref 0–0.01)
NRBC BLD-RTO: 0 PER 100 WBC
PLATELET # BLD AUTO: 236 K/UL (ref 150–400)
PMV BLD AUTO: 10.5 FL (ref 8.9–12.9)
RBC # BLD AUTO: 3.75 M/UL (ref 3.8–5.2)
SERVICE CMNT-IMP: ABNORMAL
SERVICE CMNT-IMP: ABNORMAL
SERVICE CMNT-IMP: NORMAL
WBC # BLD AUTO: 5.2 K/UL (ref 3.6–11)

## 2025-06-11 PROCEDURE — 2580000003 HC RX 258: Performed by: SURGERY

## 2025-06-11 PROCEDURE — 97530 THERAPEUTIC ACTIVITIES: CPT

## 2025-06-11 PROCEDURE — 85025 COMPLETE CBC W/AUTO DIFF WBC: CPT

## 2025-06-11 PROCEDURE — 82962 GLUCOSE BLOOD TEST: CPT

## 2025-06-11 PROCEDURE — 97165 OT EVAL LOW COMPLEX 30 MIN: CPT

## 2025-06-11 PROCEDURE — 99232 SBSQ HOSP IP/OBS MODERATE 35: CPT | Performed by: SURGERY

## 2025-06-11 PROCEDURE — 6370000000 HC RX 637 (ALT 250 FOR IP): Performed by: SURGERY

## 2025-06-11 PROCEDURE — 6360000002 HC RX W HCPCS: Performed by: SURGERY

## 2025-06-11 RX ORDER — LEVOFLOXACIN 750 MG/1
750 TABLET, FILM COATED ORAL DAILY
Qty: 4 TABLET | Refills: 0 | Status: SHIPPED | OUTPATIENT
Start: 2025-06-11 | End: 2025-06-15

## 2025-06-11 RX ORDER — METRONIDAZOLE 500 MG/1
500 TABLET ORAL 3 TIMES DAILY
Qty: 12 TABLET | Refills: 0 | Status: SHIPPED | OUTPATIENT
Start: 2025-06-11 | End: 2025-06-15

## 2025-06-11 RX ADMIN — ENOXAPARIN SODIUM 30 MG: 100 INJECTION SUBCUTANEOUS at 09:55

## 2025-06-11 RX ADMIN — SODIUM CHLORIDE: 0.9 INJECTION, SOLUTION INTRAVENOUS at 09:56

## 2025-06-11 RX ADMIN — LEVOTHYROXINE SODIUM 250 MCG: 0.12 TABLET ORAL at 06:14

## 2025-06-11 RX ADMIN — ESCITALOPRAM OXALATE 20 MG: 10 TABLET ORAL at 09:54

## 2025-06-11 RX ADMIN — ZINC SULFATE 220 MG (50 MG) CAPSULE 50 MG: CAPSULE at 09:54

## 2025-06-11 RX ADMIN — METRONIDAZOLE 500 MG: 500 INJECTION, SOLUTION INTRAVENOUS at 05:04

## 2025-06-11 RX ADMIN — PREGABALIN 75 MG: 75 CAPSULE ORAL at 09:54

## 2025-06-11 RX ADMIN — INSULIN LISPRO 8 UNITS: 100 INJECTION, SOLUTION INTRAVENOUS; SUBCUTANEOUS at 12:35

## 2025-06-11 RX ADMIN — INSULIN LISPRO 12 UNITS: 100 INJECTION, SOLUTION INTRAVENOUS; SUBCUTANEOUS at 06:14

## 2025-06-11 RX ADMIN — ASPIRIN 81 MG CHEWABLE TABLET 81 MG: 81 TABLET CHEWABLE at 09:54

## 2025-06-11 RX ADMIN — INSULIN GLARGINE 24 UNITS: 100 INJECTION, SOLUTION SUBCUTANEOUS at 09:55

## 2025-06-11 RX ADMIN — METRONIDAZOLE 500 MG: 500 INJECTION, SOLUTION INTRAVENOUS at 12:36

## 2025-06-11 RX ADMIN — PANTOPRAZOLE SODIUM 40 MG: 40 TABLET, DELAYED RELEASE ORAL at 09:54

## 2025-06-11 ASSESSMENT — PAIN SCALES - GENERAL: PAINLEVEL_OUTOF10: 0

## 2025-06-11 NOTE — PROGRESS NOTES
Deferred visit: It is noted the patient will transfer back to [place] this afternoon.  Will defer the PT evaluation.

## 2025-06-11 NOTE — PLAN OF CARE
Problem: Occupational Therapy - Adult  Goal: By Discharge: Performs self-care activities at highest level of function for planned discharge setting.  See evaluation for individualized goals.  Description: FUNCTIONAL STATUS PRIOR TO ADMISSION:  pt lives in snf at [facility], does not ambulate, only SPT to w/c but can do without assist. Pt with one fall last week in her closet. She self propels her w/c with her feet and arms. She is mod I for ADLs, staff assists with showering x 2 week.   , Prior Level of Assist for ADLs: Needs assistance,  ,  ,  ,  ,  , Prior Level of Assist for Homemaking: Needs assistance, Ambulation Assistance: Independent, Prior Level of Assist for Transfers: Independent,       HOME SUPPORT: lives alone her in snf apartment    Occupational Therapy Goals:  Initiated 6/11/2025  1.  Patient will perform bathing with Supervision within 7 day(s).  2.  Patient will perform upper body dressing with Supervision within 7 day(s).  3.  Patient will perform lower body dressing with Supervision within 7 day(s).  4.  Patient will perform toilet transfers with Supervision  within 7 day(s).  5.  Patient will perform all aspects of toileting with Supervision within 7 day(s).  Outcome: Progressing    OCCUPATIONAL THERAPY EVALUATION    Patient: Courtney Cortez (75 y.o. female)  Date: 6/11/2025  Primary Diagnosis: Acute cholecystitis [K81.0]         Precautions: Fall Risk, Bed Alarm                  ASSESSMENT :  The patient is limited by decreased functional mobility, strength, balance.    Based on the impairments listed above pt presents likely close to her ADL baseline, but currently with slight generalized weakness s/p admission for right upper quadrant pain, cholelithiasis with no surgical plans at this time. Pt denied pain at this time. She does not ambulate at baseline but is able to SPT to her w/c and BSC at MOD I at her AZRA. This date, pt able to achieve supine to sit with min A and heavy use

## 2025-06-11 NOTE — CARE COORDINATION
Transition of Care Plan:    RUR: 12% Low   Prior Level of Functioning: Assistance w/ ADL's & IADL's; WC bound  Disposition: Return to [facility]   SHERMAN: Wed. 6/11  Follow up appointments: PCP, specialist   DME needed: Defer to SNF   Transportation at discharge: [facility] stretcher transport today, Wed. 6/11 @ 2:30PM   IM/IMM Medicare/ letter given: 1st IM: 6/10  Caregiver Contact: SisterKaci, [phone]  Discharge Caregiver contacted prior to discharge?   Care Conference needed?   Barriers to discharge: None    CM reviewed chart. CM noted DC order. Patient will return to [facility]. Room #: 9141. Nursing to call report to [phone]. Discharge packet placed on hard chart. Hospital to Home stretcher transport set for today @ 2:30PM. Billed to Medicare. Nursing and patient made aware. No further CM needs.    RAJ Rae   [phone]

## 2025-06-11 NOTE — PROGRESS NOTES
Surgery Progress Note    6/11/2025    Admit Date: 6/9/2025  1:31 PM    CC: Slept well      Subjective:     No diarrhea.  Tolerating diet.  Feeling much better    Constitutional: No fever or chills  Neurologic: No headache  Eyes: No scleral icterus or irritated eyes  Nose: No nasal pain or drainage  Mouth: No oral lesions or sore throat  Cardiac: No palpations or chest pain  Pulmonary: No cough or shortness of breath  Gastrointestinal: Abdominal pain, no nausea, emesis, diarrhea, or constipation  Genitourinary: No dysuria  Musculoskeletal: No muscle or joint tenderness  Skin: No rashes or lesions  Psychiatric: No anxiety or depressed mood    Objective:     Vitals:    06/11/25 0932   BP: (!) 149/73   Pulse: 72   Resp:    Temp:    SpO2:        General: No acute distress, conversant  Eyes: PERRLA, no scleral icterus  HENT: Normocephalic without oral lesions  Neck: Trachea midline without LAD  Cardiac: Normal pulse rate and rhythm  Pulmonary: Symmetric chest rise with normal effort  GI: Soft, abdomen minimally tender, ND, no hernia, no splenomegaly  Skin: Warm without rash  Extremities: No edema or joint stiffness  Psych: Appropriate mood and affect    Labs, vital signs, and I/O reviewed.    Assessment:     75-year-old female with gallstones and concern for colitis    Plan:     As needed pain control  Continue IV fluid  Home diabetes regimen  Regular diet  Lovenox  Case management for discharge planning  PT  4 more days of Levaquin Flagyl on discharge  Can DC home today likely    Phi Jarrett MD, FACS, [facility]  Bariatric and General Surgeon  [facility]

## 2025-06-11 NOTE — PROGRESS NOTES
Orders received, chart reviewed and patient evaluated by occupational therapy. Pending progression with skilled acute occupational therapy, recommend:    Intermittent occupational therapy up to 2-3x/week in previous living setting    Recommend with nursing patient to complete as able in order to maintain strength, endurance and independence: OOB to chair 3x/day, ADLs with setup and performing toileting with 1 assist. Thank you for your assistance.     Full evaluation to follow.

## 2025-06-12 NOTE — DISCHARGE SUMMARY
Physician Discharge Summary     Patient ID:  Courtney Cortez  [id number]  75 y.o.  1950    Admit date: 6/9/2025    Discharge date and time: 6/11/2025  3:30 PM     Admitting Physician: Phi Jarrett MD     Discharge Physician: Same    Admission Diagnoses: Colitis    Discharge Diagnoses: Same    Admission Condition: good    Discharged Condition: good    Indication for Admission: Abdominal pain and diarrhea    Hospital Course: 75-year-old female presents with abdominal pain.  Found to have gallstones but also colitis.  Admitted for supportive care and antibiotic therapy.  Her diarrhea improved.  Pain improved.  She was resuscitated.  Discharged after a 3-day hospital stay.      Disposition: home    In process/preliminary results:  Outstanding Order Results       No orders found from 5/11/2025 to 6/10/2025.            Patient Instructions:   Discharge Medication List as of 6/11/2025  2:12 PM        START taking these medications    Details   levoFLOXacin (LEVAQUIN) 750 MG tablet Take 1 tablet by mouth daily for 4 days, Disp-4 tablet, R-0Normal      metroNIDAZOLE (FLAGYL) 500 MG tablet Take 1 tablet by mouth 3 times daily for 4 days, Disp-12 tablet, R-0Normal           CONTINUE these medications which have NOT CHANGED    Details   Insulin Glargine, 2 Unit Dial, (TOUJEO MAX SOLOSTAR) 300 UNIT/ML concentrated injection pen Inject 30 Units into the skin dailyHistorical Med      Dextromethorphan-guaiFENesin (ROBITUSSIN COUGH+CHEST CAROL DM PO) Take 10 mLs by mouthHistorical Med      loperamide (IMODIUM) 2 MG capsule Take 1 capsule by mouth 4 times daily as needed for DiarrheaHistorical Med      ondansetron (ZOFRAN) 4 MG tablet Take 1 tablet by mouth as needed for Nausea or Vomiting Disintegtaing tablet.  Every 6 hrs prnHistorical Med      Vitamin D3 125 MCG (5000 UT) TABS tablet Take 1 tablet by mouth dailyHistorical Med      acetaminophen (TYLENOL) 500 MG tablet Take 1 tablet by mouth every 8 (eight) hoursHistorical Med

## 2025-06-23 NOTE — PROGRESS NOTES
Physician Progress Note      PATIENT:               BRISA PARK  CSN #:                  [id number]  :                       1950  ADMIT DATE:       2025 1:31 PM  DISCH DATE:        2025 3:30 PM  RESPONDING  PROVIDER #:        Phi Jarrett MD          QUERY TEXT:    Colitis is documented in the medical record in H&P  by Phi Jarrett MD. ?Please specify the type of colitis such as    The clinical indicators include:  75 y.o. female w/ Cholelithiasis with gallbladder and on plavix presents to   the ED w/ one week of crampy abd pain and diarrhea x2 days.    \"Patient presents with abdominal pain in the right upper quadrant and about 5   or so soft, pudding-like, stools.  She reports incontinence.  She is on Plavix   for history of PAD.  CT scan ordered per ER physician with possible acute   colitis. Plan to start abx\" in H&P  ( Phi Jarrett MD)    \"75-year-old female presents with abdominal pain.  Found to have gallstones   but also colitis.  Admitted for supportive care and antibiotic therapy.  Her   diarrhea improved.  Pain improved\" in DS  (Phi Jarrett MD)    Findings suggestive of acute colitis in CT abdomen   -Levaquin and Flagyl IVPB, ceftriaxone, CT abdomen    Thank you,  ERNA Nieves CDS  Options provided:  -- Bacterial Colitis  -- Infectious Colitis  -- Other - I will add my own diagnosis  -- Disagree - Not applicable / Not valid  -- Disagree - Clinically unable to determine / Unknown  -- Refer to Clinical Documentation Reviewer    PROVIDER RESPONSE TEXT:    This patient has infectious colitis.    Query created by: Ysabel Galan on 2025 6:00 AM      Electronically signed by:  Phi Jarrett MD 2025 10:14 PM

## 2025-06-27 ENCOUNTER — OFFICE VISIT (OUTPATIENT)
Age: 75
End: 2025-06-27
Payer: MEDICARE

## 2025-06-27 VITALS
HEIGHT: 66 IN | BODY MASS INDEX: 40 KG/M2 | TEMPERATURE: 98.2 F | SYSTOLIC BLOOD PRESSURE: 139 MMHG | HEART RATE: 75 BPM | OXYGEN SATURATION: 97 % | DIASTOLIC BLOOD PRESSURE: 79 MMHG | RESPIRATION RATE: 18 BRPM | WEIGHT: 248.9 LBS

## 2025-06-27 DIAGNOSIS — K80.20 SYMPTOMATIC CHOLELITHIASIS: ICD-10-CM

## 2025-06-27 DIAGNOSIS — K52.9 COLITIS: Primary | ICD-10-CM

## 2025-06-27 PROCEDURE — G8417 CALC BMI ABV UP PARAM F/U: HCPCS | Performed by: SURGERY

## 2025-06-27 PROCEDURE — G8399 PT W/DXA RESULTS DOCUMENT: HCPCS | Performed by: SURGERY

## 2025-06-27 PROCEDURE — 1036F TOBACCO NON-USER: CPT | Performed by: SURGERY

## 2025-06-27 PROCEDURE — 99214 OFFICE O/P EST MOD 30 MIN: CPT | Performed by: SURGERY

## 2025-06-27 PROCEDURE — G8427 DOCREV CUR MEDS BY ELIG CLIN: HCPCS | Performed by: SURGERY

## 2025-06-27 PROCEDURE — 1123F ACP DISCUSS/DSCN MKR DOCD: CPT | Performed by: SURGERY

## 2025-06-27 PROCEDURE — 1111F DSCHRG MED/CURRENT MED MERGE: CPT | Performed by: SURGERY

## 2025-06-27 PROCEDURE — 1160F RVW MEDS BY RX/DR IN RCRD: CPT | Performed by: SURGERY

## 2025-06-27 PROCEDURE — 1090F PRES/ABSN URINE INCON ASSESS: CPT | Performed by: SURGERY

## 2025-06-27 PROCEDURE — 1126F AMNT PAIN NOTED NONE PRSNT: CPT | Performed by: SURGERY

## 2025-06-27 PROCEDURE — 1159F MED LIST DOCD IN RCRD: CPT | Performed by: SURGERY

## 2025-06-27 ASSESSMENT — PATIENT HEALTH QUESTIONNAIRE - PHQ9
SUM OF ALL RESPONSES TO PHQ QUESTIONS 1-9: 0
2. FEELING DOWN, DEPRESSED OR HOPELESS: NOT AT ALL
1. LITTLE INTEREST OR PLEASURE IN DOING THINGS: NOT AT ALL
SUM OF ALL RESPONSES TO PHQ QUESTIONS 1-9: 0

## 2025-06-27 NOTE — PROGRESS NOTES
Identified pt with two pt identifiers (name and ). Reviewed chart in preparation for visit and have obtained necessary documentation.    Courtney Cortez is a 75 y.o. female New Patient (Consult for cholecystectomy, ED Follow Up from 25)  .    Vitals:    25 1109   BP: 139/79   BP Site: Left Upper Arm   Patient Position: Sitting   BP Cuff Size: Large Adult   Pulse: 75   Resp: 18   Temp: 98.2 °F (36.8 °C)   TempSrc: Oral   SpO2: 97%   Weight: 112.9 kg (248 lb 14.4 oz)   Height: 1.676 m (5' 5.98\")          1. Have you been to the ER, urgent care clinic since your last visit?  Hospitalized since your last visit?  no     2. Have you seen or consulted any other health care providers outside of the [facility] since your last visit?  Include any pap smears or colon screening.  no

## 2025-06-27 NOTE — PROGRESS NOTES
Surgery Progress Note    6/27/2025    CC: Colitis    Subjective:     75-year-old female following up after recent admission for colitis.  Also found to have gallstones.  She reports her diarrhea has all resolved.  She reports a history of ulcerative colitis and was previously on a medical therapy for this which has since been stopped.  She denies any major pain in her abdomen but reports intermittent pain under her right costal margin.  This is mild at a 3 or 4 out of 10.  Not sure what causes it.  With time the pain improves.  She is going to undergo lower extremity revascularization soon.    Constitutional: No fever or chills  Neurologic: No headache  Eyes: No scleral icterus or irritated eyes  Nose: No nasal pain or drainage  Mouth: No oral lesions or sore throat  Cardiac: No palpations or chest pain  Pulmonary: No cough or shortness of breath  Gastrointestinal: Intermittent abdominal pain, no nausea, emesis, diarrhea, or constipation  Genitourinary: No dysuria  Musculoskeletal: No muscle or joint tenderness  Skin: No rashes or lesions  Psychiatric: No anxiety or depressed mood    Objective:     Vitals:    06/27/25 1109   BP: 139/79   Pulse: 75   Resp: 18   Temp: 98.2 °F (36.8 °C)   SpO2: 97%     Body mass index is 40.2 kg/m².  Wt 248 lbs    General: No acute distress, conversant  Eyes: PERRLA, no scleral icterus  HENT: Normocephalic without oral lesions  Neck: Trachea midline without LAD  Cardiac: Normal pulse rate and rhythm  Pulmonary: Symmetric chest rise with normal effort  GI: Soft, NT, ND, no hernia, no splenomegaly  Skin: Warm without rash  Extremities: No edema or joint stiffness  Psych: Appropriate mood and affect    Assessment:     75-year-old female with a recent admission for colitis who responded to antibiotic therapy with possible history of ulcerative colitis as well as gallstones    Plan:     I recommend she completes therapy for her lower extremity claudication.  Following that, she should see

## 2025-07-07 ENCOUNTER — TELEPHONE (OUTPATIENT)
Age: 75
End: 2025-07-07

## 2025-07-07 ENCOUNTER — PREP FOR PROCEDURE (OUTPATIENT)
Age: 75
End: 2025-07-07

## 2025-07-07 NOTE — TELEPHONE ENCOUNTER
Contacted patients sister, Kaci, to schedule colonoscopy for patient. Kaci accepted 7/29. Notified of arrival time and stated that we will reach out to her once it gets closer. Kaci thanked me for the call.

## 2025-07-11 RX ORDER — SODIUM CHLORIDE 9 MG/ML
INJECTION, SOLUTION INTRAVENOUS PRN
Status: CANCELLED | OUTPATIENT
Start: 2025-07-11

## 2025-07-11 RX ORDER — SODIUM CHLORIDE 0.9 % (FLUSH) 0.9 %
5-40 SYRINGE (ML) INJECTION PRN
Status: CANCELLED | OUTPATIENT
Start: 2025-07-11

## 2025-07-11 RX ORDER — SODIUM CHLORIDE 9 MG/ML
INJECTION, SOLUTION INTRAVENOUS CONTINUOUS
Status: CANCELLED | OUTPATIENT
Start: 2025-07-11

## 2025-07-11 RX ORDER — SODIUM CHLORIDE 0.9 % (FLUSH) 0.9 %
5-40 SYRINGE (ML) INJECTION EVERY 12 HOURS SCHEDULED
Status: CANCELLED | OUTPATIENT
Start: 2025-07-11

## 2025-07-18 ENCOUNTER — TELEPHONE (OUTPATIENT)
Age: 75
End: 2025-07-18

## 2025-07-18 NOTE — TELEPHONE ENCOUNTER
----- Message from DANYELL ROBERTSON LPN sent at 7/18/2025  2:33 PM EDT -----  Regarding: FW: Colonoscopy    ----- Message -----  From: Sahara Mars  Sent: 7/7/2025   8:34 AM EDT  To: Stephani Hernadez LPN  Subject: Colonoscopy                                      Patient scheduled for colonoscopy on 8/29  [facility], Kaci(sister) stated that prep will need to be sent into rehab.

## 2025-07-25 NOTE — TELEPHONE ENCOUNTER
Left message [facility] Rodriguez unit care manager to return call regarding prep instruction for scheduled colonoscopy 8/29/25

## 2025-07-28 RX ORDER — SODIUM, POTASSIUM,MAG SULFATES 17.5-3.13G
1 SOLUTION, RECONSTITUTED, ORAL ORAL ONCE
Qty: 1 EACH | Refills: 0 | Status: SHIPPED | OUTPATIENT
Start: 2025-07-28 | End: 2025-07-28

## 2025-07-28 RX ORDER — SODIUM, POTASSIUM,MAG SULFATES 17.5-3.13G
SOLUTION, RECONSTITUTED, ORAL ORAL SEE ADMIN INSTRUCTIONS
Status: CANCELLED | OUTPATIENT
Start: 2025-07-28

## 2025-07-28 NOTE — TELEPHONE ENCOUNTER
Returned call to Kenyatta Rodriguez with [employer] verified patient using 2 identifiers. She states that she was returning a call in regards to the patient that is scheduled for her colonoscopy 8/29.   She states that typically the prescriptions are faxed to them and they will send it to the [facility] who will send the medication to them. She also requested to have the colonoscopy instructions faxed to them as well. [phone] with the atten to Kenyatta Rodriguez.     I did ask if she knew if the [facility] had an escribe because typically we don't fax prescriptions they are sent electrically. I informed her that I will see how we can get the prescription to them. She appreciated the return call and will be on the look out for the prescription and instructions.

## 2025-07-28 NOTE — TELEPHONE ENCOUNTER
Halie You to get the [facility] and was informed that it was located on [address] in [place]. Location was found and the pharmacy has been added. Forwarded to Dr. Smith to send the prescription to the [facility] and will fax the instructions to the facility as requested.

## 2025-08-22 ENCOUNTER — TELEPHONE (OUTPATIENT)
Age: 75
End: 2025-08-22

## 2025-08-29 ENCOUNTER — HOSPITAL ENCOUNTER (OUTPATIENT)
Facility: HOSPITAL | Age: 75
Setting detail: OUTPATIENT SURGERY
Discharge: HOME | End: 2025-08-29
Attending: SURGERY | Admitting: SURGERY
Payer: MEDICARE

## 2025-08-29 ENCOUNTER — ANESTHESIA EVENT (OUTPATIENT)
Facility: HOSPITAL | Age: 75
End: 2025-08-29
Payer: MEDICARE

## 2025-08-29 ENCOUNTER — ANESTHESIA (OUTPATIENT)
Facility: HOSPITAL | Age: 75
End: 2025-08-29
Payer: MEDICARE

## 2025-08-29 VITALS
TEMPERATURE: 97.7 F | OXYGEN SATURATION: 96 % | BODY MASS INDEX: 40 KG/M2 | RESPIRATION RATE: 17 BRPM | WEIGHT: 248.9 LBS | HEIGHT: 66 IN | SYSTOLIC BLOOD PRESSURE: 118 MMHG | DIASTOLIC BLOOD PRESSURE: 57 MMHG | HEART RATE: 66 BPM

## 2025-08-29 LAB
GLUCOSE BLD STRIP.AUTO-MCNC: 149 MG/DL (ref 65–117)
SERVICE CMNT-IMP: ABNORMAL

## 2025-08-29 PROCEDURE — 6360000002 HC RX W HCPCS: Performed by: NURSE ANESTHETIST, CERTIFIED REGISTERED

## 2025-08-29 PROCEDURE — 88305 TISSUE EXAM BY PATHOLOGIST: CPT

## 2025-08-29 PROCEDURE — 2709999900 HC NON-CHARGEABLE SUPPLY: Performed by: SURGERY

## 2025-08-29 PROCEDURE — 3600000012 HC SURGERY LEVEL 2 ADDTL 15MIN: Performed by: SURGERY

## 2025-08-29 PROCEDURE — 7100000000 HC PACU RECOVERY - FIRST 15 MIN: Performed by: SURGERY

## 2025-08-29 PROCEDURE — 82962 GLUCOSE BLOOD TEST: CPT

## 2025-08-29 PROCEDURE — 3700000001 HC ADD 15 MINUTES (ANESTHESIA): Performed by: SURGERY

## 2025-08-29 PROCEDURE — 3600000002 HC SURGERY LEVEL 2 BASE: Performed by: SURGERY

## 2025-08-29 PROCEDURE — 45378 DIAGNOSTIC COLONOSCOPY: CPT | Performed by: SURGERY

## 2025-08-29 PROCEDURE — 7100000001 HC PACU RECOVERY - ADDTL 15 MIN: Performed by: SURGERY

## 2025-08-29 PROCEDURE — 3700000000 HC ANESTHESIA ATTENDED CARE: Performed by: SURGERY

## 2025-08-29 RX ORDER — LIDOCAINE HYDROCHLORIDE 20 MG/ML
INJECTION, SOLUTION EPIDURAL; INFILTRATION; INTRACAUDAL; PERINEURAL
Status: DISCONTINUED | OUTPATIENT
Start: 2025-08-29 | End: 2025-08-29 | Stop reason: SDUPTHER

## 2025-08-29 RX ORDER — PROPOFOL 10 MG/ML
INJECTION, EMULSION INTRAVENOUS
Status: DISCONTINUED | OUTPATIENT
Start: 2025-08-29 | End: 2025-08-29 | Stop reason: SDUPTHER

## 2025-08-29 RX ADMIN — PROPOFOL 30 MG: 10 INJECTION, EMULSION INTRAVENOUS at 07:39

## 2025-08-29 RX ADMIN — PROPOFOL 50 MG: 10 INJECTION, EMULSION INTRAVENOUS at 07:34

## 2025-08-29 RX ADMIN — PROPOFOL 30 MG: 10 INJECTION, EMULSION INTRAVENOUS at 07:37

## 2025-08-29 RX ADMIN — LIDOCAINE HYDROCHLORIDE 50 MG: 20 INJECTION, SOLUTION EPIDURAL; INFILTRATION; INTRACAUDAL; PERINEURAL at 07:34

## 2025-08-29 RX ADMIN — PROPOFOL 40 MG: 10 INJECTION, EMULSION INTRAVENOUS at 07:41

## 2025-08-29 ASSESSMENT — PAIN SCALES - GENERAL
PAINLEVEL_OUTOF10: 0

## 2025-08-29 ASSESSMENT — PAIN - FUNCTIONAL ASSESSMENT: PAIN_FUNCTIONAL_ASSESSMENT: 0-10

## (undated) DEVICE — BLADE,CARBON-STEEL,15,STRL,DISPOSABLE,TB: Brand: MEDLINE

## (undated) DEVICE — BANDAGE,GAUZE,BULKEE II,4.5"X4.1YD,STRL: Brand: MEDLINE

## (undated) DEVICE — ZIMMER® STERILE DISPOSABLE TOURNIQUET CUFF WITH PLC, DUAL PORT, SINGLE BLADDER, 34 IN. (86 CM)

## (undated) DEVICE — SOLUTION IRRIG 1000ML 0.9% SOD CHL USP POUR PLAS BTL

## (undated) DEVICE — INTENDED FOR TISSUE SEPARATION, AND OTHER PROCEDURES THAT REQUIRE A SHARP SURGICAL BLADE TO PUNCTURE OR CUT.: Brand: BARD-PARKER ® CARBON RIB-BACK BLADES

## (undated) DEVICE — Z DISCONTINUED PER MEDLINE (LOW STOCK)  USE 2422770 DRAPE C ARM W54XL78IN FOR FLROSCN

## (undated) DEVICE — STERILE POLYISOPRENE POWDER-FREE SURGICAL GLOVES: Brand: PROTEXIS

## (undated) DEVICE — Z DUP USE 2304023 K WIRE FIX L6IN DIA0.045IN [16001645] [MICRO AIRE SURGICAL INST]

## (undated) DEVICE — DEVON™ KNEE AND BODY STRAP 60" X 3" (1.5 M X 7.6 CM): Brand: DEVON

## (undated) DEVICE — SOLUTION IV 1000ML 0.9% SOD CHL

## (undated) DEVICE — TOWEL SURG W17XL27IN STD BLU COT NONFENESTRATED PREWASHED

## (undated) DEVICE — SUTURE VCRL SZ 4-0 L27IN ABSRB UD L19MM PS-2 3/8 CIR PRIM J426H

## (undated) DEVICE — REM POLYHESIVE ADULT PATIENT RETURN ELECTRODE: Brand: VALLEYLAB

## (undated) DEVICE — SUTURE MONOCRYL SZ 4-0 L27IN ABSRB UD L19MM PS-2 1/2 CIR PRIM Y426H

## (undated) DEVICE — PADDING UNDERCAST W3INXL12FT RAYON POLY SYN NONADHESIVE

## (undated) DEVICE — CATHETER,URETHRAL,REDRUBBER,STRL,16FR: Brand: MEDLINE

## (undated) DEVICE — BANDAGE COBAN 4 IN COMPR W4INXL5YD FOAM COHESIVE QUIK STK SELF ADH SFT

## (undated) DEVICE — GOWN,SIRUS,NONRNF,SETINSLV,2XL,18/CS: Brand: MEDLINE

## (undated) DEVICE — X-RAY DETECTABLE SPONGES,16 PLY: Brand: VISTEC

## (undated) DEVICE — INTENT OT USE PROVIDES A STERILE INTERFACE BETWEEN THE OPERATING ROOM SURGICAL LAMPS (NON-STERILE) AND THE SURGEON OR STAFF WORKING IN THE STERILE FIELD.: Brand: ASPEN® ALC PLUS LIGHT HANDLE COVER

## (undated) DEVICE — SYRINGE MED 10ML LUERLOCK TIP W/O SFTY DISP

## (undated) DEVICE — BANDAGE COMPR W4INXL5YD WHT BGE POLY COT M E WRP WV HK AND

## (undated) DEVICE — BLADE,CARBON-STEEL,10,STRL,DISPOSABLE,TB: Brand: MEDLINE

## (undated) DEVICE — MINOR BASIN -SMH: Brand: MEDLINE INDUSTRIES, INC.

## (undated) DEVICE — STRIP,CLOSURE,WOUND,MEDI-STRIP,1/2X4: Brand: MEDLINE

## (undated) DEVICE — PENCIL SMK EVAC 10 FT BLADE ELECTRD ROCKER FOR TELSCP

## (undated) DEVICE — SYRINGE,TOOMEY,IRRIGATION,70CC,STERILE: Brand: MEDLINE

## (undated) DEVICE — SWAB CULT DBL W/O CHAR RAYON TIP AMIES GEL CLMN FOR COLL

## (undated) DEVICE — STOCKINETTE,IMPERVIOUS,12X48,STERILE: Brand: MEDLINE

## (undated) DEVICE — HYPODERMIC SAFETY NEEDLE: Brand: MONOJECT

## (undated) DEVICE — GLOVE ORANGE PI 7 1/2   MSG9075

## (undated) DEVICE — EXTREMITY - SMH: Brand: MEDLINE INDUSTRIES, INC.

## (undated) DEVICE — COLON KIT WITH 1.1 OZ ORCA HYDRA SEAL 2 GOWN

## (undated) DEVICE — BASIC PACK: Brand: CONVERTORS

## (undated) DEVICE — SUT ETHLN 2-0 18IN FS BLK --

## (undated) DEVICE — PRECISION (9.0 X 0.51 X 31.0MM)

## (undated) DEVICE — KERLIX BANDAGE ROLL: Brand: KERLIX

## (undated) DEVICE — HANDPIECE SET WITH BONE CLEANING TIP AND SUCTION TUBE: Brand: INTERPULSE

## (undated) DEVICE — SPONGE GZ W4XL4IN COT 12 PLY TYP VII WVN C FLD DSGN STERILE

## (undated) DEVICE — BANDAGE,GAUZE,CONFORMING,4"X75",STRL,LF: Brand: MEDLINE

## (undated) DEVICE — DRESSING PETRO W3XL3IN OIL EMUL N ADH GZ KNIT IMPREG CELOS

## (undated) DEVICE — ELECTRODE PT RET AD L9FT HI MOIST COND ADH HYDRGEL CORDED

## (undated) DEVICE — SUTURE MCRYL SZ 5-0 L18IN ABSRB UD PC-3 L16MM 3/8 CIR Y844G

## (undated) DEVICE — Device

## (undated) DEVICE — DRAPE,REIN 53X77,STERILE: Brand: MEDLINE

## (undated) DEVICE — SOLUTION IRRIG 3000ML 0.9% SOD CHL FLX CONT 0797208] ICU MEDICAL INC]

## (undated) DEVICE — BOWL AND CEMENT CARTRIDGE WITH BREAKAWAY FEMORAL NOZZLE: Brand: ACM

## (undated) DEVICE — CURITY NON-ADHERENT STRIPS: Brand: CURITY

## (undated) DEVICE — PENCIL ES L3M BTTN SWCH S STL HEX LOK BLDE ELECTRD HOLSTER

## (undated) DEVICE — STRETCH BANDAGE ROLL: Brand: DERMACEA

## (undated) DEVICE — BANDAGE COMPR W6INXL5YD WHT BGE POLY COT M E WRP WV HK AND

## (undated) DEVICE — HANDLE LT SNAP ON ULT DURABLE LENS FOR TRUMPF ALC DISPOSABLE

## (undated) DEVICE — SUTURE ETHLN SZ 4-0 L18IN NONABSORBABLE BLK L19MM PS-2 3/8 1667H

## (undated) DEVICE — ROCKER SWITCH PENCIL BLADE ELECTRODE, HOLSTER: Brand: EDGE

## (undated) DEVICE — EXTREMITY III-LF: Brand: MEDLINE INDUSTRIES, INC.

## (undated) DEVICE — INFECTION CONTROL KIT SYS

## (undated) DEVICE — 1200 GUARD II KIT W/5MM TUBE W/O VAC TUBE: Brand: GUARDIAN

## (undated) DEVICE — CANISTER, RIGID, 3000CC: Brand: MEDLINE INDUSTRIES, INC.

## (undated) DEVICE — SUTURE ETHLN 2-0 L150CM NONABSORBABLE BLK TP-1 L65MM 1/2 825G

## (undated) DEVICE — SUTURE FIBERWIRE SZ 2 W/ TAPERED NEEDLE BLUE L38IN NONABSORB BLU L26.5MM 1/2 CIRCLE AR7200

## (undated) DEVICE — PAD,ABDOMINAL,5"X9",ST,LF,25/BX: Brand: MEDLINE INDUSTRIES, INC.

## (undated) DEVICE — MASTISOL ADHESIVE LIQ 2/3ML

## (undated) DEVICE — PADDING UNDERCAST W4INXL12FT RAYON POLY SYN NONADHESIVE

## (undated) DEVICE — GAUZE SPONGES,12 PLY: Brand: CURITY

## (undated) DEVICE — PREP SKN PREVAIL 40ML APPL --

## (undated) DEVICE — SOLUTION SURG PREP 26 CC PURPREP

## (undated) DEVICE — FORCEPS BX L240CM JAW DIA2.8MM L CAP W/ NDL MIC MESH TOOTH

## (undated) DEVICE — DRAPE,EXTREMITY,89X128,STERILE: Brand: MEDLINE

## (undated) DEVICE — PADDING CST CRMPD 3INX4YD NS --

## (undated) DEVICE — 6.0MM PEAR

## (undated) DEVICE — PACK,BASIC,SIRUS,V: Brand: MEDLINE

## (undated) DEVICE — IRRIGATION SUCTION CASSETTE: Brand: SONOPET IQ

## (undated) DEVICE — LIGHT HANDLE: Brand: DEVON

## (undated) DEVICE — 3M™ TEGADERM™ TRANSPARENT FILM DRESSING FRAME STYLE, 1626W, 4 IN X 4-3/4 IN (10 CM X 12 CM), 50/CT 4CT/CASE: Brand: 3M™ TEGADERM™

## (undated) DEVICE — DRESSING TRNSPAR W3XL4IN SILIC1 POLYUR RECT NET W/ SAFETAC

## (undated) DEVICE — NEEDLE HYPO 25GA L1.5IN BVL ORIENTED ECLIPSE

## (undated) DEVICE — Z DISCONTINUED USE 2220190 SUTURE VICRYL SZ 3-0 L27IN ABSRB UD L26MM SH 1/2 CIR J416H

## (undated) DEVICE — BANDAGE,ELASTIC,ESMARK,STERILE,4"X9',LF: Brand: MEDLINE

## (undated) DEVICE — 12CM IQ APEX 360: Brand: SONOPET IQ

## (undated) DEVICE — CULTURETTE SGL EVAC TUBE PALL -- 100/CA

## (undated) DEVICE — SURGICAL PROCEDURE PACK BASIN MAJ SET CUST NO CAUT

## (undated) DEVICE — COVER LT HNDL PLAS RIG 1 PER PK

## (undated) DEVICE — TUBING IRRIG L10IN DISP PMP ENDOGATOR E

## (undated) DEVICE — SOLUTION IRRIG 500ML 0.9% SOD CHLO USP POUR PLAS BTL

## (undated) DEVICE — ZIMMER® STERILE DISPOSABLE TOURNIQUET CUFF WITH PLC, DUAL PORT, SINGLE BLADDER, 24 IN. (61 CM)

## (undated) DEVICE — BANDAGE,GAUZE,CONFORMING,3"X75",STRL,LF: Brand: MEDLINE

## (undated) DEVICE — TUBING SUCT 10FR MAL ALUM SHFT FN CAP VENT UNIV CONN W/ OBT

## (undated) DEVICE — NEEDLE HYPO 27GA L1.25IN GRY POLYPR HUB S STL REG BVL STR

## (undated) DEVICE — SUTURE VCRL SZ 2-0 L27IN ABSRB UD L26MM SH 1/2 CIR J417H

## (undated) DEVICE — DISPOSABLE TOURNIQUET CUFF SINGLE BLADDER, DUAL PORT AND QUICK CONNECT CONNECTOR: Brand: COLOR CUFF

## (undated) DEVICE — SUTURE VCRL SZ 4-0 L27IN ABSRB UD L19MM FS-2 3/8 CIR REV J422H

## (undated) DEVICE — SYRINGE MED 5ML STD CLR PLAS LUERLOCK TIP N CTRL DISP

## (undated) DEVICE — CONTAINER,SPECIMEN,4OZ,OR STRL: Brand: MEDLINE

## (undated) DEVICE — BANDAGE COMPR W6INXL12FT SMOOTH FOR LIMB EXSANG ESMARCH

## (undated) DEVICE — TUBING, SUCTION, 1/4" X 10', STRAIGHT: Brand: MEDLINE

## (undated) DEVICE — SYRINGE,EAR/ULCER, 2 OZ, STERILE: Brand: MEDLINE

## (undated) DEVICE — STERILE POLYISOPRENE POWDER-FREE SURGICAL GLOVES WITH EMOLLIENT COATING: Brand: PROTEXIS

## (undated) DEVICE — (D)SYR 10ML 1/5ML GRAD NSAF -- PKGING CHANGE USE ITEM 338027

## (undated) DEVICE — ARGYLE FRAZIER SURGICAL SUCTION INSTRUMENT 10 FR/CH (3.3 MM): Brand: ARGYLE